# Patient Record
Sex: FEMALE | HISPANIC OR LATINO | Employment: PART TIME | URBAN - METROPOLITAN AREA
[De-identification: names, ages, dates, MRNs, and addresses within clinical notes are randomized per-mention and may not be internally consistent; named-entity substitution may affect disease eponyms.]

---

## 2017-08-08 ENCOUNTER — HOSPITAL ENCOUNTER (EMERGENCY)
Facility: CLINIC | Age: 22
Discharge: HOME | End: 2017-08-08
Attending: EMERGENCY MEDICINE | Admitting: EMERGENCY MEDICINE
Payer: COMMERCIAL

## 2017-08-08 VITALS
RESPIRATION RATE: 16 BRPM | HEART RATE: 78 BPM | SYSTOLIC BLOOD PRESSURE: 94 MMHG | DIASTOLIC BLOOD PRESSURE: 69 MMHG | WEIGHT: 116.13 LBS | TEMPERATURE: 98.1 F | OXYGEN SATURATION: 98 % | BODY MASS INDEX: 21.94 KG/M2

## 2017-08-08 DIAGNOSIS — L50.9 HIVES: ICD-10-CM

## 2017-08-08 PROCEDURE — 99282 EMERGENCY DEPT VISIT SF MDM: CPT | Performed by: EMERGENCY MEDICINE

## 2017-08-08 PROCEDURE — 99283 EMERGENCY DEPT VISIT LOW MDM: CPT | Mod: Z6 | Performed by: EMERGENCY MEDICINE

## 2017-08-08 PROCEDURE — 25000132 ZZH RX MED GY IP 250 OP 250 PS 637: Performed by: EMERGENCY MEDICINE

## 2017-08-08 RX ORDER — DIPHENHYDRAMINE HCL 25 MG
25 CAPSULE ORAL EVERY 6 HOURS PRN
Qty: 40 CAPSULE | Refills: 0 | Status: SHIPPED | OUTPATIENT
Start: 2017-08-08 | End: 2018-02-01

## 2017-08-08 RX ORDER — DIPHENHYDRAMINE HCL 25 MG
25 CAPSULE ORAL ONCE
Status: COMPLETED | OUTPATIENT
Start: 2017-08-08 | End: 2017-08-08

## 2017-08-08 RX ADMIN — DIPHENHYDRAMINE HYDROCHLORIDE 25 MG: 25 CAPSULE ORAL at 10:42

## 2017-08-08 ASSESSMENT — ENCOUNTER SYMPTOMS
ABDOMINAL PAIN: 0
NECK STIFFNESS: 0
TROUBLE SWALLOWING: 0
EYE REDNESS: 0
CONFUSION: 0
DIFFICULTY URINATING: 0
COLOR CHANGE: 0
SHORTNESS OF BREATH: 0
FEVER: 0
HEADACHES: 0
ARTHRALGIAS: 0

## 2017-08-08 NOTE — ED AVS SNAPSHOT
[facility], Emergency Department    [address]    [facility] [address]    Phone:  [phone]    Fax:  [phone]                                       Jaqueline Argueta   MRN: [id number]    Department:  [facility], Emergency Department   Date of Visit:  8/8/2017           After Visit Summary Signature Page     I have received my discharge instructions, and my questions have been answered. I have discussed any challenges I see with this plan with the nurse or doctor.    ..........................................................................................................................................  Patient/Patient Representative Signature      ..........................................................................................................................................  Patient Representative Print Name and Relationship to Patient    ..................................................               ................................................  Date                                            Time    ..........................................................................................................................................  Reviewed by Signature/Title    ...................................................              ..............................................  Date                                                            Time

## 2017-08-08 NOTE — DISCHARGE INSTRUCTIONS
Please make an appointment to follow up with [[facility] (phone: [phone])] [as soon as possible] [ even if entirely better.]  Hives (Adult)  Hives are pink or red bumps on the skin. These bumps are also known as wheals. The bumps can itch, burn, or sting. Hives can occur anywhere on the body. They vary in size and shape and can form in clusters. Individual hives can appear and go away quickly. New hives may develop as old ones fade. Hives are common and usually harmless. Occasionally hives are a sign of a serious allergy.  Hives are often caused by an allergic reaction. It may be an allergic reaction to foods such as fruit, shellfish, chocolate, nuts, or tomatoes. It may be a reaction to pollens, animal fur, or mold spores. Medicines, chemicals, and insect bites can also cause hives. And hives can be caused by hot sun or cold air. The cause of hives can be difficult to find.  You may be given medicines to relieve swelling and itching. Follow all instructions when using these medicines. The hives will usually fade in a few days, but can last up to 2 weeks.  Home care  Follow these tips:    Try to find the cause of the hives and eliminate it. Discuss possible causes with your healthcare provider. Future reactions to the same allergen may be worse.    Don t scratch the hives. Scratching will delay healing. To reduce itching, apply cool, wet compresses to the skin.    Dress in soft, loose cotton clothing.    Don t bathe in hot water. This can make the itching worse.    Apply an ice pack or cool pack wrapped in a thin towel to your skin. This will help reduce redness and itching. But if your hives were caused by exposure to cold, then do not apply more cold to them.    You may use over-the counter antihistamines to reduce itching. Some older antihistamines, such as diphenhydramine and chlorpheniramine, are inexpensive. But they need to be taken often and may make you sleepy. They are best  used at bedtime. Don t use diphenhydramine if you have glaucoma or have trouble urinating because of an enlarged prostate. Newer antihistamines, such as loratadine, cetirizine, and fexofenadine, are generally more expensive. But they tend to have fewer side effects, such as drowsiness. They can be taken less often.    Another type of antihistamine is used to treat heartburn. This type includes ranitidine, nizatidine, famotidine, and cimetidine. These are sometimes used along with the above antihistamines if a single medicine is not working.  Follow-up care  Follow up with your healthcare provider if your symptoms don't get better in 2 days. Ask your provider about allergy testing if you have had a severe reaction, or have had several episodes of hives. He or she can use the allergy testing to find out what you are allergic to.  When to seek medical advice  Call your healthcare provider right away if any of these occur:    Fever of 100.4 F (38.0 C) or higher, or as directed by your healthcare provider    Redness, swelling, or pain    Foul-smelling fluid coming from the rash  Call 911  Call 911 if any of the following occur:    Swelling of the face, throat, or tongue    Trouble breathing or swallowing    Dizziness, weakness, or fainting  Date Last Reviewed: 9/1/2016 2000-2017 [employer]. [address] [place] [address]. All rights reserved. This information is not intended as a substitute for professional medical care. Always follow your healthcare professional's instructions.

## 2017-08-08 NOTE — ED PROVIDER NOTES
History     Chief Complaint   Patient presents with     Rash     All over body     HPI  Jaqueline Argueta is a 21 year old female who presents to the Emergency Department for evaluation of a rash. For the past few months, the patient has been intermittently developing a diffuse rash which first starts with itching then large red blotches form. She is not aware how frequently the rash develops or how long it stays.  She thinks she has had at least 10 or more episodes this summer.  Her most recent episode developed about an hour and a half ago and is now starting to improve. She has not taken anything for her symptoms. She denies shortness of breath, trouble swallowing, eye itching or any other concerns or complaints at this time. No new medications.     Past Medical History:   Diagnosis Date     Anemia due to blood loss, acute 12/27/2011     NO ACTIVE PROBLEMS        Past Surgical History:   Procedure Laterality Date     none         Family History   Problem Relation Age of Onset     Family History Negative No family hx of        Social History   Substance Use Topics     Smoking status: Never Smoker     Smokeless tobacco: Never Used      Comment: smoke marijuana once or twice a wk     Alcohol use Yes      Comment: rare       No current facility-administered medications for this encounter.      Current Outpatient Prescriptions   Medication     diphenhydrAMINE (BENADRYL) 25 MG capsule     imiquimod (ALDARA) 5 % cream     levonorgestrel (MIRENA) 20 MCG/24HR IUD      No Known Allergies     I have reviewed the Medications, Allergies, Past Medical and Surgical History, and Social History in the Epic system.    Review of Systems   Constitutional: Negative for fever.   HENT: Negative for congestion and trouble swallowing.    Eyes: Negative for redness.   Respiratory: Negative for shortness of breath.    Cardiovascular: Negative for chest pain.   Gastrointestinal: Negative for abdominal pain.   Genitourinary: Negative for  difficulty urinating.   Musculoskeletal: Negative for arthralgias and neck stiffness.   Skin: Positive for rash (blotchy, itching, red, diffuse). Negative for color change.   Neurological: Negative for headaches.   Psychiatric/Behavioral: Negative for confusion.   All other systems reviewed and are negative.      Physical Exam   BP: 104/70  Pulse: 93  Temp: 98.4  F (36.9  C)  Resp: 16  Weight: 52.7 kg (116 lb 2 oz)  Physical Exam   Constitutional: No distress.   HENT:   Head: Atraumatic.   Mouth/Throat: Oropharynx is clear and moist. No oropharyngeal exudate.   Eyes: Pupils are equal, round, and reactive to light. No scleral icterus.   Cardiovascular: Normal heart sounds and intact distal pulses.    Pulmonary/Chest: Breath sounds normal. No respiratory distress.   Abdominal: Soft. Bowel sounds are normal. There is no tenderness.   Musculoskeletal: She exhibits no edema or tenderness.   Skin: Skin is warm. Rash noted. Rash is urticarial. She is not diaphoretic.                   ED Course     9:38 AM  The patient was seen and examined by Dr. Erwin in Room 20.     ED Course     Procedures               Labs Ordered and Resulted from Time of ED Arrival Up to the Time of Departure from the ED - No data to display         Assessments & Plan (with Medical Decision Making)   21-year-old female presents with several month history of waning and waxing urticaria.  History did not reveal obvious source or exposure.  Exam did confirm evidence of diffuse patchy urticaria.  Patient was given Benadryl in the emergency room.  She will be discharged with Benadryl and instructions to follow up with primary physician and perhaps dermatology as needed.    I have reviewed the nursing notes.    I have reviewed the findings, diagnosis, plan and need for follow up with the patient.    Discharge Medication List as of 8/8/2017 11:27 AM      START taking these medications    Details   diphenhydrAMINE (BENADRYL) 25 MG capsule Take 1 capsule (25  mg) by mouth every 6 hours as needed for itching or allergies, Disp-40 capsule, R-0, Local Print             Final diagnoses:   Hives     Aury RAHMAN, am serving as a trained medical scribe to document services personally performed by Pawan Erwin MD, based on the provider's statements to me.      Pawan RAHMAN MD, was physically present and have reviewed and verified the accuracy of this note documented by Aury Hernandez.     8/8/2017   [facility], [place], EMERGENCY DEPARTMENT     Pawan Erwin MD  08/08/17 [id number]

## 2017-08-08 NOTE — ED AVS SNAPSHOT
[facility], Emergency Department    [address] [place] [address]    Phone:  [phone]    Fax:  [phone]                                       Jaqueline Argueta   MRN: [id number]    Department:  [facility], Emergency Department   Date of Visit:  8/8/2017           Patient Information     Date Of Birth          1995        Your diagnoses for this visit were:     Hives        You were seen by Pawan Erwin MD.        Discharge Instructions         Please make an appointment to follow up with [[facility] (phone: [phone])] [as soon as possible] [ even if entirely better.]  Hives (Adult)  Hives are pink or red bumps on the skin. These bumps are also known as wheals. The bumps can itch, burn, or sting. Hives can occur anywhere on the body. They vary in size and shape and can form in clusters. Individual hives can appear and go away quickly. New hives may develop as old ones fade. Hives are common and usually harmless. Occasionally hives are a sign of a serious allergy.  Hives are often caused by an allergic reaction. It may be an allergic reaction to foods such as fruit, shellfish, chocolate, nuts, or tomatoes. It may be a reaction to pollens, animal fur, or mold spores. Medicines, chemicals, and insect bites can also cause hives. And hives can be caused by hot sun or cold air. The cause of hives can be difficult to find.  You may be given medicines to relieve swelling and itching. Follow all instructions when using these medicines. The hives will usually fade in a few days, but can last up to 2 weeks.  Home care  Follow these tips:    Try to find the cause of the hives and eliminate it. Discuss possible causes with your healthcare provider. Future reactions to the same allergen may be worse.    Don t scratch the hives. Scratching will delay healing. To reduce itching, apply cool, wet compresses to the skin.    Dress in soft, loose cotton clothing.    Don t bathe  in hot water. This can make the itching worse.    Apply an ice pack or cool pack wrapped in a thin towel to your skin. This will help reduce redness and itching. But if your hives were caused by exposure to cold, then do not apply more cold to them.    You may use over-the counter antihistamines to reduce itching. Some older antihistamines, such as diphenhydramine and chlorpheniramine, are inexpensive. But they need to be taken often and may make you sleepy. They are best used at bedtime. Don t use diphenhydramine if you have glaucoma or have trouble urinating because of an enlarged prostate. Newer antihistamines, such as loratadine, cetirizine, and fexofenadine, are generally more expensive. But they tend to have fewer side effects, such as drowsiness. They can be taken less often.    Another type of antihistamine is used to treat heartburn. This type includes ranitidine, nizatidine, famotidine, and cimetidine. These are sometimes used along with the above antihistamines if a single medicine is not working.  Follow-up care  Follow up with your healthcare provider if your symptoms don't get better in 2 days. Ask your provider about allergy testing if you have had a severe reaction, or have had several episodes of hives. He or she can use the allergy testing to find out what you are allergic to.  When to seek medical advice  Call your healthcare provider right away if any of these occur:    Fever of 100.4 F (38.0 C) or higher, or as directed by your healthcare provider    Redness, swelling, or pain    Foul-smelling fluid coming from the rash  Call 911  Call 911 if any of the following occur:    Swelling of the face, throat, or tongue    Trouble breathing or swallowing    Dizziness, weakness, or fainting  Date Last Reviewed: 9/1/2016 2000-2017 [employer]. [address] [place] [address], [place] [address]. All rights reserved. This information is not intended as a substitute for professional medical care.  Always follow your healthcare professional's instructions.          24 Hour Appointment Hotline       To make an appointment at any [facility], call [phone] ([phone]). If you don't have a family doctor or clinic, we will help you find one. [facility] clinics are conveniently located to serve the needs of you and your family.             Review of your medicines      START taking        Dose / Directions Last dose taken    diphenhydrAMINE 25 MG capsule   Commonly known as:  BENADRYL   Dose:  25 mg   Quantity:  40 capsule        Take 1 capsule (25 mg) by mouth every 6 hours as needed for itching or allergies   Refills:  0          Our records show that you are taking the medicines listed below. If these are incorrect, please call your family doctor or clinic.        Dose / Directions Last dose taken    imiquimod 5 % cream   Commonly known as:  ALDARA   Quantity:  12 packet        Apply a small sized amount to warts or molluscum three times weekly at bedtime.   Wash off after 8 hours.   May use for up to 16 weeks.   Refills:  3        MIRENA (52 MG) 20 MCG/24HR IUD   Dose:  1 each   Generic drug:  levonorgestrel        1 each by Intrauterine route once.   Refills:  0                Prescriptions were sent or printed at these locations (1 Prescription)                   Other Prescriptions                Printed at Department/Unit printer (1 of 1)         diphenhydrAMINE (BENADRYL) 25 MG capsule                Orders Needing Specimen Collection     None      Pending Results     No orders found from 8/6/2017 to 8/9/2017.            Pending Culture Results     No orders found from 8/6/2017 to 8/9/2017.            Pending Results Instructions     If you had any lab results that were not finalized at the time of your Discharge, you can call the ED Lab Result RN at [phone]. You will be contacted by this team for any positive Lab results or changes in treatment. The nurses are available 7 days a week  "from 10A to 6:30P.  You can leave a message 24 hours per day and they will return your call.        Thank you for choosing [facility]       Thank you for choosing [facility] for your care. Our goal is always to provide you with excellent care. Hearing back from our patients is one way we can continue to improve our services. Please take a few minutes to complete the written survey that you may receive in the mail after you visit with us. Thank you!        [employer] Information     [employer] lets you send messages to your doctor, view your test results, renew your prescriptions, schedule appointments and more. To sign up, go to [email or web address] . Click on \"Log in\" on the left side of the screen, which will take you to the Welcome page. Then click on \"Sign up Now\" on the right side of the page.     You will be asked to enter the access code listed below, as well as some personal information. Please follow the directions to create your username and password.     Your access code is: [email or web address]  Expires: 2017 11:27 AM     Your access code will  in 90 days. If you need help or a new code, please call your [facility] clinic or [phone].        Care EveryWhere ID     This is your Care EveryWhere ID. This could be used by other organizations to access your [facility] medical records  [id number]        Equal Access to Services     BARRY GARNICA : Hadii jaelyn clement Sosoila, waaxda luqadaha, qaybta kaalmada alen, joss diaz. So [facility] [phone].    ATENCIÓN: Si habla español, tiene a pan disposición servicios gratuitos de asistencia lingüística. Llame al [phone].    We comply with applicable federal civil rights laws and [place] laws. We do not discriminate on the basis of race, color, national origin, age, disability sex, sexual orientation or gender identity.            After Visit Summary       This is your record. Keep this with you and show to your community " pharmacist(s) and doctor(s) at your next visit.

## 2017-10-31 ENCOUNTER — OFFICE VISIT (OUTPATIENT)
Dept: OBGYN | Facility: CLINIC | Age: 22
End: 2017-10-31
Payer: COMMERCIAL

## 2017-10-31 VITALS
BODY MASS INDEX: 21.73 KG/M2 | DIASTOLIC BLOOD PRESSURE: 59 MMHG | WEIGHT: 115 LBS | TEMPERATURE: 97.1 F | HEART RATE: 80 BPM | SYSTOLIC BLOOD PRESSURE: 103 MMHG

## 2017-10-31 DIAGNOSIS — N76.0 BV (BACTERIAL VAGINOSIS): ICD-10-CM

## 2017-10-31 DIAGNOSIS — N89.8 VAGINAL DISCHARGE: ICD-10-CM

## 2017-10-31 DIAGNOSIS — Z30.09 EMERGENCY CONTRACEPTIVE COUNSELING: ICD-10-CM

## 2017-10-31 DIAGNOSIS — Z30.432 ENCOUNTER FOR IUD REMOVAL: Primary | ICD-10-CM

## 2017-10-31 DIAGNOSIS — Z11.3 SCREEN FOR STD (SEXUALLY TRANSMITTED DISEASE): ICD-10-CM

## 2017-10-31 DIAGNOSIS — B96.89 BV (BACTERIAL VAGINOSIS): ICD-10-CM

## 2017-10-31 LAB
SPECIMEN SOURCE: ABNORMAL
WET PREP SPEC: ABNORMAL

## 2017-10-31 PROCEDURE — 86780 TREPONEMA PALLIDUM: CPT | Performed by: OBSTETRICS & GYNECOLOGY

## 2017-10-31 PROCEDURE — 87210 SMEAR WET MOUNT SALINE/INK: CPT | Performed by: OBSTETRICS & GYNECOLOGY

## 2017-10-31 PROCEDURE — 58301 REMOVE INTRAUTERINE DEVICE: CPT | Performed by: OBSTETRICS & GYNECOLOGY

## 2017-10-31 PROCEDURE — 86803 HEPATITIS C AB TEST: CPT | Performed by: OBSTETRICS & GYNECOLOGY

## 2017-10-31 PROCEDURE — 87491 CHLMYD TRACH DNA AMP PROBE: CPT | Performed by: OBSTETRICS & GYNECOLOGY

## 2017-10-31 PROCEDURE — 87591 N.GONORRHOEAE DNA AMP PROB: CPT | Performed by: OBSTETRICS & GYNECOLOGY

## 2017-10-31 PROCEDURE — 87389 HIV-1 AG W/HIV-1&-2 AB AG IA: CPT | Performed by: OBSTETRICS & GYNECOLOGY

## 2017-10-31 PROCEDURE — 36415 COLL VENOUS BLD VENIPUNCTURE: CPT | Performed by: OBSTETRICS & GYNECOLOGY

## 2017-10-31 PROCEDURE — 99213 OFFICE O/P EST LOW 20 MIN: CPT | Mod: 25 | Performed by: OBSTETRICS & GYNECOLOGY

## 2017-10-31 PROCEDURE — 87340 HEPATITIS B SURFACE AG IA: CPT | Performed by: OBSTETRICS & GYNECOLOGY

## 2017-10-31 NOTE — NURSING NOTE
"Chief Complaint   Patient presents with     IUD     removal  and std check       Initial /59  Pulse 80  Temp 97.1  F (36.2  C) (Oral)  Wt 115 lb (52.2 kg)  BMI 21.73 kg/m2 Estimated body mass index is 21.73 kg/(m^2) as calculated from the following:    Height as of 3/21/16: 5' 1\" (1.549 m).    Weight as of this encounter: 115 lb (52.2 kg).  BP completed using cuff size: regular        The following HM Due: NONE      The following patient reported/Care Every where data was sent to:  P ABSTRACT QUALITY INITIATIVES [04116]  HELEN Navarro          "

## 2017-10-31 NOTE — MR AVS SNAPSHOT
"              After Visit Summary   10/31/2017    Jaqueline Argueta    MRN: [id number]           Patient Information     Date Of Birth          1995        Visit Information        Provider Department      10/31/2017 2:30 PM Fozia Payne MD [facility]        Today's Diagnoses     Encounter for IUD removal    -  1    Screen for STD (sexually transmitted disease)        Vaginal discharge        Emergency contraceptive counseling           Follow-ups after your visit        Who to contact     If you have questions or need follow up information about today's clinic visit or your schedule please contact [facility] directly at [phone].  Normal or non-critical lab and imaging results will be communicated to you by [employer], letter or phone within 4 business days after the clinic has received the results. If you do not hear from us within 7 days, please contact the clinic through [employer] or phone. If you have a critical or abnormal lab result, we will notify you by phone as soon as possible.  Submit refill requests through [employer] or call your pharmacy and they will forward the refill request to us. Please allow 3 business days for your refill to be completed.          Additional Information About Your Visit        MyChart Information     [employer] lets you send messages to your doctor, view your test results, renew your prescriptions, schedule appointments and more. To sign up, go to [email or web address] . Click on \"Log in\" on the left side of the screen, which will take you to the Welcome page. Then click on \"Sign up Now\" on the right side of the page.     You will be asked to enter the access code listed below, as well as some personal information. Please follow the directions to create your username and password.     Your access code is: [email or web address]  Expires: 2017 11:27 AM     Your access code will  in 90 days. If you need help or a new code, please call " your [place] clinic or [phone].        Care EveryWhere ID     This is your Care EveryWhere ID. This could be used by other organizations to access your [facility] medical records  [id number]        Your Vitals Were     Pulse Temperature BMI (Body Mass Index)             80 97.1  F (36.2  C) (Oral) 21.73 kg/m2          Blood Pressure from Last 3 Encounters:   10/31/17 103/59   08/08/17 94/69   12/05/16 108/75    Weight from Last 3 Encounters:   10/31/17 115 lb (52.2 kg)   08/08/17 116 lb 2 oz (52.7 kg) (26 %)*   12/05/16 121 lb 12.8 oz (55.2 kg) (40 %)*     * Growth percentiles are based on [facility] 2-20 Years data.              We Performed the Following     Anti Treponema     CHLAMYDIA TRACHOMATIS PCR     Hepatitis B surface antigen     Hepatitis C antibody     HIV Antigen Antibody Combo     NEISSERIA GONORRHOEA PCR     REMOVE INTRAUTERINE DEVICE     Wet prep        Primary Care Provider    Physician No Ref-Primary       NO REF-PRIMARY PHYSICIAN        Equal Access to Services     BARRY GARNICA : Hadii aad ku hadasho Soomaali, waaxda luqadaha, qaybta kaalmada adeegyada, joss ortega . So [facility] [phone].    ATENCIÓN: Si habla español, tiene a pan disposición servicios gratuitos de asistencia lingüística. Llame al [phone].    We comply with applicable federal civil rights laws and [place] laws. We do not discriminate on the basis of race, color, national origin, age, disability, sex, sexual orientation, or gender identity.            Thank you!     Thank you for choosing [facility]  for your care. Our goal is always to provide you with excellent care. Hearing back from our patients is one way we can continue to improve our services. Please take a few minutes to complete the written survey that you may receive in the mail after your visit with us. Thank you!             Your Updated Medication List - Protect others around you: Learn how to safely use, store and  throw away your medicines at www.disposemymeds.org.          This list is accurate as of: 10/31/17  3:35 PM.  Always use your most recent med list.                   Brand Name Dispense Instructions for use Diagnosis    diphenhydrAMINE 25 MG capsule    BENADRYL    40 capsule    Take 1 capsule (25 mg) by mouth every 6 hours as needed for itching or allergies        imiquimod 5 % cream    ALDARA    12 packet    Apply a small sized amount to warts or molluscum three times weekly at bedtime.   Wash off after 8 hours.   May use for up to 16 weeks.    Condyloma acuminata       MIRENA (52 MG) 20 MCG/24HR IUD   Generic drug:  levonorgestrel      1 each by Intrauterine route once.    Well woman exam with routine gynecological exam

## 2017-10-31 NOTE — PROGRESS NOTES
GYN CLINIC VISIT  10/31/2017     CC: IUD removal    HPI:   Jaqueline Argueta is a 22 year old  who presents to clinic today for an IUD removal.  She had a Mirena inserted on 3/19/12 for contraception. She desires removal of IUD because of cramping and irregular spotting.   Her bleeding pattern with IUD is irregular spotting. She is currently sexually active with female partners and does not need contraception.     Additionally, she requests STI testing today. She reports recent sexual activity at a party with a female partner she does not know. Since then she has had increased white vaginal discharge with a different odor. No irritation or itching. She has a history of chlamydia in . Aside from the cramping, she denies pelvic pain. No nausea, vomiting or fever.     Obstetric History       T1      L1     SAB0   TAB0   Ectopic0   Multiple0   Live Births1       # Outcome Date GA Lbr Paul/2nd Weight Sex Delivery Anes PTL Lv   1 Term 11 39w1d 02:05 / 00:11 6 lb 9 oz (2.977 kg) F Vag-Spont None N SONG      Name: LIZZY ARGUETA      Apgar1:  9                Apgar5: 9        Past Medical History:   Diagnosis Date     Anemia due to blood loss, acute 2011     NO ACTIVE PROBLEMS      Past Surgical History:   Procedure Laterality Date     none       Family History   Problem Relation Age of Onset     Family History Negative No family hx of      Social History   Substance Use Topics     Smoking status: Never Smoker     Smokeless tobacco: Never Used      Comment: smoke marijuana once or twice a wk     Alcohol use Yes      Comment: rare   Lives alone. Drinks alcohol socially and smokes marijuana a few times per week. Has joint custody of her daughter. Unemployed. Feels safe.     Review of Systems  Gen: no change in weight, no fever, no chills, no fatigue  CV: no palpitations, no chest pain, no hypertension, no syncope  Resp: no shortness of breath, no cough, no wheezing, no asthma  GI: no  nausea, no vomiting, no diarrhea, no constipation, no bloating, no GERD  : + vaginal discharge, + pelvic cramping, +spotting, no dysuria  Endo: no thyroid problems, no cold/heat intolerance, no acne, no hirsutism, no diabetes  Heme: no easy bruising or bleeding, no history of DVT/PE/CVA  Neuro: no headaches, no seizures, no strokes, no focal deficits    Reviewed medications and allergies. Changes made in EPIC.     OBJECTIVE:   Vitals:    10/31/17 1452   BP: 103/59   Pulse: 80   Temp: 97.1  F (36.2  C)   TempSrc: Oral   Weight: 115 lb (52.2 kg)     Body mass index is 21.73 kg/(m^2).     Gen: alert, oriented, no distress, cooperative and pleasant  Abd: soft, nontender, nondistended, normoactive bowel sounds, no masses, no scars  : normal external genitalia without lesions or abnormalities. Normal Bartholin's, normal [place]'s, normal urethra. Normal vaginal mucosa. Moderate amount of thin white discharge present. No lesions. Cervix appears normal, no lesions or erythema. IUD strings not visible. Bimanual exam reveals 6 week sized anteverted mobile uterus, no cervical motion tenderness, no uterine tenderness, no adnexal masses.  Extremities: multiple bruises and scrapes on knees    PROCEDURE: IUD REMOVAL  Patient has verbalized understanding of risks and benefits. All questions answered. She has signed the consent form.      A medium rick speculum was placed in the vagina with good visualization of the cervix.  The IUD strings NOT visualized. Endocervical brush used to bring strings into view. IUD strings grasped with sterile ring forceps. Gentle traction applied and IUD removed intact without difficulty. There were no complications. The patient tolerated the procedure well.       ASSESSMENT:   Jaqueline Argueta is a 22 year old  here for IUD removal and STI testing.    PLAN:  1. Encounter for IUD removal  - REMOVE INTRAUTERINE DEVICE    2. Screen for STD (sexually transmitted disease)  - NEISSERIA  GONORRHOEA PCR  - CHLAMYDIA TRACHOMATIS PCR  - Wet prep  - HIV Antigen Antibody Combo  - Anti Treponema  - Hepatitis B surface antigen  - Hepatitis C antibody    3. Vaginal discharge  - Wet prep    4. Emergency contraceptive counseling  Discussed emergency contraception if she becomes sexually active with male partner in the future and fails to use contraception. Currently sexually active with female partners and not needed.      Fozia Payne MD

## 2017-11-01 LAB
C TRACH DNA SPEC QL NAA+PROBE: NEGATIVE
HBV SURFACE AG SERPL QL IA: NONREACTIVE
HCV AB SERPL QL IA: NONREACTIVE
HIV 1+2 AB+HIV1 P24 AG SERPL QL IA: NONREACTIVE
N GONORRHOEA DNA SPEC QL NAA+PROBE: NEGATIVE
SPECIMEN SOURCE: NORMAL
SPECIMEN SOURCE: NORMAL
T PALLIDUM IGG+IGM SER QL: NEGATIVE

## 2017-11-01 RX ORDER — METRONIDAZOLE 500 MG/1
500 TABLET ORAL 2 TIMES DAILY
Qty: 14 TABLET | Refills: 0 | Status: SHIPPED | OUTPATIENT
Start: 2017-11-01 | End: 2018-02-01

## 2018-02-01 ENCOUNTER — OFFICE VISIT (OUTPATIENT)
Dept: OBGYN | Facility: CLINIC | Age: 23
End: 2018-02-01
Payer: COMMERCIAL

## 2018-02-01 VITALS
HEIGHT: 61 IN | BODY MASS INDEX: 22.84 KG/M2 | TEMPERATURE: 98.1 F | SYSTOLIC BLOOD PRESSURE: 114 MMHG | DIASTOLIC BLOOD PRESSURE: 69 MMHG | WEIGHT: 121 LBS

## 2018-02-01 DIAGNOSIS — K59.00 CONSTIPATION, UNSPECIFIED CONSTIPATION TYPE: ICD-10-CM

## 2018-02-01 DIAGNOSIS — Z01.419 ENCOUNTER FOR GYNECOLOGICAL EXAMINATION WITHOUT ABNORMAL FINDING: Primary | ICD-10-CM

## 2018-02-01 LAB — BETA HCG QUAL IFA URINE: NEGATIVE

## 2018-02-01 PROCEDURE — 87491 CHLMYD TRACH DNA AMP PROBE: CPT | Performed by: OBSTETRICS & GYNECOLOGY

## 2018-02-01 PROCEDURE — 87591 N.GONORRHOEAE DNA AMP PROB: CPT | Performed by: OBSTETRICS & GYNECOLOGY

## 2018-02-01 PROCEDURE — 84703 CHORIONIC GONADOTROPIN ASSAY: CPT | Performed by: OBSTETRICS & GYNECOLOGY

## 2018-02-01 PROCEDURE — 99395 PREV VISIT EST AGE 18-39: CPT | Performed by: OBSTETRICS & GYNECOLOGY

## 2018-02-01 RX ORDER — AMOXICILLIN 250 MG
1-2 CAPSULE ORAL 2 TIMES DAILY PRN
Qty: 60 TABLET | Refills: 1 | Status: SHIPPED | OUTPATIENT
Start: 2018-02-01 | End: 2018-04-14

## 2018-02-01 RX ORDER — PRENATAL VIT/IRON FUM/FOLIC AC 27MG-0.8MG
1 TABLET ORAL DAILY
Qty: 100 TABLET | Refills: 3 | Status: SHIPPED | OUTPATIENT
Start: 2018-02-01 | End: 2018-10-02

## 2018-02-01 NOTE — MR AVS SNAPSHOT
"              After Visit Summary   2018    Jaqueline Argueta    MRN: [id number]           Patient Information     Date Of Birth          1995        Visit Information        Provider Department      2018 2:30 PM Fozia Payne [place] [facility] [place]        Today's Diagnoses     Encounter for gynecological examination without abnormal finding    -  1    Constipation, unspecified constipation type           Follow-ups after your visit        Who to contact     If you have questions or need follow up information about today's clinic visit or your schedule please contact [facility] directly at [phone].  Normal or non-critical lab and imaging results will be communicated to you by Ener[employer], letter or phone within 4 business days after the clinic has received the results. If you do not hear from us within 7 days, please contact the clinic through Enerpulse[employer] or phone. If you have a critical or abnormal lab result, we will notify you by phone as soon as possible.  Submit refill requests through [employer] or call your pharmacy and they will forward the refill request to us. Please allow 3 business days for your refill to be completed.          Additional Information About Your Visit        MyChart Information     [employer] lets you send messages to your doctor, view your test results, renew your prescriptions, schedule appointments and more. To sign up, go to [email or web address] . Click on \"Log in\" on the left side of the screen, which will take you to the Welcome page. Then click on \"Sign up Now\" on the right side of the page.     You will be asked to enter the access code listed below, as well as some personal information. Please follow the directions to create your username and password.     Your access code is: [email or web address]  Expires: 2018  6:27 PM     Your access code will  in 90 days. If you need help or a new code, please call your [facility] or " "[phone].        Care EveryWhere ID     This is your Care EveryWhere ID. This could be used by other organizations to access your [place] medical records  [id number]        Your Vitals Were     Temperature Height Last Period BMI (Body Mass Index)          98.1  F (36.7  C) (Oral) 5' 1\" (1.549 m) 01/10/2018 (Approximate) 22.86 kg/m2         Blood Pressure from Last 3 Encounters:   02/01/18 114/69   10/31/17 103/59   08/08/17 94/69    Weight from Last 3 Encounters:   02/01/18 121 lb (54.9 kg)   10/31/17 115 lb (52.2 kg)   08/08/17 116 lb 2 oz (52.7 kg) (26 %)*     * Growth percentiles are based on [facility] 2-20 Years data.              We Performed the Following     Beta HCG qual IFA urine     CHLAMYDIA TRACHOMATIS PCR     NEISSERIA GONORRHOEA PCR          Today's Medication Changes          These changes are accurate as of 2/1/18 11:59 PM.  If you have any questions, ask your nurse or doctor.               Start taking these medicines.        Dose/Directions    prenatal multivitamin plus iron 27-0.8 MG Tabs per tablet   Used for:  Encounter for gynecological examination without abnormal finding   Started by:  Fozia Payne MD        Dose:  1 tablet   Take 1 tablet by mouth daily   Quantity:  100 tablet   Refills:  3       senna-docusate 8.6-50 MG per tablet   Commonly known as:  SENOKOT-S;PERICOLACE   Used for:  Constipation, unspecified constipation type   Started by:  Fozia Payne MD        Dose:  1-2 tablet   Take 1-2 tablets by mouth 2 times daily as needed for constipation   Quantity:  60 tablet   Refills:  1         Stop taking these medicines if you haven't already. Please contact your care team if you have questions.     diphenhydrAMINE 25 MG capsule   Commonly known as:  BENADRYL   Stopped by:  Fozia Payne MD           imiquimod 5 % cream   Commonly known as:  ALDARA   Stopped by:  Fozia Payne MD           metroNIDAZOLE 500 MG tablet   Commonly known as: "  FLAGYL   Stopped by:  Fozia Payne MD           MIRENA (52 MG) 20 MCG/24HR IUD   Generic drug:  levonorgestrel   Stopped by:  Fozia Payne MD                Where to get your medicines      These medications were sent to [facility] [place], [place] - [address]  [address], [facility] [address]     Phone:  [phone]     prenatal multivitamin plus iron 27-0.8 MG Tabs per tablet    senna-docusate 8.6-50 MG per tablet                Primary Care Provider Fax #    Physician No Ref-Primary [phone]       No address on file        Equal Access to Services     REECE GARNICA : Hadii jaelyn Payne, waaxda luqadaha, qaybta kaalmada armenyadusty, joss diaz. So [facility] [phone].    ATENCIÓN: Si habla español, tiene a pan disposición servicios gratuitos de asistencia lingüística. Llame al [phone].    We comply with applicable federal civil rights laws and [place] laws. We do not discriminate on the basis of race, color, national origin, age, disability, sex, sexual orientation, or gender identity.            Thank you!     Thank you for choosing [facility]  for your care. Our goal is always to provide you with excellent care. Hearing back from our patients is one way we can continue to improve our services. Please take a few minutes to complete the written survey that you may receive in the mail after your visit with us. Thank you!             Your Updated Medication List - Protect others around you: Learn how to safely use, store and throw away your medicines at www.disposemymeds.org.          This list is accurate as of 2/1/18 11:59 PM.  Always use your most recent med list.                   Brand Name Dispense Instructions for use Diagnosis    prenatal multivitamin plus iron 27-0.8 MG Tabs per tablet     100 tablet    Take 1 tablet by mouth daily    Encounter for gynecological examination without  abnormal finding       senna-docusate 8.6-50 MG per tablet    SENOKOT-S;PERICOLACE    60 tablet    Take 1-2 tablets by mouth 2 times daily as needed for constipation    Constipation, unspecified constipation type

## 2018-02-01 NOTE — PROGRESS NOTES
GYN CLINIC VISIT  2018  CC: annual exam    HPI:  Jaqueline is a 22 year old  female who presents for annual exam.     Menses are irregular and normal lasting 4 days.  Menses flow: normal.  Patient's last menstrual period was 01/10/2018 (approximate).. Using none for contraception.  She is currently considering pregnancy.    Besides routine health maintenance,  she would like to discuss bloating.  1 week of abdominal pain, bloating and constipation. No n/v.     GYNECOLOGIC HISTORY:  Menarche: 9-10  Jaqueline is sexually active with 1 male partner(s) and is currently in monogamous relationship.    History sexually transmitted infections:Genital warts  STI testing offered?  Declined  BORIS exposure: No  History of abnormal Pap smear: NO - under age 21, PAP not appropriate for age  Family history of breast CA: No  Family history of uterine/ovarian CA: No  Family history of colon CA: No    HEALTH MAINTENANCE:  Cholesterol: (No results found for: CHOL History of abnormal lipids: No  Mammo: no hx  . History of abnormal Mammo: Not applicable.  Regular Self Breast Exams: No  Calcium/Vitamin D intake: source:  dairy Adequate? Yes  TSH: (No results found for: TSH )  Pap; (No results found for: PAP )    HISTORY:  Obstetric History       T1      L1     SAB0   TAB0   Ectopic0   Multiple0   Live Births1       # Outcome Date GA Lbr Paul/2nd Weight Sex Delivery Anes PTL Lv   1 Term 11 39w1d 02:05 / 00:11 6 lb 9 oz (2.977 kg) F Vag-Spont None N SONG      Name: LIZZY VALADEZ      Apgar1:  9                Apgar5: 9        Past Medical History:   Diagnosis Date     Anemia due to blood loss, acute 2011     NO ACTIVE PROBLEMS      Past Surgical History:   Procedure Laterality Date     none       Family History   Problem Relation Age of Onset     Family History Negative No family hx of      Social History   Lives with mom and sister.   Not working.  Social History     Marital status: Single      "Spouse name: N/A     Number of children: N/A     Years of education: N/A     Social History Main Topics     Smoking status: Never Smoker     Smokeless tobacco: Never Used      Comment: smoke marijuana once or twice a wk     Alcohol use Yes      Comment: rare     Drug use: Yes     Special: Marijuana      Comment: marijuana once or twice a wk     Sexual activity: Yes     Partners: Male     Birth control/ protection: IUD     Other Topics Concern     Not on file     Social History Narrative    FAMILY INFORMATION     Date: 2009    Parent #1      Name: Shayy   Gender: female   : 1970        Siblings:      Name: Presley    : 1991    Name: Lisa Baron    : 1991    Name: Monse    : 1992    Name: Jaqueline    : 1995            Relationship Status of Parent(s): single    Who does the child live with? mother                     No current outpatient prescriptions on file.   No Known Allergies    Past medical, surgical, social and family history were reviewed and updated in EPIC.    ROS:   C:     NEGATIVE for fever, chills, change in weight  I:       NEGATIVE for worrisome rashes, moles or lesions  E:     NEGATIVE for vision changes or irritation  E/M: NEGATIVE for ear, mouth and throat problems  R:     NEGATIVE for significant cough or SOB  CV:   NEGATIVE for chest pain, palpitations or peripheral edema  GI:     NEGATIVE for nausea, abdominal pain, heartburn, or change in bowel habits  :   NEGATIVE for frequency, dysuria, hematuria, vaginal discharge, or irregular bleeding  M:     NEGATIVE for significant arthralgias or myalgia  N:      NEGATIVE for weakness, dizziness or paresthesias  E:      NEGATIVE for temperature intolerance, skin/hair changes  P:      NEGATIVE for changes in mood or affect.    EXAM:  /69  Temp 98.1  F (36.7  C) (Oral)  Ht 5' 1\" (1.549 m)  Wt 121 lb (54.9 kg)  LMP 01/10/2018 (Approximate)  BMI 22.86 kg/m2   BMI: Body mass index is " 22.86 kg/(m^2).  Constitutional: healthy, alert and no distress  Head: Normocephalic. No masses, lesions, tenderness or abnormalities  Neck: Neck supple. Trachea midline. No adenopathy. Thyroid symmetric, normal size.   Cardiovascular: RRR. Normal S1 and S2  Respiratory: clear bilaterally.   Breast: No nodularity, asymmetry or nipple discharge bilaterally.  Gastrointestinal: Abdomen soft, non-tender, non-distended. No masses, organomegaly.  :  Vulva:  No external lesions, normal female hair distribution, no inguinal adenopathy.    Urethra:  Midline, non-tender, well supported, no discharge  Vagina:  Moist, pink, no abnormal discharge, no lesions  Uterus:  Normal size, non-tender, freely mobile  Ovaries:  No masses appreciated, non-tender, mobile  Rectal Exam: deferred  Musculoskeletal: extremities normal  Skin: no suspicious lesions or rashes  Psychiatric: Affect appropriate, cooperative,mentation appears normal.     COUNSELING:   Reviewed preventive health counseling, as reflected in patient instructions       Regular exercise       Healthy diet/nutrition       Contraception       Family planning       Safe sex practices/STD prevention   reports that she has never smoked. She has never used smokeless tobacco.    Body mass index is 22.86 kg/(m^2).    FRAX Risk Assessment    ASSESSMENT:  22 year old female with satisfactory annual exam  (Z01.419) Encounter for gynecological examination without abnormal finding  (primary encounter diagnosis)  Plan: NEISSERIA GONORRHOEA PCR, CHLAMYDIA TRACHOMATIS        PCR, Prenatal Vit-Fe Fumarate-FA (PRENATAL         MULTIVITAMIN PLUS IRON) 27-0.8 MG TABS per         tablet, Beta HCG qual IFA urine            (K59.00) Constipation, unspecified constipation type  Plan: senna-docusate (SENOKOT-S;PERICOLACE) 8.6-50 MG        per tablet          Fozia Payne MD

## 2018-02-01 NOTE — LETTER
[facility] [place]  [address] [place] [address] [address]  [facility] [address] [phone]      February 5, 2018      Jaqueline Argueta  [address] [facility] [address]              Dear Jaqueline,    Your recent gonorrhea and chlamydia cultures were negative.  If you have any questions please call the nurse line at [phone].      Sincerely,      Fozia Payne MD  W

## 2018-02-01 NOTE — NURSING NOTE
"Chief Complaint   Patient presents with     Physical     feeling bloated since last weekend. yesterday stomach started hurting bad, by bladder. not as bad today. upper abd today. tried using restroom, sometimes can, sometimes nothing comes out.        Initial /69  Temp 98.1  F (36.7  C) (Oral)  Ht 5' 1\" (1.549 m)  Wt 121 lb (54.9 kg)  LMP 01/10/2018 (Approximate)  BMI 22.86 kg/m2 Estimated body mass index is 22.86 kg/(m^2) as calculated from the following:    Height as of this encounter: 5' 1\" (1.549 m).    Weight as of this encounter: 121 lb (54.9 kg).  BP completed using cuff size: regular        The following HM Due: NONE      The following patient reported/Care Every where data was sent to:  P ABSTRACT QUALITY INITIATIVES [48107]        patient has appointment for today    Wen Asif CMA                "

## 2018-02-04 LAB
C TRACH DNA SPEC QL NAA+PROBE: NEGATIVE
N GONORRHOEA DNA SPEC QL NAA+PROBE: NEGATIVE
SPECIMEN SOURCE: NORMAL
SPECIMEN SOURCE: NORMAL

## 2018-03-27 ENCOUNTER — HOSPITAL ENCOUNTER (EMERGENCY)
Facility: CLINIC | Age: 23
Discharge: HOME | End: 2018-03-27
Attending: EMERGENCY MEDICINE | Admitting: EMERGENCY MEDICINE
Payer: COMMERCIAL

## 2018-03-27 ENCOUNTER — PRENATAL OFFICE VISIT (OUTPATIENT)
Dept: NURSING | Facility: CLINIC | Age: 23
End: 2018-03-27
Payer: COMMERCIAL

## 2018-03-27 VITALS
SYSTOLIC BLOOD PRESSURE: 103 MMHG | TEMPERATURE: 98.5 F | OXYGEN SATURATION: 99 % | RESPIRATION RATE: 16 BRPM | BODY MASS INDEX: 22.82 KG/M2 | HEART RATE: 81 BPM | WEIGHT: 120.8 LBS | DIASTOLIC BLOOD PRESSURE: 50 MMHG

## 2018-03-27 VITALS
SYSTOLIC BLOOD PRESSURE: 104 MMHG | HEIGHT: 61 IN | HEART RATE: 79 BPM | DIASTOLIC BLOOD PRESSURE: 67 MMHG | WEIGHT: 119.2 LBS | BODY MASS INDEX: 22.51 KG/M2

## 2018-03-27 DIAGNOSIS — Z34.90 SUPERVISION OF NORMAL PREGNANCY: Primary | ICD-10-CM

## 2018-03-27 DIAGNOSIS — O21.0 HYPEREMESIS GRAVIDARUM: ICD-10-CM

## 2018-03-27 PROBLEM — Z23 NEED FOR TDAP VACCINATION: Status: ACTIVE | Noted: 2018-03-27

## 2018-03-27 LAB
ABO + RH BLD: NORMAL
ABO + RH BLD: NORMAL
ALBUMIN UR-MCNC: ABNORMAL MG/DL
ANION GAP SERPL CALCULATED.3IONS-SCNC: 9 MMOL/L (ref 3–14)
APPEARANCE UR: CLEAR
BILIRUB UR QL STRIP: ABNORMAL
BLD GP AB SCN SERPL QL: NORMAL
BLOOD BANK CMNT PATIENT-IMP: NORMAL
BUN SERPL-MCNC: 6 MG/DL (ref 7–30)
CALCIUM SERPL-MCNC: 8.7 MG/DL (ref 8.5–10.1)
CHLORIDE SERPL-SCNC: 104 MMOL/L (ref 94–109)
CO2 SERPL-SCNC: 25 MMOL/L (ref 20–32)
COLOR UR AUTO: YELLOW
CREAT SERPL-MCNC: 0.58 MG/DL (ref 0.52–1.04)
ERYTHROCYTE [DISTWIDTH] IN BLOOD BY AUTOMATED COUNT: 12.1 % (ref 10–15)
GFR SERPL CREATININE-BSD FRML MDRD: >90 ML/MIN/1.7M2
GLUCOSE SERPL-MCNC: 86 MG/DL (ref 70–99)
GLUCOSE UR STRIP-MCNC: NEGATIVE MG/DL
HCT VFR BLD AUTO: 37.9 % (ref 35–47)
HGB BLD-MCNC: 13.2 G/DL (ref 11.7–15.7)
HGB BLD-MCNC: 13.3 G/DL (ref 11.7–15.7)
HGB UR QL STRIP: ABNORMAL
KETONES BLD-SCNC: 0.6 MMOL/L (ref 0–0.6)
KETONES UR STRIP-MCNC: >=80 MG/DL
LEUKOCYTE ESTERASE UR QL STRIP: ABNORMAL
MCH RBC QN AUTO: 31.4 PG (ref 26.5–33)
MCHC RBC AUTO-ENTMCNC: 34.8 G/DL (ref 31.5–36.5)
MCV RBC AUTO: 90 FL (ref 78–100)
NITRATE UR QL: NEGATIVE
PH UR STRIP: 6 PH (ref 5–7)
PLATELET # BLD AUTO: 227 10E9/L (ref 150–450)
POTASSIUM SERPL-SCNC: 3.4 MMOL/L (ref 3.4–5.3)
RBC # BLD AUTO: 4.21 10E12/L (ref 3.8–5.2)
SODIUM SERPL-SCNC: 138 MMOL/L (ref 133–144)
SOURCE: ABNORMAL
SP GR UR STRIP: >1.03 (ref 1–1.03)
SPECIMEN EXP DATE BLD: NORMAL
UROBILINOGEN UR STRIP-ACNC: 0.2 EU/DL (ref 0.2–1)
WBC # BLD AUTO: 8.6 10E9/L (ref 4–11)

## 2018-03-27 PROCEDURE — 87086 URINE CULTURE/COLONY COUNT: CPT | Performed by: ADVANCED PRACTICE MIDWIFE

## 2018-03-27 PROCEDURE — 96374 THER/PROPH/DIAG INJ IV PUSH: CPT | Performed by: EMERGENCY MEDICINE

## 2018-03-27 PROCEDURE — 85027 COMPLETE CBC AUTOMATED: CPT | Performed by: ADVANCED PRACTICE MIDWIFE

## 2018-03-27 PROCEDURE — 99283 EMERGENCY DEPT VISIT LOW MDM: CPT | Mod: Z6 | Performed by: EMERGENCY MEDICINE

## 2018-03-27 PROCEDURE — 96361 HYDRATE IV INFUSION ADD-ON: CPT | Performed by: EMERGENCY MEDICINE

## 2018-03-27 PROCEDURE — 99207 ZZC NO CHARGE NURSE ONLY: CPT

## 2018-03-27 PROCEDURE — 25000128 H RX IP 250 OP 636: Performed by: EMERGENCY MEDICINE

## 2018-03-27 PROCEDURE — 99284 EMERGENCY DEPT VISIT MOD MDM: CPT | Mod: 25 | Performed by: EMERGENCY MEDICINE

## 2018-03-27 PROCEDURE — 81003 URINALYSIS AUTO W/O SCOPE: CPT | Performed by: ADVANCED PRACTICE MIDWIFE

## 2018-03-27 PROCEDURE — 85018 HEMOGLOBIN: CPT | Performed by: EMERGENCY MEDICINE

## 2018-03-27 PROCEDURE — 80048 BASIC METABOLIC PNL TOTAL CA: CPT | Performed by: EMERGENCY MEDICINE

## 2018-03-27 PROCEDURE — 86901 BLOOD TYPING SEROLOGIC RH(D): CPT | Performed by: ADVANCED PRACTICE MIDWIFE

## 2018-03-27 PROCEDURE — 36415 COLL VENOUS BLD VENIPUNCTURE: CPT | Performed by: ADVANCED PRACTICE MIDWIFE

## 2018-03-27 PROCEDURE — 99000 SPECIMEN HANDLING OFFICE-LAB: CPT | Performed by: ADVANCED PRACTICE MIDWIFE

## 2018-03-27 PROCEDURE — 82010 KETONE BODYS QUAN: CPT | Performed by: EMERGENCY MEDICINE

## 2018-03-27 PROCEDURE — 87340 HEPATITIS B SURFACE AG IA: CPT | Performed by: ADVANCED PRACTICE MIDWIFE

## 2018-03-27 PROCEDURE — 86780 TREPONEMA PALLIDUM: CPT | Performed by: ADVANCED PRACTICE MIDWIFE

## 2018-03-27 PROCEDURE — 86850 RBC ANTIBODY SCREEN: CPT | Performed by: ADVANCED PRACTICE MIDWIFE

## 2018-03-27 PROCEDURE — 86762 RUBELLA ANTIBODY: CPT | Performed by: ADVANCED PRACTICE MIDWIFE

## 2018-03-27 PROCEDURE — 86900 BLOOD TYPING SEROLOGIC ABO: CPT | Performed by: ADVANCED PRACTICE MIDWIFE

## 2018-03-27 PROCEDURE — 83021 HEMOGLOBIN CHROMOTOGRAPHY: CPT | Mod: 90 | Performed by: ADVANCED PRACTICE MIDWIFE

## 2018-03-27 PROCEDURE — 87389 HIV-1 AG W/HIV-1&-2 AB AG IA: CPT | Performed by: ADVANCED PRACTICE MIDWIFE

## 2018-03-27 RX ORDER — SODIUM CHLORIDE 9 MG/ML
INJECTION, SOLUTION INTRAVENOUS
Status: DISCONTINUED
Start: 2018-03-27 | End: 2018-03-27 | Stop reason: HOSPADM

## 2018-03-27 RX ORDER — SODIUM CHLORIDE 9 MG/ML
INJECTION, SOLUTION INTRAVENOUS CONTINUOUS
Status: DISCONTINUED | OUTPATIENT
Start: 2018-03-27 | End: 2018-03-27 | Stop reason: HOSPADM

## 2018-03-27 RX ORDER — METOCLOPRAMIDE HYDROCHLORIDE 5 MG/ML
5 INJECTION INTRAMUSCULAR; INTRAVENOUS EVERY 30 MIN PRN
Status: DISCONTINUED | OUTPATIENT
Start: 2018-03-27 | End: 2018-03-27 | Stop reason: HOSPADM

## 2018-03-27 RX ORDER — METOCLOPRAMIDE 5 MG/1
5 TABLET ORAL 4 TIMES DAILY PRN
Qty: 30 TABLET | Refills: 0 | Status: ON HOLD | OUTPATIENT
Start: 2018-03-27 | End: 2018-03-29

## 2018-03-27 RX ADMIN — SODIUM CHLORIDE 1000 ML: 9 INJECTION, SOLUTION INTRAVENOUS at 14:43

## 2018-03-27 RX ADMIN — METOCLOPRAMIDE 5 MG: 5 INJECTION, SOLUTION INTRAMUSCULAR; INTRAVENOUS at 14:44

## 2018-03-27 ASSESSMENT — ENCOUNTER SYMPTOMS
FREQUENCY: 0
NAUSEA: 1
SHORTNESS OF BREATH: 0
DIFFICULTY URINATING: 0
COUGH: 0
HEMATURIA: 0
DYSURIA: 0
ABDOMINAL PAIN: 0
FEVER: 0
VOMITING: 1

## 2018-03-27 NOTE — ED AVS SNAPSHOT
[facility], Emergency Department    [address]    [place] [address]    Phone:  [phone]    Fax:  [phone]                                       Jaqueline Argueta   MRN: [id number]    Department:  [facility], Emergency Department   Date of Visit:  3/27/2018           Patient Information     Date Of Birth          1995        Your diagnoses for this visit were:     Hyperemesis gravidarum        You were seen by Toro Elizabeth MD.        Discharge Instructions       Please make an appointment to follow up with Your Primary Care Provider in one week for recheck.    Return to the ER for worsening    Discharge References/Attachments     HYPEREMESIS GRAVIDARUM (ENGLISH)      Your next 10 appointments already scheduled     Apr 24, 2018  2:00 PM CDT   New Prenatal with WILNER Echevarria [facility] [place] [place]    [address] [place] [address] [phone]              24 Hour Appointment Hotline       To make an appointment at any [facility], call [phone] ([phone]). If you don't have a family doctor or clinic, we will help you find one. [facility] are conveniently located to serve the needs of you and your family.             Review of your medicines      START taking        Dose / Directions Last dose taken    doxylamine 25 MG Tabs tablet   Commonly known as:  UNISOM   Dose:  12.5 mg   Quantity:  30 tablet        Take 0.5 tablets (12.5 mg) by mouth 2 times daily   Refills:  0        metoclopramide 5 MG tablet   Commonly known as:  REGLAN   Dose:  5 mg   Quantity:  30 tablet        Take 1 tablet (5 mg) by mouth 4 times daily as needed for nausea   Refills:  0          Our records show that you are taking the medicines listed below. If these are incorrect, please call your family doctor or clinic.        Dose / Directions Last dose taken    prenatal multivitamin plus iron 27-0.8 MG  Tabs per tablet   Dose:  1 tablet   Quantity:  100 tablet        Take 1 tablet by mouth daily   Refills:  3        senna-docusate 8.6-50 MG per tablet   Commonly known as:  SENOKOT-S;PERICOLACE   Dose:  1-2 tablet   Quantity:  60 tablet        Take 1-2 tablets by mouth 2 times daily as needed for constipation   Refills:  1                Prescriptions were sent or printed at these locations (2 Prescriptions)                   Other Prescriptions                Printed at Department/Unit printer (2 of 2)         metoclopramide (REGLAN) 5 MG tablet               doxylamine (UNISOM) 25 MG TABS tablet                Procedures and tests performed during your visit     Basic metabolic panel    Hemoglobin    Ketone Beta-Hydroxybutyrate Quantitative    Peripheral IV catheter      Orders Needing Specimen Collection     None      Pending Results     Date and Time Order Name Status Description    3/27/2018 1255 URINE CULTURE AEROBIC BACTERIAL In process     3/27/2018 1255 ABO/RH TYPE AND SCREEN In process     3/27/2018 1255 ANTI TREPONEMA In process     3/27/2018 1255 HEPATITIS B SURFACE ANTIGEN In process     3/27/2018 1255 RUBELLA ANTIBODY IGG QUANTITATIVE In process     3/27/2018 1255 HIV ANTIGEN ANTIBODY COMBO In process     3/27/2018 1254 HGB EVAL REFLEX TO ELP OR RBC SOLUBILITY In process             Pending Culture Results     Date and Time Order Name Status Description    3/27/2018 1255 URINE CULTURE AEROBIC BACTERIAL In process             Pending Results Instructions     If you had any lab results that were not finalized at the time of your Discharge, you can call the ED Lab Result RN at [phone]. You will be contacted by this team for any positive Lab results or changes in treatment. The nurses are available 7 days a week from 10A to 6:30P.  You can leave a message 24 hours per day and they will return your call.        Thank you for choosing [facility]       Thank you for choosing [facility] for your care. Our  "goal is always to provide you with excellent care. Hearing back from our patients is one way we can continue to improve our services. Please take a few minutes to complete the written survey that you may receive in the mail after you visit with us. Thank you!        Everypoint Information     Everypoint lets you send messages to your doctor, view your test results, renew your prescriptions, schedule appointments and more. To sign up, go to [email or web address] . Click on \"Log in\" on the left side of the screen, which will take you to the Welcome page. Then click on \"Sign up Now\" on the right side of the page.     You will be asked to enter the access code listed below, as well as some personal information. Please follow the directions to create your username and password.     Your access code is: [email or web address]  Expires: 2018  7:27 PM     Your access code will  in 90 days. If you need help or a new code, please call your [place] clinic or [phone].        Care EveryWhere ID     This is your Care EveryWhere ID. This could be used by other organizations to access your [place] medical records  [id number]        Equal Access to Services     BARRY GARNICA : Cleo Payne, wadomenico joseph, regan lowry, joss diaz. So [facility] [phone].    ATENCIÓN: Si habla español, tiene a pan disposición servicios gratuitos de asistencia lingüística. Dewayne al [phone].    We comply with applicable federal civil rights laws and [place] laws. We do not discriminate on the basis of race, color, national origin, age, disability, sex, sexual orientation, or gender identity.            After Visit Summary       This is your record. Keep this with you and show to your community pharmacist(s) and doctor(s) at your next visit.                  "

## 2018-03-27 NOTE — ED AVS SNAPSHOT
[facility], Emergency Department    [address]    [facility] [address]    Phone:  [phone]    Fax:  [phone]                                       Jaqueline Argueta   MRN: [id number]    Department:  [facility], Emergency Department   Date of Visit:  3/27/2018           After Visit Summary Signature Page     I have received my discharge instructions, and my questions have been answered. I have discussed any challenges I see with this plan with the nurse or doctor.    ..........................................................................................................................................  Patient/Patient Representative Signature      ..........................................................................................................................................  Patient Representative Print Name and Relationship to Patient    ..................................................               ................................................  Date                                            Time    ..........................................................................................................................................  Reviewed by Signature/Title    ...................................................              ..............................................  Date                                                            Time

## 2018-03-27 NOTE — PROGRESS NOTES
Patient presents for new ob teaching and labs, second pregnancy. Complains of severe nausea, recommended to try B6 and Unisom, call in a week if not effective. Positive home pregnancy test 3/18/18. Declined first trimester screening. Handouts reviewed and given. NOB appointment with RODOLFO 4/24/18    Caffeine intake/servings daily - 3 pepsi daily  Calcium intake/servings daily - 3  Exercise 5 times weekly - describe ; walks  Sunscreen used - Yes  Seatbelts used - Yes  Guns stored in the home - No  Self Breast Exam - No  Pap test up to date -  No  Eye exam up to date -  Yes  Dental exam up to date -  Yes  Immunizations reviewed and up to date - Yes  Abuse: Current or Past (Physical, Sexual or Emotional) - No  Do you feel safe in your environment - Yes  Do you cope well with stress - Yes  Do you suffer from insomnia - No    Prenatal OB Questionnaire  Past Medical History  Diabetes   No  Hypertension   No  Heart Disease, mitral valve prolapse, or rheumatic fever?   No  An autoimmune disorder such as Lupus or Rheumatoid Arthritis?   No  Kidney Disease or Urinary Tract Infection?   No  Epilepsy, seizures or spells?   No  Migraine headaches?   No  A stroke or loss of function or sensation?   No  Any other neurological problems?   No  Have you ever been treated for depression?  No  Are you having problems with crying spells or loss of self-esteem?   No  Have you ever required psychiatric care?   No  Have you ever hepatitis, liver disease or jaundice?   No  Have you ever been treated for blood clots in your veins, deep venous thrombosis, inflammation in the veins, thrombosis, phlebitis, pulmonary embolism or varicosities?   No  Have you had excessive bleeding after surgery or dental work?   No  Do you bleed more than other women after a cut or scratch?   No  Do you have a history of anemia?   No  Have you ever been treated for thyroid problems or taken thyroid medication?  No  Do you have any other endocrine problems?   No  Have you ever been in a major accident or suffered serious trauma?   No  Within the last year, has anyone hit slapped, kicked or otherwise hurt you?  No  In the last year, has anyone forced you to have sex when you didn't want to?  No  Have you ever had a blood transfusion?   No  Would you refuse a blood transfusion if a doctor judged it to be medically necessary?   No  If you answered yes, would you rather die than have a blood transfusion?   No  If you answered yes, is this for Moravian reasons?   No  Does anyone in your home smoke?   No  Do you use tobacco products?  No  Do you drink beer, wine, hard liquor?  No  Do you use any of the following: marijuana, speed, cocaine, heroine, hallucinogens, or other drugs?  Yes  Is your blood type Rh negative?   No  Have you ever had abnormal antibodies in your blood?   No  Have you ever had asthma?   No  Have you ever had tuberculosis?   No  Do you have any allergies to drugs or over-the-counter medications?   No    Allergies as of 3/27/2018:    Allergies as of 03/27/2018     (No Known Allergies)       Do you have any breast problems?   No  Have you ever ?   No  Have you had any gynecological surgical procedures such as cervical conization, a LEEP procedure, laser treatment, cryosurgery of the cervix, or a dilation and curettage, etc?  No  Have you had any other surgical procedures?  No  Have you been hospitalized for a nonsurgical reason excluding normal delivery?   No  Have you ever had any anesthetic complications?   No  Have you ever had an abnormal pap smear?   No  Do you have a history of abnormalities of the uterus?   No  Did it take you more than one year to become pregnant?   No  Have you ever been evaluated or treated for infertility?   No  Is there a history of medical problems in your family, which you feel might adversely affect your health or pregnancy?   No  Do you have any other problems we have not asked you about which you feel may be  important to this pregnancy?  No    Symptoms since Last Menstrual Period  Do you have any of the following:    *abdominal pain  No  *blood in stool or urine  No  *chest pain  No  *shortness of breath  No  *coughing or vomiting up blood No  *heart racing or skipping beats  No  *nausea and vomiting  Yes  *pain with urination  No  *vaginal discharge or bleeding  No  Current medications are:  Current Outpatient Prescriptions   Medication Sig Dispense Refill     senna-docusate (SENOKOT-S;PERICOLACE) 8.6-50 MG per tablet Take 1-2 tablets by mouth 2 times daily as needed for constipation 60 tablet 1     Prenatal Vit-Fe Fumarate-FA (PRENATAL MULTIVITAMIN PLUS IRON) 27-0.8 MG TABS per tablet Take 1 tablet by mouth daily 100 tablet 3       Genetic Screening  At the time of birth, will you be 35 years old or older?  No  Has the patient, baby s father, or anyone in either family had:  Thalassemia (Italian, Greek, Mediterranean, or  background only) and an MCV result less than 80?  No  Neural tube defect such as meningomyelocele, spina bifida or anencephaly?  No  Congenital heart defect?  No  Down s syndrome?  No  Win-Sach s disease (Restorationism, Cajun, French-[place])?  No  Sickle cell disease or trait (Pamela)?  No  Hemophilia or other inherited problems of blood coagulation? No  Muscular dystrophy?  No  Cystic Fibrosis?  No  [place] s chorea?  No  Mental retardation/autism? No   If yes, was the person tested for fragile X?  No  Any other inherited genetic or chromosomal disorder?  No  Maternal metabolic disorder (e.g. insulin-dependent diabetes, PKU)? No  A child with birth defects not listed above?  No  Recurrent pregnancy loss or a stillbirth?  No  Has the patient had any medications/street drugs/alcohol since her last menstrual period? No  Does the patient or baby s father have any other genetic risks?  FOB is diabetic,taking medication infrequently, not managed well per patient  Infection History  Do you object to  being tested for Hepatitis B? No  Do you object to being tested for HIV? No  Do you feel that you are at high risk for coming in contact with the AIDS virus?  No  Have you ever been treated for tuberculosis?  No  Have you ever received the BCG vaccine for tuberculosis?  No  Have you ever had a positive skin test for tuberculosis? No  Do you live with someone who has tuberculosis?  No  Have you ever been exposed to tuberculosis?  No  Do you have genital herpes?  No  Does your partner have genital herpes?  No  Have you had a rash or viral illness since your last period?  No  Have you ever had Gonorrhea, Chlamydia, Syphilis, venereal warts, trichomoniasis, pelvic inflammatory disease or any other sexually transmitted disease?  Yes  Do you know if you are a genital group B streptococcus carrier? No  You have not had chicken pox/varicella  No  Have you been vaccinated against chicken pox?  No  Have you had any other infectious disease? No        Early ultrasound screening tool:    Does patient have irregular periods?  No  Did patient use hormonal birth control in the three months prior to positive urine pregnancy test? No  Is the patient breastfeeding?  No  Is the patient 10 weeks or greater at time of education visit?  No

## 2018-03-27 NOTE — MR AVS SNAPSHOT
"              After Visit Summary   3/27/2018    Jaqueline Argueta    MRN: [id number]           Patient Information     Date Of Birth          1995        Visit Information        Provider Department      3/27/2018 12:45 PM RD OB NURSE EDUCATION [facility]        Today's Diagnoses     Supervision of normal pregnancy    -  1       Follow-ups after your visit        Your next 10 appointments already scheduled     Apr 24, 2018  2:00 PM CDT   New Prenatal with WILNER Echevarria CNM   [facility] ([facility])    [address] [place] [address] [phone]              Who to contact     If you have questions or need follow up information about today's clinic visit or your schedule please contact [facility] directly at [phone].  Normal or non-critical lab and imaging results will be communicated to you by [employer], letter or phone within 4 business days after the clinic has received the results. If you do not hear from us within 7 days, please contact the clinic through MyChart or phone. If you have a critical or abnormal lab result, we will notify you by phone as soon as possible.  Submit refill requests through [employer] or call your pharmacy and they will forward the refill request to us. Please allow 3 business days for your refill to be completed.          Additional Information About Your Visit        MyChart Information     [employer] lets you send messages to your doctor, view your test results, renew your prescriptions, schedule appointments and more. To sign up, go to [email or web address] . Click on \"Log in\" on the left side of the screen, which will take you to the Welcome page. Then click on \"Sign up Now\" on the right side of the page.     You will be asked to enter the access code listed below, as well as some personal information. Please follow the directions to create your username and password.   " "  Your access code is: [email or web address]  Expires: 2018  7:27 PM     Your access code will  in 90 days. If you need help or a new code, please call your [facility] or [phone].        Care EveryWhere ID     This is your Care EveryWhere ID. This could be used by other organizations to access your [place] medical records  [id number]        Your Vitals Were     Pulse Height Last Period BMI (Body Mass Index)          79 5' 1\" (1.549 m) 2018 22.52 kg/m2         Blood Pressure from Last 3 Encounters:   18 104/67   18 114/69   10/31/17 103/59    Weight from Last 3 Encounters:   18 119 lb 3.2 oz (54.1 kg)   18 121 lb (54.9 kg)   10/31/17 115 lb (52.2 kg)              We Performed the Following     ABO/RH Type and Screen     Anti Treponema     CBC with Platelets     Hepatitis B surface antigen     HGB Eval Reflex to ELP or RBC Solubility     HIV Antigen Antibody Combo     Rubella Antibody IgG Quantitative     UA without Microscopic     Urine Culture Aerobic Bacterial        Primary Care Provider Fax #    Physician No Ref-Primary [phone]       No address on file        Equal Access to Services     BARRY GARNICA : Hadii aad ku hadasho Soomaali, waaxda luqadaha, qaybta kaalmada adeegyada, joss diaz. So [facility] [phone].    ATENCIÓN: Si habla español, tiene a pan disposición servicios gratuitos de asistencia lingüística. Llame al [phone].    We comply with applicable federal civil rights laws and [place] laws. We do not discriminate on the basis of race, color, national origin, age, disability, sex, sexual orientation, or gender identity.            Thank you!     Thank you for choosing [facility]  for your care. Our goal is always to provide you with excellent care. Hearing back from our patients is one way we can continue to improve our services. Please take a few minutes to complete the written survey that you may receive " in the mail after your visit with us. Thank you!             Your Updated Medication List - Protect others around you: Learn how to safely use, store and throw away your medicines at www.disposemymeds.org.          This list is accurate as of 3/27/18  1:07 PM.  Always use your most recent med list.                   Brand Name Dispense Instructions for use Diagnosis    prenatal multivitamin plus iron 27-0.8 MG Tabs per tablet     100 tablet    Take 1 tablet by mouth daily    Encounter for gynecological examination without abnormal finding       senna-docusate 8.6-50 MG per tablet    SENOKOT-S;PERICOLACE    60 tablet    Take 1-2 tablets by mouth 2 times daily as needed for constipation    Constipation, unspecified constipation type

## 2018-03-27 NOTE — DISCHARGE INSTRUCTIONS
Please make an appointment to follow up with Your Primary Care Provider in one week for recheck.    Return to the ER for worsening

## 2018-03-27 NOTE — ED PROVIDER NOTES
History     Chief Complaint   Patient presents with     Hyperemesis     6wks pregnant, not tolerating po for past 4 days. feels dizzy     HPI  Jaqueline Argueta is a 22 year old female who presents to the ER with ongoing nausea and vomiting from the clinic.  Patient was diagnosed as being 6 weeks along in the clinic by date and states that she has been sick and vomiting for the last 3 days.  Patient had urine checked in the clinic which revealed a large amount of ketones and she was sent here for IV fluids and treatment.  Patient states that she has had no UTI symptoms, no vaginal bleeding, and states that this is her second pregnancy.  Patient states her first pregnancy was full-term and that she had hyperemesis with that pregnancy.  Patient denies any cough, fever, shortness of breath.  Patient denies any diarrhea.    Past Medical History:   Diagnosis Date     Anemia due to blood loss, acute 12/27/2011       Past Surgical History:   Procedure Laterality Date     NO HISTORY OF SURGERY         Family History   Problem Relation Age of Onset     Family History Negative No family hx of        Social History   Substance Use Topics     Smoking status: Never Smoker     Smokeless tobacco: Never Used      Comment: smoke marijuana once or twice a wk     Alcohol use Yes      Comment: rare       Previous Medications    PRENATAL VIT-FE FUMARATE-FA (PRENATAL MULTIVITAMIN PLUS IRON) 27-0.8 MG TABS PER TABLET    Take 1 tablet by mouth daily    SENNA-DOCUSATE (SENOKOT-S;PERICOLACE) 8.6-50 MG PER TABLET    Take 1-2 tablets by mouth 2 times daily as needed for constipation      No Known Allergies      I have reviewed the Medications, Allergies, Past Medical and Surgical History, and Social History in the Epic system.    Review of Systems   Constitutional: Negative for fever.   Respiratory: Negative for cough and shortness of breath.    Cardiovascular: Negative for chest pain.   Gastrointestinal: Positive for nausea and vomiting.  Negative for abdominal pain.   Genitourinary: Negative for difficulty urinating, dysuria, frequency, hematuria and vaginal bleeding.   All other systems reviewed and are negative.      Physical Exam   BP: 103/50  Pulse: 81  Temp: 98.5  F (36.9  C)  Resp: 16  Weight: 54.8 kg (120 lb 12.8 oz)  SpO2: 99 %      Physical Exam   Constitutional: She is oriented to person, place, and time.   Alert and conversant   HENT:   Head: Atraumatic.   Eyes: EOM are normal. Pupils are equal, round, and reactive to light.   Neck: Neck supple.   Cardiovascular: Normal heart sounds.    Pulmonary/Chest: Breath sounds normal.   Abdominal: Soft. There is no tenderness.   Musculoskeletal: She exhibits no edema or tenderness.   Neurological: She is alert and oriented to person, place, and time. No cranial nerve deficit.   Grossly intact and symmetric   Skin: Skin is warm.   Psychiatric: She has a normal mood and affect.       ED Course     ED Course     Procedures          Results for SEDA VALADEZ (MRN [id number]) as of 3/27/2018 15:59   Ref. Range 3/27/2018 13:04   Color Urine Unknown Yellow   Appearance Urine Unknown Clear   Glucose Urine Latest Ref Range: NEG^Negative mg/dL Negative   Bilirubin Urine Latest Ref Range: NEG^Negative  Small (A)   Ketones Urine Latest Ref Range: NEG^Negative mg/dL >=80 (A)   Specific Gravity Urine Latest Ref Range: 1.003 - 1.035  >1.030   pH Urine Latest Ref Range: 5.0 - 7.0 pH 6.0   Protein Albumin Urine Latest Ref Range: NEG^Negative mg/dL Trace (A)   Urobilinogen Urine Latest Ref Range: 0.2 - 1.0 EU/dL 0.2   Nitrite Urine Latest Ref Range: NEG^Negative  Negative   Blood Urine Latest Ref Range: NEG^Negative  Trace (A)   Leukocyte Esterase Urine Latest Ref Range: NEG^Negative  Moderate (A)   Source Unknown Midstream Urine       Results for orders placed or performed during the hospital encounter of 03/27/18   Ketone Beta-Hydroxybutyrate Quantitative   Result Value Ref Range    Ketone Quantitative 0.6 0.0  - 0.6 mmol/L   Basic metabolic panel   Result Value Ref Range    Sodium 138 133 - 144 mmol/L    Potassium 3.4 3.4 - 5.3 mmol/L    Chloride 104 94 - 109 mmol/L    Carbon Dioxide 25 20 - 32 mmol/L    Anion Gap 9 3 - 14 mmol/L    Glucose 86 70 - 99 mg/dL    Urea Nitrogen 6 (L) 7 - 30 mg/dL    Creatinine 0.58 0.52 - 1.04 mg/dL    GFR Estimate >90 >60 mL/min/1.7m2    GFR Estimate If Black >90 >60 mL/min/1.7m2    Calcium 8.7 8.5 - 10.1 mg/dL   Hemoglobin   Result Value Ref Range    Hemoglobin 13.3 11.7 - 15.7 g/dL       Labs Ordered and Resulted from Time of ED Arrival Up to the Time of Departure from the ED   BASIC METABOLIC PANEL - Abnormal; Notable for the following:        Result Value    Urea Nitrogen 6 (*)     All other components within normal limits   KETONE BETA-HYDROXYBUTYRATE QUANTITATIVE   HEMOGLOBIN   PERIPHERAL IV CATHETER       Assessments & Plan (with Medical Decision Making)     I have reviewed the nursing notes.    Medications   sodium chloride (PF) 0.9% PF flush 3 mL (not administered)   sodium chloride (PF) 0.9% PF flush 3 mL (not administered)   0.9% sodium chloride BOLUS (0 mLs Intravenous Stopped 3/27/18 1553)     Followed by   0.9% sodium chloride BOLUS ( Intravenous Canceled Entry 3/27/18 1553)     Followed by   sodium chloride 0.9% infusion (not administered)   metoclopramide (REGLAN) injection 5 mg (5 mg Intravenous Given 3/27/18 1444)     Patient did well in the ER and was IV hydrated and medicated.  Afterwards she was able to take orally and not vomit.  At this time the patient will be sent home    I have reviewed the findings, diagnosis, plan and need for follow up with the patient.    New Prescriptions    DOXYLAMINE (UNISOM) 25 MG TABS TABLET    Take 0.5 tablets (12.5 mg) by mouth 2 times daily    METOCLOPRAMIDE (REGLAN) 5 MG TABLET    Take 1 tablet (5 mg) by mouth 4 times daily as needed for nausea       Final diagnoses:   Hyperemesis gravidarum     Please make an appointment to follow up  with Your Primary Care Provider in one week for recheck.    Return to the ER for worsening    Routine discharge instructions were given for this diagnosis.      3/27/2018   [facility] [place], EMERGENCY DEPARTMENT     Toro Elizabeth MD  03/27/18 9240

## 2018-03-28 ENCOUNTER — HOSPITAL ENCOUNTER (OUTPATIENT)
Facility: CLINIC | Age: 23
Setting detail: OBSERVATION
Discharge: HOME | End: 2018-03-29
Attending: EMERGENCY MEDICINE | Admitting: OBSTETRICS & GYNECOLOGY
Payer: COMMERCIAL

## 2018-03-28 DIAGNOSIS — Z3A.01 LESS THAN 8 WEEKS GESTATION OF PREGNANCY: ICD-10-CM

## 2018-03-28 DIAGNOSIS — O21.0 MILD HYPEREMESIS GRAVIDARUM, DELIVERED: ICD-10-CM

## 2018-03-28 DIAGNOSIS — O21.9 NAUSEA/VOMITING IN PREGNANCY: ICD-10-CM

## 2018-03-28 LAB
ANION GAP SERPL CALCULATED.3IONS-SCNC: 9 MMOL/L (ref 3–14)
BACTERIA SPEC CULT: NORMAL
BASOPHILS # BLD AUTO: 0 10E9/L (ref 0–0.2)
BASOPHILS NFR BLD AUTO: 0.2 %
BUN SERPL-MCNC: 6 MG/DL (ref 7–30)
CALCIUM SERPL-MCNC: 8.9 MG/DL (ref 8.5–10.1)
CHLORIDE SERPL-SCNC: 106 MMOL/L (ref 94–109)
CO2 SERPL-SCNC: 25 MMOL/L (ref 20–32)
CREAT SERPL-MCNC: 0.51 MG/DL (ref 0.52–1.04)
DIFFERENTIAL METHOD BLD: ABNORMAL
EOSINOPHIL # BLD AUTO: 0 10E9/L (ref 0–0.7)
EOSINOPHIL NFR BLD AUTO: 0.1 %
ERYTHROCYTE [DISTWIDTH] IN BLOOD BY AUTOMATED COUNT: 11.9 % (ref 10–15)
GFR SERPL CREATININE-BSD FRML MDRD: >90 ML/MIN/1.7M2
GLUCOSE SERPL-MCNC: 85 MG/DL (ref 70–99)
HBV SURFACE AG SERPL QL IA: NONREACTIVE
HCT VFR BLD AUTO: 40.4 % (ref 35–47)
HGB BLD-MCNC: 14.1 G/DL (ref 11.7–15.7)
HIV 1+2 AB+HIV1 P24 AG SERPL QL IA: NONREACTIVE
IMM GRANULOCYTES # BLD: 0 10E9/L (ref 0–0.4)
IMM GRANULOCYTES NFR BLD: 0.4 %
LYMPHOCYTES # BLD AUTO: 1.6 10E9/L (ref 0.8–5.3)
LYMPHOCYTES NFR BLD AUTO: 14.3 %
MCH RBC QN AUTO: 31.3 PG (ref 26.5–33)
MCHC RBC AUTO-ENTMCNC: 34.9 G/DL (ref 31.5–36.5)
MCV RBC AUTO: 90 FL (ref 78–100)
MONOCYTES # BLD AUTO: 0.4 10E9/L (ref 0–1.3)
MONOCYTES NFR BLD AUTO: 3.9 %
NEUTROPHILS # BLD AUTO: 9.2 10E9/L (ref 1.6–8.3)
NEUTROPHILS NFR BLD AUTO: 81.1 %
NRBC # BLD AUTO: 0 10*3/UL
NRBC BLD AUTO-RTO: 0 /100
PLATELET # BLD AUTO: 234 10E9/L (ref 150–450)
POTASSIUM SERPL-SCNC: 3.1 MMOL/L (ref 3.4–5.3)
RBC # BLD AUTO: 4.51 10E12/L (ref 3.8–5.2)
RUBV IGG SERPL IA-ACNC: 7 IU/ML
SODIUM SERPL-SCNC: 140 MMOL/L (ref 133–144)
SPECIMEN SOURCE: NORMAL
T PALLIDUM IGG+IGM SER QL: NEGATIVE
WBC # BLD AUTO: 11.3 10E9/L (ref 4–11)

## 2018-03-28 PROCEDURE — 96361 HYDRATE IV INFUSION ADD-ON: CPT | Mod: 59

## 2018-03-28 PROCEDURE — 99285 EMERGENCY DEPT VISIT HI MDM: CPT | Mod: 25

## 2018-03-28 PROCEDURE — 25000132 ZZH RX MED GY IP 250 OP 250 PS 637: Performed by: EMERGENCY MEDICINE

## 2018-03-28 PROCEDURE — 99285 EMERGENCY DEPT VISIT HI MDM: CPT | Mod: Z6 | Performed by: EMERGENCY MEDICINE

## 2018-03-28 PROCEDURE — 80048 BASIC METABOLIC PNL TOTAL CA: CPT | Performed by: EMERGENCY MEDICINE

## 2018-03-28 PROCEDURE — 25000128 H RX IP 250 OP 636: Performed by: EMERGENCY MEDICINE

## 2018-03-28 PROCEDURE — 25000128 H RX IP 250 OP 636

## 2018-03-28 PROCEDURE — 85025 COMPLETE CBC W/AUTO DIFF WBC: CPT | Performed by: EMERGENCY MEDICINE

## 2018-03-28 PROCEDURE — 81003 URINALYSIS AUTO W/O SCOPE: CPT | Performed by: EMERGENCY MEDICINE

## 2018-03-28 PROCEDURE — 25000125 ZZHC RX 250: Performed by: EMERGENCY MEDICINE

## 2018-03-28 PROCEDURE — 96374 THER/PROPH/DIAG INJ IV PUSH: CPT

## 2018-03-28 RX ORDER — SODIUM CHLORIDE 9 MG/ML
INJECTION, SOLUTION INTRAVENOUS
Status: COMPLETED
Start: 2018-03-28 | End: 2018-03-28

## 2018-03-28 RX ORDER — PROMETHAZINE HYDROCHLORIDE 12.5 MG/1
12.5 SUPPOSITORY RECTAL ONCE
Status: COMPLETED | OUTPATIENT
Start: 2018-03-28 | End: 2018-03-29

## 2018-03-28 RX ORDER — ALUMINA, MAGNESIA, AND SIMETHICONE 2400; 2400; 240 MG/30ML; MG/30ML; MG/30ML
30 SUSPENSION ORAL ONCE
Status: COMPLETED | OUTPATIENT
Start: 2018-03-28 | End: 2018-03-28

## 2018-03-28 RX ORDER — SODIUM CHLORIDE 9 MG/ML
INJECTION, SOLUTION INTRAVENOUS ONCE
Status: COMPLETED | OUTPATIENT
Start: 2018-03-28 | End: 2018-03-29

## 2018-03-28 RX ORDER — ONDANSETRON 8 MG/1
8 TABLET, ORALLY DISINTEGRATING ORAL ONCE
Status: COMPLETED | OUTPATIENT
Start: 2018-03-28 | End: 2018-03-28

## 2018-03-28 RX ORDER — ONDANSETRON 2 MG/ML
8 INJECTION INTRAMUSCULAR; INTRAVENOUS ONCE
Status: COMPLETED | OUTPATIENT
Start: 2018-03-28 | End: 2018-03-28

## 2018-03-28 RX ADMIN — ALUMINUM HYDROXIDE, MAGNESIUM HYDROXIDE, AND DIMETHICONE 30 ML: 400; 400; 40 SUSPENSION ORAL at 21:21

## 2018-03-28 RX ADMIN — SODIUM CHLORIDE 1000 ML: 9 INJECTION, SOLUTION INTRAVENOUS at 22:14

## 2018-03-28 RX ADMIN — Medication 1000 ML: at 22:14

## 2018-03-28 RX ADMIN — ONDANSETRON 8 MG: 8 TABLET, ORALLY DISINTEGRATING ORAL at 22:07

## 2018-03-28 RX ADMIN — SODIUM CHLORIDE: 9 INJECTION, SOLUTION INTRAVENOUS at 23:46

## 2018-03-28 RX ADMIN — SODIUM CHLORIDE 1000 ML: 9 INJECTION, SOLUTION INTRAVENOUS at 20:46

## 2018-03-28 RX ADMIN — ONDANSETRON 8 MG: 2 INJECTION INTRAMUSCULAR; INTRAVENOUS at 20:47

## 2018-03-28 NOTE — IP AVS SNAPSHOT
[facility] [address]    [address]    [place] [address]    Phone:  [phone]                                       After Visit Summary   3/28/2018    Jaqueline Argueta    MRN: [id number]           After Visit Summary Signature Page     I have received my discharge instructions, and my questions have been answered. I have discussed any challenges I see with this plan with the nurse or doctor.    ..........................................................................................................................................  Patient/Patient Representative Signature      ..........................................................................................................................................  Patient Representative Print Name and Relationship to Patient    ..................................................               ................................................  Date                                            Time    ..........................................................................................................................................  Reviewed by Signature/Title    ...................................................              ..............................................  Date                                                            Time

## 2018-03-28 NOTE — IP AVS SNAPSHOT
MRN:[id number]                      After Visit Summary   3/28/2018    Jaqueline Argueta    MRN: [id number]           Thank you!     Thank you for choosing [facility] for your care. Our goal is always to provide you with excellent care. Hearing back from our patients is one way we can continue to improve our services. Please take a few minutes to complete the written survey that you may receive in the mail after you visit with us. Thank you!        Patient Information     Date Of Birth          1995        Designated Caregiver       Most Recent Value    Caregiver    Will someone help with your care after discharge? yes    Name of designated caregiver Shayy/mom    Phone number of caregiver [phone]    Caregiver address [address],[place] [address]      About your hospital stay     You were admitted on:  March 29, 2018 You last received care in the:  [facility]    You were discharged on:  March 29, 2018        Reason for your hospital stay       Nausea in early pregnancy                  Who to Call     For medical emergencies, please call 911.  For non-urgent questions about your medical care, please call your primary care provider or clinic, [phone]          Attending Provider     Provider Specialty    Josué Franco MD Emergency Medicine    Adrianne Galo MD OB/Gyn       Primary Care Provider Office Phone # Fax #    [facility] [phone]       When to contact your care team       Call [phone] if you are experience: vaginal bleeding or inability to tolerate and oral intake of food or liquids                  After Care Instructions     Activity       Your activity upon discharge: activity as tolerated and activity as tolerated and no driving for today            Diet       Follow this diet upon discharge: Regular                  Follow-up Appointments     Follow Up and recommended labs and tests       Follow up with Dr. Payne ,  "at (location with clinic name or city) [facility], within 1 week  for hospital follow- up.                  Your next 10 appointments already scheduled     2018  2:00 PM CDT   New Prenatal with WILNER Echevarria CNM   [facility] ([facility])    [address] [place] [id number] [phone]              Pending Results     No orders found for last 3 day(s).            Statement of Approval     Ordered          18 5532  I have reviewed and agree with all the recommendations and orders detailed in this document.  EFFECTIVE NOW     Approved and electronically signed by:  Sarah Shipley MD             Admission Information     Date & Time Provider Department Dept. Phone    3/28/2018 Adrianne Galo MD [facility] [phone]      Your Vitals Were     Blood Pressure Pulse Temperature Respirations Weight Last Period    116/62 (BP Location: Left arm) 100 97.4  F (36.3  C) (Oral) 16 54.7 kg (120 lb 11.2 oz) 2018    Pulse Oximetry BMI (Body Mass Index)                100% 22.81 kg/m2          MyChart Information     [employer] lets you send messages to your doctor, view your test results, renew your prescriptions, schedule appointments and more. To sign up, go to [email or web address] . Click on \"Log in\" on the left side of the screen, which will take you to the Welcome page. Then click on \"Sign up Now\" on the right side of the page.     You will be asked to enter the access code listed below, as well as some personal information. Please follow the directions to create your username and password.     Your access code is: [email or web address]  Expires: 2018  7:27 PM     Your access code will  in 90 days. If you need help or a new code, please call your [facility] or [phone].        Care EveryWhere ID     This is your Care EveryWhere ID. This could be used by other organizations to access your [facility] medical " records  [id number]        Equal Access to Services     [facility] : Hadii aad ku haddelvisnay Sosoila, waaxda luqadaha, qaybta kaalmadusty lowry, joss diaz. So [facility] [phone].    ATENCIÓN: Si habla español, tiene a pan disposición servicios gratuitos de asistencia lingüística. Llame al [phone].    We comply with applicable federal civil rights laws and [place] laws. We do not discriminate on the basis of race, color, national origin, age, disability, sex, sexual orientation, or gender identity.               Review of your medicines      START taking        Dose / Directions    ondansetron 4 MG ODT tab   Commonly known as:  ZOFRAN-ODT        Dose:  4 mg   Take 1 tablet (4 mg) by mouth every 6 hours   Quantity:  90 tablet   Refills:  1       prochlorperazine 25 MG Suppository   Commonly known as:  COMPAZINE        Dose:  25 mg   Place 1 suppository (25 mg) rectally every 12 hours as needed for nausea or vomiting   Quantity:  12 suppository   Refills:  1       promethazine 25 MG tablet   Commonly known as:  PHENERGAN        Dose:  25 mg   Take 1 tablet (25 mg) by mouth every 6 hours   Quantity:  90 tablet   Refills:  1         CONTINUE these medicines which have NOT CHANGED        Dose / Directions    prenatal multivitamin plus iron 27-0.8 MG Tabs per tablet   Used for:  Encounter for gynecological examination without abnormal finding        Dose:  1 tablet   Take 1 tablet by mouth daily   Quantity:  100 tablet   Refills:  3       senna-docusate 8.6-50 MG per tablet   Commonly known as:  SENOKOT-S;PERICOLACE   Used for:  Constipation, unspecified constipation type        Dose:  1-2 tablet   Take 1-2 tablets by mouth 2 times daily as needed for constipation   Quantity:  60 tablet   Refills:  1         STOP taking     doxylamine 25 MG Tabs tablet   Commonly known as:  UNISOM           metoclopramide 5 MG tablet   Commonly known as:  REGLAN                Where to get your  medicines      These medications were sent to [facility] [place], [place] - [address]  [address] [facility], [facility] [address]     Phone:  [phone]     ondansetron 4 MG ODT tab    prochlorperazine 25 MG Suppository    promethazine 25 MG tablet                Protect others around you: Learn how to safely use, store and throw away your medicines at www.disposemymeds.org.             Medication List: This is a list of all your medications and when to take them. Check marks below indicate your daily home schedule. Keep this list as a reference.      Medications           Morning Afternoon Evening Bedtime As Needed    ondansetron 4 MG ODT tab   Commonly known as:  ZOFRAN-ODT   Take 1 tablet (4 mg) by mouth every 6 hours   Last time this was given:  4 mg on 3/29/2018 11:35 AM         6:00 am    12:00 pm    6:00 pm    12:00 am           prenatal multivitamin plus iron 27-0.8 MG Tabs per tablet   Take 1 tablet by mouth daily         8:00 am                       prochlorperazine 25 MG Suppository   Commonly known as:  COMPAZINE   Place 1 suppository (25 mg) rectally every 12 hours as needed for nausea or vomiting                                promethazine 25 MG tablet   Commonly known as:  PHENERGAN   Take 1 tablet (25 mg) by mouth every 6 hours   Last time this was given:  25 mg on 3/29/2018 10:19 AM                                senna-docusate 8.6-50 MG per tablet   Commonly known as:  SENOKOT-S;PERICOLACE   Take 1-2 tablets by mouth 2 times daily as needed for constipation         8:00 am        8:00 pm

## 2018-03-29 ENCOUNTER — APPOINTMENT (OUTPATIENT)
Dept: ULTRASOUND IMAGING | Facility: CLINIC | Age: 23
End: 2018-03-29
Payer: COMMERCIAL

## 2018-03-29 VITALS
BODY MASS INDEX: 22.81 KG/M2 | HEART RATE: 100 BPM | WEIGHT: 120.7 LBS | SYSTOLIC BLOOD PRESSURE: 116 MMHG | OXYGEN SATURATION: 100 % | TEMPERATURE: 97.4 F | DIASTOLIC BLOOD PRESSURE: 62 MMHG | RESPIRATION RATE: 16 BRPM

## 2018-03-29 PROBLEM — O21.9 NAUSEA/VOMITING IN PREGNANCY: Status: ACTIVE | Noted: 2018-03-29

## 2018-03-29 PROBLEM — O21.9 NAUSEA AND VOMITING OF PREGNANCY, ANTEPARTUM: Status: ACTIVE | Noted: 2018-03-29

## 2018-03-29 LAB
ALBUMIN UR-MCNC: NEGATIVE MG/DL
ANION GAP SERPL CALCULATED.3IONS-SCNC: 7 MMOL/L (ref 3–14)
APPEARANCE UR: CLEAR
B-HCG SERPL-ACNC: ABNORMAL IU/L (ref 0–5)
BILIRUB UR QL STRIP: NEGATIVE
BUN SERPL-MCNC: 4 MG/DL (ref 7–30)
CALCIUM SERPL-MCNC: 7.5 MG/DL (ref 8.5–10.1)
CHLORIDE SERPL-SCNC: 111 MMOL/L (ref 94–109)
CO2 SERPL-SCNC: 21 MMOL/L (ref 20–32)
COLOR UR AUTO: YELLOW
CREAT SERPL-MCNC: 0.45 MG/DL (ref 0.52–1.04)
GFR SERPL CREATININE-BSD FRML MDRD: >90 ML/MIN/1.7M2
GLUCOSE SERPL-MCNC: 137 MG/DL (ref 70–99)
GLUCOSE UR STRIP-MCNC: NEGATIVE MG/DL
HGB A1 MFR BLD: 96.5 % (ref 95–97.9)
HGB A2 MFR BLD: 3 % (ref 2–3.5)
HGB C MFR BLD: 0 % (ref 0–0)
HGB E MFR BLD: 0 % (ref 0–0)
HGB F MFR BLD: 0.5 % (ref 0–2.1)
HGB FRACT BLD ELPH-IMP: NORMAL
HGB OTHER MFR BLD: 0 % (ref 0–0)
HGB S BLD QL SOLY: NORMAL
HGB S MFR BLD: 0 % (ref 0–0)
HGB UR QL STRIP: NEGATIVE
KETONES UR STRIP-MCNC: >150 MG/DL
LEUKOCYTE ESTERASE UR QL STRIP: NEGATIVE
NITRATE UR QL: NEGATIVE
PATH INTERP BLD-IMP: NORMAL
PH UR STRIP: 5.5 PH (ref 5–7)
POTASSIUM SERPL-SCNC: 3.2 MMOL/L (ref 3.4–5.3)
POTASSIUM SERPL-SCNC: 3.7 MMOL/L (ref 3.4–5.3)
SODIUM SERPL-SCNC: 139 MMOL/L (ref 133–144)
SOURCE: ABNORMAL
SP GR UR STRIP: 1.02 (ref 1–1.03)
UROBILINOGEN UR STRIP-MCNC: NORMAL MG/DL (ref 0–2)

## 2018-03-29 PROCEDURE — 84132 ASSAY OF SERUM POTASSIUM: CPT | Mod: 91 | Performed by: OBSTETRICS & GYNECOLOGY

## 2018-03-29 PROCEDURE — G0378 HOSPITAL OBSERVATION PER HR: HCPCS

## 2018-03-29 PROCEDURE — 25000128 H RX IP 250 OP 636: Performed by: STUDENT IN AN ORGANIZED HEALTH CARE EDUCATION/TRAINING PROGRAM

## 2018-03-29 PROCEDURE — 36415 COLL VENOUS BLD VENIPUNCTURE: CPT | Performed by: OBSTETRICS & GYNECOLOGY

## 2018-03-29 PROCEDURE — 25000132 ZZH RX MED GY IP 250 OP 250 PS 637: Performed by: OBSTETRICS & GYNECOLOGY

## 2018-03-29 PROCEDURE — 25000132 ZZH RX MED GY IP 250 OP 250 PS 637: Performed by: EMERGENCY MEDICINE

## 2018-03-29 PROCEDURE — 76801 OB US < 14 WKS SINGLE FETUS: CPT

## 2018-03-29 PROCEDURE — 25000128 H RX IP 250 OP 636: Performed by: OBSTETRICS & GYNECOLOGY

## 2018-03-29 PROCEDURE — 80048 BASIC METABOLIC PNL TOTAL CA: CPT | Performed by: OBSTETRICS & GYNECOLOGY

## 2018-03-29 PROCEDURE — 25000125 ZZHC RX 250: Performed by: STUDENT IN AN ORGANIZED HEALTH CARE EDUCATION/TRAINING PROGRAM

## 2018-03-29 PROCEDURE — 84702 CHORIONIC GONADOTROPIN TEST: CPT | Performed by: OBSTETRICS & GYNECOLOGY

## 2018-03-29 PROCEDURE — 25000125 ZZHC RX 250: Performed by: OBSTETRICS & GYNECOLOGY

## 2018-03-29 PROCEDURE — 99217 ZZC OBSERVATION CARE DISCHARGE: CPT | Mod: GC | Performed by: OBSTETRICS & GYNECOLOGY

## 2018-03-29 RX ORDER — AMOXICILLIN 250 MG
1 CAPSULE ORAL 2 TIMES DAILY PRN
Status: DISCONTINUED | OUTPATIENT
Start: 2018-03-29 | End: 2018-03-29 | Stop reason: HOSPADM

## 2018-03-29 RX ORDER — ONDANSETRON 2 MG/ML
4 INJECTION INTRAMUSCULAR; INTRAVENOUS EVERY 6 HOURS PRN
Status: DISCONTINUED | OUTPATIENT
Start: 2018-03-29 | End: 2018-03-29

## 2018-03-29 RX ORDER — PROMETHAZINE HYDROCHLORIDE 25 MG/1
25 SUPPOSITORY RECTAL EVERY 6 HOURS PRN
Status: DISCONTINUED | OUTPATIENT
Start: 2018-03-29 | End: 2018-03-29 | Stop reason: HOSPADM

## 2018-03-29 RX ORDER — PROCHLORPERAZINE MALEATE 5 MG
10 TABLET ORAL EVERY 6 HOURS PRN
Status: DISCONTINUED | OUTPATIENT
Start: 2018-03-29 | End: 2018-03-29 | Stop reason: HOSPADM

## 2018-03-29 RX ORDER — ONDANSETRON 2 MG/ML
4 INJECTION INTRAMUSCULAR; INTRAVENOUS EVERY 6 HOURS
Status: DISCONTINUED | OUTPATIENT
Start: 2018-03-29 | End: 2018-03-29

## 2018-03-29 RX ORDER — DEXTROSE MONOHYDRATE, SODIUM CHLORIDE, AND POTASSIUM CHLORIDE 50; 1.49; 4.5 G/1000ML; G/1000ML; G/1000ML
INJECTION, SOLUTION INTRAVENOUS CONTINUOUS
Status: DISCONTINUED | OUTPATIENT
Start: 2018-03-29 | End: 2018-03-29 | Stop reason: HOSPADM

## 2018-03-29 RX ORDER — POTASSIUM CHLORIDE 750 MG/1
20-40 TABLET, EXTENDED RELEASE ORAL
Status: DISCONTINUED | OUTPATIENT
Start: 2018-03-29 | End: 2018-03-29 | Stop reason: HOSPADM

## 2018-03-29 RX ORDER — PROMETHAZINE HYDROCHLORIDE 25 MG/1
25 TABLET ORAL EVERY 6 HOURS
Qty: 90 TABLET | Refills: 1 | Status: SHIPPED | OUTPATIENT
Start: 2018-03-29 | End: 2018-10-02

## 2018-03-29 RX ORDER — BISACODYL 10 MG
10 SUPPOSITORY, RECTAL RECTAL DAILY PRN
Status: DISCONTINUED | OUTPATIENT
Start: 2018-03-29 | End: 2018-03-29 | Stop reason: HOSPADM

## 2018-03-29 RX ORDER — ONDANSETRON 4 MG/1
4 TABLET, ORALLY DISINTEGRATING ORAL EVERY 6 HOURS
Qty: 90 TABLET | Refills: 1 | Status: SHIPPED | OUTPATIENT
Start: 2018-03-29 | End: 2018-10-02

## 2018-03-29 RX ORDER — AMOXICILLIN 250 MG
2 CAPSULE ORAL 2 TIMES DAILY PRN
Status: DISCONTINUED | OUTPATIENT
Start: 2018-03-29 | End: 2018-03-29 | Stop reason: HOSPADM

## 2018-03-29 RX ORDER — POTASSIUM CL/LIDO/0.9 % NACL 10MEQ/0.1L
10 INTRAVENOUS SOLUTION, PIGGYBACK (ML) INTRAVENOUS
Status: DISCONTINUED | OUTPATIENT
Start: 2018-03-29 | End: 2018-03-29 | Stop reason: HOSPADM

## 2018-03-29 RX ORDER — SODIUM CHLORIDE, SODIUM LACTATE, POTASSIUM CHLORIDE, CALCIUM CHLORIDE 600; 310; 30; 20 MG/100ML; MG/100ML; MG/100ML; MG/100ML
INJECTION, SOLUTION INTRAVENOUS CONTINUOUS
Status: DISCONTINUED | OUTPATIENT
Start: 2018-03-29 | End: 2018-03-29

## 2018-03-29 RX ORDER — PROCHLORPERAZINE 25 MG
25 SUPPOSITORY, RECTAL RECTAL EVERY 12 HOURS PRN
Qty: 12 SUPPOSITORY | Refills: 1 | Status: SHIPPED | OUTPATIENT
Start: 2018-03-29 | End: 2018-04-13

## 2018-03-29 RX ORDER — CALCIUM CARBONATE 500 MG/1
1000 TABLET, CHEWABLE ORAL 4 TIMES DAILY PRN
Status: DISCONTINUED | OUTPATIENT
Start: 2018-03-29 | End: 2018-03-29 | Stop reason: HOSPADM

## 2018-03-29 RX ORDER — ONDANSETRON 4 MG/1
4 TABLET, ORALLY DISINTEGRATING ORAL EVERY 6 HOURS
Status: DISCONTINUED | OUTPATIENT
Start: 2018-03-29 | End: 2018-03-29 | Stop reason: HOSPADM

## 2018-03-29 RX ORDER — METOCLOPRAMIDE HYDROCHLORIDE 5 MG/ML
10 INJECTION INTRAMUSCULAR; INTRAVENOUS EVERY 6 HOURS PRN
Status: DISCONTINUED | OUTPATIENT
Start: 2018-03-29 | End: 2018-03-29 | Stop reason: HOSPADM

## 2018-03-29 RX ORDER — POTASSIUM CHLORIDE 29.8 MG/ML
20 INJECTION INTRAVENOUS
Status: DISCONTINUED | OUTPATIENT
Start: 2018-03-29 | End: 2018-03-29

## 2018-03-29 RX ORDER — PROMETHAZINE HYDROCHLORIDE 25 MG/1
25 TABLET ORAL EVERY 6 HOURS
Status: DISCONTINUED | OUTPATIENT
Start: 2018-03-29 | End: 2018-03-29 | Stop reason: HOSPADM

## 2018-03-29 RX ORDER — IBUPROFEN 600 MG/1
600 TABLET, FILM COATED ORAL EVERY 6 HOURS PRN
Status: DISCONTINUED | OUTPATIENT
Start: 2018-03-29 | End: 2018-03-29

## 2018-03-29 RX ORDER — ACETAMINOPHEN 650 MG/1
650 SUPPOSITORY RECTAL EVERY 4 HOURS PRN
Status: DISCONTINUED | OUTPATIENT
Start: 2018-03-29 | End: 2018-03-29 | Stop reason: HOSPADM

## 2018-03-29 RX ORDER — ONDANSETRON 4 MG/1
4 TABLET, ORALLY DISINTEGRATING ORAL EVERY 6 HOURS PRN
Status: DISCONTINUED | OUTPATIENT
Start: 2018-03-29 | End: 2018-03-29

## 2018-03-29 RX ORDER — THIAMINE HYDROCHLORIDE 100 MG/ML
100 INJECTION, SOLUTION INTRAMUSCULAR; INTRAVENOUS DAILY
Status: DISCONTINUED | OUTPATIENT
Start: 2018-03-29 | End: 2018-03-29

## 2018-03-29 RX ORDER — PROCHLORPERAZINE 25 MG
25 SUPPOSITORY, RECTAL RECTAL EVERY 12 HOURS PRN
Status: DISCONTINUED | OUTPATIENT
Start: 2018-03-29 | End: 2018-03-29 | Stop reason: HOSPADM

## 2018-03-29 RX ORDER — METOCLOPRAMIDE 10 MG/1
10 TABLET ORAL EVERY 6 HOURS PRN
Status: DISCONTINUED | OUTPATIENT
Start: 2018-03-29 | End: 2018-03-29 | Stop reason: HOSPADM

## 2018-03-29 RX ORDER — NALOXONE HYDROCHLORIDE 0.4 MG/ML
.1-.4 INJECTION, SOLUTION INTRAMUSCULAR; INTRAVENOUS; SUBCUTANEOUS
Status: DISCONTINUED | OUTPATIENT
Start: 2018-03-29 | End: 2018-03-29 | Stop reason: HOSPADM

## 2018-03-29 RX ORDER — LIDOCAINE 40 MG/G
CREAM TOPICAL
Status: DISCONTINUED | OUTPATIENT
Start: 2018-03-29 | End: 2018-03-29 | Stop reason: HOSPADM

## 2018-03-29 RX ORDER — POTASSIUM CHLORIDE 1.5 G/1.58G
20-40 POWDER, FOR SOLUTION ORAL
Status: DISCONTINUED | OUTPATIENT
Start: 2018-03-29 | End: 2018-03-29 | Stop reason: HOSPADM

## 2018-03-29 RX ORDER — POTASSIUM CHLORIDE 7.45 MG/ML
10 INJECTION INTRAVENOUS
Status: DISCONTINUED | OUTPATIENT
Start: 2018-03-29 | End: 2018-03-29 | Stop reason: HOSPADM

## 2018-03-29 RX ADMIN — Medication 10 MEQ: at 10:19

## 2018-03-29 RX ADMIN — Medication 10 MEQ: at 11:36

## 2018-03-29 RX ADMIN — ONDANSETRON 4 MG: 4 TABLET, ORALLY DISINTEGRATING ORAL at 06:47

## 2018-03-29 RX ADMIN — PROMETHAZINE HYDROCHLORIDE 25 MG: 25 TABLET ORAL at 10:19

## 2018-03-29 RX ADMIN — PROMETHAZINE HYDROCHLORIDE 12.5 MG: 12.5 SUPPOSITORY RECTAL at 00:31

## 2018-03-29 RX ADMIN — ONDANSETRON 4 MG: 4 TABLET, ORALLY DISINTEGRATING ORAL at 17:57

## 2018-03-29 RX ADMIN — FAMOTIDINE 20 MG: 20 INJECTION, SOLUTION INTRAVENOUS at 05:11

## 2018-03-29 RX ADMIN — ONDANSETRON 4 MG: 4 TABLET, ORALLY DISINTEGRATING ORAL at 11:35

## 2018-03-29 RX ADMIN — SODIUM CHLORIDE, POTASSIUM CHLORIDE, SODIUM LACTATE AND CALCIUM CHLORIDE: 600; 310; 30; 20 INJECTION, SOLUTION INTRAVENOUS at 05:11

## 2018-03-29 RX ADMIN — FOLIC ACID: 5 INJECTION, SOLUTION INTRAMUSCULAR; INTRAVENOUS; SUBCUTANEOUS at 06:18

## 2018-03-29 ASSESSMENT — ACTIVITIES OF DAILY LIVING (ADL)
DRESS: 0 - INDEPENDENT
COGNITION: 0 - NO COGNITION ISSUES REPORTED
SWALLOWING: 0-->SWALLOWS FOODS/LIQUIDS WITHOUT DIFFICULTY
COMMUNICATION: 0 - UNDERSTANDS/COMMUNICATES WITHOUT DIFFICULTY
DRESS: 0-->INDEPENDENT
BATHING: 0 - INDEPENDENT
AMBULATION: 0 - INDEPENDENT
ADLS_ACUITY_SCORE: 9
RETIRED_COMMUNICATION: 0-->UNDERSTANDS/COMMUNICATES WITHOUT DIFFICULTY
TRANSFERRING: 0 - INDEPENDENT
TOILETING: 0 - INDEPENDENT
RETIRED_EATING: 0-->INDEPENDENT
SWALLOWING: 0 - SWALLOWS FOODS/LIQUIDS WITHOUT DIFFICULTY
BATHING: 0-->INDEPENDENT
FALL_HISTORY_WITHIN_LAST_SIX_MONTHS: NO
AMBULATION: 0-->INDEPENDENT
EATING: 0 - INDEPENDENT
CHANGE_IN_FUNCTIONAL_STATUS_SINCE_ONSET_OF_CURRENT_ILLNESS/INJURY: NO
TOILETING: 0-->INDEPENDENT
TRANSFERRING: 0-->INDEPENDENT

## 2018-03-29 NOTE — DISCHARGE SUMMARY
[facility] Discharge Summary    Jaqueline Argueta MRN# [id number]   Age: 22 year old YOB: 1995     Date of Admission:  3/29/2018  Date of Discharge::  3/29/2018   Admitting Physician:  Adrianne Galo MD  Discharge Physician:  Ellen Galan MD      Home clinic: [facility]           Admission Diagnoses:   1. Early pregnancy, 7w0d by LMP  2. Nausea and vomiting in pregnancy   3. Dehydration  4. Hypokalemia          Discharge Diagnosis:   1. IUP at 7w0d by LMP c/w 6w4d US  2. Nausea and vomiting in pregnancy; resolved   3. Dehydration; resolved  4. Hypokalemia; resolved           Procedures:   Procedure(s): Potassium replacement   IV hydration  IV antiemetics   Dating US              Medications Prior to Admission:     Prescriptions Prior to Admission   Medication Sig Dispense Refill Last Dose     senna-docusate (SENOKOT-S;PERICOLACE) 8.6-50 MG per tablet Take 1-2 tablets by mouth 2 times daily as needed for constipation 60 tablet 1 Past Week at Unknown time     Prenatal Vit-Fe Fumarate-FA (PRENATAL MULTIVITAMIN PLUS IRON) 27-0.8 MG TABS per tablet Take 1 tablet by mouth daily 100 tablet 3 Past Week at Unknown time     [DISCONTINUED] metoclopramide (REGLAN) 5 MG tablet Take 1 tablet (5 mg) by mouth 4 times daily as needed for nausea 30 tablet 0 3/29/2018 at Unknown time     [DISCONTINUED] doxylamine (UNISOM) 25 MG TABS tablet Take 0.5 tablets (12.5 mg) by mouth 2 times daily 30 tablet 0 3/28/2018 at Unknown time           Discharge Medications:        Review of your medicines      START taking       Dose / Directions    ondansetron 4 MG ODT tab   Commonly known as:  ZOFRAN-ODT        Dose:  4 mg   Take 1 tablet (4 mg) by mouth every 6 hours   Quantity:  90 tablet   Refills:  1       prochlorperazine 25 MG Suppository   Commonly known as:  COMPAZINE        Dose:  25 mg   Place 1 suppository (25 mg) rectally every 12 hours as needed for nausea or vomiting   Quantity:  12 suppository    Refills:  1       promethazine 25 MG tablet   Commonly known as:  PHENERGAN        Dose:  25 mg   Take 1 tablet (25 mg) by mouth every 6 hours   Quantity:  90 tablet   Refills:  1         CONTINUE these medicines which have NOT CHANGED       Dose / Directions    prenatal multivitamin plus iron 27-0.8 MG Tabs per tablet   Used for:  Encounter for gynecological examination without abnormal finding        Dose:  1 tablet   Take 1 tablet by mouth daily   Quantity:  100 tablet   Refills:  3       senna-docusate 8.6-50 MG per tablet   Commonly known as:  SENOKOT-S;PERICOLACE   Used for:  Constipation, unspecified constipation type        Dose:  1-2 tablet   Take 1-2 tablets by mouth 2 times daily as needed for constipation   Quantity:  60 tablet   Refills:  1         STOP taking          doxylamine 25 MG Tabs tablet   Commonly known as:  UNISOM           metoclopramide 5 MG tablet   Commonly known as:  REGLAN                Where to get your medicines      These medications were sent to [facility] [address]  [address] [address]     Phone:  [phone]      ondansetron 4 MG ODT tab     prochlorperazine 25 MG Suppository     promethazine 25 MG tablet                    Consultations:   None          Brief History of Illness:     Ms. Jaqueline Argueta is a 22 year old  at 7w0d by LMP who presents with nausea and vomiting of pregnancy.  She reports worsening nausea and vomiting for the last couple days.  Has not been able to keep anything down. Was seen in the ED yesterday and was treated with IVF, unisom and Reglan.  She was discharged home with these medications but reports she has been unable to tolerate PO. She denies any abdominal cramping or vaginal bleeding. In the ED today she received 2L of IVF, zofran x2 and a phenergan suppository with no relief. Patient reports she vomiting 20 times while in the ED.         Hospital Course:   The patient  was admitted and given IV hydration with potassium replacement as well as IV antiemetics with resolution of her symptoms.  On HD#1, her anti-emetic regimen was given PO (Zofram, Phenergan with PRN Compazine suppositories) and she was able to tolerate small amounts of regular diet. Furthermore, on HD#2, she had a formal dating US completed with the below results.  She was discharge home on HD#1 after meeting her discharge goals.     Imaging:   HISTORY: 7 weeks by last menstrual period.     TECHNIQUE: The pelvis was scanned in standard fashion with  transabdominal and transvaginal transducer(s).     FINDINGS:   Single living intrauterine gestation with a heart rate of 120 bpm. The  crown-rump length measures 6.5 mm, corresponding to a gestational age  of 6 week weeks, 4 days. Corresponding PRINCESS by ultrasound is November 18, 2018.      Both ovaries are normal in appearance.  The right and left ovaries  measure 2.8 x 1.3 x 1.8 cm and 2.9 x 3.3 x 2.5 cm, respectively.  Corpus luteum of the left ovary measuring 2.3 x 2.1 x 2.3 cm.         IMPRESSION:      Single living gestation measuring  6 weeks 4 days with an PRINCESS of  November 18, 2018 by ultrasound.          Discharge Instructions and Follow-Up:   Discharge diet: Regular   Discharge activity: Activity as tolerated   Discharge follow-up: Follow up with Dr. Payne in 1 week           Discharge Disposition:   Discharged to home      # Discharge Pain Plan:  - Patient currently has NO PAIN and is not being prescribed pain medications on discharge.     Sarah Maldonado MD   OB/Gyn Resident, PGY-4  March 29, 2018

## 2018-03-29 NOTE — PLAN OF CARE
Problem: Patient Care Overview  Goal: Plan of Care/Patient Progress Review  Outcome: Adequate for Discharge Date Met: 03/29/18  Pt states she has some nausea but thinks it has improved. No emesis. Pt able to tolerate sips of clear liquids. Medicated with Zofran. Pt sleeping between nursing checks.  Pt wants to take  phenergan when she gets home. Discharge instructions reviewed with patient and sig.other. Questions answered. Pt states she understands instructions. Home medications filled at [facility]. Medications reviewed with patient and Sig. Other. Pt discharged home with all belongings via w/c. Accompanied by  and family.

## 2018-03-29 NOTE — H&P
[facility]  OB History and Physical      Jaqueline Argueta MRN# [id number]   Age: 22 year old YOB: 1995     CC:  Nausea and vomiting of pregnancy    HPI:  Ms. Jaqueline Argueta is a 22 year old  at 7w0d by LMP who presents with nausea and vomiting of pregnancy.  She reports worsening nausea and vomiting for the last couple days.  Has not been able to keep anything down. Was seen in the ED yesterday and was treated with IVF, unisom and Reglan.  She was discharged home with these medications but reports she has been unable to tolerate PO. She denies any abdominal cramping or vaginal bleeding. In the ED today she received 2L of IVF, zofran x2 and a phenergan suppository with no relief. Patient reports she vomiting 20 times while in the ED.    OB History  Obstetric History       T1      L1     SAB0   TAB0   Ectopic0   Multiple0   Live Births1       # Outcome Date GA Lbr Paul/2nd Weight Sex Delivery Anes PTL Lv   2 Current            1 Term 11 39w1d 02:05 / 00:11 2.977 kg (6 lb 9 oz) F Vag-Spont None N SONG      Name: LIZZY ARGUETA      Apgar1:  9                Apgar5: 9        PMHx:   Past Medical History:   Diagnosis Date     Anemia due to blood loss, acute 2011     PSHx:   Past Surgical History:   Procedure Laterality Date     NO HISTORY OF SURGERY       Meds:   Prescriptions Prior to Admission   Medication Sig Dispense Refill Last Dose     metoclopramide (REGLAN) 5 MG tablet Take 1 tablet (5 mg) by mouth 4 times daily as needed for nausea 30 tablet 0 3/29/2018 at Unknown time     doxylamine (UNISOM) 25 MG TABS tablet Take 0.5 tablets (12.5 mg) by mouth 2 times daily 30 tablet 0 3/28/2018 at Unknown time     senna-docusate (SENOKOT-S;PERICOLACE) 8.6-50 MG per tablet Take 1-2 tablets by mouth 2 times daily as needed for constipation 60 tablet 1 Past Week at Unknown time     Prenatal Vit-Fe Fumarate-FA (PRENATAL MULTIVITAMIN PLUS IRON) 27-0.8  MG TABS per tablet Take 1 tablet by mouth daily 100 tablet 3 Past Week at Unknown time     Allergies:  No Known Allergies   FmHx:   Family History   Problem Relation Age of Onset     Family History Negative No family hx of      SocHx: She denies any tobacco or alcohol use during this pregnancy.  Does report THC use daily, 1-2 cigarettes worth in the past day.    ROS:   Complete 10-point ROS negative except as noted in HPI  PE:  Vit:   Patient Vitals for the past 4 hrs:   BP Temp Temp src Pulse Resp SpO2 Weight   18 0504 - - - - - - 54.7 kg (120 lb 11.2 oz)   18 0415 101/59 98.6  F (37  C) Oral 97 20 100 % -      Gen: Well-appearing, NAD, comfortable   CV: rrr, no mrg  Pulm: Ctab, no wheezes or crackles  Abd: Soft, gravid, non-tender  BSUS:   Gestational sac present, ?yolk sac w/ fetal pole, Difficulty visualizing cardiac activity    Assessment  Ms. Jaqueline Argueta is a 22 year old , at 7w0d by LMP, who presents with inability to tolerate PO due to nausea and vomiting in pregnancy. No weight loss but significant symptoms and labs notable for ketonuria and hypokalemia.  Plan to admit for IVF and antiemetics    - Scheduled IV zofran and phenergan suppositories  - PRN Reglan and compazine  - 1L banana bag followed by 125 ml/hr LR  - IV Pepcid Q12H  - Hypokalemia - ERP ordered, repeat BMP   - Consider dating ultrasound while inpatient  - Encourage small PO intake   -Transition to orals when able  - Discussed with patient that THC use may be contributing to nausea and vomiting and recommendation to quit    The patient was discussed with Dr. Galo who is in agreement with the treatment plan.    Rhona Brothers MD  OBGYN PGY-3  4:38 AM 3/29/2018      I discussed this patient with Dr Brothers and agree with the plan.    Adrianne Galo MD

## 2018-03-29 NOTE — ED NOTES
"[facility]   ED Nurse to Floor Handoff     Jaqueline Argueta is a 22 year old female who speaks English and lives in a home  They arrived in the ED by car from home    ED Chief Complaint: Hyperemesis (\"I can't keep anything down,\" 6 weeks pregnant with vomiting.)    ED Dx;   Final diagnoses:   Nausea/vomiting in pregnancy         Needed?: No    Allergies: No Known Allergies.  Past Medical Hx:   Past Medical History:   Diagnosis Date     Anemia due to blood loss, acute 12/27/2011      Baseline Mental status: WDL  Current Mental Status changes: none    Infection present or suspected this encounter: no  Sepsis suspected: No  Isolation type: No active isolations     Activity level - Baseline/Home:  Independent  Activity Level - Current:   Independent    Bariatric equipment needed?: No    In the ED these meds were given:   Medications   0.9% sodium chloride BOLUS (0 mLs Intravenous Stopped 3/28/18 2226)   ondansetron (ZOFRAN) injection 8 mg (8 mg Intravenous Given 3/28/18 2047)   alum & mag hydroxide-simethicone (MYLANTA ES/MAALOX  ES) suspension 30 mL (30 mLs Oral Given 3/28/18 2121)   0.9% sodium chloride BOLUS (0 mLs Intravenous Stopped 3/28/18 2334)   ondansetron (ZOFRAN-ODT) ODT tab 8 mg (8 mg Oral Given 3/28/18 2207)   sodium chloride 0.9% infusion ( Intravenous Stopped 3/29/18 0035)   promethazine (PHENERGAN) Suppository 12.5 mg (12.5 mg Rectal Given 3/29/18 0031)       Drips running?  No    Home pump  No    Current LDAs  Peripheral IV 03/28/18 Right (Active)   Number of days:1       Labs results:   Labs Ordered and Resulted from Time of ED Arrival Up to the Time of Departure from the ED   BASIC METABOLIC PANEL - Abnormal; Notable for the following:        Result Value    Potassium 3.1 (*)     Urea Nitrogen 6 (*)     Creatinine 0.51 (*)     All other components within normal limits   CBC WITH PLATELETS DIFFERENTIAL - Abnormal; Notable for the following:     WBC 11.3 " (*)     Absolute Neutrophil 9.2 (*)     All other components within normal limits   UA MACROSCOPIC WITH REFLEX TO MICRO AND CULTURE - Abnormal; Notable for the following:     Ketones Urine >150 (*)     All other components within normal limits       Imaging Studies: No results found for this or any previous visit (from the past 24 hour(s)).    Recent vital signs:   /61  Pulse 70  Temp 96.2  F (35.7  C) (Oral)  Resp 20  Wt 53.5 kg (118 lb)  LMP 02/08/2018  SpO2 90%  BMI 22.3 kg/m2    Cardiac Rhythm: not assessed  Pt needs tele? No  Skin/wound Issues: None    Code Status: Full Code    Pain control: Pt had none    Nausea control: fair    Abnormal labs/tests/findings requiring intervention: none    Family present during ED course? Yes   Family Comments/Social Situation comments:     Tasks needing completion: None    Nathanael Arauz RN  [facility]-- [phone] [place] [facility]  [phone] [facility] ED

## 2018-03-29 NOTE — PLAN OF CARE
Problem: Patient Care Overview  Goal: Plan of Care/Patient Progress Review  Outcome: Improving  Patient alert and oriented x 4. Up and ambulating independently. Still with some nausea but no vomiting reported. Patient able to tolerate oral meds for nausea, was able to tolerate solid foods. K level -3.2- replaced this shift, recheck at 1700. Abdominal Ultrasound done this shift.Patient planning to d/c home this PM. Will continue with POC.

## 2018-03-29 NOTE — PROGRESS NOTES
"CLINICAL NUTRITION SERVICES - ASSESSMENT NOTE     Nutrition Prescription    RECOMMENDATIONS FOR MDs/PROVIDERS TO ORDER:  Start daily prenatal multivitamin     Malnutrition Status:    Patient does not meet two of the above criteria necessary for diagnosing malnutrition    Recommendations already ordered by Registered Dietitian (RD):  Boost Plus qd + PRN    Future/Additional Recommendations:  Adjust supplement pending tolerance.      REASON FOR ASSESSMENT  Jaqueline Argueta is a/an 22 year old female assessed by the dietitian for Admission Nutrition Risk Screen for reduced oral intake over the last month    NUTRITION HISTORY  - Pt reports being 6 weeks pregnant and c/o N/V, \"unable to keep anything down.\"  - She has not eating any foods for the last 4 days per pt report.   - Prior to 1 week ago, pt was tolerating a regular diet and eating well at baseline.    CURRENT NUTRITION ORDERS  Diet: Regular  Intake/Tolerance: ate toast and yogurt per pt report. She wants to take things slow but denies any current nausea.    LABS  Labs reviewed  - K+ 3.2 (L)  - Creatinine 0.45 (L)    MEDICATIONS  Medications reviewed  - Zofran q 6 hrs -- pt reports this is helping    ANTHROPOMETRICS  Height: 0 cm (Data Unavailable)  Ht Readings from Last 2 Encounters:   03/27/18 1.549 m (5' 1\")   02/01/18 1.549 m (5' 1\")   Most Recent Weight: 54.7 kg (120 lb 11.2 oz)    Prepregnancy Weight: 54.9 kg (121 lb)  IBW: 47.7 kg (115% IBW)  BMI: Normal BMI  Weight History: pts wt has been stable over the last 2 months since becoming pregnant. Pt denies any wt changes.   Based on prepregnancy BMI of 19.9-26 kg/m2, it is recommend pt gain 0.9 lbs (0.4 kg) per week per week after 12 weeks, overall gaining at least 25-35 lbs (11.5-16 kg) total in this pregnancy.  Wt Readings from Last 10 Encounters:   03/29/18 54.7 kg (120 lb 11.2 oz)   03/27/18 54.8 kg (120 lb 12.8 oz)   03/27/18 54.1 kg (119 lb 3.2 oz)   02/01/18 54.9 kg (121 lb)   10/31/17 52.2 kg (115 " lb)   08/08/17 52.7 kg (116 lb 2 oz) (26 %)*   12/05/16 55.2 kg (121 lb 12.8 oz) (40 %)*   03/21/16 61.7 kg (136 lb) (68 %)*   03/20/15 60.2 kg (132 lb 11.2 oz) (67 %)*   10/17/14 56.7 kg (125 lb) (56 %)*     * Growth percentiles are based on CDC 2-20 Years data.     Dosing Weight: 55 kg - most recent wt    ASSESSED NUTRITION NEEDS  Estimated Energy Needs: 1650 kcals/day (30 kcals/kg)  Justification: First Trimester Pregnancy  Estimated Protein Needs: 55-66 grams protein/day (1 - 1.2 grams of pro/kg)  Justification: First Trimester Pregnancy  Estimated Fluid Needs: 7138-6756 mL/day (30 - 35 mL/kg)   Justification: Increased needs    PHYSICAL FINDINGS  See malnutrition section below.  Pregnancy 7w0d    MALNUTRITION  % Intake: </= 50% for >/= 5 days (severe)  % Weight Loss: None noted  Subcutaneous Fat Loss: None observed  Muscle Loss: None observed  Fluid Accumulation/Edema: None noted  Malnutrition Diagnosis: Patient does not meet two of the above criteria necessary for diagnosing malnutrition    NUTRITION DIAGNOSIS  Inadequate oral intake related to N/V as evidenced by likely eating <50% nutritional needs over the last several days with frequent vomiting       INTERVENTIONS  Implementation  Nutrition Education: briefly discussed that bland foods are better tolerated during N/V, however pt denies any N/V at this time and feels able to eat and tolerated regular foods. Agreeable to trying Boost Plus d/t poor intakes over the last several days.     Goals  Patient to consume % of nutritionally adequate meal trays TID, or the equivalent with supplements/snacks.     Monitoring/Evaluation  Progress toward goals will be monitored and evaluated per protocol.      Jackie Van RD, LD  Unit Pager: [phone]

## 2018-03-29 NOTE — PROGRESS NOTES
S: Patient feeling better.  No nausea or vomiting for several hours now.  Ate a good amount of her lunch.  Feels ready for discharge.     O:   /62 (BP Location: Left arm)  Pulse 100  Temp 97.4  F (36.3  C) (Oral)  Resp 16  Wt 54.7 kg (120 lb 11.2 oz)  LMP 2018  SpO2 100%  BMI 22.81 kg/m2     Gen: Pleasant, NAD   CV: Regular rate   Resp: Non-labored breathing   Psych: Good eye contact, bright affect   Neuro: A&O x3      A/P: Ms. Jaqueline Argueta is a 22 year old , at 7w0d by LMP c/w 6w4d US, who is HD#1 with nausea and vomiting in pregnancy. Patient with notable improvement in symptoms since schedule emetics have been started.  No eating a good amount of food with PO hydration.  Feels stable for discharge home.   - Plan on discharge home this evening after repeat potassium level.  If low, will replace with PO prior to discharge with plan for recheck in the next 1-5 days.     Discussed with Dr. Adama Maldonado MD   OB/Gyn Resident, PGY-4  2018

## 2018-03-29 NOTE — PLAN OF CARE
Problem: Patient Care Overview  Goal: Plan of Care/Patient Progress Review  Outcome: No Change        VS:   Arrived to unit via wheelchair.Up ad sparkle.Steady gait.VSS.LS clear,satts well at room air.   Output:   Denies issues with bladder.Passing gas,states had BM yesterday and this morning.   Activity:   Up ad spakrle.   Skin: Intact except tattoos   Pain:   Denies   Neuro/CMS:   Denies any numbness/tingling sensation.   Dressing(s):   none   Diet:   On regular diet.Nausea resolved.Tolerating saltines,boxlunch ordered per pt's request,currently eating.   LDA:   PIV line R lower forearm.Banana bag to be infused after famotidine IV then LR at same rate   Equipment:   PCDs.   Plan:   Pt will be monitored for oral intake.On scheduled zofran/phenergan.   Additional Info:   Seen by OB resident and checked  FHT via [facility].MD states FHT ok and will have another check tomorrow morning.

## 2018-03-29 NOTE — ED PROVIDER NOTES
"  History     Chief Complaint   Patient presents with     Hyperemesis     \"I can't keep anything down,\" 6 weeks pregnant with vomiting.     HPI   Jaqueline Argueta is a 22 year old female who is 6 weeks (by LMP) pregnant who presents to the ED with hyperemesis. Per review of her chart, she was seen in the ED yesterday for the same symptoms. She was treated with IV fluids and discharged with Unisom and Reglan. She stated at the previous visit that she also had hyperemesis with her first pregnancy. Patient states she is still unable to tolerate PO intake and hasn't had relief with Unisom or Raglan. She reports she took Zofran with her previous pregnancy and is requesting to get Zofran again.       PAST MEDICAL HISTORY  Past Medical History:   Diagnosis Date     Anemia due to blood loss, acute 2011     PAST SURGICAL HISTORY  Past Surgical History:   Procedure Laterality Date     NO HISTORY OF SURGERY       FAMILY HISTORY  Family History   Problem Relation Age of Onset     Family History Negative No family hx of      SOCIAL HISTORY  Social History   Substance Use Topics     Smoking status: Never Smoker     Smokeless tobacco: Never Used      Comment: smoke marijuana once or twice a wk     Alcohol use Yes      Comment: rare     MEDICATIONS  No current facility-administered medications for this encounter.      Current Outpatient Prescriptions   Medication     metoclopramide (REGLAN) 5 MG tablet     doxylamine (UNISOM) 25 MG TABS tablet     senna-docusate (SENOKOT-S;PERICOLACE) 8.6-50 MG per tablet     Prenatal Vit-Fe Fumarate-FA (PRENATAL MULTIVITAMIN PLUS IRON) 27-0.8 MG TABS per tablet     ALLERGIES  No Known Allergies    I have reviewed the Medications, Allergies, Past Medical and Surgical History, and Social History in the Epic system.    Review of Systems   All other systems reviewed and are negative.      Physical Exam   BP: 128/71  Pulse: 98  Heart Rate: 98  Temp: 96.2  F (35.7  C)  Resp: 18  Weight: " 53.5 kg (118 lb)  SpO2: 97 %      Physical Exam   Constitutional: She is oriented to person, place, and time. She appears well-developed and well-nourished. No distress.   HENT:   Head: Normocephalic and atraumatic.   Eyes: No scleral icterus.   Neck: Normal range of motion. Neck supple.   Cardiovascular: Normal rate.    Pulmonary/Chest: Effort normal.   Abdominal: Soft. There is no tenderness.   Neurological: She is alert and oriented to person, place, and time.   Skin: Skin is warm and dry. No rash noted. She is not diaphoretic. No erythema. No pallor.       ED Course     ED Course     Procedures             Critical Care time:  none         Results for orders placed or performed during the hospital encounter of 03/28/18   Basic metabolic panel   Result Value Ref Range    Sodium 140 133 - 144 mmol/L    Potassium 3.1 (L) 3.4 - 5.3 mmol/L    Chloride 106 94 - 109 mmol/L    Carbon Dioxide 25 20 - 32 mmol/L    Anion Gap 9 3 - 14 mmol/L    Glucose 85 70 - 99 mg/dL    Urea Nitrogen 6 (L) 7 - 30 mg/dL    Creatinine 0.51 (L) 0.52 - 1.04 mg/dL    GFR Estimate >90 >60 mL/min/1.7m2    GFR Estimate If Black >90 >60 mL/min/1.7m2    Calcium 8.9 8.5 - 10.1 mg/dL   CBC with platelets differential   Result Value Ref Range    WBC 11.3 (H) 4.0 - 11.0 10e9/L    RBC Count 4.51 3.8 - 5.2 10e12/L    Hemoglobin 14.1 11.7 - 15.7 g/dL    Hematocrit 40.4 35.0 - 47.0 %    MCV 90 78 - 100 fl    MCH 31.3 26.5 - 33.0 pg    MCHC 34.9 31.5 - 36.5 g/dL    RDW 11.9 10.0 - 15.0 %    Platelet Count 234 150 - 450 10e9/L    Diff Method Automated Method     % Neutrophils 81.1 %    % Lymphocytes 14.3 %    % Monocytes 3.9 %    % Eosinophils 0.1 %    % Basophils 0.2 %    % Immature Granulocytes 0.4 %    Nucleated RBCs 0 0 /100    Absolute Neutrophil 9.2 (H) 1.6 - 8.3 10e9/L    Absolute Lymphocytes 1.6 0.8 - 5.3 10e9/L    Absolute Monocytes 0.4 0.0 - 1.3 10e9/L    Absolute Eosinophils 0.0 0.0 - 0.7 10e9/L    Absolute Basophils 0.0 0.0 - 0.2 10e9/L    Abs  Immature Granulocytes 0.0 0 - 0.4 10e9/L    Absolute Nucleated RBC 0.0    UA reflex to Microscopic and Culture   Result Value Ref Range    Color Urine Yellow     Appearance Urine Clear     Glucose Urine Negative NEG^Negative mg/dL    Bilirubin Urine Negative NEG^Negative    Ketones Urine >150 (A) NEG^Negative mg/dL    Specific Gravity Urine 1.019 1.003 - 1.035    Blood Urine Negative NEG^Negative    pH Urine 5.5 5.0 - 7.0 pH    Protein Albumin Urine Negative NEG^Negative mg/dL    Urobilinogen mg/dL Normal 0.0 - 2.0 mg/dL    Nitrite Urine Negative NEG^Negative    Leukocyte Esterase Urine Negative NEG^Negative    Source Midstream Urine      Medications   0.9% sodium chloride BOLUS (0 mLs Intravenous Stopped 3/28/18 2226)   ondansetron (ZOFRAN) injection 8 mg (8 mg Intravenous Given 3/28/18 2047)   alum & mag hydroxide-simethicone (MYLANTA ES/MAALOX  ES) suspension 30 mL (30 mLs Oral Given 3/28/18 2121)   0.9% sodium chloride BOLUS (0 mLs Intravenous Stopped 3/28/18 2334)   ondansetron (ZOFRAN-ODT) ODT tab 8 mg (8 mg Oral Given 3/28/18 2207)   sodium chloride 0.9% infusion ( Intravenous Stopped 3/29/18 0035)   promethazine (PHENERGAN) Suppository 12.5 mg (12.5 mg Rectal Given 3/29/18 0031)            Labs Ordered and Resulted from Time of ED Arrival Up to the Time of Departure from the ED   BASIC METABOLIC PANEL - Abnormal; Notable for the following:        Result Value    Potassium 3.1 (*)     Urea Nitrogen 6 (*)     Creatinine 0.51 (*)     All other components within normal limits   CBC WITH PLATELETS DIFFERENTIAL - Abnormal; Notable for the following:     WBC 11.3 (*)     Absolute Neutrophil 9.2 (*)     All other components within normal limits   UA MACROSCOPIC WITH REFLEX TO MICRO AND CULTURE - Abnormal; Notable for the following:     Ketones Urine >150 (*)     All other components within normal limits            Assessments & Plan (with Medical Decision Making)   This is a 22-year-old female patient coming in the  emergency room 6 weeks pregnant with vomiting.  Her reviewed her note where she was seen here yesterday with similar symptoms and provided with Reglan and Unisom upon discharge.  She continues to be extremely nauseous and has vomited all day with no food or anything to drink.  Her labs are reviewed which does show significant elevation in the ketones in her urine.  She is provided with 3 L of IV fluid here in the emergency room including Zofran, Phenergan with no significant improvement.  Should be continued to monitor for short period of time.  The case was discussed with OB/GYN and if she does not improve she will be admitted to their service for continued care management.  Should be signed out to the evening emergency physician for disposition planning.    I have reviewed the nursing notes.    I have reviewed the findings, diagnosis, plan and need for follow up with the patient.    New Prescriptions    No medications on file       Final diagnoses:   None       3/28/2018   [facility], [place], EMERGENCY DEPARTMENT     Josué Franco MD  03/29/18 0052

## 2018-03-30 PROBLEM — Z28.39 RUBELLA NON-IMMUNE STATUS, ANTEPARTUM: Status: ACTIVE | Noted: 2018-03-30

## 2018-03-30 PROBLEM — O09.899 RUBELLA NON-IMMUNE STATUS, ANTEPARTUM: Status: ACTIVE | Noted: 2018-03-30

## 2018-04-13 ENCOUNTER — HOSPITAL ENCOUNTER (EMERGENCY)
Facility: CLINIC | Age: 23
Discharge: HOME | End: 2018-04-13
Attending: EMERGENCY MEDICINE | Admitting: EMERGENCY MEDICINE
Payer: COMMERCIAL

## 2018-04-13 VITALS
BODY MASS INDEX: 21.35 KG/M2 | WEIGHT: 113 LBS | TEMPERATURE: 97.9 F | DIASTOLIC BLOOD PRESSURE: 80 MMHG | OXYGEN SATURATION: 100 % | SYSTOLIC BLOOD PRESSURE: 124 MMHG | HEART RATE: 92 BPM

## 2018-04-13 DIAGNOSIS — O21.9 NAUSEA/VOMITING IN PREGNANCY: ICD-10-CM

## 2018-04-13 LAB
ANION GAP SERPL CALCULATED.3IONS-SCNC: 10 MMOL/L (ref 3–14)
BUN SERPL-MCNC: 10 MG/DL (ref 7–30)
CALCIUM SERPL-MCNC: 9.3 MG/DL (ref 8.5–10.1)
CHLORIDE SERPL-SCNC: 101 MMOL/L (ref 94–109)
CO2 SERPL-SCNC: 25 MMOL/L (ref 20–32)
CREAT SERPL-MCNC: 0.45 MG/DL (ref 0.52–1.04)
GFR SERPL CREATININE-BSD FRML MDRD: >90 ML/MIN/1.7M2
GLUCOSE SERPL-MCNC: 86 MG/DL (ref 70–99)
POTASSIUM SERPL-SCNC: 3.4 MMOL/L (ref 3.4–5.3)
SODIUM SERPL-SCNC: 136 MMOL/L (ref 133–144)

## 2018-04-13 PROCEDURE — 80048 BASIC METABOLIC PNL TOTAL CA: CPT | Performed by: EMERGENCY MEDICINE

## 2018-04-13 PROCEDURE — 96376 TX/PRO/DX INJ SAME DRUG ADON: CPT | Performed by: EMERGENCY MEDICINE

## 2018-04-13 PROCEDURE — 99283 EMERGENCY DEPT VISIT LOW MDM: CPT | Mod: Z6 | Performed by: EMERGENCY MEDICINE

## 2018-04-13 PROCEDURE — 25000128 H RX IP 250 OP 636: Performed by: EMERGENCY MEDICINE

## 2018-04-13 PROCEDURE — 99284 EMERGENCY DEPT VISIT MOD MDM: CPT | Mod: 25 | Performed by: EMERGENCY MEDICINE

## 2018-04-13 PROCEDURE — 96374 THER/PROPH/DIAG INJ IV PUSH: CPT | Performed by: EMERGENCY MEDICINE

## 2018-04-13 PROCEDURE — 25000132 ZZH RX MED GY IP 250 OP 250 PS 637: Performed by: FAMILY MEDICINE

## 2018-04-13 PROCEDURE — 96361 HYDRATE IV INFUSION ADD-ON: CPT | Performed by: EMERGENCY MEDICINE

## 2018-04-13 RX ORDER — PROCHLORPERAZINE 25 MG
25 SUPPOSITORY, RECTAL RECTAL EVERY 12 HOURS PRN
Qty: 20 SUPPOSITORY | Refills: 1 | Status: SHIPPED | OUTPATIENT
Start: 2018-04-13 | End: 2018-10-02

## 2018-04-13 RX ORDER — ONDANSETRON 2 MG/ML
8 INJECTION INTRAMUSCULAR; INTRAVENOUS ONCE
Status: COMPLETED | OUTPATIENT
Start: 2018-04-13 | End: 2018-04-13

## 2018-04-13 RX ORDER — ONDANSETRON 2 MG/ML
4 INJECTION INTRAMUSCULAR; INTRAVENOUS EVERY 30 MIN PRN
Status: DISCONTINUED | OUTPATIENT
Start: 2018-04-13 | End: 2018-04-13 | Stop reason: HOSPADM

## 2018-04-13 RX ORDER — SODIUM CHLORIDE 9 MG/ML
INJECTION, SOLUTION INTRAVENOUS CONTINUOUS
Status: DISCONTINUED | OUTPATIENT
Start: 2018-04-13 | End: 2018-04-13 | Stop reason: HOSPADM

## 2018-04-13 RX ORDER — ALUMINA, MAGNESIA, AND SIMETHICONE 2400; 2400; 240 MG/30ML; MG/30ML; MG/30ML
30 SUSPENSION ORAL ONCE
Status: COMPLETED | OUTPATIENT
Start: 2018-04-13 | End: 2018-04-13

## 2018-04-13 RX ADMIN — ONDANSETRON 8 MG: 2 INJECTION INTRAMUSCULAR; INTRAVENOUS at 15:47

## 2018-04-13 RX ADMIN — SODIUM CHLORIDE 1000 ML: 9 INJECTION, SOLUTION INTRAVENOUS at 15:04

## 2018-04-13 RX ADMIN — SODIUM CHLORIDE 1000 ML: 9 INJECTION, SOLUTION INTRAVENOUS at 14:22

## 2018-04-13 RX ADMIN — ALUMINUM HYDROXIDE, MAGNESIUM HYDROXIDE, AND DIMETHICONE 20 ML: 400; 400; 40 SUSPENSION ORAL at 15:03

## 2018-04-13 RX ADMIN — ONDANSETRON 4 MG: 2 INJECTION INTRAMUSCULAR; INTRAVENOUS at 14:24

## 2018-04-13 ASSESSMENT — ENCOUNTER SYMPTOMS
ABDOMINAL PAIN: 0
VOMITING: 1
FEVER: 0
NAUSEA: 1

## 2018-04-13 NOTE — ED AVS SNAPSHOT
[facility], Emergency Department    [address]    [facility] [address]    Phone:  [phone]    Fax:  [phone]                                       Jaqueline Argueta   MRN: [id number]    Department:  [facility], Emergency Department   Date of Visit:  4/13/2018           After Visit Summary Signature Page     I have received my discharge instructions, and my questions have been answered. I have discussed any challenges I see with this plan with the nurse or doctor.    ..........................................................................................................................................  Patient/Patient Representative Signature      ..........................................................................................................................................  Patient Representative Print Name and Relationship to Patient    ..................................................               ................................................  Date                                            Time    ..........................................................................................................................................  Reviewed by Signature/Title    ...................................................              ..............................................  Date                                                            Time

## 2018-04-13 NOTE — ED PROVIDER NOTES
History     Chief Complaint   Patient presents with     Hyperemesis     Pt 7 weeks pregnant with hyperemesis     HPI  Jaqueline Argueta is a 22 year old female who is  at about 7 weeks here with 3 days of nausea and vomiting. No abdominal pain, no vaginal bleeding. She has not started prenatal care yet. She has Phenergan and Zofran at home but says she is throwing up. No fevers. She has no chronic medical problems like diabetes. She is not using drugs or alcohol.  This part of the medical record was transcribed by Cierra Parrish Medical Scribe, from a dictation done by Dhaval Grimaldo MD.       I have reviewed the Medications, Allergies, Past Medical and Surgical History, and Social History in the [employer] system.  PAST MEDICAL HISTORY:   Past Medical History:   Diagnosis Date     Anemia due to blood loss, acute 2011       PAST SURGICAL HISTORY:   Past Surgical History:   Procedure Laterality Date     NO HISTORY OF SURGERY         FAMILY HISTORY:   Family History   Problem Relation Age of Onset     Family History Negative No family hx of        SOCIAL HISTORY:   Social History   Substance Use Topics     Smoking status: Never Smoker     Smokeless tobacco: Never Used      Comment: smoke marijuana once or twice a wk     Alcohol use No      Comment: rare     No current facility-administered medications for this encounter.      Current Outpatient Prescriptions   Medication     ondansetron (ZOFRAN-ODT) 4 MG ODT tab     prochlorperazine (COMPAZINE) 25 MG Suppository     promethazine (PHENERGAN) 25 MG tablet     Prenatal Vit-Fe Fumarate-FA (PRENATAL MULTIVITAMIN PLUS IRON) 27-0.8 MG TABS per tablet      No Known Allergies      Review of Systems   Constitutional: Negative for fever.   Gastrointestinal: Positive for nausea and vomiting. Negative for abdominal pain.   Genitourinary: Negative.  Negative for vaginal bleeding.   All other systems reviewed and are negative.      Physical Exam   BP: 124/80  Pulse:  92  Temp: 97.9  F (36.6  C)  Weight: 51.3 kg (113 lb)  SpO2: 100 %      Physical Exam   Constitutional: She is oriented to person, place, and time. She appears well-developed and well-nourished. No distress.   Thin   HENT:   Head: Normocephalic.   Mouth/Throat: Oropharynx is clear and moist.   Eyes: Conjunctivae and EOM are normal. Pupils are equal, round, and reactive to light.   Neck: Normal range of motion. Neck supple. No JVD present.   Cardiovascular: Normal rate, regular rhythm and normal heart sounds.    Pulmonary/Chest: Effort normal and breath sounds normal. No respiratory distress. She has no wheezes. She has no rales.   Abdominal: Soft. She exhibits no mass. There is no tenderness. There is no guarding.   Musculoskeletal: Normal range of motion.        Right lower leg: Normal.        Left lower leg: Normal.   Neurological: She is alert and oriented to person, place, and time.   Skin: Skin is warm and dry. She is not diaphoretic.   Psychiatric: She has a normal mood and affect. Her behavior is normal.   Nursing note and vitals reviewed.      ED Course     ED Course     Procedures        Medications   0.9% sodium chloride BOLUS (0 mLs Intravenous Stopped 4/13/18 1504)     Followed by   0.9% sodium chloride BOLUS (0 mLs Intravenous Stopped 4/13/18 1626)   alum & mag hydroxide-simethicone (MYLANTA ES/MAALOX  ES) suspension 30 mL (20 mLs Oral Given 4/13/18 1503)   ondansetron (ZOFRAN) injection 8 mg (8 mg Intravenous Given 4/13/18 1547)            Labs Ordered and Resulted from Time of ED Arrival Up to the Time of Departure from the ED   BASIC METABOLIC PANEL - Abnormal; Notable for the following:        Result Value    Creatinine 0.45 (*)     All other components within normal limits            Assessments & Plan (with Medical Decision Making)   22-year-old G2 para 1 at approximately 7 weeks with nausea and vomiting without abdominal pain or vaginal bleeding to suggest miscarriage.  She has not started  prenatal care.  Here she was given IV fluids and antiemetics and Maalox with improvement of her symptoms.  Will sign out to incoming physician, continue hydration and discharge home with antiemetics and recommend primary clinic OB provider follow-up.  No indication for emergent ultrasound this patient does not have pain or bleeding.    I have reviewed the nursing notes.    I have reviewed the findings, diagnosis, plan and need for follow up with the patient.    Discharge Medication List as of 4/13/2018  5:31 PM          Final diagnoses:   Nausea/vomiting in pregnancy       4/13/2018   [facility], [place], EMERGENCY DEPARTMENT     Dhaval Grimaldo MD  04/27/18 0929

## 2018-04-13 NOTE — ED AVS SNAPSHOT
[facility], Emergency Department    [address]    [facility] [place] [place] [address]    Phone:  [phone]    Fax:  [phone]                                       Jaqueline Argueta   MRN: [id number]    Department:  [facility], Emergency Department   Date of Visit:  4/13/2018           Patient Information     Date Of Birth          1995        Your diagnoses for this visit were:     Nausea/vomiting in pregnancy        You were seen by Dhaval Grimaldo MD and Yves Tucrios MD.        Discharge Instructions       Use your home medications and contact your OB provider if still having difficulty  Eating Tips for Morning Sickness   Hyperemesis Gravidarum  During pregnancy, you need to eat enough food to meet your needs and the needs of your baby. Severe nausea and vomiting (hyperemesis) may lead to weight loss and dehydration (too little fluid in the body).   Follow these tips to help control nausea.   1. Eat 6 to 8 small meals in a day, about two hours apart.  2. Before rising in the morning, eat a small amount of dry food. Choose from the list below.    soda crackers (saltines)    dry toast with jelly    breadsticks    dry cereal    rice cakes    pretzels    plain potatoes, rice or noodles    plain low-fat cookies or cake  3. Avoid liquids with meals. Drink liquids 30 to 60 minutes before or after eating. Sip slowly.  4. Foods and drinks should be cool or at room temperature. Try:    flavored gelatin    sherbet, sorbet or Popsicles    carbonated (fizzy) drinks    ice cubes made from juice.  5. Avoid hot drinks and foods.  6. Avoid drinks with caffeine (coffee, tea, cola drinks). They may increase stomach acid.  7. Avoid very sweet, hot or spicy foods.  8. Avoid high-fat foods such as butter, margarine, mayonnaise, wiley, gravies, pie crust, pastries and fried foods. They take longer to leave the stomach.  9. Avoid strong food odors such as fish, cabbage or broccoli. Avoid cooking odors by  eating food you do not have to cook.  10. Do not lie down after eating. Rest sitting up for an hour after meals.  11. Take your prenatal vitamins with food in the evening. Tell your doctor if you cannot take them.  12. Nausea is often gone by midday. You may eat more food in the late afternoon, supper and mid-evening. Find the times best for you.  Keep a food diary to help you find foods that you can eat without problems. Try any food that appeals to you.  Menu Planning Guidelines     [employer]. Reprinted with permission.  Food groups Foods recommended  Foods that may cause distress    Soups Low-fat broth-based and cream soups made with allowed foods. All other soups.    Meats and substitutes  (Six or more ounces daily) All lean, tender meats, poultry or fish. All should be baked, broiled or boiled. Boiled egg. Low-fat or fat-free cheeses. Fried meat, poultry or fish; highly seasoned, cured or smoked meat, poultry or fish (i.e., corned beef, luncheon meat, frankfurters, sausages, sardines, anchovies, wiley and strong flavored cheese). Peanut butter.    Fruits (Two or more servings daily; include a vitamin C source daily) Fruit juices, canned fruits, grapefruit and orange sections (without membrane). Other fresh and dried fruits, if tolerated.     Vegetables  (Three or more servings daily) Vegetable juices, cooked vegetables (i.e., asparagus, green or wax beans, beets, carrots, peas, pumpkin, winter squash, spinach and mushrooms). Raw vegetables if tolerated.  Gas-forming vegetables (i.e., dried peas and beans, corn, broccoli, onions, cauliflower, [place] sprouts, cucumbers, cabbage, turnip, rutabagas, sauerkraut, green peppers).    Bread, cereal, potato, pasta and grains  (Six or more servings daily) Enriched breads and cereals, plain crackers, potatoes, enriched rice, barley, noodles, spaghetti, macaroni and other pastas. Very coarse cereals such as bran; seeds in or on breads, rolls and crackers; breads made with  nuts or dried fruits; fried breads and pastries such as doughnuts; fried potatoes, fried rice, wild rice, seasoned rice and pasta mixes.    Dessert fats Low-fat versions of cakes, cookies, custard, pudding, ice cream, frozen yogurt sherbet; ice pops, gelatin, frozen fruit bars, sorbet. Desserts containing salad dressings, nuts, coconut; high-fat desserts.    Milk and milk products (Four or more cups daily) Fat-free and low-fat milk products. Whole milk, cream.    Beverages   (Four or more cups daily) Water, decaffeinated coffee and tea, fruit drinks, caffeine-free carbonated beverages, weak tea, lemonade, sports drinks. All caffeine-containing beverages (i.e., coffee, strong tea, cocoa, cola); alcoholic beverages.    Condiments and sweets Iodized salt, flavorings, low-fat gravies and sauces, herbs and spices as tolerated; sugar, syrup, honey, jelly, seedless jam, hard candies and marshmallows. Strongly flavored seasonings and condiments (i.e., catsup, pepper, barbecue sauce, chili sauce, chili pepper, horseradish, garlic, mustard and vinegar), olives, pickles, nuts, chocolate candy.    For informational purposes only. Not to replace the advice of your health care provider.   Copyright   2006 [facility]. All rights reserved. [employer] 534684 - REV 09/15.      Your next 10 appointments already scheduled     Apr 24, 2018  2:00 PM CDT   [facility]    [address] [place] [id number] [phone]              24 Hour Appointment Hotline       To make an appointment at any [facility], call [phone] ([phone]). If you don't have a family doctor or clinic, we will help you find one. [facility] are conveniently located to serve the needs of you and your family.             Review of your medicines      CONTINUE these medicines which may have CHANGED, or have new  prescriptions. If we are uncertain of the size of tablets/capsules you have at home, strength may be listed as something that might have changed.        Dose / Directions Last dose taken    prochlorperazine 25 MG Suppository   Commonly known as:  COMPAZINE   Dose:  25 mg   What changed:  reasons to take this   Quantity:  20 suppository        Place 1 suppository (25 mg) rectally every 12 hours as needed for nausea   Refills:  1          Our records show that you are taking the medicines listed below. If these are incorrect, please call your family doctor or clinic.        Dose / Directions Last dose taken    ondansetron 4 MG ODT tab   Commonly known as:  ZOFRAN-ODT   Dose:  4 mg   Quantity:  90 tablet        Take 1 tablet (4 mg) by mouth every 6 hours   Refills:  1        prenatal multivitamin plus iron 27-0.8 MG Tabs per tablet   Dose:  1 tablet   Quantity:  100 tablet        Take 1 tablet by mouth daily   Refills:  3        promethazine 25 MG tablet   Commonly known as:  PHENERGAN   Dose:  25 mg   Quantity:  90 tablet        Take 1 tablet (25 mg) by mouth every 6 hours   Refills:  1        senna-docusate 8.6-50 MG per tablet   Commonly known as:  SENOKOT-S;PERICOLACE   Dose:  1-2 tablet   Quantity:  60 tablet        Take 1-2 tablets by mouth 2 times daily as needed for constipation   Refills:  1                Prescriptions were sent or printed at these locations (1 Prescription)                   Other Prescriptions                Printed at Department/Unit printer (1 of 1)         prochlorperazine (COMPAZINE) 25 MG Suppository                Procedures and tests performed during your visit     Basic metabolic panel      Orders Needing Specimen Collection     None      Pending Results     No orders found from 4/11/2018 to 4/14/2018.            Pending Culture Results     No orders found from 4/11/2018 to 4/14/2018.            Pending Results Instructions     If you had any lab results that were not finalized at  "the time of your Discharge, you can call the ED Lab Result RN at [phone]. You will be contacted by this team for any positive Lab results or changes in treatment. The nurses are available 7 days a week from 10A to 6:30P.  You can leave a message 24 hours per day and they will return your call.        Thank you for choosing [facility]       Thank you for choosing [facility] for your care. Our goal is always to provide you with excellent care. Hearing back from our patients is one way we can continue to improve our services. Please take a few minutes to complete the written survey that you may receive in the mail after you visit with us. Thank you!        [employer] Information     [employer] lets you send messages to your doctor, view your test results, renew your prescriptions, schedule appointments and more. To sign up, go to [email or web address] . Click on \"Log in\" on the left side of the screen, which will take you to the Welcome page. Then click on \"Sign up Now\" on the right side of the page.     You will be asked to enter the access code listed below, as well as some personal information. Please follow the directions to create your username and password.     Your access code is: [email or web address]  Expires: 2018  7:27 PM     Your access code will  in 90 days. If you need help or a new code, please call your [facility] clinic or [phone].        Care EveryWhere ID     This is your Care EveryWhere ID. This could be used by other organizations to access your [facility] medical records  [id number]        Equal Access to Services     [facility] NAHUN : Hadjanene clement Sosoila, waaxda luqadaha, qaybta kaalmada alen, joss diaz. So [facility] [phone].    ATENCIÓN: Si habla español, tiene a pan disposición servicios gratuitos de asistencia lingüística. Llame al [phone].    We comply with applicable federal civil rights laws and [place] laws. We do not discriminate on the " basis of race, color, national origin, age, disability, sex, sexual orientation, or gender identity.            After Visit Summary       This is your record. Keep this with you and show to your community pharmacist(s) and doctor(s) at your next visit.

## 2018-04-14 ENCOUNTER — NURSE TRIAGE (OUTPATIENT)
Dept: NURSING | Facility: CLINIC | Age: 23
End: 2018-04-14

## 2018-04-14 ENCOUNTER — HOSPITAL ENCOUNTER (EMERGENCY)
Facility: CLINIC | Age: 23
Discharge: HOME | End: 2018-04-14
Attending: EMERGENCY MEDICINE | Admitting: EMERGENCY MEDICINE
Payer: COMMERCIAL

## 2018-04-14 VITALS
RESPIRATION RATE: 18 BRPM | TEMPERATURE: 98.3 F | SYSTOLIC BLOOD PRESSURE: 118 MMHG | DIASTOLIC BLOOD PRESSURE: 65 MMHG | BODY MASS INDEX: 21.99 KG/M2 | OXYGEN SATURATION: 100 % | WEIGHT: 116.4 LBS | HEART RATE: 95 BPM

## 2018-04-14 DIAGNOSIS — O21.0 HYPEREMESIS GRAVIDARUM: ICD-10-CM

## 2018-04-14 LAB
ALBUMIN UR-MCNC: 30 MG/DL
ANION GAP SERPL CALCULATED.3IONS-SCNC: 12 MMOL/L (ref 3–14)
APPEARANCE UR: CLEAR
BACTERIA #/AREA URNS HPF: ABNORMAL /HPF
BASOPHILS # BLD AUTO: 0 10E9/L (ref 0–0.2)
BASOPHILS NFR BLD AUTO: 0.3 %
BILIRUB UR QL STRIP: NEGATIVE
BUN SERPL-MCNC: 5 MG/DL (ref 7–30)
CALCIUM SERPL-MCNC: 8.1 MG/DL (ref 8.5–10.1)
CHLORIDE SERPL-SCNC: 107 MMOL/L (ref 94–109)
CO2 SERPL-SCNC: 18 MMOL/L (ref 20–32)
COLOR UR AUTO: YELLOW
CREAT SERPL-MCNC: 0.39 MG/DL (ref 0.52–1.04)
DIFFERENTIAL METHOD BLD: ABNORMAL
EOSINOPHIL # BLD AUTO: 0 10E9/L (ref 0–0.7)
EOSINOPHIL NFR BLD AUTO: 0.1 %
ERYTHROCYTE [DISTWIDTH] IN BLOOD BY AUTOMATED COUNT: 11.7 % (ref 10–15)
GFR SERPL CREATININE-BSD FRML MDRD: >90 ML/MIN/1.7M2
GLUCOSE SERPL-MCNC: 70 MG/DL (ref 70–99)
GLUCOSE UR STRIP-MCNC: NEGATIVE MG/DL
HCT VFR BLD AUTO: 34.7 % (ref 35–47)
HGB BLD-MCNC: 12 G/DL (ref 11.7–15.7)
HGB UR QL STRIP: NEGATIVE
IMM GRANULOCYTES # BLD: 0 10E9/L (ref 0–0.4)
IMM GRANULOCYTES NFR BLD: 0.3 %
KETONES UR STRIP-MCNC: >150 MG/DL
LEUKOCYTE ESTERASE UR QL STRIP: ABNORMAL
LYMPHOCYTES # BLD AUTO: 1.1 10E9/L (ref 0.8–5.3)
LYMPHOCYTES NFR BLD AUTO: 14.2 %
MCH RBC QN AUTO: 31 PG (ref 26.5–33)
MCHC RBC AUTO-ENTMCNC: 34.6 G/DL (ref 31.5–36.5)
MCV RBC AUTO: 90 FL (ref 78–100)
MONOCYTES # BLD AUTO: 0.4 10E9/L (ref 0–1.3)
MONOCYTES NFR BLD AUTO: 4.7 %
MUCOUS THREADS #/AREA URNS LPF: PRESENT /LPF
NEUTROPHILS # BLD AUTO: 6.3 10E9/L (ref 1.6–8.3)
NEUTROPHILS NFR BLD AUTO: 80.4 %
NITRATE UR QL: NEGATIVE
NRBC # BLD AUTO: 0 10*3/UL
NRBC BLD AUTO-RTO: 0 /100
PH UR STRIP: 5.5 PH (ref 5–7)
PLATELET # BLD AUTO: 212 10E9/L (ref 150–450)
POTASSIUM SERPL-SCNC: 3.4 MMOL/L (ref 3.4–5.3)
RBC # BLD AUTO: 3.87 10E12/L (ref 3.8–5.2)
RBC #/AREA URNS AUTO: 1 /HPF (ref 0–2)
SODIUM SERPL-SCNC: 137 MMOL/L (ref 133–144)
SOURCE: ABNORMAL
SP GR UR STRIP: 1.02 (ref 1–1.03)
SQUAMOUS #/AREA URNS AUTO: 5 /HPF (ref 0–1)
TRANS CELLS #/AREA URNS HPF: <1 /HPF (ref 0–1)
UROBILINOGEN UR STRIP-MCNC: NORMAL MG/DL (ref 0–2)
WBC # BLD AUTO: 7.8 10E9/L (ref 4–11)
WBC #/AREA URNS AUTO: 9 /HPF (ref 0–5)

## 2018-04-14 PROCEDURE — 96361 HYDRATE IV INFUSION ADD-ON: CPT | Performed by: EMERGENCY MEDICINE

## 2018-04-14 PROCEDURE — 80048 BASIC METABOLIC PNL TOTAL CA: CPT | Performed by: EMERGENCY MEDICINE

## 2018-04-14 PROCEDURE — 87086 URINE CULTURE/COLONY COUNT: CPT | Performed by: EMERGENCY MEDICINE

## 2018-04-14 PROCEDURE — 25000128 H RX IP 250 OP 636: Performed by: EMERGENCY MEDICINE

## 2018-04-14 PROCEDURE — 85025 COMPLETE CBC W/AUTO DIFF WBC: CPT | Performed by: EMERGENCY MEDICINE

## 2018-04-14 PROCEDURE — 99284 EMERGENCY DEPT VISIT MOD MDM: CPT | Mod: 25 | Performed by: EMERGENCY MEDICINE

## 2018-04-14 PROCEDURE — 81001 URINALYSIS AUTO W/SCOPE: CPT | Performed by: EMERGENCY MEDICINE

## 2018-04-14 PROCEDURE — 96374 THER/PROPH/DIAG INJ IV PUSH: CPT | Performed by: EMERGENCY MEDICINE

## 2018-04-14 PROCEDURE — 99284 EMERGENCY DEPT VISIT MOD MDM: CPT | Mod: Z6 | Performed by: EMERGENCY MEDICINE

## 2018-04-14 RX ORDER — ONDANSETRON 2 MG/ML
4 INJECTION INTRAMUSCULAR; INTRAVENOUS ONCE
Status: COMPLETED | OUTPATIENT
Start: 2018-04-14 | End: 2018-04-14

## 2018-04-14 RX ADMIN — SODIUM CHLORIDE 1000 ML: 9 INJECTION, SOLUTION INTRAVENOUS at 11:07

## 2018-04-14 RX ADMIN — ONDANSETRON 4 MG: 2 INJECTION INTRAMUSCULAR; INTRAVENOUS at 11:36

## 2018-04-14 ASSESSMENT — ENCOUNTER SYMPTOMS
ABDOMINAL PAIN: 0
BLOOD IN STOOL: 0
VOMITING: 1
DIFFICULTY URINATING: 0
DYSURIA: 0
FEVER: 0
SHORTNESS OF BREATH: 0
NAUSEA: 1
COLOR CHANGE: 0
NECK STIFFNESS: 0
HEMATURIA: 0
CONSTIPATION: 1
CONFUSION: 0
ARTHRALGIAS: 0
EYE REDNESS: 0
HEADACHES: 0

## 2018-04-14 NOTE — ED AVS SNAPSHOT
[facility], Emergency Department    [address]    [place] [address]    Phone:  [phone]    Fax:  [phone]                                       Jaqueline Argueta   MRN: [id number]    Department:  [facility], Emergency Department   Date of Visit:  4/14/2018           Patient Information     Date Of Birth          1995        Your diagnoses for this visit were:     Hyperemesis gravidarum        You were seen by Pawan Erwin MD.      Follow-up Information     Follow up with Fozia Payne MD. Call in 2 days.    Specialty:  OB/Gyn    Contact information:    [address]  [facility] [address] [phone]          Discharge Instructions       Eating Tips for Morning Sickness   Hyperemesis Gravidarum  During pregnancy, you need to eat enough food to meet your needs and the needs of your baby. Severe nausea and vomiting (hyperemesis) may lead to weight loss and dehydration (too little fluid in the body).   Follow these tips to help control nausea.   1. Eat 6 to 8 small meals in a day, about two hours apart.  2. Before rising in the morning, eat a small amount of dry food. Choose from the list below.    soda crackers (saltines)    dry toast with jelly    breadsticks    dry cereal    rice cakes    pretzels    plain potatoes, rice or noodles    plain low-fat cookies or cake  3. Avoid liquids with meals. Drink liquids 30 to 60 minutes before or after eating. Sip slowly.  4. Foods and drinks should be cool or at room temperature. Try:    flavored gelatin    sherbet, sorbet or Popsicles    carbonated (fizzy) drinks    ice cubes made from juice.  5. Avoid hot drinks and foods.  6. Avoid drinks with caffeine (coffee, tea, cola drinks). They may increase stomach acid.  7. Avoid very sweet, hot or spicy foods.  8. Avoid high-fat foods such as butter, margarine, mayonnaise, wiley, gravies, pie crust, pastries and fried foods. They take longer to leave the stomach.  9. Avoid  strong food odors such as fish, cabbage or broccoli. Avoid cooking odors by eating food you do not have to cook.  10. Do not lie down after eating. Rest sitting up for an hour after meals.  11. Take your prenatal vitamins with food in the evening. Tell your doctor if you cannot take them.  12. Nausea is often gone by midday. You may eat more food in the late afternoon, supper and mid-evening. Find the times best for you.  Keep a food diary to help you find foods that you can eat without problems. Try any food that appeals to you.  Menu Planning Guidelines     [employer]. Reprinted with permission.  Food groups Foods recommended  Foods that may cause distress    Soups Low-fat broth-based and cream soups made with allowed foods. All other soups.    Meats and substitutes  (Six or more ounces daily) All lean, tender meats, poultry or fish. All should be baked, broiled or boiled. Boiled egg. Low-fat or fat-free cheeses. Fried meat, poultry or fish; highly seasoned, cured or smoked meat, poultry or fish (i.e., corned beef, luncheon meat, frankfurters, sausages, sardines, anchovies, wiley and strong flavored cheese). Peanut butter.    Fruits (Two or more servings daily; include a vitamin C source daily) Fruit juices, canned fruits, grapefruit and orange sections (without membrane). Other fresh and dried fruits, if tolerated.     Vegetables  (Three or more servings daily) Vegetable juices, cooked vegetables (i.e., asparagus, green or wax beans, beets, carrots, peas, pumpkin, winter squash, spinach and mushrooms). Raw vegetables if tolerated.  Gas-forming vegetables (i.e., dried peas and beans, corn, broccoli, onions, cauliflower, [place] sprouts, cucumbers, cabbage, turnip, rutabagas, sauerkraut, green peppers).    Bread, cereal, potato, pasta and grains  (Six or more servings daily) Enriched breads and cereals, plain crackers, potatoes, enriched rice, barley, noodles, spaghetti, macaroni and other pastas. Very coarse cereals  such as bran; seeds in or on breads, rolls and crackers; breads made with nuts or dried fruits; fried breads and pastries such as doughnuts; fried potatoes, fried rice, wild rice, seasoned rice and pasta mixes.    Dessert fats Low-fat versions of cakes, cookies, custard, pudding, ice cream, frozen yogurt sherbet; ice pops, gelatin, frozen fruit bars, sorbet. Desserts containing salad dressings, nuts, coconut; high-fat desserts.    Milk and milk products (Four or more cups daily) Fat-free and low-fat milk products. Whole milk, cream.    Beverages   (Four or more cups daily) Water, decaffeinated coffee and tea, fruit drinks, caffeine-free carbonated beverages, weak tea, lemonade, sports drinks. All caffeine-containing beverages (i.e., coffee, strong tea, cocoa, cola); alcoholic beverages.    Condiments and sweets Iodized salt, flavorings, low-fat gravies and sauces, herbs and spices as tolerated; sugar, syrup, honey, jelly, seedless jam, hard candies and marshmallows. Strongly flavored seasonings and condiments (i.e., catsup, pepper, barbecue sauce, chili sauce, chili pepper, horseradish, garlic, mustard and vinegar), olives, pickles, nuts, chocolate candy.    For informational purposes only. Not to replace the advice of your health care provider.   Copyright   2006 [facility]. All rights reserved. [employer] 127643 - REV 09/15.      Hyperemesis Gravidarum  Hyperemesis gravidarum is a severe form of morning sickness that can affect some women during pregnancy. It may develop around the 5th week and last until the 16th week of pregnancy. In some women, it may last longer. Symptoms include severe nausea and vomiting. This can lead to problems such as as weight loss and dehydration.  It is not clear what causes hyperemesis gravidarum. It may be due to rising hormone levels early in the pregnancy. It can be a serious threat to mother and fetus, if symptoms are severe. Therefore, follow the advice below  carefully. If symptoms are not controlled by home measures, a hospital stay may be needed. IV (intravenous) fluids and medicines may be given.  Home care    Diet    Keep a log of the foods you eat and how they affect your symptoms. Avoid foods that trigger your symptoms.    Eat frequent small meals throughout the day rather than three large meals. This can help keep the stomach from being empty, which can make nausea worse.    Choose foods that are high in carbohydrates. Eating foods high in protein may also help. Limit greasy or spicy foods.    Before getting out of bed in the morning, try eating crackers or dry toast. This may help settle your stomach.    Drink cold, clear liquids. Drinking small amounts of liquids with electrolytes, such as sports drinks may help as well.    Medicine    If needed, your healthcare provider may prescribe certain medicines to help relieve nausea and vomiting. Vitamin B6 and brandon may also be advised. Don t try any over-the-counter medicines or home remedies without talking to your provider first.  Follow-up care  Follow up with your healthcare provider, or as advised.  When to seek medical advice  Call your healthcare provider right away if any of these occur:    Signs of dehydration (dry mouth, extreme thirst, dark urine or little urine output, dizziness, weakness, or fainting)    Vomiting that won t stop    Inability to keep down liquids    Frequent diarrhea    Weight loss or no weight gain over a 2-week period    Severe constant pain in the lower right abdomen    Fever of 100.4 F (38 C) or higher, or as directed by your provider  Date Last Reviewed: 8/20/2015 2000-2017 [employer]. [address] [place] [address], [place] [address]. All rights reserved. This information is not intended as a substitute for professional medical care. Always follow your healthcare professional's instructions.          Your next 10 appointments already scheduled     Apr 24, 2018  2:00 PM CDT    New Prenatal with WILNER Echevarria CNM   [facility] ([facility])    [address] [place] [address] [phone]              24 Hour Appointment Hotline       To make an appointment at any [facility], call [phone] ([phone]). If you don't have a family doctor or clinic, we will help you find one. [facility] are conveniently located to serve the needs of you and your family.             Review of your medicines      Our records show that you are taking the medicines listed below. If these are incorrect, please call your family doctor or clinic.        Dose / Directions Last dose taken    ondansetron 4 MG ODT tab   Commonly known as:  ZOFRAN-ODT   Dose:  4 mg   Quantity:  90 tablet        Take 1 tablet (4 mg) by mouth every 6 hours   Refills:  1        prenatal multivitamin plus iron 27-0.8 MG Tabs per tablet   Dose:  1 tablet   Quantity:  100 tablet        Take 1 tablet by mouth daily   Refills:  3        prochlorperazine 25 MG Suppository   Commonly known as:  COMPAZINE   Dose:  25 mg   Quantity:  20 suppository        Place 1 suppository (25 mg) rectally every 12 hours as needed for nausea   Refills:  1        promethazine 25 MG tablet   Commonly known as:  PHENERGAN   Dose:  25 mg   Quantity:  90 tablet        Take 1 tablet (25 mg) by mouth every 6 hours   Refills:  1                Procedures and tests performed during your visit     Basic metabolic panel    CBC with platelets differential    Orthostatic blood pressure and pulse    UA with Microscopic reflex to Culture    Urine Culture Aerobic Bacterial    Weigh patient      Orders Needing Specimen Collection     None      Pending Results     Date and Time Order Name Status Description    4/14/2018 1058 Urine Culture Aerobic Bacterial In process             Pending Culture Results     Date and Time Order Name Status Description    4/14/2018 1058 Urine Culture Aerobic  "Bacterial In process             Pending Results Instructions     If you had any lab results that were not finalized at the time of your Discharge, you can call the ED Lab Result RN at [phone]. You will be contacted by this team for any positive Lab results or changes in treatment. The nurses are available 7 days a week from 10A to 6:30P.  You can leave a message 24 hours per day and they will return your call.        Thank you for choosing [facility]       Thank you for choosing [facility] for your care. Our goal is always to provide you with excellent care. Hearing back from our patients is one way we can continue to improve our services. Please take a few minutes to complete the written survey that you may receive in the mail after you visit with us. Thank you!        [email or web address] Information     [employer] lets you send messages to your doctor, view your test results, renew your prescriptions, schedule appointments and more. To sign up, go to [email or web address] . Click on \"Log in\" on the left side of the screen, which will take you to the Welcome page. Then click on \"Sign up Now\" on the right side of the page.     You will be asked to enter the access code listed below, as well as some personal information. Please follow the directions to create your username and password.     Your access code is: [email or web address]  Expires: 2018  7:27 PM     Your access code will  in 90 days. If you need help or a new code, please call your [facility] clinic or [phone].        Care EveryWhere ID     This is your Care EveryWhere ID. This could be used by other organizations to access your [facility] medical records  [id number]        Equal Access to Services     [facility]: Cleo Payne, ismael joseph, joss khan. So [facility] [phone].    ATENCIÓN: Si habla español, tiene a pan disposición servicios gratuitos de asistencia lingüística. Llame al " [phone].    We comply with applicable federal civil rights laws and [place] laws. We do not discriminate on the basis of race, color, national origin, age, disability, sex, sexual orientation, or gender identity.            After Visit Summary       This is your record. Keep this with you and show to your community pharmacist(s) and doctor(s) at your next visit.

## 2018-04-14 NOTE — DISCHARGE INSTRUCTIONS
Eating Tips for Morning Sickness   Hyperemesis Gravidarum  During pregnancy, you need to eat enough food to meet your needs and the needs of your baby. Severe nausea and vomiting (hyperemesis) may lead to weight loss and dehydration (too little fluid in the body).   Follow these tips to help control nausea.   1. Eat 6 to 8 small meals in a day, about two hours apart.  2. Before rising in the morning, eat a small amount of dry food. Choose from the list below.    soda crackers (saltines)    dry toast with jelly    breadsticks    dry cereal    rice cakes    pretzels    plain potatoes, rice or noodles    plain low-fat cookies or cake  3. Avoid liquids with meals. Drink liquids 30 to 60 minutes before or after eating. Sip slowly.  4. Foods and drinks should be cool or at room temperature. Try:    flavored gelatin    sherbet, sorbet or Popsicles    carbonated (fizzy) drinks    ice cubes made from juice.  5. Avoid hot drinks and foods.  6. Avoid drinks with caffeine (coffee, tea, cola drinks). They may increase stomach acid.  7. Avoid very sweet, hot or spicy foods.  8. Avoid high-fat foods such as butter, margarine, mayonnaise, wiley, gravies, pie crust, pastries and fried foods. They take longer to leave the stomach.  9. Avoid strong food odors such as fish, cabbage or broccoli. Avoid cooking odors by eating food you do not have to cook.  10. Do not lie down after eating. Rest sitting up for an hour after meals.  11. Take your prenatal vitamins with food in the evening. Tell your doctor if you cannot take them.  12. Nausea is often gone by midday. You may eat more food in the late afternoon, supper and mid-evening. Find the times best for you.  Keep a food diary to help you find foods that you can eat without problems. Try any food that appeals to you.  Menu Planning Guidelines     [employer]. Reprinted with permission.  Food groups Foods recommended  Foods that may cause distress    Soups Low-fat broth-based and cream  soups made with allowed foods. All other soups.    Meats and substitutes  (Six or more ounces daily) All lean, tender meats, poultry or fish. All should be baked, broiled or boiled. Boiled egg. Low-fat or fat-free cheeses. Fried meat, poultry or fish; highly seasoned, cured or smoked meat, poultry or fish (i.e., corned beef, luncheon meat, frankfurters, sausages, sardines, anchovies, wiley and strong flavored cheese). Peanut butter.    Fruits (Two or more servings daily; include a vitamin C source daily) Fruit juices, canned fruits, grapefruit and orange sections (without membrane). Other fresh and dried fruits, if tolerated.     Vegetables  (Three or more servings daily) Vegetable juices, cooked vegetables (i.e., asparagus, green or wax beans, beets, carrots, peas, pumpkin, winter squash, spinach and mushrooms). Raw vegetables if tolerated.  Gas-forming vegetables (i.e., dried peas and beans, corn, broccoli, onions, cauliflower, [place] sprouts, cucumbers, cabbage, turnip, rutabagas, sauerkraut, green peppers).    Bread, cereal, potato, pasta and grains  (Six or more servings daily) Enriched breads and cereals, plain crackers, potatoes, enriched rice, barley, noodles, spaghetti, macaroni and other pastas. Very coarse cereals such as bran; seeds in or on breads, rolls and crackers; breads made with nuts or dried fruits; fried breads and pastries such as doughnuts; fried potatoes, fried rice, wild rice, seasoned rice and pasta mixes.    Dessert fats Low-fat versions of cakes, cookies, custard, pudding, ice cream, frozen yogurt sherbet; ice pops, gelatin, frozen fruit bars, sorbet. Desserts containing salad dressings, nuts, coconut; high-fat desserts.    Milk and milk products (Four or more cups daily) Fat-free and low-fat milk products. Whole milk, cream.    Beverages   (Four or more cups daily) Water, decaffeinated coffee and tea, fruit drinks, caffeine-free carbonated beverages, weak tea, lemonade, sports drinks.  All caffeine-containing beverages (i.e., coffee, strong tea, cocoa, cola); alcoholic beverages.    Condiments and sweets Iodized salt, flavorings, low-fat gravies and sauces, herbs and spices as tolerated; sugar, syrup, honey, jelly, seedless jam, hard candies and marshmallows. Strongly flavored seasonings and condiments (i.e., catsup, pepper, barbecue sauce, chili sauce, chili pepper, horseradish, garlic, mustard and vinegar), olives, pickles, nuts, chocolate candy.    For informational purposes only. Not to replace the advice of your health care provider.   Copyright   2006 [facility]. All rights reserved. hiyalife [id number] - REV 09/15.      Hyperemesis Gravidarum  Hyperemesis gravidarum is a severe form of morning sickness that can affect some women during pregnancy. It may develop around the 5th week and last until the 16th week of pregnancy. In some women, it may last longer. Symptoms include severe nausea and vomiting. This can lead to problems such as as weight loss and dehydration.  It is not clear what causes hyperemesis gravidarum. It may be due to rising hormone levels early in the pregnancy. It can be a serious threat to mother and fetus, if symptoms are severe. Therefore, follow the advice below carefully. If symptoms are not controlled by home measures, a hospital stay may be needed. IV (intravenous) fluids and medicines may be given.  Home care    Diet    Keep a log of the foods you eat and how they affect your symptoms. Avoid foods that trigger your symptoms.    Eat frequent small meals throughout the day rather than three large meals. This can help keep the stomach from being empty, which can make nausea worse.    Choose foods that are high in carbohydrates. Eating foods high in protein may also help. Limit greasy or spicy foods.    Before getting out of bed in the morning, try eating crackers or dry toast. This may help settle your stomach.    Drink cold, clear liquids. Drinking small  amounts of liquids with electrolytes, such as sports drinks may help as well.    Medicine    If needed, your healthcare provider may prescribe certain medicines to help relieve nausea and vomiting. Vitamin B6 and brandon may also be advised. Don t try any over-the-counter medicines or home remedies without talking to your provider first.  Follow-up care  Follow up with your healthcare provider, or as advised.  When to seek medical advice  Call your healthcare provider right away if any of these occur:    Signs of dehydration (dry mouth, extreme thirst, dark urine or little urine output, dizziness, weakness, or fainting)    Vomiting that won t stop    Inability to keep down liquids    Frequent diarrhea    Weight loss or no weight gain over a 2-week period    Severe constant pain in the lower right abdomen    Fever of 100.4 F (38 C) or higher, or as directed by your provider  Date Last Reviewed: 8/20/2015 2000-2017 [employer]. [address] [place] [address], [place] [address]. All rights reserved. This information is not intended as a substitute for professional medical care. Always follow your healthcare professional's instructions.

## 2018-04-14 NOTE — ED AVS SNAPSHOT
[facility], Emergency Department    [address]    [facility] [address]    Phone:  [phone]    Fax:  [phone]                                       Jaqueline Argueta   MRN: [id number]    Department:  [facility], Emergency Department   Date of Visit:  4/14/2018           After Visit Summary Signature Page     I have received my discharge instructions, and my questions have been answered. I have discussed any challenges I see with this plan with the nurse or doctor.    ..........................................................................................................................................  Patient/Patient Representative Signature      ..........................................................................................................................................  Patient Representative Print Name and Relationship to Patient    ..................................................               ................................................  Date                                            Time    ..........................................................................................................................................  Reviewed by Signature/Title    ...................................................              ..............................................  Date                                                            Time

## 2018-04-14 NOTE — ED PROVIDER NOTES
History     Chief Complaint   Patient presents with     Nausea & Vomiting     Patient is 9 weeks pregnant; nausea and vomiting for a little over a week. Was prescribed zofran and phenergan although it makes her stomach upset; unable to take them.      JESSICA Argueta is a 22 year old  female who is 9 weeks pregnant by LMP who presents to the ED with nausea and vomiting.  The patient has now been seen in the emergency department with similar complaints 3 different times since 3/27/2018 with one visit resulting in an admission for management of dehydration and hypokalemia (admitted on 3/28/2018, discharged on 3/29) and her most recent ED visit being yesterday. Following her admission, the patient was instructed to follow up with her OB/GYN physician, Dr. Payne, but the patient never called to make the appointment. The patient has now tried PO Reglan, PO Zofran, PO Phenergan, and Compazine suppositories without adequate control of her nausea/vomiting.      She reports that her symptoms have been ongoing now for the past 3 weeks, and she has been vomiting about once every hour consistently during that timeframe. Due to this, the patient has not been able to keep any food down for what she reports is a week, and she is having significant difficulty keeping liquids down as well.  She denies any hematemesis, but she reports that she does occasionally note brownish emesis. Since her initial ED visit about 2 weeks ago, she has now last about 10 pounds, but she denies any persistent abdominal pain other than that which she experiences following an episode of emesis. She reports feeling somewhat constipated as well, as she typically stools two times per day, but she has been having only one per day now with her last being yesterday morning. Last use of compazine at 2200 last night, about 13 hours PTA, which once again did not help her symptoms.     The patient reports that her symptoms are similar to her  previous pregnancy, and she doesn't have an appointment with an OB/GYN provider until 4/24/18, 10 days from now. She does not have a PCP. The patient offers no other concerns or complaints at this time. She called into the Nursing Triage line today to speak about her pregnancy as she is considering termination secondary to the severity of her symptoms, and she states that they referred her here for further advising.       PAST MEDICAL HISTORY:   Past Medical History:   Diagnosis Date     Anemia due to blood loss, acute 12/27/2011       PAST SURGICAL HISTORY:   Past Surgical History:   Procedure Laterality Date     NO HISTORY OF SURGERY         FAMILY HISTORY:   Family History   Problem Relation Age of Onset     Family History Negative No family hx of        SOCIAL HISTORY:   Social History   Substance Use Topics     Smoking status: Never Smoker     Smokeless tobacco: Never Used      Comment: smoke marijuana once or twice a wk     Alcohol use No      Comment: rare       Discharge Medication List as of 4/14/2018 12:16 PM      CONTINUE these medications which have NOT CHANGED    Details   prochlorperazine (COMPAZINE) 25 MG Suppository Place 1 suppository (25 mg) rectally every 12 hours as needed for nausea, Disp-20 suppository, R-1, Local Print      promethazine (PHENERGAN) 25 MG tablet Take 1 tablet (25 mg) by mouth every 6 hours, Disp-90 tablet, R-1, E-Prescribe      ondansetron (ZOFRAN-ODT) 4 MG ODT tab Take 1 tablet (4 mg) by mouth every 6 hours, Disp-90 tablet, R-1, E-Prescribe      Prenatal Vit-Fe Fumarate-FA (PRENATAL MULTIVITAMIN PLUS IRON) 27-0.8 MG TABS per tablet Take 1 tablet by mouth daily, Disp-100 tablet, R-3, E-Prescribe              No Known Allergies          I have reviewed the Medications, Allergies, Past Medical and Surgical History, and Social History in the Epic system.    Review of Systems   Constitutional: Negative for fever.   HENT: Negative for congestion.    Eyes: Negative for redness.    Respiratory: Negative for shortness of breath.    Cardiovascular: Negative for chest pain.   Gastrointestinal: Positive for constipation, nausea and vomiting. Negative for abdominal pain and blood in stool.   Genitourinary: Negative for difficulty urinating, dysuria, hematuria and vaginal bleeding.   Musculoskeletal: Negative for arthralgias and neck stiffness.   Skin: Negative for color change.   Neurological: Negative for headaches.   Psychiatric/Behavioral: Negative for confusion.   All other systems reviewed and are negative.      Physical Exam   BP: 109/76  Pulse: 76  Temp: 97.9  F (36.6  C)  Resp: 16  Weight: 52.8 kg (116 lb 6.4 oz)  SpO2: 100 %      Physical Exam   Constitutional: No distress.   HENT:   Head: Atraumatic.   Mouth/Throat: Oropharynx is clear and moist. No oropharyngeal exudate.   Eyes: Pupils are equal, round, and reactive to light. No scleral icterus.   Cardiovascular: Normal heart sounds and intact distal pulses.    Pulmonary/Chest: Breath sounds normal. No respiratory distress.   Abdominal: Soft. Bowel sounds are normal. There is no tenderness.   Musculoskeletal: She exhibits no edema or tenderness.   Skin: Skin is warm. No rash noted. She is not diaphoretic.       ED Course     ED Course     Procedures             Labs Ordered and Resulted from Time of ED Arrival Up to the Time of Departure from the ED   BASIC METABOLIC PANEL - Abnormal; Notable for the following:        Result Value    Carbon Dioxide 18 (*)     Urea Nitrogen 5 (*)     Creatinine 0.39 (*)     Calcium 8.1 (*)     All other components within normal limits   CBC WITH PLATELETS DIFFERENTIAL - Abnormal; Notable for the following:     Hematocrit 34.7 (*)     All other components within normal limits   ROUTINE UA WITH MICROSCOPIC REFLEX TO CULTURE - Abnormal; Notable for the following:     Ketones Urine >150 (*)     Protein Albumin Urine 30 (*)     Leukocyte Esterase Urine Large (*)     WBC Urine 9 (*)     Bacteria Urine Few (*)      Squamous Epithelial /HPF Urine 5 (*)     Mucous Urine Present (*)     All other components within normal limits   MEASURE WEIGHT   ORTHOSTATIC BLOOD PRESSURE AND PULSE   URINE CULTURE AEROBIC BACTERIAL     Results for orders placed or performed during the hospital encounter of 04/14/18 (from the past 24 hour(s))   Basic metabolic panel   Result Value Ref Range    Sodium 137 133 - 144 mmol/L    Potassium 3.4 3.4 - 5.3 mmol/L    Chloride 107 94 - 109 mmol/L    Carbon Dioxide 18 (L) 20 - 32 mmol/L    Anion Gap 12 3 - 14 mmol/L    Glucose 70 70 - 99 mg/dL    Urea Nitrogen 5 (L) 7 - 30 mg/dL    Creatinine 0.39 (L) 0.52 - 1.04 mg/dL    GFR Estimate >90 >60 mL/min/1.7m2    GFR Estimate If Black >90 >60 mL/min/1.7m2    Calcium 8.1 (L) 8.5 - 10.1 mg/dL   CBC with platelets differential   Result Value Ref Range    WBC 7.8 4.0 - 11.0 10e9/L    RBC Count 3.87 3.8 - 5.2 10e12/L    Hemoglobin 12.0 11.7 - 15.7 g/dL    Hematocrit 34.7 (L) 35.0 - 47.0 %    MCV 90 78 - 100 fl    MCH 31.0 26.5 - 33.0 pg    MCHC 34.6 31.5 - 36.5 g/dL    RDW 11.7 10.0 - 15.0 %    Platelet Count 212 150 - 450 10e9/L    Diff Method Automated Method     % Neutrophils 80.4 %    % Lymphocytes 14.2 %    % Monocytes 4.7 %    % Eosinophils 0.1 %    % Basophils 0.3 %    % Immature Granulocytes 0.3 %    Nucleated RBCs 0 0 /100    Absolute Neutrophil 6.3 1.6 - 8.3 10e9/L    Absolute Lymphocytes 1.1 0.8 - 5.3 10e9/L    Absolute Monocytes 0.4 0.0 - 1.3 10e9/L    Absolute Eosinophils 0.0 0.0 - 0.7 10e9/L    Absolute Basophils 0.0 0.0 - 0.2 10e9/L    Abs Immature Granulocytes 0.0 0 - 0.4 10e9/L    Absolute Nucleated RBC 0.0    UA with Microscopic reflex to Culture   Result Value Ref Range    Color Urine Yellow     Appearance Urine Clear     Glucose Urine Negative NEG^Negative mg/dL    Bilirubin Urine Negative NEG^Negative    Ketones Urine >150 (A) NEG^Negative mg/dL    Specific Gravity Urine 1.024 1.003 - 1.035    Blood Urine Negative NEG^Negative    pH Urine 5.5  5.0 - 7.0 pH    Protein Albumin Urine 30 (A) NEG^Negative mg/dL    Urobilinogen mg/dL Normal 0.0 - 2.0 mg/dL    Nitrite Urine Negative NEG^Negative    Leukocyte Esterase Urine Large (A) NEG^Negative    Source Midstream Urine     WBC Urine 9 (H) 0 - 5 /HPF    RBC Urine 1 0 - 2 /HPF    Bacteria Urine Few (A) NEG^Negative /HPF    Squamous Epithelial /HPF Urine 5 (H) 0 - 1 /HPF    Transitional Epi <1 0 - 1 /HPF    Mucous Urine Present (A) NEG^Negative /LPF          Medications   0.9% sodium chloride BOLUS (0 mLs Intravenous Stopped 18 1216)   ondansetron (ZOFRAN) injection 4 mg (4 mg Intravenous Given 18 1136)       Assessments & Plan (with Medical Decision Making)   22-year-old female who is 9 weeks pregnant presents with nausea and vomiting.  Differential includes gastroenteritis, hyperemesis gravidarum, medication side effect, other.  Vital signs were entirely normal including blood pressure and a heart rate of 76.  In the emergency room, patient was given Zofran and Zofran as well as liter of normal saline.  Laboratories were obtained including basic metabolic panel and CBC which were grossly unremarkable with exception of slightly low carbon dioxide and low creatinine, BUN and minimally low hematocrit.  Urinalysis revealed ketones but was otherwise contaminated in the setting of no urinary tract symptoms.  Patient requested termination of her pregnancy.  It was explained to her that this procedure is not performed in the emergency room or in the hospital.  She was given resources to pursue including [facility].  It appears that she contacted a nurse on call immediately prior to coming in requesting an .  At that time, she did not mention nausea or vomiting.  She had been given resources at that time but could not wait.  Throughout her observation.  In the emergency room, she had no further vomiting.  She was able to drink without difficulty.  She apparently has missed a follow-up  appointment a week ago with OB/GYN.  She was encouraged to contact the clinic to reestablish an appointment if so desired.  Patient has medications at home including Zofran, promethazine and Compazine which she was encouraged to use.    I have reviewed the nursing notes.    I have reviewed the findings, diagnosis, plan and need for follow up with the patient.    Discharge Medication List as of 4/14/2018 12:16 PM          Final diagnoses:   Hyperemesis gravidarum   I, Raz Negrete, am serving as a trained medical scribe to document services personally performed by Isaias Erwin MD, based on the provider's statements to me.      IIsaias MD, was physically present and have reviewed and verified the accuracy of this note documented by Raz Negrete.       4/14/2018   [facility], [place], EMERGENCY DEPARTMENT     Pawan Erwin MD  04/14/18 142

## 2018-04-15 LAB
BACTERIA SPEC CULT: NORMAL
Lab: NORMAL
SPECIMEN SOURCE: NORMAL

## 2018-04-17 ENCOUNTER — HOSPITAL ENCOUNTER (EMERGENCY)
Facility: CLINIC | Age: 23
Discharge: HOME | End: 2018-04-17
Attending: EMERGENCY MEDICINE | Admitting: EMERGENCY MEDICINE
Payer: COMMERCIAL

## 2018-04-17 VITALS
HEART RATE: 98 BPM | RESPIRATION RATE: 16 BRPM | OXYGEN SATURATION: 98 % | DIASTOLIC BLOOD PRESSURE: 70 MMHG | SYSTOLIC BLOOD PRESSURE: 120 MMHG | TEMPERATURE: 98 F

## 2018-04-17 DIAGNOSIS — O21.0 HYPEREMESIS GRAVIDARUM: ICD-10-CM

## 2018-04-17 PROCEDURE — 96361 HYDRATE IV INFUSION ADD-ON: CPT | Performed by: EMERGENCY MEDICINE

## 2018-04-17 PROCEDURE — 96374 THER/PROPH/DIAG INJ IV PUSH: CPT | Performed by: EMERGENCY MEDICINE

## 2018-04-17 PROCEDURE — 96375 TX/PRO/DX INJ NEW DRUG ADDON: CPT | Performed by: EMERGENCY MEDICINE

## 2018-04-17 PROCEDURE — 99284 EMERGENCY DEPT VISIT MOD MDM: CPT | Mod: Z6 | Performed by: EMERGENCY MEDICINE

## 2018-04-17 PROCEDURE — 25000128 H RX IP 250 OP 636: Performed by: EMERGENCY MEDICINE

## 2018-04-17 PROCEDURE — 99284 EMERGENCY DEPT VISIT MOD MDM: CPT | Mod: 25 | Performed by: EMERGENCY MEDICINE

## 2018-04-17 RX ORDER — ONDANSETRON 2 MG/ML
4 INJECTION INTRAMUSCULAR; INTRAVENOUS ONCE
Status: COMPLETED | OUTPATIENT
Start: 2018-04-17 | End: 2018-04-17

## 2018-04-17 RX ADMIN — PROCHLORPERAZINE EDISYLATE 5 MG: 5 INJECTION INTRAMUSCULAR; INTRAVENOUS at 14:01

## 2018-04-17 RX ADMIN — SODIUM CHLORIDE 1000 ML: 9 INJECTION, SOLUTION INTRAVENOUS at 14:01

## 2018-04-17 RX ADMIN — ONDANSETRON 4 MG: 2 INJECTION INTRAMUSCULAR; INTRAVENOUS at 14:01

## 2018-04-17 ASSESSMENT — ENCOUNTER SYMPTOMS
VOMITING: 1
DIFFICULTY URINATING: 0
ABDOMINAL PAIN: 0
CONFUSION: 0
ARTHRALGIAS: 0
HEADACHES: 0
COLOR CHANGE: 0
DIZZINESS: 1
SORE THROAT: 1
SHORTNESS OF BREATH: 0
FEVER: 0
NECK STIFFNESS: 0
EYE REDNESS: 0

## 2018-04-17 NOTE — ED AVS SNAPSHOT
[facility], Emergency Department    [address]    [facility] [address]    Phone:  [phone]    Fax:  [phone]                                       Jaqueline Argueta   MRN: [id number]    Department:  [facility], Emergency Department   Date of Visit:  4/17/2018           After Visit Summary Signature Page     I have received my discharge instructions, and my questions have been answered. I have discussed any challenges I see with this plan with the nurse or doctor.    ..........................................................................................................................................  Patient/Patient Representative Signature      ..........................................................................................................................................  Patient Representative Print Name and Relationship to Patient    ..................................................               ................................................  Date                                            Time    ..........................................................................................................................................  Reviewed by Signature/Title    ...................................................              ..............................................  Date                                                            Time

## 2018-04-17 NOTE — ED PROVIDER NOTES
History     Chief Complaint   Patient presents with     Morning Sickness     continuous n/v for past few weeks; not able to keep any po down. Pt says she is 9 weeks pregnant.     JESSICA Argueta is a 22 year old female who presents for evaluation of vomiting. Patient complains of several episodes of vomiting per day to the point where she has lost count of how many. She reports today she has had at least 10 episodes of vomiting prior to arrival with associated sore throat. She also complains of dizziness when she goes from laying down to sitting or standing. She denies hematemesis, but notes sometimes the emesis is dark-brown in color. Of note, patient was seen and evaluated here in the Emergency Department for the same symptoms on 18, three days ago, at which time she was prescribed Compazine suppositories and ODT Zofran which she states she has been taking without relief. Her last suppository was at 5 AM and her last dose of Zofran was at 10 AM. She does have an appointment at [facility] scheduled for tomorrow, and the patient is planning on having an .     I have reviewed the Medications, Allergies, Past Medical and Surgical History, and Social History in the [employer] system.  Past Medical History:   Diagnosis Date     Anemia due to blood loss, acute 2011       Past Surgical History:   Procedure Laterality Date     NO HISTORY OF SURGERY         Family History   Problem Relation Age of Onset     Family History Negative No family hx of        Social History   Substance Use Topics     Smoking status: Never Smoker     Smokeless tobacco: Never Used      Comment: smoke marijuana once or twice a wk     Alcohol use No      Comment: rare       No current facility-administered medications for this encounter.      Current Outpatient Prescriptions   Medication     prochlorperazine (COMPAZINE) 25 MG Suppository     ondansetron (ZOFRAN-ODT) 4 MG ODT tab     promethazine (PHENERGAN) 25 MG tablet      Prenatal Vit-Fe Fumarate-FA (PRENATAL MULTIVITAMIN PLUS IRON) 27-0.8 MG TABS per tablet      No Known Allergies    Review of Systems   Constitutional: Negative for fever.   HENT: Positive for sore throat. Negative for congestion.    Eyes: Negative for redness.   Respiratory: Negative for shortness of breath.    Cardiovascular: Negative for chest pain.   Gastrointestinal: Positive for vomiting. Negative for abdominal pain.   Genitourinary: Negative for difficulty urinating.   Musculoskeletal: Negative for arthralgias and neck stiffness.   Skin: Negative for color change.   Neurological: Positive for dizziness. Negative for headaches.   Psychiatric/Behavioral: Negative for confusion.   All other systems reviewed and are negative.      Physical Exam   BP: 126/79  Pulse: 110  Temp: 98  F (36.7  C)  Resp: 20  SpO2: 99 %      Physical Exam   Constitutional: No distress.   HENT:   Head: Atraumatic.   Mouth/Throat: Oropharynx is clear and moist. No oropharyngeal exudate.   Eyes: Pupils are equal, round, and reactive to light. No scleral icterus.   Cardiovascular: Normal heart sounds and intact distal pulses.    Pulmonary/Chest: Breath sounds normal. No respiratory distress.   Abdominal: Soft. Bowel sounds are normal. There is no tenderness.   Musculoskeletal: She exhibits no edema or tenderness.   Skin: Skin is warm. No rash noted. She is not diaphoretic.       ED Course     ED Course     Procedures       1:37 PM  The patient was seen and examined by Dr. Erwin in Room 18.          Labs Ordered and Resulted from Time of ED Arrival Up to the Time of Departure from the ED - No data to display       Medications   0.9% sodium chloride BOLUS (0 mLs Intravenous Stopped 4/17/18 1508)   ondansetron (ZOFRAN) injection 4 mg (4 mg Intravenous Given 4/17/18 1401)   prochlorperazine (COMPAZINE) injection 5 mg (5 mg Intravenous Given 4/17/18 1401)       Assessments & Plan (with Medical Decision Making)   22-year-old female who is well  known to me from previous visit 3 days ago presents for evaluation of continued nausea and vomiting in the setting of pregnancy.  Laboratories from 3 and 4 days ago were unremarkable.  In the emergency room, she was noted to be somewhat tachycardic but otherwise had normal exam.  She was treated with normal saline, Zofran and Compazine and felt improved.  She will be discharged to follow-up with the primary physician or as she has noted in previous visit with [facility] tomorrow that she is scheduled for consideration of termination of her pregnancy.  She was encouraged to continue use of Zofran and Compazine suppositories.    I have reviewed the nursing notes.    I have reviewed the findings, diagnosis, plan and need for follow up with the patient.    New Prescriptions    No medications on file       Final diagnoses:   Hyperemesis gravidarum   IAyana, am serving as a trained medical scribe to document services personally performed by Isaias Erwin MD, based on the provider's statements to me.   Isaias RAHMAN MD, was physically present and have reviewed and verified the accuracy of this note documented by Ayana Oglesby.      4/17/2018   [facility], [place], EMERGENCY DEPARTMENT     Pawan Erwin MD  04/17/18 2360

## 2018-04-17 NOTE — ED AVS SNAPSHOT
[facility], Emergency Department    [address]    [place] [address]    Phone:  [phone]    Fax:  [phone]                                       Jaqueline Argueta   MRN: [id number]    Department:  [facility], Emergency Department   Date of Visit:  4/17/2018           Patient Information     Date Of Birth          1995        Your diagnoses for this visit were:     Hyperemesis gravidarum        You were seen by Pawan Erwin MD.      Follow-up Information     Follow up with Clinic, [facility].    Specialty:  Clinic    Contact information:    [address] [facility] [address]  [facility] [id number] [phone]          Discharge Instructions         Severe Morning Sickness (Hyperemesis Gravidarum)    Nausea and vomiting are common during pregnancy. It is often called  morning sickness,  but it can happen at any time of day. But severe nausea and vomiting that doesn t let up is not normal. Dehydration and weight loss can result. This can be dangerous for the mother and baby. Hyperemesis gravidarum (HEG) is the medical term for severe nausea and vomiting during pregnancy, and it results in weight loss. If you have it, your healthcare provider can take steps to keep you and your baby safe. He or she can also help you find relief.   What are the symptoms of severe morning sickness?  Call your healthcare provider right away if you suspect that you have severe morning symptoms. The symptoms include:    Inability to keep down liquids    Nausea that is severe and lasts beyond the first few months    Inability to empty the bladder     Urine that is dark and concentrated     Fainting spells  What causes severe morning symptoms?  Nausea and vomiting during pregnancy are thought to be due to an increase in certain hormone levels. It is not clear what causes severe morning sickness, but it may be more likely in women carrying twins or more. Your healthcare provider will do some tests to rule out  certain health conditions that may lead to severe nausea and vomiting.  Getting relief from morning sickness  To help combat nausea, eat small amounts often. This helps prevent the stomach from being empty, which can make nausea worse. Choose dry foods such as crackers. Try sipping cold, clear drinks. And ask your healthcare provider about taking vitamin B6 or brandon to help ease nausea. Your provider may recommend that you try vitamin B6 and a medicine called doxylamine to relieve the nausea. In some cases, alternative treatments such as acupuncture are effective in helping managing nausea during pregnancy.  Treating severe morning sickness  The focus of treatment for severe morning sickness is to relieve symptoms and prevent weight loss and dehydration. If you are dehydrated or losing weight, steps are needed to protect you and your baby. You will most likely be admitted to the hospital for at least a short time. There, you can be given IV fluids to rehydrate you. You may also be prescribed medicines that relieve nausea. In very severe cases, a longer hospitalization may be needed. IV nutrition or tube feeding will then be used. If this becomes necessary, your healthcare provider can tell you more.  Recovery and follow-up  With treatment, severe morning sickness can be managed. Follow up with your healthcare provider to be sure you are keeping down fluids and gaining a healthy amount of weight.  When to seek medical care  Contact your healthcare provider right away if you have any of the following:    Signs of dehydration, including extreme thirst, headache, little urine, very dark urine, or a dry, sticky mouth.    Weight loss    Dizziness or fainting    Racing or pounding heart    Blood in your vomit   Date Last Reviewed: 5/1/2016 2000-2017 [employer]. [address] [place] [address], [place] [address]. All rights reserved. This information is not intended as a substitute for professional medical care.  Always follow your healthcare professional's instructions.        Eating Tips for Morning Sickness   Hyperemesis Gravidarum  During pregnancy, you need to eat enough food to meet your needs and the needs of your baby. Severe nausea and vomiting (hyperemesis) may lead to weight loss and dehydration (too little fluid in the body).   Follow these tips to help control nausea.   1. Eat 6 to 8 small meals in a day, about two hours apart.  2. Before rising in the morning, eat a small amount of dry food. Choose from the list below.    soda crackers (saltines)    dry toast with jelly    breadsticks    dry cereal    rice cakes    pretzels    plain potatoes, rice or noodles    plain low-fat cookies or cake  3. Avoid liquids with meals. Drink liquids 30 to 60 minutes before or after eating. Sip slowly.  4. Foods and drinks should be cool or at room temperature. Try:    flavored gelatin    sherbet, sorbet or Popsicles    carbonated (fizzy) drinks    ice cubes made from juice.  5. Avoid hot drinks and foods.  6. Avoid drinks with caffeine (coffee, tea, cola drinks). They may increase stomach acid.  7. Avoid very sweet, hot or spicy foods.  8. Avoid high-fat foods such as butter, margarine, mayonnaise, wiley, gravies, pie crust, pastries and fried foods. They take longer to leave the stomach.  9. Avoid strong food odors such as fish, cabbage or broccoli. Avoid cooking odors by eating food you do not have to cook.  10. Do not lie down after eating. Rest sitting up for an hour after meals.  11. Take your prenatal vitamins with food in the evening. Tell your doctor if you cannot take them.  12. Nausea is often gone by midday. You may eat more food in the late afternoon, supper and mid-evening. Find the times best for you.  Keep a food diary to help you find foods that you can eat without problems. Try any food that appeals to you.  Menu Planning Guidelines     [employer]. Reprinted with permission.  Food groups Foods recommended  Foods  that may cause distress    Soups Low-fat broth-based and cream soups made with allowed foods. All other soups.    Meats and substitutes  (Six or more ounces daily) All lean, tender meats, poultry or fish. All should be baked, broiled or boiled. Boiled egg. Low-fat or fat-free cheeses. Fried meat, poultry or fish; highly seasoned, cured or smoked meat, poultry or fish (i.e., corned beef, luncheon meat, frankfurters, sausages, sardines, anchovies, wiley and strong flavored cheese). Peanut butter.    Fruits (Two or more servings daily; include a vitamin C source daily) Fruit juices, canned fruits, grapefruit and orange sections (without membrane). Other fresh and dried fruits, if tolerated.     Vegetables  (Three or more servings daily) Vegetable juices, cooked vegetables (i.e., asparagus, green or wax beans, beets, carrots, peas, pumpkin, winter squash, spinach and mushrooms). Raw vegetables if tolerated.  Gas-forming vegetables (i.e., dried peas and beans, corn, broccoli, onions, cauliflower, [place] sprouts, cucumbers, cabbage, turnip, rutabagas, sauerkraut, green peppers).    Bread, cereal, potato, pasta and grains  (Six or more servings daily) Enriched breads and cereals, plain crackers, potatoes, enriched rice, barley, noodles, spaghetti, macaroni and other pastas. Very coarse cereals such as bran; seeds in or on breads, rolls and crackers; breads made with nuts or dried fruits; fried breads and pastries such as doughnuts; fried potatoes, fried rice, wild rice, seasoned rice and pasta mixes.    Dessert fats Low-fat versions of cakes, cookies, custard, pudding, ice cream, frozen yogurt sherbet; ice pops, gelatin, frozen fruit bars, sorbet. Desserts containing salad dressings, nuts, coconut; high-fat desserts.    Milk and milk products (Four or more cups daily) Fat-free and low-fat milk products. Whole milk, cream.    Beverages   (Four or more cups daily) Water, decaffeinated coffee and tea, fruit drinks,  caffeine-free carbonated beverages, weak tea, lemonade, sports drinks. All caffeine-containing beverages (i.e., coffee, strong tea, cocoa, cola); alcoholic beverages.    Condiments and sweets Iodized salt, flavorings, low-fat gravies and sauces, herbs and spices as tolerated; sugar, syrup, honey, jelly, seedless jam, hard candies and marshmallows. Strongly flavored seasonings and condiments (i.e., catsup, pepper, barbecue sauce, chili sauce, chili pepper, horseradish, garlic, mustard and vinegar), olives, pickles, nuts, chocolate candy.    For informational purposes only. Not to replace the advice of your health care provider.   Copyright   2006 [facility]. All rights reserved. [employer] [id number] - REV 09/15.      Hyperemesis Gravidarum  Hyperemesis gravidarum is a severe form of morning sickness that can affect some women during pregnancy. It may develop around the 5th week and last until the 16th week of pregnancy. In some women, it may last longer. Symptoms include severe nausea and vomiting. This can lead to problems such as as weight loss and dehydration.  It is not clear what causes hyperemesis gravidarum. It may be due to rising hormone levels early in the pregnancy. It can be a serious threat to mother and fetus, if symptoms are severe. Therefore, follow the advice below carefully. If symptoms are not controlled by home measures, a hospital stay may be needed. IV (intravenous) fluids and medicines may be given.  Home care    Diet    Keep a log of the foods you eat and how they affect your symptoms. Avoid foods that trigger your symptoms.    Eat frequent small meals throughout the day rather than three large meals. This can help keep the stomach from being empty, which can make nausea worse.    Choose foods that are high in carbohydrates. Eating foods high in protein may also help. Limit greasy or spicy foods.    Before getting out of bed in the morning, try eating crackers or dry toast. This may  help settle your stomach.    Drink cold, clear liquids. Drinking small amounts of liquids with electrolytes, such as sports drinks may help as well.    Medicine    If needed, your healthcare provider may prescribe certain medicines to help relieve nausea and vomiting. Vitamin B6 and brandon may also be advised. Don t try any over-the-counter medicines or home remedies without talking to your provider first.  Follow-up care  Follow up with your healthcare provider, or as advised.  When to seek medical advice  Call your healthcare provider right away if any of these occur:    Signs of dehydration (dry mouth, extreme thirst, dark urine or little urine output, dizziness, weakness, or fainting)    Vomiting that won t stop    Inability to keep down liquids    Frequent diarrhea    Weight loss or no weight gain over a 2-week period    Severe constant pain in the lower right abdomen    Fever of 100.4 F (38 C) or higher, or as directed by your provider  Date Last Reviewed: 8/20/2015 2000-2017 [employer]. [address] [place] [address]. All rights reserved. This information is not intended as a substitute for professional medical care. Always follow your healthcare professional's instructions.          Your next 10 appointments already scheduled     Apr 24, 2018  2:00 PM CDT   New Prenatal with WILNER Echevarria CNM   [facility] ([facility])    [address] [place] [id number] [phone]              24 Hour Appointment Hotline       To make an appointment at any [facility], call [phone] ([phone]). If you don't have a family doctor or clinic, we will help you find one. [facility] are conveniently located to serve the needs of you and your family.             Review of your medicines      Our records show that you are taking the medicines listed below. If these are incorrect, please call your family doctor  or clinic.        Dose / Directions Last dose taken    ondansetron 4 MG ODT tab   Commonly known as:  ZOFRAN-ODT   Dose:  4 mg   Quantity:  90 tablet        Take 1 tablet (4 mg) by mouth every 6 hours   Refills:  1        prenatal multivitamin plus iron 27-0.8 MG Tabs per tablet   Dose:  1 tablet   Quantity:  100 tablet        Take 1 tablet by mouth daily   Refills:  3        prochlorperazine 25 MG Suppository   Commonly known as:  COMPAZINE   Dose:  25 mg   Quantity:  20 suppository        Place 1 suppository (25 mg) rectally every 12 hours as needed for nausea   Refills:  1        promethazine 25 MG tablet   Commonly known as:  PHENERGAN   Dose:  25 mg   Quantity:  90 tablet        Take 1 tablet (25 mg) by mouth every 6 hours   Refills:  1                Procedures and tests performed during your visit     Orthostatic blood pressure and pulse    Weigh patient      Orders Needing Specimen Collection     None      Pending Results     No orders found from 4/15/2018 to 4/18/2018.            Pending Culture Results     No orders found from 4/15/2018 to 4/18/2018.            Pending Results Instructions     If you had any lab results that were not finalized at the time of your Discharge, you can call the ED Lab Result RN at [phone]. You will be contacted by this team for any positive Lab results or changes in treatment. The nurses are available 7 days a week from 10A to 6:30P.  You can leave a message 24 hours per day and they will return your call.        Thank you for choosing [facility]       Thank you for choosing [facility] for your care. Our goal is always to provide you with excellent care. Hearing back from our patients is one way we can continue to improve our services. Please take a few minutes to complete the written survey that you may receive in the mail after you visit with us. Thank you!        [employer] Information     [employer] lets you send messages to your doctor, view your test results, renew your  "prescriptions, schedule appointments and more. To sign up, go to [email or web address] . Click on \"Log in\" on the left side of the screen, which will take you to the Welcome page. Then click on \"Sign up Now\" on the right side of the page.     You will be asked to enter the access code listed below, as well as some personal information. Please follow the directions to create your username and password.     Your access code is: [email or web address]  Expires: 2018  7:27 PM     Your access code will  in 90 days. If you need help or a new code, please call your [facility] clinic or [phone].        Care EveryWhere ID     This is your Care EveryWhere ID. This could be used by other organizations to access your [facility] medical records  [id number]        Equal Access to Services     BARRY GARNICA : Cleo Payne, ismael joseph, regan lowry, joss ortega . So [facility] [phone].    ATENCIÓN: Si habla español, tiene a pan disposición servicios gratuitos de asistencia lingüística. Llame al [phone].    We comply with applicable federal civil rights laws and [place] laws. We do not discriminate on the basis of race, color, national origin, age, disability, sex, sexual orientation, or gender identity.            After Visit Summary       This is your record. Keep this with you and show to your community pharmacist(s) and doctor(s) at your next visit.                  "

## 2018-04-17 NOTE — DISCHARGE INSTRUCTIONS
Severe Morning Sickness (Hyperemesis Gravidarum)    Nausea and vomiting are common during pregnancy. It is often called  morning sickness,  but it can happen at any time of day. But severe nausea and vomiting that doesn t let up is not normal. Dehydration and weight loss can result. This can be dangerous for the mother and baby. Hyperemesis gravidarum (HEG) is the medical term for severe nausea and vomiting during pregnancy, and it results in weight loss. If you have it, your healthcare provider can take steps to keep you and your baby safe. He or she can also help you find relief.   What are the symptoms of severe morning sickness?  Call your healthcare provider right away if you suspect that you have severe morning symptoms. The symptoms include:    Inability to keep down liquids    Nausea that is severe and lasts beyond the first few months    Inability to empty the bladder     Urine that is dark and concentrated     Fainting spells  What causes severe morning symptoms?  Nausea and vomiting during pregnancy are thought to be due to an increase in certain hormone levels. It is not clear what causes severe morning sickness, but it may be more likely in women carrying twins or more. Your healthcare provider will do some tests to rule out certain health conditions that may lead to severe nausea and vomiting.  Getting relief from morning sickness  To help combat nausea, eat small amounts often. This helps prevent the stomach from being empty, which can make nausea worse. Choose dry foods such as crackers. Try sipping cold, clear drinks. And ask your healthcare provider about taking vitamin B6 or brandon to help ease nausea. Your provider may recommend that you try vitamin B6 and a medicine called doxylamine to relieve the nausea. In some cases, alternative treatments such as acupuncture are effective in helping managing nausea during pregnancy.  Treating severe morning sickness  The focus of treatment for severe  morning sickness is to relieve symptoms and prevent weight loss and dehydration. If you are dehydrated or losing weight, steps are needed to protect you and your baby. You will most likely be admitted to the hospital for at least a short time. There, you can be given IV fluids to rehydrate you. You may also be prescribed medicines that relieve nausea. In very severe cases, a longer hospitalization may be needed. IV nutrition or tube feeding will then be used. If this becomes necessary, your healthcare provider can tell you more.  Recovery and follow-up  With treatment, severe morning sickness can be managed. Follow up with your healthcare provider to be sure you are keeping down fluids and gaining a healthy amount of weight.  When to seek medical care  Contact your healthcare provider right away if you have any of the following:    Signs of dehydration, including extreme thirst, headache, little urine, very dark urine, or a dry, sticky mouth.    Weight loss    Dizziness or fainting    Racing or pounding heart    Blood in your vomit   Date Last Reviewed: 5/1/2016 2000-2017 [employer]. [address] [place] [address]. All rights reserved. This information is not intended as a substitute for professional medical care. Always follow your healthcare professional's instructions.        Eating Tips for Morning Sickness   Hyperemesis Gravidarum  During pregnancy, you need to eat enough food to meet your needs and the needs of your baby. Severe nausea and vomiting (hyperemesis) may lead to weight loss and dehydration (too little fluid in the body).   Follow these tips to help control nausea.   1. Eat 6 to 8 small meals in a day, about two hours apart.  2. Before rising in the morning, eat a small amount of dry food. Choose from the list below.    soda crackers (saltines)    dry toast with jelly    breadsticks    dry cereal    rice cakes    pretzels    plain potatoes, rice or noodles    plain  low-fat cookies or cake  3. Avoid liquids with meals. Drink liquids 30 to 60 minutes before or after eating. Sip slowly.  4. Foods and drinks should be cool or at room temperature. Try:    flavored gelatin    sherbet, sorbet or Popsicles    carbonated (fizzy) drinks    ice cubes made from juice.  5. Avoid hot drinks and foods.  6. Avoid drinks with caffeine (coffee, tea, cola drinks). They may increase stomach acid.  7. Avoid very sweet, hot or spicy foods.  8. Avoid high-fat foods such as butter, margarine, mayonnaise, wiley, gravies, pie crust, pastries and fried foods. They take longer to leave the stomach.  9. Avoid strong food odors such as fish, cabbage or broccoli. Avoid cooking odors by eating food you do not have to cook.  10. Do not lie down after eating. Rest sitting up for an hour after meals.  11. Take your prenatal vitamins with food in the evening. Tell your doctor if you cannot take them.  12. Nausea is often gone by midday. You may eat more food in the late afternoon, supper and mid-evening. Find the times best for you.  Keep a food diary to help you find foods that you can eat without problems. Try any food that appeals to you.  Menu Planning Guidelines     [employer]. Reprinted with permission.  Food groups Foods recommended  Foods that may cause distress    Soups Low-fat broth-based and cream soups made with allowed foods. All other soups.    Meats and substitutes  (Six or more ounces daily) All lean, tender meats, poultry or fish. All should be baked, broiled or boiled. Boiled egg. Low-fat or fat-free cheeses. Fried meat, poultry or fish; highly seasoned, cured or smoked meat, poultry or fish (i.e., corned beef, luncheon meat, frankfurters, sausages, sardines, anchovies, wiley and strong flavored cheese). Peanut butter.    Fruits (Two or more servings daily; include a vitamin C source daily) Fruit juices, canned fruits, grapefruit and orange sections (without membrane). Other fresh and dried  fruits, if tolerated.     Vegetables  (Three or more servings daily) Vegetable juices, cooked vegetables (i.e., asparagus, green or wax beans, beets, carrots, peas, pumpkin, winter squash, spinach and mushrooms). Raw vegetables if tolerated.  Gas-forming vegetables (i.e., dried peas and beans, corn, broccoli, onions, cauliflower, [place] sprouts, cucumbers, cabbage, turnip, rutabagas, sauerkraut, green peppers).    Bread, cereal, potato, pasta and grains  (Six or more servings daily) Enriched breads and cereals, plain crackers, potatoes, enriched rice, barley, noodles, spaghetti, macaroni and other pastas. Very coarse cereals such as bran; seeds in or on breads, rolls and crackers; breads made with nuts or dried fruits; fried breads and pastries such as doughnuts; fried potatoes, fried rice, wild rice, seasoned rice and pasta mixes.    Dessert fats Low-fat versions of cakes, cookies, custard, pudding, ice cream, frozen yogurt sherbet; ice pops, gelatin, frozen fruit bars, sorbet. Desserts containing salad dressings, nuts, coconut; high-fat desserts.    Milk and milk products (Four or more cups daily) Fat-free and low-fat milk products. Whole milk, cream.    Beverages   (Four or more cups daily) Water, decaffeinated coffee and tea, fruit drinks, caffeine-free carbonated beverages, weak tea, lemonade, sports drinks. All caffeine-containing beverages (i.e., coffee, strong tea, cocoa, cola); alcoholic beverages.    Condiments and sweets Iodized salt, flavorings, low-fat gravies and sauces, herbs and spices as tolerated; sugar, syrup, honey, jelly, seedless jam, hard candies and marshmallows. Strongly flavored seasonings and condiments (i.e., catsup, pepper, barbecue sauce, chili sauce, chili pepper, horseradish, garlic, mustard and vinegar), olives, pickles, nuts, chocolate candy.    For informational purposes only. Not to replace the advice of your health care provider.   Copyright   2006 [facility]. All  rights reserved. [employer] [id number] - REV 09/15.      Hyperemesis Gravidarum  Hyperemesis gravidarum is a severe form of morning sickness that can affect some women during pregnancy. It may develop around the 5th week and last until the 16th week of pregnancy. In some women, it may last longer. Symptoms include severe nausea and vomiting. This can lead to problems such as as weight loss and dehydration.  It is not clear what causes hyperemesis gravidarum. It may be due to rising hormone levels early in the pregnancy. It can be a serious threat to mother and fetus, if symptoms are severe. Therefore, follow the advice below carefully. If symptoms are not controlled by home measures, a hospital stay may be needed. IV (intravenous) fluids and medicines may be given.  Home care    Diet    Keep a log of the foods you eat and how they affect your symptoms. Avoid foods that trigger your symptoms.    Eat frequent small meals throughout the day rather than three large meals. This can help keep the stomach from being empty, which can make nausea worse.    Choose foods that are high in carbohydrates. Eating foods high in protein may also help. Limit greasy or spicy foods.    Before getting out of bed in the morning, try eating crackers or dry toast. This may help settle your stomach.    Drink cold, clear liquids. Drinking small amounts of liquids with electrolytes, such as sports drinks may help as well.    Medicine    If needed, your healthcare provider may prescribe certain medicines to help relieve nausea and vomiting. Vitamin B6 and brandon may also be advised. Don t try any over-the-counter medicines or home remedies without talking to your provider first.  Follow-up care  Follow up with your healthcare provider, or as advised.  When to seek medical advice  Call your healthcare provider right away if any of these occur:    Signs of dehydration (dry mouth, extreme thirst, dark urine or little urine output, dizziness,  weakness, or fainting)    Vomiting that won t stop    Inability to keep down liquids    Frequent diarrhea    Weight loss or no weight gain over a 2-week period    Severe constant pain in the lower right abdomen    Fever of 100.4 F (38 C) or higher, or as directed by your provider  Date Last Reviewed: 8/20/2015 2000-2017 [employer]. [address] [place] [address]. All rights reserved. This information is not intended as a substitute for professional medical care. Always follow your healthcare professional's instructions.

## 2018-10-01 NOTE — PROGRESS NOTES
SUBJECTIVE:   CC: Jaqueline Argueta is an 23 year old woman who presents for preventive health visit.     Healthy Habits:    Do you get at least three servings of calcium containing foods daily (dairy, green leafy vegetables, etc.)? yes    Amount of exercise or daily activities, outside of work: No     Problems taking medications regularly No    Medication side effects: No    Have you had an eye exam in the past two years? no    Do you see a dentist twice per year? no    Do you have sleep apnea, excessive snoring or daytime drowsiness?no      -------------------------------------    Today's PHQ-2 Score:   PHQ-2 ( 1999 Pfizer) 3/21/2016 3/29/2013   Q1: Little interest or pleasure in doing things 0 0   Q2: Feeling down, depressed or hopeless 0 0   PHQ-2 Score 0 0       Abuse: Current or Past(Physical, Sexual or Emotional)- No  Do you feel safe in your environment - Yes    Social History   Substance Use Topics     Smoking status: Never Smoker     Smokeless tobacco: Never Used      Comment: smoke marijuana once or twice a wk     Alcohol use No      Comment: rare     If you drink alcohol do you typically have >3 drinks per day or >7 drinks per week? No                     Reviewed orders with patient.  Reviewed health maintenance and updated orders accordingly - Yes  Labs reviewed in EPIC    Mammogram not appropriate for this patient based on age.    Pertinent mammograms are reviewed under the imaging tab.  History of abnormal Pap smear: NO - age 21-29 PAP every 3 years recommended     Reviewed and updated as needed this visit by clinical staff         Reviewed and updated as needed this visit by Provider            ROS:  CONSTITUTIONAL: NEGATIVE for fever, chills, change in weight  INTEGUMENTARU/SKIN: NEGATIVE for worrisome rashes, moles or lesions  EYES: NEGATIVE for vision changes or irritation  ENT: NEGATIVE for ear, mouth and throat problems  RESP: NEGATIVE for significant cough or SOB  BREAST: NEGATIVE for  masses, tenderness or discharge  CV: NEGATIVE for chest pain, palpitations or peripheral edema  GI: NEGATIVE for nausea, abdominal pain, heartburn, or change in bowel habits  : NEGATIVE for unusual urinary or vaginal symptoms. Periods are regular.  MUSCULOSKELETAL: NEGATIVE for significant arthralgias or myalgia  NEURO: NEGATIVE for weakness, dizziness or paresthesias  PSYCHIATRIC: NEGATIVE for changes in mood or affect    OBJECTIVE:   LMP 02/08/2018  EXAM:  GENERAL: healthy, alert and no distress  NECK: no adenopathy, no asymmetry, masses, or scars and thyroid normal to palpation  RESP: lungs clear to auscultation - no rales, rhonchi or wheezes  CV: regular rate and rhythm, normal S1 S2, no S3 or S4, no murmur, click or rub, no peripheral edema and peripheral pulses strong  ABDOMEN: soft, nontender, no hepatosplenomegaly, no masses and bowel sounds normal   (female): normal female external genitalia, normal urethral meatus, vaginal mucosa, normal cervix/adnexa/uterus without masses or discharge  MS: no gross musculoskeletal defects noted, no edema  SKIN: no suspicious lesions or rashes    Diagnostic Test Results:  No results found for this or any previous visit (from the past 24 hour(s)).    ASSESSMENT/PLAN:       ICD-10-CM    1. Routine general medical examination at a health care facility Z00.00    2. Encounter for initial prescription of injectable contraceptive Z30.013 Medroxyprogesterone inj  1mg   (Depo Provera J-Code)     INJECTION INTRAMUSCULAR OR SUB-Q     Beta HCG Qual, Urine - [facility] and [facility] ([id number])     medroxyPROGESTERone (DEPO-PROVERA) 150 MG/ML injection   3. Routine screening for STI (sexually transmitted infection) Z11.3 HIV Antigen Antibody Combo     Treponema Abs w Reflex to RPR and Titer     NEISSERIA GONORRHOEA PCR     CHLAMYDIA TRACHOMATIS PCR     CANCELED: NEISSERIA GONORRHOEA PCR     CANCELED: CHLAMYDIA TRACHOMATIS PCR   4. Cervical cancer screening Z12.4 PAP imaged thin layer  "screen only - recommended age 21 - 24 years       COUNSELING:   Reviewed preventive health counseling, as reflected in patient instructions       Regular exercise       Healthy diet/nutrition    BP Readings from Last 1 Encounters:   04/17/18 120/70     Estimated body mass index is 21.99 kg/(m^2) as calculated from the following:    Height as of 3/27/18: 5' 1\" (1.549 m).    Weight as of 4/14/18: 116 lb 6.4 oz (52.8 kg).           reports that she has never smoked. She has never used smokeless tobacco.      Counseling Resources:  ATP IV Guidelines  Pooled Cohorts Equation Calculator  Breast Cancer Risk Calculator  FRAX Risk Assessment  ICSI Preventive Guidelines  Dietary Guidelines for Americans, 2010  USDA's MyPlate  ASA Prophylaxis  Lung CA Screening    [facility]  "

## 2018-10-02 ENCOUNTER — OFFICE VISIT (OUTPATIENT)
Dept: FAMILY MEDICINE | Facility: CLINIC | Age: 23
End: 2018-10-02
Payer: COMMERCIAL

## 2018-10-02 VITALS
BODY MASS INDEX: 23.5 KG/M2 | DIASTOLIC BLOOD PRESSURE: 78 MMHG | SYSTOLIC BLOOD PRESSURE: 107 MMHG | HEIGHT: 61 IN | HEART RATE: 99 BPM | WEIGHT: 124.5 LBS | TEMPERATURE: 98.4 F | RESPIRATION RATE: 12 BRPM | OXYGEN SATURATION: 99 %

## 2018-10-02 DIAGNOSIS — Z30.013 ENCOUNTER FOR INITIAL PRESCRIPTION OF INJECTABLE CONTRACEPTIVE: ICD-10-CM

## 2018-10-02 DIAGNOSIS — Z00.00 ROUTINE GENERAL MEDICAL EXAMINATION AT A HEALTH CARE FACILITY: Primary | ICD-10-CM

## 2018-10-02 DIAGNOSIS — Z11.3 ROUTINE SCREENING FOR STI (SEXUALLY TRANSMITTED INFECTION): ICD-10-CM

## 2018-10-02 DIAGNOSIS — Z12.4 CERVICAL CANCER SCREENING: ICD-10-CM

## 2018-10-02 LAB — BETA HCG QUAL IFA URINE: NEGATIVE

## 2018-10-02 PROCEDURE — 99395 PREV VISIT EST AGE 18-39: CPT | Mod: 25 | Performed by: NURSE PRACTITIONER

## 2018-10-02 PROCEDURE — 87491 CHLMYD TRACH DNA AMP PROBE: CPT | Performed by: NURSE PRACTITIONER

## 2018-10-02 PROCEDURE — 86780 TREPONEMA PALLIDUM: CPT | Performed by: NURSE PRACTITIONER

## 2018-10-02 PROCEDURE — 87389 HIV-1 AG W/HIV-1&-2 AB AG IA: CPT | Performed by: NURSE PRACTITIONER

## 2018-10-02 PROCEDURE — 36415 COLL VENOUS BLD VENIPUNCTURE: CPT | Performed by: NURSE PRACTITIONER

## 2018-10-02 PROCEDURE — 87591 N.GONORRHOEAE DNA AMP PROB: CPT | Performed by: NURSE PRACTITIONER

## 2018-10-02 PROCEDURE — 84703 CHORIONIC GONADOTROPIN ASSAY: CPT | Performed by: NURSE PRACTITIONER

## 2018-10-02 PROCEDURE — G0145 SCR C/V CYTO,THINLAYER,RESCR: HCPCS | Performed by: NURSE PRACTITIONER

## 2018-10-02 PROCEDURE — 96372 THER/PROPH/DIAG INJ SC/IM: CPT | Performed by: NURSE PRACTITIONER

## 2018-10-02 NOTE — MR AVS SNAPSHOT
After Visit Summary   10/2/2018    Jaqueline Argueta    MRN: [id number]           Patient Information     Date Of Birth          1995        Visit Information        Provider Department      10/2/2018 3:30 PM Georgia Appiah APRN [facility]        Today's Diagnoses     Routine general medical examination at a health care facility    -  1    Encounter for initial prescription of injectable contraceptive        Routine screening for STI (sexually transmitted infection)        Cervical cancer screening          Care Instructions      Preventive Health Recommendations  Female Ages 21 to 25     Yearly exam:     See your health care provider every year in order to  o Review health changes.   o Discuss preventive care.    o Review your medicines if your doctor has prescribed any.      You should be tested each year for STDs (sexually transmitted diseases).       Talk to your provider about how often you should have cholesterol testing.      Get a Pap test every three years. If you have an abnormal result, your doctor may have you test more often.      If you are at risk for diabetes, you should have a diabetes test (fasting glucose).     Shots:     Get a flu shot each year.     Get a tetanus shot every 10 years.     Consider getting the shot (vaccine) that prevents cervical cancer (Gardasil).    Nutrition:     Eat at least 5 servings of fruits and vegetables each day.    Eat whole-grain bread, whole-wheat pasta and brown rice instead of white grains and rice.    Get adequate Calcium and Vitamin D.     Lifestyle    Exercise at least 150 minutes a week each week (30 minutes a day, 5 days a week). This will help you control your weight and prevent disease.    Limit alcohol to one drink per day.    No smoking.     Wear sunscreen to prevent skin cancer.    See your dentist every six months for an exam and cleaning.          Follow-ups after your visit        Who to contact     If you  "have questions or need follow up information about today's clinic visit or your schedule please contact [facility] directly at [phone].  Normal or non-critical lab and imaging results will be communicated to you by MyChart, letter or phone within 4 business days after the clinic has received the results. If you do not hear from us within 7 days, please contact the clinic through [employer] or phone. If you have a critical or abnormal lab result, we will notify you by phone as soon as possible.  Submit refill requests through [employer] or call your pharmacy and they will forward the refill request to us. Please allow 3 business days for your refill to be completed.          Additional Information About Your Visit        [employer] [employer] Information     [facility] lets you send messages to your doctor, view your test results, renew your prescriptions, schedule appointments and more. To sign up, go to [email or web address] . Click on \"Log in\" on the left side of the screen, which will take you to the Welcome page. Then click on \"Sign up Now\" on the right side of the page.     You will be asked to enter the access code listed below, as well as some personal information. Please follow the directions to create your username and password.     Your access code is: [email or web address]  Expires: 2018  3:31 PM     Your access code will  in 90 days. If you need help or a new code, please call your [facility] clinic or [phone].        Care EveryWhere ID     This is your Care EveryWhere ID. This could be used by other organizations to access your [facility] medical records  [id number]        Your Vitals Were     Pulse Temperature Respirations Height Last Period Pulse Oximetry    99 98.4  F (36.9  C) (Oral) 12 5' 1\" (1.549 m) 2018 99%    Breastfeeding? BMI (Body Mass Index)                No 23.52 kg/m2           Blood Pressure from Last 3 Encounters:   10/02/18 107/78   18 120/70   18 118/65    " Weight from Last 3 Encounters:   10/02/18 124 lb 8 oz (56.5 kg)   04/14/18 116 lb 6.4 oz (52.8 kg)   04/13/18 113 lb (51.3 kg)              We Performed the Following     Beta HCG Qual, Urine - [facility] ([id number])     CHLAMYDIA TRACHOMATIS PCR     HIV Antigen Antibody Combo     INJECTION INTRAMUSCULAR OR SUB-Q     Medroxyprogesterone inj  1mg   (Depo Provera J-Code)     NEISSERIA GONORRHOEA PCR     PAP imaged thin layer screen only - recommended age 21 - 24 years     Treponema Abs w Reflex to RPR and Titer        Primary Care Provider Office Phone # Fax #    [facility] [phone]       [address] [address]        Equal Access to Services     BARRY GARNICA : Hadii jaelyn bondo Sosoila, waaxda luqadaha, qaybta kaalmada adeegyada, joss ortega . So [facility] [phone].    ATENCIÓN: Si habla español, tiene a pan disposición servicios gratuitos de asistencia lingüística. Llame al [phone].    We comply with applicable federal civil rights laws and [place] laws. We do not discriminate on the basis of race, color, national origin, age, disability, sex, sexual orientation, or gender identity.            Thank you!     Thank you for choosing [facility]  for your care. Our goal is always to provide you with excellent care. Hearing back from our patients is one way we can continue to improve our services. Please take a few minutes to complete the written survey that you may receive in the mail after your visit with us. Thank you!             Your Updated Medication List - Protect others around you: Learn how to safely use, store and throw away your medicines at www.disposemymeds.org.      Notice  As of 10/2/2018  4:15 PM    You have not been prescribed any medications.

## 2018-10-04 LAB
C TRACH DNA SPEC QL NAA+PROBE: NEGATIVE
HIV 1+2 AB+HIV1 P24 AG SERPL QL IA: NONREACTIVE
N GONORRHOEA DNA SPEC QL NAA+PROBE: NEGATIVE
SPECIMEN SOURCE: NORMAL
SPECIMEN SOURCE: NORMAL
T PALLIDUM AB SER QL: NONREACTIVE

## 2018-10-05 LAB
COPATH REPORT: NORMAL
PAP: NORMAL

## 2018-10-09 ENCOUNTER — TELEPHONE (OUTPATIENT)
Dept: FAMILY MEDICINE | Facility: CLINIC | Age: 23
End: 2018-10-09

## 2018-10-09 NOTE — TELEPHONE ENCOUNTER
Spoke with patient. Notified of provider result message from results 10/02/18. Pt verbalized understanding.    Gloria Wall RN  [facility]

## 2018-10-11 RX ORDER — MEDROXYPROGESTERONE ACETATE 150 MG/ML
150 INJECTION, SUSPENSION INTRAMUSCULAR
Qty: 1 ML | Refills: 1 | OUTPATIENT
Start: 2018-10-11 | End: 2021-03-10

## 2019-04-18 NOTE — PROGRESS NOTES
SUBJECTIVE:   Jaqueline Argueta is a 23 year old female who presents to clinic today for the following   health issues:    Concern - Face breaking out  Onset: 1 week ago    Description:   When she gets out of the shower, face gets red, itchy, and bumpy    Intensity: moderate    Progression of Symptoms:  same    Accompanying Signs & Symptoms:  None    Previous history of similar problem:   No    Precipitating factors:   Worsened by: Getting out of the shower or washing face in the morning, nothing new    Alleviating factors:  Improved by: Nothing    Therapies Tried and outcome: nothing    Requesting STD testing, reports she does not have any current symptoms    HPI: Patient reports that she noticed pimples appearing on her face, which started 2 weeks ago. She also has a  Piercing below her right eye and she noticed that the area is a little raised, she removed the ring two days ago and the area is looking improved. Uses Cetaphil soap with little improvement. Requesting STD screening, denies symptoms, no new partners.       Additional history: as documented    Reviewed  and updated as needed this visit by clinical staff         Reviewed and updated as needed this visit by Provider         Patient Active Problem List   Diagnosis     Condyloma acuminata     Need for Tdap vaccination     Nausea/vomiting in pregnancy     Nausea and vomiting of pregnancy, antepartum     Rubella non-immune status, antepartum     Past Surgical History:   Procedure Laterality Date     NO HISTORY OF SURGERY         Social History     Tobacco Use     Smoking status: Never Smoker     Smokeless tobacco: Never Used     Tobacco comment: smoke marijuana once or twice a wk   Substance Use Topics     Alcohol use: No     Comment: rare     Family History   Problem Relation Age of Onset     Family History Negative No family hx of            ROS:  CONSTITUTIONAL:NEGATIVE for fever, chills, change in weight  INTEGUMENTARY/SKIN: see HPI    OBJECTIVE:  "    /80   Pulse 92   Temp 98.5  F (36.9  C) (Oral)   Resp 14   Ht 1.549 m (5' 1\")   Wt 68.2 kg (150 lb 4.8 oz)   SpO2 100%   BMI 28.40 kg/m    Body mass index is 28.4 kg/m .  GENERAL: healthy, alert and no distress  RESP: lungs clear to auscultation - no rales, rhonchi or wheezes  CV: regular rate and rhythm, normal S1 S2, no S3 or S4, no murmur, click or rub, no peripheral edema and peripheral pulses strong  SKIN: superficial, clear 1 to 2 mm maculopapular rash to neck and right side of face., Old piercing site right cheekbone with mild erythema, no tenderness or pain. No drng, Tattoo above upper eye lid and temporal area.    MS: no tenderness over cheekbone and erythema localized only to old piercing    ASSESSMENT/PLAN:       1. Screen for STD (sexually transmitted disease)  - HIV Antigen Antibody Combo  - Treponema Abs w Reflex to RPR and Titer  - Neisseria gonorrhoeae PCR  - Chlamydia trachomatis PCR    2. Acne vulgaris  Comment: Mild papulopustular  Plan:benzoyl peroxide face wash   Differin gel to affected areas     3. Heat rash  Comment: Based on presentation of superficial clear vesicles without associated inflammation, this is more consistent with heat rash.  Plan:   1.apply Hydrocortisone 0.1% to affected area for itching PRN.   2.stay in  cooler environment, if possible  3. Wearing of breathable clothing (such as cotton) that does not occlude the skin    scar -below right eye  Plan: For your face, you can warm pack the old piercing and watch for signs of infection (increasing redness or warmth, pus drainage) and notify us if that is the case      Patient Instructions   For your face, you can warm pack the old piercing and watch for signs of infection (increasing redness or warmth, pus drainage) and notify us if that is the case    Use the differin and Hydrocortisone cream over the counter for the rash and acne    Manny Zelaya DNP Student    I was present with the NP Student during the " history and exam.  I discussed the case with the NP Student and agree with the findings as documented in the assessment and plan.     IWLNER Balderrama CNP  [facility]

## 2019-04-19 ENCOUNTER — OFFICE VISIT (OUTPATIENT)
Dept: FAMILY MEDICINE | Facility: CLINIC | Age: 24
End: 2019-04-19
Payer: COMMERCIAL

## 2019-04-19 VITALS
RESPIRATION RATE: 14 BRPM | WEIGHT: 150.3 LBS | SYSTOLIC BLOOD PRESSURE: 118 MMHG | HEART RATE: 92 BPM | BODY MASS INDEX: 28.37 KG/M2 | OXYGEN SATURATION: 100 % | TEMPERATURE: 98.5 F | DIASTOLIC BLOOD PRESSURE: 80 MMHG | HEIGHT: 61 IN

## 2019-04-19 DIAGNOSIS — L74.0 HEAT RASH: ICD-10-CM

## 2019-04-19 DIAGNOSIS — L70.0 ACNE VULGARIS: ICD-10-CM

## 2019-04-19 DIAGNOSIS — Z11.3 SCREEN FOR STD (SEXUALLY TRANSMITTED DISEASE): Primary | ICD-10-CM

## 2019-04-19 PROCEDURE — 0064U ANTB TP TOTAL&RPR IA QUAL: CPT | Performed by: NURSE PRACTITIONER

## 2019-04-19 PROCEDURE — 36415 COLL VENOUS BLD VENIPUNCTURE: CPT | Performed by: NURSE PRACTITIONER

## 2019-04-19 PROCEDURE — 87491 CHLMYD TRACH DNA AMP PROBE: CPT | Performed by: NURSE PRACTITIONER

## 2019-04-19 PROCEDURE — 87591 N.GONORRHOEAE DNA AMP PROB: CPT | Performed by: NURSE PRACTITIONER

## 2019-04-19 PROCEDURE — 99213 OFFICE O/P EST LOW 20 MIN: CPT | Performed by: NURSE PRACTITIONER

## 2019-04-19 PROCEDURE — 87389 HIV-1 AG W/HIV-1&-2 AB AG IA: CPT | Performed by: NURSE PRACTITIONER

## 2019-04-19 ASSESSMENT — MIFFLIN-ST. JEOR: SCORE: 1384.14

## 2019-04-19 NOTE — PATIENT INSTRUCTIONS
For your face, you can warm pack the old piercing and watch for signs of infection (increasing redness or warmth, pus drainage) and notify us if that is the case    Use the differin and Hydrocortisone cream over the counter for the rash and acne

## 2019-04-20 LAB — T PALLIDUM AB SER QL: NONREACTIVE

## 2019-04-22 LAB — HIV 1+2 AB+HIV1 P24 AG SERPL QL IA: NONREACTIVE

## 2019-09-10 ENCOUNTER — OFFICE VISIT (OUTPATIENT)
Dept: FAMILY MEDICINE | Facility: CLINIC | Age: 24
End: 2019-09-10
Payer: COMMERCIAL

## 2019-09-10 VITALS
BODY MASS INDEX: 27.41 KG/M2 | TEMPERATURE: 97.6 F | DIASTOLIC BLOOD PRESSURE: 82 MMHG | HEIGHT: 61 IN | RESPIRATION RATE: 18 BRPM | WEIGHT: 145.2 LBS | SYSTOLIC BLOOD PRESSURE: 118 MMHG | HEART RATE: 76 BPM | OXYGEN SATURATION: 99 %

## 2019-09-10 DIAGNOSIS — A59.9 TRICHIMONIASIS: Primary | ICD-10-CM

## 2019-09-10 DIAGNOSIS — B96.89 BV (BACTERIAL VAGINOSIS): ICD-10-CM

## 2019-09-10 DIAGNOSIS — Z11.3 SCREEN FOR STD (SEXUALLY TRANSMITTED DISEASE): ICD-10-CM

## 2019-09-10 DIAGNOSIS — N76.0 BV (BACTERIAL VAGINOSIS): ICD-10-CM

## 2019-09-10 DIAGNOSIS — N89.8 VAGINAL ITCHING: ICD-10-CM

## 2019-09-10 LAB
SPECIMEN SOURCE: ABNORMAL
WET PREP SPEC: ABNORMAL

## 2019-09-10 PROCEDURE — 87591 N.GONORRHOEAE DNA AMP PROB: CPT | Performed by: PHYSICIAN ASSISTANT

## 2019-09-10 PROCEDURE — 99214 OFFICE O/P EST MOD 30 MIN: CPT | Performed by: PHYSICIAN ASSISTANT

## 2019-09-10 PROCEDURE — 36415 COLL VENOUS BLD VENIPUNCTURE: CPT | Performed by: PHYSICIAN ASSISTANT

## 2019-09-10 PROCEDURE — 86803 HEPATITIS C AB TEST: CPT | Performed by: PHYSICIAN ASSISTANT

## 2019-09-10 PROCEDURE — 86780 TREPONEMA PALLIDUM: CPT | Performed by: PHYSICIAN ASSISTANT

## 2019-09-10 PROCEDURE — 87389 HIV-1 AG W/HIV-1&-2 AB AG IA: CPT | Performed by: PHYSICIAN ASSISTANT

## 2019-09-10 PROCEDURE — 87210 SMEAR WET MOUNT SALINE/INK: CPT | Performed by: PHYSICIAN ASSISTANT

## 2019-09-10 PROCEDURE — 87491 CHLMYD TRACH DNA AMP PROBE: CPT | Performed by: PHYSICIAN ASSISTANT

## 2019-09-10 RX ORDER — METRONIDAZOLE 500 MG/1
500 TABLET ORAL 2 TIMES DAILY
Qty: 14 TABLET | Refills: 0 | Status: SHIPPED | OUTPATIENT
Start: 2019-09-10 | End: 2019-10-16

## 2019-09-10 ASSESSMENT — MIFFLIN-ST. JEOR: SCORE: 1361

## 2019-09-10 NOTE — PATIENT INSTRUCTIONS
Always wear condoms  Return to clinic for any new or worsening symptoms or go to ER Urgent care in off hours     Patient Education     If You Think You Have an STD  Treating a sexually transmitted disease (STD) early limits the problems they can cause. If you have an STD, get treated right away. Ask your partner to be tested, too. Then avoid sex until you ve finished treatment and your healthcare provider says it s OK to have sex again.    Follow your treatment plan  Treatment depends on the type of STD you have. Common treatments include injections and oral pills or liquids. Creams and gels can be applied to sores caused by certain STDs. Follow the tips below:    Get new treatment for each new STD.    Don t use old medicine, even for the same STD. Use medicines as directed.    Don t share medicine unless instructed to do so by your healthcare provider or clinic.  Talk to your partner  If you have an STD, it s your duty to tell all your recent partners so they can be tested and treated. This is one important way to prevent the disease from being spread. Telling a partner that you have an STD can be hard. You may be embarrassed, angry, or afraid. It s often unclear who had the STD first. So try not to place blame. Your healthcare provider may offer some advice on how to begin.  Prevent future problems  Even after you ve been treated, you can still be infected again. This is a common problem. It happens when a partner passes the STD back to you. To avoid this, your partner must be tested. He or she may also need treatment. After treatment, go to any scheduled follow-up visits. Then prevent future problems with safer sex. Limit your number of partners and always use a latex condom.  Diagnosing STDS  Your healthcare provider will take a health history and examine you. During your health history, you will be asked about your sex habits and health history. You may also be asked about drug use. Give honest answers. Your  healthcare provider will then check your body for signs of STDs. He or she also may perform one or more of the following tests:    Fluid is swabbed from any sores. Samples also may be taken from the vagina, penis, mouth, or rectum. The samples are then tested for STDs.    Blood or urine samples may be taken. They are checked for viruses or bacteria that cause STDs.    For women, cells from the cervix (where the vagina and uterus meet) are checked for signs of cancer. This is called a Pap smear. If cell changes are found, a magnifying scope may be used to take a closer look (colposcopy).   Date Last Reviewed: 12/1/2016 2000-2018 [employer]. [address] [place] [address], [place] [address]. All rights reserved. This information is not intended as a substitute for professional medical care. Always follow your healthcare professional's instructions.

## 2019-09-10 NOTE — LETTER
September 13, 2019      Jaqueline Argueta  [address]  [facility] [address]        Dear ,    We are writing to inform you of your test results.    Your test results fall within the expected range(s) or remain unchanged from previous results.  Please continue with current treatment plan.    Resulted Orders   HIV Antigen Antibody Combo   Result Value Ref Range    HIV Antigen Antibody Combo Nonreactive NR^Nonreactive          Comment:      HIV-1 p24 Ag & HIV-1/HIV-2 Ab Not Detected   Hepatitis C antibody   Result Value Ref Range    Hepatitis C Antibody Nonreactive NR^Nonreactive      Comment:      Assay performance characteristics have not been established for newborns,   infants, and children     Treponema Abs w Reflex to RPR and Titer   Result Value Ref Range    Treponema Antibodies Nonreactive NR^Nonreactive   NEISSERIA GONORRHOEA PCR   Result Value Ref Range    Specimen Descrip Urine     N Gonorrhea PCR Negative NEG^Negative      Comment:      Negative for N. gonorrhoeae rRNA by transcription mediated amplification.  A negative result by transcription mediated amplification does not preclude   the presence of N. gonorrhoeae infection because results are dependent on   proper and adequate collection, absence of inhibitors, and sufficient rRNA to   be detected.     CHLAMYDIA TRACHOMATIS PCR   Result Value Ref Range    Specimen Description Urine     Chlamydia Trachomatis PCR Negative NEG^Negative      Comment:      Negative for C. trachomatis rRNA by transcription mediated amplification.  A negative result by transcription mediated amplification does not preclude   the presence of C. trachomatis infection because results are dependent on   proper and adequate collection, absence of inhibitors, and sufficient rRNA to   be detected.     Wet prep   Result Value Ref Range    Specimen Description Vagina     Wet Prep Rare  Trichomonas seen   (A)     Wet Prep No yeast seen     Wet Prep Moderate  Clue cells  seen   (A)     Wet Prep Few  WBC'S seen          If you have any questions or concerns, please call the clinic at the number listed above.       Sincerely,        Laureen Jerry PA-C

## 2019-09-10 NOTE — LETTER
September 12, 2019      Jaqueline Argueta  [address]  [facility] [address]        Dear ,    We are writing to inform you of your test results.    Your STD test results fall within the expected range(s) or is within normal.  Please continue with current treatment plan.    Resulted Orders   HIV Antigen Antibody Combo   Result Value Ref Range    HIV Antigen Antibody Combo Nonreactive NR^Nonreactive          Comment:      HIV-1 p24 Ag & HIV-1/HIV-2 Ab Not Detected   Hepatitis C antibody   Result Value Ref Range    Hepatitis C Antibody Nonreactive NR^Nonreactive      Comment:      Assay performance characteristics have not been established for newborns,   infants, and children     Treponema Abs w Reflex to RPR and Titer   Result Value Ref Range    Treponema Antibodies Nonreactive NR^Nonreactive   NEISSERIA GONORRHOEA PCR   Result Value Ref Range    Specimen Descrip Urine     N Gonorrhea PCR Negative NEG^Negative      Comment:      Negative for N. gonorrhoeae rRNA by transcription mediated amplification.  A negative result by transcription mediated amplification does not preclude   the presence of N. gonorrhoeae infection because results are dependent on   proper and adequate collection, absence of inhibitors, and sufficient rRNA to   be detected.     CHLAMYDIA TRACHOMATIS PCR   Result Value Ref Range    Specimen Description Urine     Chlamydia Trachomatis PCR Negative NEG^Negative      Comment:      Negative for C. trachomatis rRNA by transcription mediated amplification.  A negative result by transcription mediated amplification does not preclude   the presence of C. trachomatis infection because results are dependent on   proper and adequate collection, absence of inhibitors, and sufficient rRNA to   be detected.     Wet prep   Result Value Ref Range    Specimen Description Vagina     Wet Prep Rare  Trichomonas seen   (A)     Wet Prep No yeast seen     Wet Prep Moderate  Clue cells seen   (A)      Wet Prep Few  WBC'S seen          If you have any questions or concerns, please call the clinic at the number listed above.       Sincerely,        Laureen Jerry PA-C

## 2019-09-10 NOTE — PROGRESS NOTES
"Jaqueline Argueta is a 23 year old female who presents to clinic today for the following health issues:    STD Check  -Patient is here STD check, reports vaginal itching, white discharge in the last week  -She has not had itching in the past  -Reports being with a new partner  -States that her stress level is \"somewhat\" higher  -Has not been using any new soaps or detergents  -Patient states that she will have mild urinary incontinence when exercising    Social History  -Patient has an 8 year old daughter      Problem list and histories reviewed & adjusted, as indicated.  Additional history: as documented    ROS:  CONSTITUTIONAL: NEGATIVE for fever, chills, change in weight  INTEGUMENTARY/SKIN: NEGATIVE for worrisome rashes, moles or lesions  EYES: NEGATIVE for vision changes or irritation  ENT/MOUTH: NEGATIVE for ear, mouth and throat problems  RESP: NEGATIVE for significant cough or SOB  BREAST: NEGATIVE for masses, tenderness or discharge  CV: NEGATIVE for chest pain, palpitations or peripheral edema  GI: NEGATIVE for nausea, abdominal pain, heartburn, or change in bowel habits  : NEGATIVE for frequency, dysuria, or hematuria, POSITIVE incontinence, itching, discharge  MUSCULOSKELETAL: NEGATIVE for significant arthralgias or myalgia  NEURO: NEGATIVE for weakness, dizziness or paresthesias  ENDOCRINE: NEGATIVE for temperature intolerance, skin/hair changes  HEME: NEGATIVE for bleeding problems  PSYCHIATRIC: NEGATIVE for changes in mood or affect    This document serves as a record of the services and decisions personally performed and made by Laureen Jerry PA-C. It was created on her behalf by Javier Hall, trained medical scribe. The creation of this document is based on the provider's statements to the medical scribe.  Javier Hall 11:06 AM September 10, 2019    Patient Active Problem List   Diagnosis     Condyloma acuminata     Need for Tdap vaccination     Nausea/vomiting in pregnancy     Nausea and " "vomiting of pregnancy, antepartum     Rubella non-immune status, antepartum     Past Surgical History:   Procedure Laterality Date     NO HISTORY OF SURGERY         Social History     Tobacco Use     Smoking status: Never Smoker     Smokeless tobacco: Never Used     Tobacco comment: smoke marijuana once or twice a wk   Substance Use Topics     Alcohol use: No     Comment: rare     Family History   Problem Relation Age of Onset     Family History Negative No family hx of            Labs reviewed in EPIC  Patient Active Problem List   Diagnosis     Condyloma acuminata     Need for Tdap vaccination     Nausea/vomiting in pregnancy     Nausea and vomiting of pregnancy, antepartum     Rubella non-immune status, antepartum     Past Surgical History:   Procedure Laterality Date     NO HISTORY OF SURGERY         Social History     Tobacco Use     Smoking status: Never Smoker     Smokeless tobacco: Never Used     Tobacco comment: smoke marijuana once or twice a wk   Substance Use Topics     Alcohol use: No     Comment: rare     Family History   Problem Relation Age of Onset     Family History Negative No family hx of          Current Outpatient Medications   Medication Sig Dispense Refill     metroNIDAZOLE (FLAGYL) 500 MG tablet Take 1 tablet (500 mg) by mouth 2 times daily for 7 days 14 tablet 0     medroxyPROGESTERone (DEPO-PROVERA) 150 MG/ML injection Inject 1 mL (150 mg) into the muscle every 3 months (Patient not taking: Reported on 9/10/2019) 1 mL 1       OBJECTIVE:                                                    /82   Pulse 76   Temp 97.6  F (36.4  C) (Oral)   Resp 18   Ht 1.549 m (5' 1\")   Wt 65.9 kg (145 lb 3.2 oz)   SpO2 99%   BMI 27.44 kg/m   Body mass index is 27.44 kg/m .   GENERAL:: healthy, alert and no distress  RESP: lungs clear to auscultation - no rales, no rhonchi, no wheezes  CV: regular rates and rhythm, normal S1 S2, no S3 or S4 and no murmur, no click or rub -  MS: extremities- no " gross deformities noted, no edema  ABDOMEN: soft, no hepatosplenomegaly, no masses and bowel sounds normal, mild suprapubic tenderness  SKIN: no suspicious lesions, no rashes  NEURO: strength and tone- normal, sensory exam- grossly normal, mentation- intact, speech- normal, reflexes- symmetric  PSYCH: Alert and oriented times 3; speech- coherent , normal rate and volume; able to articulate logical thoughts, able to abstract reason, no tangential thoughts, no hallucinations or delusions, affect- normal    Results for orders placed or performed in visit on 09/10/19 (from the past 24 hour(s))   Wet prep   Result Value Ref Range    Specimen Description Vagina     Wet Prep Rare  Trichomonas seen   (A)     Wet Prep No yeast seen     Wet Prep Moderate  Clue cells seen   (A)     Wet Prep Few  WBC'S seen             ASSESSMENT/PLAN:                                                        ICD-10-CM    1. Trichimoniasis A59.9 metroNIDAZOLE (FLAGYL) 500 MG tablet   2. BV (bacterial vaginosis) N76.0 metroNIDAZOLE (FLAGYL) 500 MG tablet    B96.89    3. Screen for STD (sexually transmitted disease) Z11.3 HIV Antigen Antibody Combo     Hepatitis C antibody     Treponema Abs w Reflex to RPR and Titer     NEISSERIA GONORRHOEA PCR     CHLAMYDIA TRACHOMATIS PCR   4. Vaginal itching N89.8 Wet prep       Patient Instructions     Always wear condoms  Return to clinic for any new or worsening symptoms or go to ER Urgent care in off hours     Patient Education     If You Think You Have an STD  Treating a sexually transmitted disease (STD) early limits the problems they can cause. If you have an STD, get treated right away. Ask your partner to be tested, too. Then avoid sex until you ve finished treatment and your healthcare provider says it s OK to have sex again.    Follow your treatment plan  Treatment depends on the type of STD you have. Common treatments include injections and oral pills or liquids. Creams and gels can be applied to  sores caused by certain STDs. Follow the tips below:    Get new treatment for each new STD.    Don t use old medicine, even for the same STD. Use medicines as directed.    Don t share medicine unless instructed to do so by your healthcare provider or clinic.  Talk to your partner  If you have an STD, it s your duty to tell all your recent partners so they can be tested and treated. This is one important way to prevent the disease from being spread. Telling a partner that you have an STD can be hard. You may be embarrassed, angry, or afraid. It s often unclear who had the STD first. So try not to place blame. Your healthcare provider may offer some advice on how to begin.  Prevent future problems  Even after you ve been treated, you can still be infected again. This is a common problem. It happens when a partner passes the STD back to you. To avoid this, your partner must be tested. He or she may also need treatment. After treatment, go to any scheduled follow-up visits. Then prevent future problems with safer sex. Limit your number of partners and always use a latex condom.  Diagnosing STDS  Your healthcare provider will take a health history and examine you. During your health history, you will be asked about your sex habits and health history. You may also be asked about drug use. Give honest answers. Your healthcare provider will then check your body for signs of STDs. He or she also may perform one or more of the following tests:    Fluid is swabbed from any sores. Samples also may be taken from the vagina, penis, mouth, or rectum. The samples are then tested for STDs.    Blood or urine samples may be taken. They are checked for viruses or bacteria that cause STDs.    For women, cells from the cervix (where the vagina and uterus meet) are checked for signs of cancer. This is called a Pap smear. If cell changes are found, a magnifying scope may be used to take a closer look (colposcopy).   Date Last Reviewed:  "12/1/2016 2000-2018 [employer]. [address] [place] [address], [place] [address]. All rights reserved. This information is not intended as a substitute for professional medical care. Always follow your healthcare professional's instructions.                 Estimated body mass index is 27.44 kg/m  as calculated from the following:    Height as of this encounter: 1.549 m (5' 1\").    Weight as of this encounter: 65.9 kg (145 lb 3.2 oz).     The information in this document, created by the medical scribe for me, accurately reflects the services I personally performed and the decisions made by me. I have reviewed and approved this document for accuracy prior to leaving the patient care area.  September 10, 2019 11:07 AM    Laureen Jerry  [facility] [place]      "

## 2019-09-11 LAB
C TRACH DNA SPEC QL NAA+PROBE: NEGATIVE
HCV AB SERPL QL IA: NONREACTIVE
HIV 1+2 AB+HIV1 P24 AG SERPL QL IA: NONREACTIVE
N GONORRHOEA DNA SPEC QL NAA+PROBE: NEGATIVE
SPECIMEN SOURCE: NORMAL
SPECIMEN SOURCE: NORMAL
T PALLIDUM AB SER QL: NONREACTIVE

## 2019-10-16 ENCOUNTER — OFFICE VISIT (OUTPATIENT)
Dept: FAMILY MEDICINE | Facility: CLINIC | Age: 24
End: 2019-10-16
Payer: COMMERCIAL

## 2019-10-16 VITALS
DIASTOLIC BLOOD PRESSURE: 64 MMHG | HEIGHT: 61 IN | HEART RATE: 96 BPM | BODY MASS INDEX: 26.45 KG/M2 | WEIGHT: 140.1 LBS | TEMPERATURE: 98.3 F | SYSTOLIC BLOOD PRESSURE: 112 MMHG | OXYGEN SATURATION: 100 %

## 2019-10-16 DIAGNOSIS — B37.31 YEAST INFECTION OF THE VAGINA: ICD-10-CM

## 2019-10-16 DIAGNOSIS — N94.9 VAGINAL SYMPTOM: Primary | ICD-10-CM

## 2019-10-16 DIAGNOSIS — N76.0 BV (BACTERIAL VAGINOSIS): ICD-10-CM

## 2019-10-16 DIAGNOSIS — Z72.51 UNPROTECTED SEX: ICD-10-CM

## 2019-10-16 DIAGNOSIS — B96.89 BV (BACTERIAL VAGINOSIS): ICD-10-CM

## 2019-10-16 LAB
HCG UR QL: NEGATIVE
SPECIMEN SOURCE: ABNORMAL
WET PREP SPEC: ABNORMAL

## 2019-10-16 PROCEDURE — 87491 CHLMYD TRACH DNA AMP PROBE: CPT | Performed by: NURSE PRACTITIONER

## 2019-10-16 PROCEDURE — 87591 N.GONORRHOEAE DNA AMP PROB: CPT | Performed by: NURSE PRACTITIONER

## 2019-10-16 PROCEDURE — 81025 URINE PREGNANCY TEST: CPT | Performed by: NURSE PRACTITIONER

## 2019-10-16 PROCEDURE — 87210 SMEAR WET MOUNT SALINE/INK: CPT | Performed by: NURSE PRACTITIONER

## 2019-10-16 PROCEDURE — 99213 OFFICE O/P EST LOW 20 MIN: CPT | Performed by: NURSE PRACTITIONER

## 2019-10-16 RX ORDER — METRONIDAZOLE 500 MG/1
500 TABLET ORAL 3 TIMES DAILY
Qty: 21 TABLET | Refills: 0 | Status: SHIPPED | OUTPATIENT
Start: 2019-10-16 | End: 2020-01-08

## 2019-10-16 RX ORDER — FLUCONAZOLE 150 MG/1
150 TABLET ORAL ONCE
Qty: 1 TABLET | Refills: 0 | Status: SHIPPED | OUTPATIENT
Start: 2019-10-16 | End: 2020-01-08

## 2019-10-16 RX ORDER — METRONIDAZOLE 7.5 MG/G
1 GEL VAGINAL DAILY
Qty: 25 G | Refills: 0 | Status: SHIPPED | OUTPATIENT
Start: 2019-10-16 | End: 2019-10-16

## 2019-10-16 ASSESSMENT — MIFFLIN-ST. JEOR: SCORE: 1332.87

## 2019-10-16 NOTE — PATIENT INSTRUCTIONS
Patient Education     Bacterial Vaginosis    You have a vaginal infection called bacterial vaginosis (BV). Both good and bad bacteria are present in a healthy vagina. BV occurs when these bacteria get out of balance. The number of bad bacteria increase. And the number of good bacteria decrease. Although BV is associated with sexual activity, it is not a sexually transmitted disease.  BV may or may not cause symptoms. If symptoms do occur, they can include:    Thin, gray, milky-white, or sometimes green discharge    Unpleasant odor or  fishy  smell    Itching, burning, or pain in or around the vagina  It is not known what causes BV, but certain factors can make the problem more likely. This can include:    Douching    Having sex with a new partner    Having sex with more than one partner  BV will sometimes go away on its own. But treatment is usually recommended. This is because untreated BV can increase the risk of more serious health problems such as:    Pelvic inflammatory disease (PID)     delivery (giving birth to a baby early if you re pregnant)    HIV and certain other sexually transmitted diseases (STDs)    Infection after surgery on the reproductive organs  Home care  General care    BV is most often treated with medicines called antibiotics. These may be given as pills or as a vaginal cream. If antibiotics are prescribed, be sure to use them exactly as directed. Also, be sure to complete all of the medicine, even if your symptoms go away.    Don't douche or having sex during treatment.    If you have sex with a female partner, ask your healthcare provider if she should also be treated.  Prevention    Don't douche.    Don't have sex. If you do have sex, then take steps to lower your risk:  ? Use condoms when having sex.  ? Limit the number of sexual partners you have.  Follow-up care  Follow up with your healthcare provider, or as advised.  When to seek medical advice  Call your healthcare provider  right away if:    You have a fever of 100.4 F (38 C) or higher, or as directed by your provider.    Your symptoms worsen, or they don t go away within a few days of starting treatment.    You have new pain in the lower belly or pelvic region.    You have side effects that bother you or a reaction to the pills or cream you re prescribed.    You or any partners you have sex with have new symptoms, such as a rash, joint pain, or sores.  Date Last Reviewed: 10/1/2017    3983-7534 [employer]. [address] [place] [address]. All rights reserved. This information is not intended as a substitute for professional medical care. Always follow your healthcare professional's instructions.           Patient Education     Vaginal Infection: Yeast (Candidiasis)  Yeast infection occurs when yeast in the vagina increase and attacks the vaginal tissues. Yeast is a type of fungus. These infections are often caused by a type of yeast called Candida albicans. Other species of yeast can also cause infections. Factors that may make infection more likely include recent antibiotic use, douching, or increased sex. Yeast infections are more common in women who have diabetes, or are obese or pregnant, or have a weak immune system.  Symptoms of yeast infection    Clumpy or thin, white discharge, which may look like cottage cheese    No odor or minimal odor    Severe vaginal itching or burning    Burning with urination    Swelling, redness of vulva    Pain during sex  Treating yeast infection  Yeast infection is treated with a vaginal antifungal cream. In some cases, antifungal pills are prescribed instead. During treatment:    Finish all of your medicine, even if your symptoms go away.    Apply the cream before going to bed. Lie flat after applying so that it doesn't drip out.    Do not douche or use tampons.    Don't rely on a diaphragm or condoms, since the cream may weaken them.    Avoid intercourse if advised by  your healthcare provider.     Should I treat a yeast infection myself?  Discuss with your healthcare provider whether you should use over-the-counter medicines to treat a yeast infection. Self-treatment may depend on whether:    You've had a yeast infection in the past.    You're at risk for STDs.  Call your healthcare provider if symptoms do not go away or come back after treatment.   Date Last Reviewed: 3/1/2017    1662-3187 [employer]. [address] [place] [address], [place] [id number]. All rights reserved. This information is not intended as a substitute for professional medical care. Always follow your healthcare professional's instructions.

## 2019-10-16 NOTE — Clinical Note
I forgot to ask pt to schedule a health maintenance exam and come fasting to the appointment for blood work, can do the rest of her std testing at that appointment and a pelvic if her symptoms do not clear up--ideally in a couple weeks or so. Thanks!

## 2019-10-16 NOTE — PROGRESS NOTES
Subjective     Jaqueline Argueta is a 24 year old female who presents to clinic today for the following health issues:    HPI     Would also like to do a pregnancy test and pap as well today --clarified pap is not due and was normal last year, wants everything else checked   Unprotected intercourse with partner she does not know the history of   Adds this is her current partner she is still with     The patient is here today for STI screening.  Last STI screening:  In the beginning of September but never got results would like to retest anyways  History of STI:  no  Sexually active: yes   Known exposures:  Unsure  Symptoms of concern:  Itching, discharge that is white, thicker, no burning with urination           Patient Active Problem List   Diagnosis     Condyloma acuminata     Need for Tdap vaccination     Nausea/vomiting in pregnancy     Nausea and vomiting of pregnancy, antepartum     Rubella non-immune status, antepartum     Past Surgical History:   Procedure Laterality Date     NO HISTORY OF SURGERY         Social History     Tobacco Use     Smoking status: Never Smoker     Smokeless tobacco: Never Used     Tobacco comment: smoke marijuana once or twice a wk   Substance Use Topics     Alcohol use: No     Comment: rare     Family History   Problem Relation Age of Onset     Family History Negative No family hx of          No Known Allergies  Recent Labs   Lab Test 04/14/18  1057 04/13/18  1410  10/08/14  0147 12/31/11  1335   ALT  --   --   --  28 22   CR 0.39* 0.45*   < > 0.54 0.40   GFRESTIMATED >90 >90   < > >90  Non  GFR Calc   GFR not calculated, patient <16 years old.   GFRESTBLACK >90 >90   < > >90   GFR Calc   GFR not calculated, patient <16 years old.   POTASSIUM 3.4 3.4   < > 3.5 3.9    < > = values in this interval not displayed.          Reviewed and updated as needed this visit by Provider         Review of Systems   ROS COMP: Constitutional, HEENT, cardiovascular,  "pulmonary, GI, , musculoskeletal, neuro, skin, endocrine and psych systems are negative, except as otherwise noted.      Objective    /64   Pulse 96   Temp 98.3  F (36.8  C) (Oral)   Ht 1.549 m (5' 1\")   Wt 63.5 kg (140 lb 1.6 oz)   LMP 09/13/2019 (Exact Date)   SpO2 100%   BMI 26.47 kg/m    Body mass index is 26.47 kg/m .  Physical Exam   GENERAL: healthy, alert and no distress  EYES: Eyes grossly normal to inspection, PERRL and conjunctivae and sclerae normal  RESP: Respiratory rate regular and breathing easily   CV: No abnormal color and extremities warm    (female): self collected wet prep  NEURO: Normal strength and tone, mentation intact and speech normal  PSYCH: mentation appears normal, affect normal/bright    Diagnostic Test Results:  Labs reviewed in Epic  Results for orders placed or performed in visit on 10/16/19 (from the past 24 hour(s))   HCG Qual, Urine ([id number])   Result Value Ref Range    HCG Qual Urine Negative NEG^Negative   Wet prep   Result Value Ref Range    Specimen Description Vagina     Wet Prep Moderate  Clue cells seen   (A)     Wet Prep Moderate  WBC'S seen       Wet Prep Few  Yeast seen   (A)     Wet Prep No Trichomonas seen            Assessment & Plan       ICD-10-CM    1. Vaginal symptom N94.9 HCG Qual, Urine ([id number])     Wet prep   2. BV (bacterial vaginosis) N76.0 metroNIDAZOLE (FLAGYL) 500 MG tablet    B96.89 DISCONTINUED: metroNIDAZOLE (METROGEL) 0.75 % vaginal gel   3. Yeast infection of the vagina B37.3 fluconazole (DIFLUCAN) 150 MG tablet   4. Unprotected sex Z72.51 NEISSERIA GONORRHOEA PCR     CHLAMYDIA TRACHOMATIS PCR     HIV Antigen Antibody Combo     Treponema Abs w Reflex to RPR and Titer   pt did not offer up details regarding partner, recommended she have open communication with partner(s) and condoms if no info known.   Discussed treatment and prevention, noted after this visit pt also had recent trich. Will likely need reinforcement     Due for " "health maintenance exam can do pelvic at that time and rest of std testing, will have scheduling ask that she come fasting for blood work      BMI:   Estimated body mass index is 26.47 kg/m  as calculated from the following:    Height as of this encounter: 1.549 m (5' 1\").    Weight as of this encounter: 63.5 kg (140 lb 1.6 oz).         No follow-ups on file.    WILNER Balderrama [facility]      "

## 2019-10-17 ENCOUNTER — TELEPHONE (OUTPATIENT)
Dept: FAMILY MEDICINE | Facility: CLINIC | Age: 24
End: 2019-10-17

## 2019-10-17 NOTE — TELEPHONE ENCOUNTER
Called and left voice mail to call back.   Needs to schedule physical with fasting labs.    Per provider: I forgot to ask pt to schedule a health maintenance exam and come fasting to the appointment for blood work, can do the rest of her std testing at that appointment and a pelvic if her symptoms do not clear up--ideally in a couple weeks or so. Thanks!

## 2019-10-19 ENCOUNTER — APPOINTMENT (OUTPATIENT)
Dept: GENERAL RADIOLOGY | Facility: CLINIC | Age: 24
End: 2019-10-19
Attending: EMERGENCY MEDICINE
Payer: COMMERCIAL

## 2019-10-19 ENCOUNTER — HOSPITAL ENCOUNTER (EMERGENCY)
Facility: CLINIC | Age: 24
Discharge: HOME | End: 2019-10-19
Attending: EMERGENCY MEDICINE | Admitting: EMERGENCY MEDICINE
Payer: COMMERCIAL

## 2019-10-19 VITALS
WEIGHT: 140 LBS | BODY MASS INDEX: 26.43 KG/M2 | OXYGEN SATURATION: 100 % | SYSTOLIC BLOOD PRESSURE: 117 MMHG | DIASTOLIC BLOOD PRESSURE: 76 MMHG | HEIGHT: 61 IN | RESPIRATION RATE: 18 BRPM | TEMPERATURE: 97.4 F | HEART RATE: 86 BPM

## 2019-10-19 DIAGNOSIS — S61.210A LACERATION OF RIGHT INDEX FINGER WITHOUT FOREIGN BODY WITHOUT DAMAGE TO NAIL, INITIAL ENCOUNTER: ICD-10-CM

## 2019-10-19 PROCEDURE — 90471 IMMUNIZATION ADMIN: CPT | Performed by: EMERGENCY MEDICINE

## 2019-10-19 PROCEDURE — 99283 EMERGENCY DEPT VISIT LOW MDM: CPT | Mod: 25 | Performed by: EMERGENCY MEDICINE

## 2019-10-19 PROCEDURE — 73140 X-RAY EXAM OF FINGER(S): CPT | Mod: RT

## 2019-10-19 PROCEDURE — 90715 TDAP VACCINE 7 YRS/> IM: CPT | Performed by: EMERGENCY MEDICINE

## 2019-10-19 PROCEDURE — 25000128 H RX IP 250 OP 636: Performed by: EMERGENCY MEDICINE

## 2019-10-19 PROCEDURE — 12001 RPR S/N/AX/GEN/TRNK 2.5CM/<: CPT | Mod: Z6 | Performed by: EMERGENCY MEDICINE

## 2019-10-19 PROCEDURE — 25000128 H RX IP 250 OP 636

## 2019-10-19 PROCEDURE — 12001 RPR S/N/AX/GEN/TRNK 2.5CM/<: CPT | Performed by: EMERGENCY MEDICINE

## 2019-10-19 RX ORDER — BUPIVACAINE HYDROCHLORIDE 5 MG/ML
INJECTION, SOLUTION EPIDURAL; INTRACAUDAL
Status: COMPLETED
Start: 2019-10-19 | End: 2019-10-19

## 2019-10-19 RX ADMIN — CLOSTRIDIUM TETANI TOXOID ANTIGEN (FORMALDEHYDE INACTIVATED), CORYNEBACTERIUM DIPHTHERIAE TOXOID ANTIGEN (FORMALDEHYDE INACTIVATED), BORDETELLA PERTUSSIS TOXOID ANTIGEN (GLUTARALDEHYDE INACTIVATED), BORDETELLA PERTUSSIS FILAMENTOUS HEMAGGLUTININ ANTIGEN (FORMALDEHYDE INACTIVATED), BORDETELLA PERTUSSIS PERTACTIN ANTIGEN, AND BORDETELLA PERTUSSIS FIMBRIAE 2/3 ANTIGEN 0.5 ML: 5; 2; 2.5; 5; 3; 5 INJECTION, SUSPENSION INTRAMUSCULAR at 21:23

## 2019-10-19 RX ADMIN — BUPIVACAINE HYDROCHLORIDE: 5 INJECTION, SOLUTION EPIDURAL; INTRACAUDAL; PERINEURAL at 21:33

## 2019-10-19 ASSESSMENT — ENCOUNTER SYMPTOMS: WOUND: 1

## 2019-10-19 ASSESSMENT — MIFFLIN-ST. JEOR: SCORE: 1332.42

## 2019-10-19 NOTE — ED AVS SNAPSHOT
[facility], Emergency Department  [address]  [facility] [address]  Phone:  [phone]  Fax:  [phone]                                    Jaqueline Argueta   MRN: [id number]    Department:  [facility], Emergency Department   Date of Visit:  10/19/2019           After Visit Summary Signature Page    I have received my discharge instructions, and my questions have been answered. I have discussed any challenges I see with this plan with the nurse or doctor.    ..........................................................................................................................................  Patient/Patient Representative Signature      ..........................................................................................................................................  Patient Representative Print Name and Relationship to Patient    ..................................................               ................................................  Date                                   Time    ..........................................................................................................................................  Reviewed by Signature/Title    ...................................................              ..............................................  Date                                               Time          22EPIC Rev 08/18

## 2019-10-20 NOTE — ED PROVIDER NOTES
[facility] EMERGENCY DEPARTMENT ([facility])    10/19/19        History     Chief Complaint   Patient presents with     Laceration     Right fourth digit caught in car door causing approximately 2 cm, controlled bleeding     The history is provided by the patient and medical records.     Jaqueline Argueta is a 24 year old female with no significant past medical history who presents here to the Emergency Department due to a laceration to her right index finger. Patient reports that she was rushing and accidentally slammed her right index finger in her right index finger. This subsequently caused a laceration to the anterior aspect of the right pointer finger. Patient wrapped the finger, applied Vasolin and stopped the bleeding within 5 minutes. Patient drove herself here. Patient is right hand dominant. According to [facility] records patients last tetanus shot was in 2009.    I have reviewed the Medications, Allergies, Past Medical and Surgical History, and Social History in the [employer] system.    Past Medical History:   Diagnosis Date     Anemia due to blood loss, acute 12/27/2011       Past Surgical History:   Procedure Laterality Date     NO HISTORY OF SURGERY         Family History   Problem Relation Age of Onset     Family History Negative No family hx of        Social History     Tobacco Use     Smoking status: Never Smoker     Smokeless tobacco: Never Used     Tobacco comment: smoke marijuana once or twice a wk   Substance Use Topics     Alcohol use: No     Comment: rare       No current facility-administered medications for this encounter.      Current Outpatient Medications   Medication     metroNIDAZOLE (FLAGYL) 500 MG tablet     medroxyPROGESTERone (DEPO-PROVERA) 150 MG/ML injection      No Known Allergies      Review of Systems   Skin: Positive for wound (Laceration; right anterior index finger).   All other systems reviewed and are negative.      Physical Exam   BP: 117/76  Pulse: 86  Temp: 97.4  F (36.3  " C)  Resp: 18  Height: 154.9 cm (5' 1\")  Weight: 63.5 kg (140 lb)  SpO2: 100 %      Physical Exam  Vitals signs and nursing note reviewed.   Constitutional:       General: She is not in acute distress.     Appearance: She is well-developed. She is not ill-appearing or toxic-appearing.   HENT:      Head: Normocephalic and atraumatic.   Eyes:      General: No scleral icterus.     Pupils: Pupils are equal, round, and reactive to light.   Neck:      Musculoskeletal: Normal range of motion and neck supple.   Cardiovascular:      Rate and Rhythm: Normal rate.   Pulmonary:      Effort: Pulmonary effort is normal. No respiratory distress.   Musculoskeletal:      Right hand: She exhibits tenderness, laceration and swelling. She exhibits normal range of motion, normal two-point discrimination, normal capillary refill and no deformity. Normal sensation noted. Normal strength noted.        Hands:    Skin:     General: Skin is warm and dry.      Coloration: Skin is not pale.      Findings: No rash.   Neurological:      Mental Status: She is alert and oriented to person, place, and time.      Sensory: No sensory deficit.   Psychiatric:         Behavior: Behavior normal.         ED Course         Procedures     [facility] Procedure Note        Laceration Repair:    Performed by: Med student under direct supervision by Armando Vu MD  Authorized by: Armando Vu MD      Preparation: Patient was prepped and draped in usual sterile fashion.  Irrigation solution: saline    Body area: Right index finger  Laceration length: 2cm  Contamination: The wound is not contaminated.  Foreign bodies:none  Tendon involvement: none  Anesthesia: Digital block  Local anesthetic: Bupivacaine 0.5%  Anesthetic total: 2ml    Debridement: none  Skin closure: Closed with 3 x 4.0 Ethilon  Technique: interrupted  Approximation: close  Approximation difficulty: simple    Patient tolerance: Patient tolerated the procedure well " with no immediate complications.        Results for orders placed or performed during the hospital encounter of 10/19/19   XR Finger Right G/E 2 Views    Narrative    XR FINGER RT G/E 2 VW   10/19/2019 7:59 PM     HISTORY:  trauma      Impression    IMPRESSION:  Negative exam.    JANNIE BERG MD            Assessments & Plan (with Medical Decision Making)     This patient presented to the emergency department after slamming her right index finger in a car door.  She was neurovascularly intact distally and x-ray demonstrated no evidence of underlying fracture.  No clinical evidence to suggest tendon injury.  Wound was repaired under my direct supervision by the medical student as per the above procedure note.  Tetanus was updated and patient was discharged in good condition with instructions for care and follow-up.    I have reviewed the nursing notes.    I have reviewed the findings, diagnosis, plan and need for follow up with the patient.    Discharge Medication List as of 10/19/2019  9:28 PM          Final diagnoses:   Laceration of right index finger without foreign body without damage to nail, initial encounter     IJack, am serving as a trained medical scribe to document services personally performed by Armando Vu MD, based on the provider's statements to me.   IArmando MD, was physically present and have reviewed and verified the accuracy of this note documented by Jack Llamas.    10/19/2019   [facility], [facility], EMERGENCY DEPARTMENT     Armando Vu MD  10/20/19 0044

## 2019-10-20 NOTE — DISCHARGE INSTRUCTIONS
Please make an appointment to follow up with Your Primary Care Provider for suture removal in 7-10 days.   Remove dressing in 36 hours and then cleanse twice per day with peroxide on a Q-tip and apply antibiotic ointment and dressing.   Return to the emergency department for fevers, redness, swelling, red streaks, pus from wound, or any other problems.

## 2019-10-29 ENCOUNTER — OFFICE VISIT (OUTPATIENT)
Dept: FAMILY MEDICINE | Facility: CLINIC | Age: 24
End: 2019-10-29
Payer: COMMERCIAL

## 2019-10-29 VITALS
TEMPERATURE: 98.3 F | DIASTOLIC BLOOD PRESSURE: 73 MMHG | SYSTOLIC BLOOD PRESSURE: 117 MMHG | BODY MASS INDEX: 25.75 KG/M2 | WEIGHT: 136.3 LBS | HEART RATE: 104 BPM

## 2019-10-29 DIAGNOSIS — B96.89 BV (BACTERIAL VAGINOSIS): ICD-10-CM

## 2019-10-29 DIAGNOSIS — Z48.02 VISIT FOR SUTURE REMOVAL: ICD-10-CM

## 2019-10-29 DIAGNOSIS — B37.31 YEAST INFECTION OF THE VAGINA: ICD-10-CM

## 2019-10-29 DIAGNOSIS — N89.8 VAGINAL DISCHARGE: Primary | ICD-10-CM

## 2019-10-29 DIAGNOSIS — N76.0 BV (BACTERIAL VAGINOSIS): ICD-10-CM

## 2019-10-29 DIAGNOSIS — Z72.51 UNPROTECTED SEX: ICD-10-CM

## 2019-10-29 LAB
SPECIMEN SOURCE: ABNORMAL
WET PREP SPEC: ABNORMAL

## 2019-10-29 PROCEDURE — 99214 OFFICE O/P EST MOD 30 MIN: CPT | Performed by: NURSE PRACTITIONER

## 2019-10-29 PROCEDURE — 86780 TREPONEMA PALLIDUM: CPT | Performed by: NURSE PRACTITIONER

## 2019-10-29 PROCEDURE — 87210 SMEAR WET MOUNT SALINE/INK: CPT | Performed by: NURSE PRACTITIONER

## 2019-10-29 PROCEDURE — 36415 COLL VENOUS BLD VENIPUNCTURE: CPT | Performed by: NURSE PRACTITIONER

## 2019-10-29 PROCEDURE — 87389 HIV-1 AG W/HIV-1&-2 AB AG IA: CPT | Performed by: NURSE PRACTITIONER

## 2019-10-29 RX ORDER — CLINDAMYCIN HCL 300 MG
300 CAPSULE ORAL 3 TIMES DAILY
Qty: 14 CAPSULE | Refills: 0 | Status: SHIPPED | OUTPATIENT
Start: 2019-10-29 | End: 2020-01-08

## 2019-10-29 RX ORDER — FLUCONAZOLE 150 MG/1
150 TABLET ORAL ONCE
Qty: 1 TABLET | Refills: 1 | Status: SHIPPED | OUTPATIENT
Start: 2019-10-29 | End: 2020-01-08

## 2019-10-29 NOTE — PROGRESS NOTES
Subjective     Jaqueline Argueta is a 24 year old female who presents to clinic today for the following health issues:    HPI   ED/UC Followup:    Facility:  [facility]  Date of visit: 10/19/19  Reason for visit: laceration of right index finger   Current Status: needs removal of sutures    Fingertip continues to be swollen and a little numb, hasn't tried Ibuprofen or ice     Vaginal Symptoms      Duration: few weeks     Description  vaginal discharge - white clear odorous    Intensity:  moderate    Accompanying signs and symptoms (fever/dysuria/abdominal or back pain): None    History  Sexually active: yes, had new partner unprotected, no new since she saw me 2 weeks ago   Possibility of pregnancy: No  Recent antibiotic use: YES was treated with flagyl and diflucan     Precipitating or alleviating factors: above     Therapies tried and outcome: Diflucan and Flagyl (metronidazole)   Outcome: unchanged       Patient Active Problem List   Diagnosis     Condyloma acuminata     Need for Tdap vaccination     Nausea/vomiting in pregnancy     Nausea and vomiting of pregnancy, antepartum     Rubella non-immune status, antepartum     Past Surgical History:   Procedure Laterality Date     NO HISTORY OF SURGERY         Social History     Tobacco Use     Smoking status: Never Smoker     Smokeless tobacco: Never Used     Tobacco comment: smoke marijuana once or twice a wk   Substance Use Topics     Alcohol use: No     Comment: rare     Family History   Problem Relation Age of Onset     Family History Negative No family hx of          Current Outpatient Medications   Medication Sig Dispense Refill     medroxyPROGESTERone (DEPO-PROVERA) 150 MG/ML injection Inject 1 mL (150 mg) into the muscle every 3 months (Patient not taking: Reported on 10/29/2019) 1 mL 1     No Known Allergies    Reviewed and updated as needed this visit by Provider         Review of Systems   ROS COMP: Constitutional, HEENT, cardiovascular, pulmonary, GI,  , musculoskeletal, neuro, skin, endocrine and psych systems are negative, except as otherwise noted.      Objective    /73 (BP Location: Left arm, Patient Position: Sitting, Cuff Size: Adult Regular)   Pulse 104   Temp 98.3  F (36.8  C) (Oral)   Wt 61.8 kg (136 lb 4.8 oz)   BMI 25.75 kg/m    Body mass index is 25.75 kg/m .  Physical Exam   GENERAL: healthy, alert and no distress  EYES: Eyes grossly normal to inspection, PERRL and conjunctivae and sclerae normal  HENT: ear canals and TM's normal, nose and mouth without ulcers or lesions  NECK: no adenopathy, no asymmetry, masses, or scars and thyroid normal to palpation  RESP: lungs clear to auscultation - no rales, rhonchi or wheezes  CV: regular rate and rhythm, normal S1 S2, no S3 or S4, no murmur, click or rub, no peripheral edema and peripheral pulses strong  ABDOMEN: soft, nontender, no hepatosplenomegaly, no masses and bowel sounds normal   (female): self collected wet prep   MS: no gross musculoskeletal defects noted, no edema  SKIN: right index finger with 3 sutures clean and removed compltely, tolerated well slight swelling and covered with bacitracin and bandaid   NEURO: normal movement, pt reports decreased sensation at fingertip with light touch    Diagnostic Test Results:  Labs reviewed in Epic      Results for orders placed or performed during the hospital encounter of 10/19/19   XR Finger Right G/E 2 Views    Narrative    XR FINGER RT G/E 2 VW   10/19/2019 7:59 PM     HISTORY:  trauma      Impression    IMPRESSION:  Negative exam.    JANNIE BERG MD        Assessment & Plan       ICD-10-CM    1. Vaginal discharge N89.8 Wet prep   2. Unprotected sex Z72.51 HIV Antigen Antibody Combo     Treponema Abs w Reflex to RPR and Titer   3. Visit for suture removal Z48.02    4. BV (bacterial vaginosis) N76.0 clindamycin (CLEOCIN) 300 MG capsule    B96.89    5. Yeast infection of the vagina B37.3 fluconazole (DIFLUCAN) 150 MG tablet   BV and  "yeast again on wet prep, will treat with other oral agent, can do 7 day otc yeast treatment and repeat diflucan at end of clinda course  Daily yogurt and good self care, recommended she quit smoking that can also contribute to infections/delayed healing   Went over results and answered pt's many questions    Monitor finger and see patient instructions we reviewed      BMI:   Estimated body mass index is 25.75 kg/m  as calculated from the following:    Height as of 10/19/19: 1.549 m (5' 1\").    Weight as of this encounter: 61.8 kg (136 lb 4.8 oz).     Patient Instructions       Patient Education     Stitches or Staple Removal  You were seen today for removal of your stitches or staples. Your wound is healing as expected. The wound has healed well enough that the stitches or staples can be removed. The wound will continue to heal for the next few months.  At this time there is no sign of infection.   Home care    If you have pain, take pain medicine as advised by your healthcare provider.     Keep your wound clean and protected by covering it with a bandage for the next week or so.     Wash your hands with soap and warm water before and after caring for your wound. This helps prevent infection.    Clean the wound gently with soap and warm water daily or as directed by your healthcare provider. Don't use iodine, alcohol, or other cleansers on the wound.  Gently pat it dry. Put on a new bandage, if needed. Don't reuse bandages.    If the area gets wet, gently pat it dry with a clean cloth. Replace the wet bandage with a dry one.    Check the wound daily for signs of infection. (These are listed under \"When to seek medical advice\" below.)    You may shower and bathe as usual. Swimming may be allowed, but check with your healthcare provider first.    Keep the wound out of prolonged direct sunlight. The new skin is very sensitive and can easily sunburn causing worse scarring.    Ask your provider about using " over-the-counter scar removing creams if your wound is highly visible.  Follow-up care  Follow up with your healthcare provider as advised.  When to seek medical advice   Call your healthcare provider if any of the following occur:    Wound reopens or bleeds    Signs of an infection, such as:  ? Increasing redness or swelling around the wound  ? Increased warmth from the wound  ? Worsening pain  ? Red streaking lines away from the wound  ? Fluid draining from the wound    Fever of 100.4 F (38 C) or higher, or as directed by your healthcare provider  Date Last Reviewed: 4/1/2018 2000-2018 [employer]. [address] [place] [address], [place] [id number]. All rights reserved. This information is not intended as a substitute for professional medical care. Always follow your healthcare professional's instructions.           Patient Education     Preventing Vaginitis     Use mild, unscented soap when you bathe or shower to avoid irritating your vagina.    Vaginitis is irritation or infection of the vagina or vulva (the outside opening of the vagina). Vaginitis can be caused by bacteria, viruses, parasites, or yeast. Chemicals (such as in perfumes or soaps or in spermicides) can sometimes be a cause. Vaginitis can be caused by hormone changes in pregnancy or with menopause. You can help prevent vaginitis. Follow the tips below. And see your healthcare provider if you have any symptoms.  Hygiene    Avoid chemicals. Do not use vaginal sprays. Do not use scented toilet paper or tampons that are scented. Sprays and scents have chemicals that can irritate your vagina.    Do not douche unless you are told to by your healthcare provider. Douching is rarely needed. And it upsets the normal balance in the vagina.    Wash yourself well. Wash the outer vaginal area (vulva) every day with mild, unscented soap. Keep it as dry as possible.    Wipe correctly. Make sure to wipe from front to back after a bowel movement. This helps  keep from spreading bacteria from your anus to your vagina.    Change your tampon often. During your period, make sure to change your tampon as often as directed on the package. This allows the normal flow of vaginal discharge and blood.  Lifestyle    Limit your number of sexual partners. The more partners you have, the greater your risk of infection. Using condoms helps reduce your risk.    Get enough sleep. Sleep helps keep your body s immune system healthy. This helps you fight infection.    Lose weight, if needed. Excess weight can reduce air circulation around your vagina. This can increase your risk of infection.    Exercise regularly. Regular activity helps keep your body healthy.    Take antibiotics only as directed. Antibiotics can change the normal chemical balance in the vagina.    Clothing    Don t sit in wet clothes. Yeast thrives when it s warm and damp.    Don t wear tight pants. And don t wear tights, leggings, or hose without a cotton crotch. These types of clothing trap warmth and moisture.    Wear cotton underwear. Cotton lets air circulate around the vagina.  Symptoms of vaginitis    Irritation, swelling, or itching of the genital area    Vaginal discharge    Bad vaginal odor    Pain or burning during urination   Date Last Reviewed: 12/1/2016 2000-2018 [employer]. [address] [place] [address], [place] [address]. All rights reserved. This information is not intended as a substitute for professional medical care. Always follow your healthcare professional's instructions.            Return for next annual exam or as needed.     I spent 30 min in direct face to face time with this pt, greater than 50% in counseling and coordination of care of above diagnoses      WILNER Balderrama [facility]

## 2019-10-29 NOTE — LETTER
October 31, 2019      Jaqueline Argueta  [address]  [facility] [address]        Dear ,    We are writing to inform you of your test results.    Your test results fall within the expected range(s) and are normal.  Please continue with current treatment plan.    Resulted Orders   HIV Antigen Antibody Combo   Result Value Ref Range    HIV Antigen Antibody Combo Nonreactive NR^Nonreactive          Comment:      HIV-1 p24 Ag & HIV-1/HIV-2 Ab Not Detected   Treponema Abs w Reflex to RPR and Titer   Result Value Ref Range    Treponema Antibodies Nonreactive NR^Nonreactive   Wet prep   Result Value Ref Range    Specimen Description Vagina     Wet Prep No Trichomonas seen     Wet Prep Few  Clue cells seen   (A)     Wet Prep Many  Yeast seen   (A)     Wet Prep Moderate  WBC'S seen          If you have any questions or concerns, please call the clinic at the number listed above.       Sincerely,        WILNER Balderrama CNP

## 2019-10-29 NOTE — PATIENT INSTRUCTIONS
"  Patient Education     Stitches or Staple Removal  You were seen today for removal of your stitches or staples. Your wound is healing as expected. The wound has healed well enough that the stitches or staples can be removed. The wound will continue to heal for the next few months.  At this time there is no sign of infection.   Home care    If you have pain, take pain medicine as advised by your healthcare provider.     Keep your wound clean and protected by covering it with a bandage for the next week or so.     Wash your hands with soap and warm water before and after caring for your wound. This helps prevent infection.    Clean the wound gently with soap and warm water daily or as directed by your healthcare provider. Don't use iodine, alcohol, or other cleansers on the wound.  Gently pat it dry. Put on a new bandage, if needed. Don't reuse bandages.    If the area gets wet, gently pat it dry with a clean cloth. Replace the wet bandage with a dry one.    Check the wound daily for signs of infection. (These are listed under \"When to seek medical advice\" below.)    You may shower and bathe as usual. Swimming may be allowed, but check with your healthcare provider first.    Keep the wound out of prolonged direct sunlight. The new skin is very sensitive and can easily sunburn causing worse scarring.    Ask your provider about using over-the-counter scar removing creams if your wound is highly visible.  Follow-up care  Follow up with your healthcare provider as advised.  When to seek medical advice   Call your healthcare provider if any of the following occur:    Wound reopens or bleeds    Signs of an infection, such as:  ? Increasing redness or swelling around the wound  ? Increased warmth from the wound  ? Worsening pain  ? Red streaking lines away from the wound  ? Fluid draining from the wound    Fever of 100.4 F (38 C) or higher, or as directed by your healthcare provider  Date Last Reviewed: 4/1/2018    " [id number] [employer]. [address] [place] [address], [place] [address]. All rights reserved. This information is not intended as a substitute for professional medical care. Always follow your healthcare professional's instructions.           Patient Education     Preventing Vaginitis     Use mild, unscented soap when you bathe or shower to avoid irritating your vagina.    Vaginitis is irritation or infection of the vagina or vulva (the outside opening of the vagina). Vaginitis can be caused by bacteria, viruses, parasites, or yeast. Chemicals (such as in perfumes or soaps or in spermicides) can sometimes be a cause. Vaginitis can be caused by hormone changes in pregnancy or with menopause. You can help prevent vaginitis. Follow the tips below. And see your healthcare provider if you have any symptoms.  Hygiene    Avoid chemicals. Do not use vaginal sprays. Do not use scented toilet paper or tampons that are scented. Sprays and scents have chemicals that can irritate your vagina.    Do not douche unless you are told to by your healthcare provider. Douching is rarely needed. And it upsets the normal balance in the vagina.    Wash yourself well. Wash the outer vaginal area (vulva) every day with mild, unscented soap. Keep it as dry as possible.    Wipe correctly. Make sure to wipe from front to back after a bowel movement. This helps keep from spreading bacteria from your anus to your vagina.    Change your tampon often. During your period, make sure to change your tampon as often as directed on the package. This allows the normal flow of vaginal discharge and blood.  Lifestyle    Limit your number of sexual partners. The more partners you have, the greater your risk of infection. Using condoms helps reduce your risk.    Get enough sleep. Sleep helps keep your body s immune system healthy. This helps you fight infection.    Lose weight, if needed. Excess weight can reduce air circulation around your vagina.  This can increase your risk of infection.    Exercise regularly. Regular activity helps keep your body healthy.    Take antibiotics only as directed. Antibiotics can change the normal chemical balance in the vagina.    Clothing    Don t sit in wet clothes. Yeast thrives when it s warm and damp.    Don t wear tight pants. And don t wear tights, leggings, or hose without a cotton crotch. These types of clothing trap warmth and moisture.    Wear cotton underwear. Cotton lets air circulate around the vagina.  Symptoms of vaginitis    Irritation, swelling, or itching of the genital area    Vaginal discharge    Bad vaginal odor    Pain or burning during urination   Date Last Reviewed: 12/1/2016 2000-2018 [employer]. [address] [place] [address], [place] [address]. All rights reserved. This information is not intended as a substitute for professional medical care. Always follow your healthcare professional's instructions.

## 2019-10-30 LAB
HIV 1+2 AB+HIV1 P24 AG SERPL QL IA: NONREACTIVE
T PALLIDUM AB SER QL: NONREACTIVE

## 2020-01-08 ENCOUNTER — OFFICE VISIT (OUTPATIENT)
Dept: MIDWIFE SERVICES | Facility: CLINIC | Age: 25
End: 2020-01-08
Payer: COMMERCIAL

## 2020-01-08 VITALS
WEIGHT: 129.7 LBS | TEMPERATURE: 98.4 F | BODY MASS INDEX: 24.49 KG/M2 | HEART RATE: 95 BPM | OXYGEN SATURATION: 100 % | DIASTOLIC BLOOD PRESSURE: 71 MMHG | HEIGHT: 61 IN | SYSTOLIC BLOOD PRESSURE: 111 MMHG

## 2020-01-08 DIAGNOSIS — N89.8 VAGINAL DISCHARGE: ICD-10-CM

## 2020-01-08 DIAGNOSIS — Z30.013 ENCOUNTER FOR INITIAL PRESCRIPTION OF INJECTABLE CONTRACEPTIVE: ICD-10-CM

## 2020-01-08 DIAGNOSIS — Z11.3 SCREEN FOR STD (SEXUALLY TRANSMITTED DISEASE): Primary | ICD-10-CM

## 2020-01-08 LAB
HCG UR QL: NEGATIVE
SPECIMEN SOURCE: NORMAL
WET PREP SPEC: NORMAL

## 2020-01-08 PROCEDURE — 87591 N.GONORRHOEAE DNA AMP PROB: CPT | Performed by: ADVANCED PRACTICE MIDWIFE

## 2020-01-08 PROCEDURE — 87491 CHLMYD TRACH DNA AMP PROBE: CPT | Performed by: ADVANCED PRACTICE MIDWIFE

## 2020-01-08 PROCEDURE — 87210 SMEAR WET MOUNT SALINE/INK: CPT | Performed by: ADVANCED PRACTICE MIDWIFE

## 2020-01-08 PROCEDURE — 99214 OFFICE O/P EST MOD 30 MIN: CPT | Mod: 25 | Performed by: ADVANCED PRACTICE MIDWIFE

## 2020-01-08 PROCEDURE — 96372 THER/PROPH/DIAG INJ SC/IM: CPT | Performed by: ADVANCED PRACTICE MIDWIFE

## 2020-01-08 PROCEDURE — 81025 URINE PREGNANCY TEST: CPT | Performed by: ADVANCED PRACTICE MIDWIFE

## 2020-01-08 RX ORDER — MEDROXYPROGESTERONE ACETATE 150 MG/ML
150 INJECTION, SUSPENSION INTRAMUSCULAR
Status: DISCONTINUED | OUTPATIENT
Start: 2020-01-08 | End: 2020-03-11

## 2020-01-08 RX ADMIN — MEDROXYPROGESTERONE ACETATE 150 MG: 150 INJECTION, SUSPENSION INTRAMUSCULAR at 15:36

## 2020-01-08 ASSESSMENT — MIFFLIN-ST. JEOR: SCORE: 1285.7

## 2020-01-08 NOTE — PROGRESS NOTES
"SUBJECTIVE:  Jaqueline Argueta is an 24 year old woman who presents with vaginitis. Symptoms   include local irritation. Onset of symptoms 1   week ago, unchanged since.  She would like STI testing and would like to restart Depo.  She has taken it in the past without any problems.      Predisposing factors: None  Hx of previous vaginitis: rare  Sexually active: yes, single partner, contraception - condoms    Current Outpatient Medications   Medication     medroxyPROGESTERone (DEPO-PROVERA) 150 MG/ML injection     Current Facility-Administered Medications   Medication     medroxyPROGESTERone (DEPO-PROVERA) injection 150 mg     No Known Allergies    Social History     Tobacco Use     Smoking status: Never Smoker     Smokeless tobacco: Never Used     Tobacco comment: smoke marijuana once or twice a wk   Substance Use Topics     Alcohol use: No     Comment: rare       OBJECTIVE:  /71 (BP Location: Left arm, Patient Position: Sitting, Cuff Size: Adult Regular)   Pulse 95   Temp 98.4  F (36.9  C) (Oral)   Ht 1.549 m (5' 1\")   Wt 58.8 kg (129 lb 11.2 oz)   LMP  (LMP Unknown)   SpO2 100%   Breastfeeding No   BMI 24.51 kg/m    Pelvic: External genitalia and vagina normal. Bimanual normal.  GC/CT and wet prep collected   Wet prep negative  UPT negative     ASSESSMENT:  Vaginal discharge - normal   STI screen  Initial depo     PLAN:  1)  Reviewed results   2) reviewed STI prevention.  Test pending   3) Depo reviewed and given - use back up birth control for 1 week   4)  Recheck if symptoms persist, worsen, or new symptoms develop.    PE: Discussed vaginitis, modes of transmission, and rationale for   Treatment.      25  minutes was spent face to face with the patient today discussing her history, diagnosis, and follow-up plan as noted above. Over 50% of the visit was spent in counseling and coordination of care.    Total Visit Time: 25 minutes.       "

## 2020-01-08 NOTE — NURSING NOTE
"Chief Complaint   Patient presents with     STD       Initial /71 (BP Location: Left arm, Patient Position: Sitting, Cuff Size: Adult Regular)   Pulse 95   Temp 98.4  F (36.9  C) (Oral)   Ht 1.549 m (5' 1\")   Wt 58.8 kg (129 lb 11.2 oz)   LMP  (LMP Unknown)   SpO2 100%   Breastfeeding No   BMI 24.51 kg/m   Estimated body mass index is 24.51 kg/m  as calculated from the following:    Height as of this encounter: 1.549 m (5' 1\").    Weight as of this encounter: 58.8 kg (129 lb 11.2 oz).  BP completed using cuff size: regular    Questioned patient about current smoking habits.  Pt. has never smoked.          The following HM Due: NONE      The following patient reported/Care Every where data was sent to:  P ABSTRACT QUALITY INITIATIVES [[id number]]        N/a      Kathy Delgado MA     Clinic Administered Medication Documentation    MEDICATION LIST:   Depo Provera Documentation    Prior to injection, verified patient identity using patient's name and date of birth. Medication was administered. Please see MAR and medication order for additional information. Patient instructed to report any adverse reaction to staff immediately .    BP: 111/71    LAST PAP/EXAM:   Lab Results   Component Value Date    PAP NIL 10/02/2018     URINE HCG:negative    NEXT INJECTION DUE: 3/25/20 - 20    Was entire vial of medication used? Yes  Vial/Syringe: Single dose vial  Expiration Date:  2020           "

## 2020-02-19 ENCOUNTER — OFFICE VISIT (OUTPATIENT)
Dept: MIDWIFE SERVICES | Facility: CLINIC | Age: 25
End: 2020-02-19
Payer: COMMERCIAL

## 2020-02-19 VITALS
TEMPERATURE: 97.3 F | HEART RATE: 85 BPM | BODY MASS INDEX: 25.32 KG/M2 | WEIGHT: 134 LBS | SYSTOLIC BLOOD PRESSURE: 112 MMHG | DIASTOLIC BLOOD PRESSURE: 71 MMHG

## 2020-02-19 DIAGNOSIS — R21 RASH OF GENITAL AREA: Primary | ICD-10-CM

## 2020-02-19 DIAGNOSIS — N89.8 VAGINAL ITCHING: ICD-10-CM

## 2020-02-19 DIAGNOSIS — N76.0 BV (BACTERIAL VAGINOSIS): ICD-10-CM

## 2020-02-19 DIAGNOSIS — A59.01 TRICHOMONAL VAGINITIS: ICD-10-CM

## 2020-02-19 DIAGNOSIS — Z11.3 SCREEN FOR STD (SEXUALLY TRANSMITTED DISEASE): ICD-10-CM

## 2020-02-19 DIAGNOSIS — B96.89 BV (BACTERIAL VAGINOSIS): ICD-10-CM

## 2020-02-19 LAB
SPECIMEN SOURCE: ABNORMAL
WET PREP SPEC: ABNORMAL

## 2020-02-19 PROCEDURE — 36415 COLL VENOUS BLD VENIPUNCTURE: CPT | Performed by: ADVANCED PRACTICE MIDWIFE

## 2020-02-19 PROCEDURE — 86780 TREPONEMA PALLIDUM: CPT | Performed by: ADVANCED PRACTICE MIDWIFE

## 2020-02-19 PROCEDURE — 86803 HEPATITIS C AB TEST: CPT | Performed by: ADVANCED PRACTICE MIDWIFE

## 2020-02-19 PROCEDURE — 87389 HIV-1 AG W/HIV-1&-2 AB AG IA: CPT | Performed by: ADVANCED PRACTICE MIDWIFE

## 2020-02-19 PROCEDURE — 87491 CHLMYD TRACH DNA AMP PROBE: CPT | Performed by: ADVANCED PRACTICE MIDWIFE

## 2020-02-19 PROCEDURE — 87210 SMEAR WET MOUNT SALINE/INK: CPT | Performed by: ADVANCED PRACTICE MIDWIFE

## 2020-02-19 PROCEDURE — 87591 N.GONORRHOEAE DNA AMP PROB: CPT | Performed by: ADVANCED PRACTICE MIDWIFE

## 2020-02-19 PROCEDURE — 99213 OFFICE O/P EST LOW 20 MIN: CPT | Performed by: ADVANCED PRACTICE MIDWIFE

## 2020-02-19 RX ORDER — METRONIDAZOLE 500 MG/1
500 TABLET ORAL 2 TIMES DAILY
Qty: 14 TABLET | Refills: 0 | Status: SHIPPED | OUTPATIENT
Start: 2020-02-19 | End: 2020-03-11

## 2020-02-19 RX ORDER — TRIAMCINOLONE ACETONIDE 1 MG/G
CREAM TOPICAL 2 TIMES DAILY
Qty: 80 G | Refills: 0 | Status: SHIPPED | OUTPATIENT
Start: 2020-02-19 | End: 2020-07-07

## 2020-02-19 NOTE — PROGRESS NOTES
SUBJECTIVE: 24 year old female presents with complaints of vaginal itching and discharge. She reports this has been going on for a few weeks now. She has increased discharge and itching on both the inside and the outside of the vagina. She was seen 1/8/2020 with similar symptoms but all tests were negative. Feels like it has only been getting worse since. She denies odor or rash. She started on the depo shot 1/8 which has been going well. Pregnancy test was negative then and reports there is no chance of pregnancy. She denies any STI exposures. She denies urinary symptoms. She has no other questions or concerns today.     Current Outpatient Medications   Medication     medroxyPROGESTERone (DEPO-PROVERA) 150 MG/ML injection     Current Facility-Administered Medications   Medication     medroxyPROGESTERone (DEPO-PROVERA) injection 150 mg     No Known Allergies  Social History     Tobacco Use     Smoking status: Never Smoker     Smokeless tobacco: Never Used     Tobacco comment: smoke marijuana once or twice a wk   Substance Use Topics     Alcohol use: No     Comment: rare         REVIEW OF SYSTEMS: Constitutional, HEENT, cardiovascular, pulmonary, gi and gu systems are negative, except as otherwise noted.  General medical, surgical, OB/Gyn and social histories   reviewed and updated in Histories section of [facility].     OBJECTIVE: /71   Pulse 85   Temp 97.3  F (36.3  C) (Oral)   Wt 60.8 kg (134 lb)   BMI 25.32 kg/m    Patient appears well.  Abdomen normal, soft without tenderness, guarding, mass or organomegaly.   No inguinal adenopathy or CVA tenderness.  Pelvic exam: normal vagina and vulva with red rash around labial majora and down to the rectum, located on her bottom, one excoriation luann, vaginal discharge described as white. No odor.   WET PREP: trichomonas and clue cells   Gonorrhea/Chlamydia: pending     ASSESSMENT:   trichomonas vaginalis, bacterial vaginosis.  Genital Rash    PLAN:  Treatment plan  per orders in [facility].   STD prevention discussed. Birth control needs reviewed.  Abstain from intercourse for duration of treatment.   Return if symptoms do not resolve as anticipated.    (R21) Rash of genital area  (primary encounter diagnosis)  Plan: triamcinolone 0.1 % EX external cream    (Z11.3) Screen for STD (sexually transmitted disease)  Plan: Chlamydia trachomatis PCR, Hepatitis C         antibody, HIV Antigen Antibody Combo, Neisseria        gonorrhoeae PCR, Treponema Abs w Reflex to RPR         and Titer, Wet prep    (N89.8) Vaginal itching    (A59.01) Trichomonal vaginitis  Plan: metroNIDAZOLE 500 MG PO tablet. Discuss with partner. He needs treatment. No sexual intercourse until he is treated. Can come in for JENNIFER if desires.     (N76.0,  B96.89) BV (bacterial vaginosis)  Plan: metroNIDAZOLE 500 MG PO tablet. No alcohol use.     WILNER Rondon CNM

## 2020-02-21 NOTE — LETTER
Caity Horan called today to discuss possible birth control options.  She is contemplating getting IUD placed. Because Caity in on mycophenolate, this would be an acceptable and preferred type of contraception.  Caity montemayor/rizwan, she will discuss with gynecologist at next appt.     Caity did express concern about increased infection risk. RNCC stated that there would be increased infection risk, however, this is usually a safe option for transplant patients and we recommend IUD's if the gynecologist feels this is a safe option.  RNCC also recommended she discuss with GI team as well. She is currently receiving vedolizumab infusions every 6 weeks.     We also discussed need to stop mycophenolate when she is ready to become pregnant in the future, Caity Horan v/rizwan.    October 11, 2018      Jaqueline Argueta  [address]  [facility] [address]    Dear ,      I am happy to inform you that your recent cervical cancer screening test (PAP smear) was normal.      Preventative screenings such as this help to ensure your health for years to come. You should repeat a pap smear in 3 years, unless otherwise directed.      You will still need to return to the clinic every year for your annual exam and other preventive tests.     If you have additional questions regarding this result, please call our registered nurse, Yelena at [phone].      Sincerely,      WILNER Keys CNP./  Yelena Garcia RN-Pap Tracking          NADYA TOM

## 2020-03-11 ENCOUNTER — OFFICE VISIT (OUTPATIENT)
Dept: MIDWIFE SERVICES | Facility: CLINIC | Age: 25
End: 2020-03-11
Payer: COMMERCIAL

## 2020-03-11 VITALS
SYSTOLIC BLOOD PRESSURE: 124 MMHG | DIASTOLIC BLOOD PRESSURE: 79 MMHG | HEART RATE: 120 BPM | HEIGHT: 61 IN | WEIGHT: 131 LBS | OXYGEN SATURATION: 97 % | BODY MASS INDEX: 24.73 KG/M2

## 2020-03-11 DIAGNOSIS — B96.89 BV (BACTERIAL VAGINOSIS): ICD-10-CM

## 2020-03-11 DIAGNOSIS — R21 RASH AND NONSPECIFIC SKIN ERUPTION: ICD-10-CM

## 2020-03-11 DIAGNOSIS — N89.8 VAGINAL DISCHARGE: Primary | ICD-10-CM

## 2020-03-11 DIAGNOSIS — N76.0 BV (BACTERIAL VAGINOSIS): ICD-10-CM

## 2020-03-11 LAB
SPECIMEN SOURCE: ABNORMAL
WET PREP SPEC: ABNORMAL

## 2020-03-11 PROCEDURE — 87210 SMEAR WET MOUNT SALINE/INK: CPT | Performed by: ADVANCED PRACTICE MIDWIFE

## 2020-03-11 PROCEDURE — 99213 OFFICE O/P EST LOW 20 MIN: CPT | Performed by: ADVANCED PRACTICE MIDWIFE

## 2020-03-11 RX ORDER — METRONIDAZOLE 500 MG/1
500 TABLET ORAL 2 TIMES DAILY
Qty: 14 TABLET | Refills: 0 | Status: SHIPPED | OUTPATIENT
Start: 2020-03-11 | End: 2020-07-07

## 2020-03-11 ASSESSMENT — PATIENT HEALTH QUESTIONNAIRE - PHQ9: SUM OF ALL RESPONSES TO PHQ QUESTIONS 1-9: 2

## 2020-03-11 ASSESSMENT — MIFFLIN-ST. JEOR: SCORE: 1291.59

## 2020-03-11 NOTE — PROGRESS NOTES
"SUBJECTIVE:  Jaqueline Argueta is a 24 year old female presents with discharge described as brown for 7 days.    She also has a rash on her legs comprised of small red bumps that are spaced from each other.     Contraception abstinence    REVIEW OF SYSTEMS  C: NEGATIVE for fever, chills, change in weight  R: NEGATIVE for significant cough or SOB  CV: NEGATIVE for chest pain, palpitations or peripheral edema  GI: NEGATIVE for nausea, abdominal pain, heartburn, or change in bowel habits  : NEGATIVE for frequency, dysuria, or hematuria      General medical, surgical, OB/Gyn and social histories   reviewed and updated in Histories section of [facility].     OBJECTIVE:   EXAM  /79   Pulse 120   Ht 1.549 m (5' 1\")   Wt 59.4 kg (131 lb)   SpO2 97%   BMI 24.75 kg/m    Patient appears well.    Abdomen normal, soft without tenderness, guarding, mass or organomegaly.   No inguinal adenopathy or CVA tenderness.    PELVIC EXAM:  PELVIC EXAM:  Vulva: BUS WNL, no lesions noted,   Vagina: Discharge scant and brown, no lesions, well rugated, good tone,   Cervix: Smooth, pink, no visible lesions, neg CMT,   Uterus: Normal size and position, non-tender, mobile,   Ovaries: No masses palpable, non-tender, mobile,   Rectal exam: deferred    WET PREP: clue cells   GC/CHLAMYDIA CULTURE OBTAINED:PATIENT DECLINED    ASSESSMENT:   bacterial vaginosis.  (N89.8) Vaginal discharge  (primary encounter diagnosis)  Comment:   Plan: Wet prep            (R21) Rash and nonspecific skin eruption  Comment:   Plan: DERMATOLOGY REFERRAL            (N76.0,  B96.89) BV (bacterial vaginosis)  Comment:   Plan: metroNIDAZOLE (FLAGYL) 500 MG tablet              PLAN:  Treatment plan per orders in [facility].   STD prevention discussed. Birth control needs reviewed.  Abstain from intercourse for duration of treatment.   Return if symptoms do not resolve as anticipated.  Given letter/ handout on healthy vaginal environment.      Marla Rios, " CNM, APRN CNM

## 2020-03-22 ENCOUNTER — HEALTH MAINTENANCE LETTER (OUTPATIENT)
Age: 25
End: 2020-03-22

## 2020-04-15 ENCOUNTER — E-VISIT (OUTPATIENT)
Dept: MIDWIFE SERVICES | Facility: CLINIC | Age: 25
End: 2020-04-15
Payer: MEDICAID

## 2020-04-15 DIAGNOSIS — R52 PAIN: Primary | ICD-10-CM
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
## 2020-07-07 ENCOUNTER — OFFICE VISIT (OUTPATIENT)
Dept: MIDWIFE SERVICES | Facility: CLINIC | Age: 25
End: 2020-07-07
Payer: COMMERCIAL

## 2020-07-07 VITALS
DIASTOLIC BLOOD PRESSURE: 68 MMHG | HEIGHT: 61 IN | TEMPERATURE: 98.6 F | BODY MASS INDEX: 25.53 KG/M2 | SYSTOLIC BLOOD PRESSURE: 108 MMHG | WEIGHT: 135.2 LBS | HEART RATE: 83 BPM

## 2020-07-07 DIAGNOSIS — Z11.3 SCREEN FOR STD (SEXUALLY TRANSMITTED DISEASE): Primary | ICD-10-CM

## 2020-07-07 LAB
SPECIMEN SOURCE: NORMAL
WET PREP SPEC: NORMAL

## 2020-07-07 PROCEDURE — 87591 N.GONORRHOEAE DNA AMP PROB: CPT | Performed by: ADVANCED PRACTICE MIDWIFE

## 2020-07-07 PROCEDURE — 87491 CHLMYD TRACH DNA AMP PROBE: CPT | Performed by: ADVANCED PRACTICE MIDWIFE

## 2020-07-07 PROCEDURE — 87210 SMEAR WET MOUNT SALINE/INK: CPT | Performed by: ADVANCED PRACTICE MIDWIFE

## 2020-07-07 PROCEDURE — 99212 OFFICE O/P EST SF 10 MIN: CPT | Performed by: ADVANCED PRACTICE MIDWIFE

## 2020-07-07 RX ORDER — METRONIDAZOLE 500 MG/1
500 TABLET ORAL 2 TIMES DAILY
Qty: 14 TABLET | Refills: 0 | Status: CANCELLED | OUTPATIENT
Start: 2020-07-07

## 2020-07-07 ASSESSMENT — MIFFLIN-ST. JEOR: SCORE: 1310.64

## 2020-07-07 NOTE — PROGRESS NOTES
"Chief Complaint   Patient presents with     STD       Initial /68 (BP Location: Left arm, Patient Position: Sitting, Cuff Size: Adult Regular)   Pulse 83   Temp 98.6  F (37  C) (Oral)   Ht 1.549 m (5' 1\")   Wt 61.3 kg (135 lb 3.2 oz)   LMP 2020 (Approximate)   Breastfeeding No   BMI 25.55 kg/m   Estimated body mass index is 25.55 kg/m  as calculated from the following:    Height as of this encounter: 1.549 m (5' 1\").    Weight as of this encounter: 61.3 kg (135 lb 3.2 oz).  BP completed using cuff size: regular    Questioned patient about current smoking habits.  Pt. has never smoked.          The following HM Due: NONE      The following patient reported/Care Every where data was sent to:  P ABSTRACT QUALITY INITIATIVES [[id number]]  n/a      n/a and patient has appointment for today              "

## 2020-07-07 NOTE — PROGRESS NOTES
"  SUBJECTIVE:                                                    Jaqueline Argueta is a 24 year old female who presents to clinic today for the following health issues:  Concern - Hx of BV and yeast    Onset: 3 day(s) ago     Description:   Location: Vag  Radiation:   Character: Dull ache and Gnawing    Duration: (seconds, minutes, hours, days?):     Intensity: mild     Frequency (if intermittent):     Accompanying Signs & Symptoms and Therapies tried:     Precipitating and/or Alleviating factors:  Hx of previous vaginitis: frequent  Sexually active: yes, multiple partners, contraception - none and depoprovera  Contraception:  none and depoprovera.      Progression of Symptoms: (better, worse, same): worse    Patient's last menstrual period was 06/14/2020 (approximate).    Additional History: None Noted    Current Outpatient Medications:      permethrin (ELIMITE) 5 % external cream, Apply cream from head to toe (except the face); leave on for 8-14 hours then wash off with water; reapply in 1 week if live mites appear., Disp: 60 g, Rfl: 1     medroxyPROGESTERone (DEPO-PROVERA) 150 MG/ML injection, Inject 1 mL (150 mg) into the muscle every 3 months (Patient not taking: Reported on 10/29/2019), Disp: 1 mL, Rfl: 1    ROS:  5-Point Review of Systems Negative -- Except as noted above.    OBJECTIVE:                                                    /68 (BP Location: Left arm, Patient Position: Sitting, Cuff Size: Adult Regular)   Pulse 83   Temp 98.6  F (37  C) (Oral)   Ht 1.549 m (5' 1\")   Wt 61.3 kg (135 lb 3.2 oz)   LMP 06/14/2020 (Approximate)   Breastfeeding No   BMI 25.55 kg/m   Body mass index is 25.55 kg/m .   Appears in no acute distress.  ABDOMEN: soft, nontender, no hepatosplenomegaly, no masses and bowel sounds normal   (female): normal female external genitalia, vaginal mucosa pink, moist, well rugated and normal cervix  Bimanual exam is deferred  Urine dipstick:deferred.    LAB:  Wet prep:  " Negative                       Ph is 4.6  UA:unknown   GC/Chlamydia Screening:  YES         ASSESSMENT/PLAN:                                                        ICD-10-CM    1. Screen for STD (sexually transmitted disease)  Z11.3 NEISSERIA GONORRHOEA PCR     CHLAMYDIA TRACHOMATIS PCR     Wet prep       Discussed vaginitis, modes of transmission, and rationale for treatment.  Risks, benefits and alternatives of treatments discussed. Plan agreed on.    Teja Weaver CNM

## 2020-09-30 NOTE — PROGRESS NOTES
"Chief Complaint   Patient presents with     STD       Initial /71 (BP Location: Left arm, Patient Position: Sitting, Cuff Size: Adult Regular)   Pulse 95   Temp 98.4  F (36.9  C) (Oral)   Ht 1.549 m (5' 1\")   Wt 58.8 kg (129 lb 11.2 oz)   LMP  (LMP Unknown)   SpO2 100%   Breastfeeding No   BMI 24.51 kg/m   Estimated body mass index is 24.51 kg/m  as calculated from the following:    Height as of this encounter: 1.549 m (5' 1\").    Weight as of this encounter: 58.8 kg (129 lb 11.2 oz).  BP completed using cuff size: regular    Questioned patient about current smoking habits.  Pt. has never smoked.          The following HM Due: NONE      The following patient reported/Care Every where data was sent to:  P ABSTRACT QUALITY INITIATIVES [[id number]]  n/a      n/a, patient has appointment for today and orders have been placed              " Detail Level: Simple Additional Notes: Patient consent was obtained to proceed with the visit and recommended plan of care after discussion of all risks and benefits, including the risks of COVID-19 exposure.

## 2021-01-15 ENCOUNTER — HEALTH MAINTENANCE LETTER (OUTPATIENT)
Age: 26
End: 2021-01-15

## 2021-03-10 ENCOUNTER — OFFICE VISIT (OUTPATIENT)
Dept: FAMILY MEDICINE | Facility: CLINIC | Age: 26
End: 2021-03-10
Payer: COMMERCIAL

## 2021-03-10 VITALS
WEIGHT: 140.5 LBS | DIASTOLIC BLOOD PRESSURE: 72 MMHG | TEMPERATURE: 98.2 F | SYSTOLIC BLOOD PRESSURE: 114 MMHG | OXYGEN SATURATION: 97 % | HEART RATE: 90 BPM | BODY MASS INDEX: 26.55 KG/M2

## 2021-03-10 DIAGNOSIS — Z11.3 ROUTINE SCREENING FOR STI (SEXUALLY TRANSMITTED INFECTION): Primary | ICD-10-CM

## 2021-03-10 DIAGNOSIS — A59.9 TRICHOMONIASIS: ICD-10-CM

## 2021-03-10 DIAGNOSIS — Z23 ENCOUNTER FOR IMMUNIZATION: ICD-10-CM

## 2021-03-10 DIAGNOSIS — Z30.8 ENCOUNTER FOR OTHER CONTRACEPTIVE MANAGEMENT: ICD-10-CM

## 2021-03-10 DIAGNOSIS — N76.0 BACTERIAL VAGINOSIS: ICD-10-CM

## 2021-03-10 DIAGNOSIS — B96.89 BACTERIAL VAGINOSIS: ICD-10-CM

## 2021-03-10 DIAGNOSIS — Z11.3 ROUTINE SCREENING FOR STI (SEXUALLY TRANSMITTED INFECTION): ICD-10-CM

## 2021-03-10 LAB
HCG UR QL: NEGATIVE
SPECIMEN SOURCE: ABNORMAL
WET PREP SPEC: ABNORMAL

## 2021-03-10 PROCEDURE — 87210 SMEAR WET MOUNT SALINE/INK: CPT | Performed by: NURSE PRACTITIONER

## 2021-03-10 PROCEDURE — 87491 CHLMYD TRACH DNA AMP PROBE: CPT | Performed by: NURSE PRACTITIONER

## 2021-03-10 PROCEDURE — 87389 HIV-1 AG W/HIV-1&-2 AB AG IA: CPT | Performed by: NURSE PRACTITIONER

## 2021-03-10 PROCEDURE — 36415 COLL VENOUS BLD VENIPUNCTURE: CPT | Performed by: NURSE PRACTITIONER

## 2021-03-10 PROCEDURE — 81025 URINE PREGNANCY TEST: CPT | Performed by: NURSE PRACTITIONER

## 2021-03-10 PROCEDURE — 86780 TREPONEMA PALLIDUM: CPT | Mod: 90 | Performed by: NURSE PRACTITIONER

## 2021-03-10 PROCEDURE — 99214 OFFICE O/P EST MOD 30 MIN: CPT | Mod: 25 | Performed by: NURSE PRACTITIONER

## 2021-03-10 PROCEDURE — 96372 THER/PROPH/DIAG INJ SC/IM: CPT | Performed by: NURSE PRACTITIONER

## 2021-03-10 PROCEDURE — 90471 IMMUNIZATION ADMIN: CPT | Performed by: NURSE PRACTITIONER

## 2021-03-10 PROCEDURE — 87591 N.GONORRHOEAE DNA AMP PROB: CPT | Performed by: NURSE PRACTITIONER

## 2021-03-10 PROCEDURE — 90651 9VHPV VACCINE 2/3 DOSE IM: CPT | Performed by: NURSE PRACTITIONER

## 2021-03-10 PROCEDURE — 99000 SPECIMEN HANDLING OFFICE-LAB: CPT | Performed by: NURSE PRACTITIONER

## 2021-03-10 RX ORDER — MEDROXYPROGESTERONE ACETATE 150 MG/ML
150 INJECTION, SUSPENSION INTRAMUSCULAR
Status: DISCONTINUED | OUTPATIENT
Start: 2021-03-10 | End: 2021-11-24

## 2021-03-10 RX ORDER — METRONIDAZOLE 500 MG/1
500 TABLET ORAL 2 TIMES DAILY
Qty: 14 TABLET | Refills: 0 | Status: SHIPPED | OUTPATIENT
Start: 2021-03-10 | End: 2021-03-17

## 2021-03-10 RX ADMIN — MEDROXYPROGESTERONE ACETATE 150 MG: 150 INJECTION, SUSPENSION INTRAMUSCULAR at 16:23

## 2021-03-10 NOTE — PROGRESS NOTES
"    Assessment & Plan   Problem List Items Addressed This Visit     None      Visit Diagnoses     Routine screening for STI (sexually transmitted infection)    -  Primary    Relevant Orders    **HIV Antigen Antibody Combo FUTURE anytime (Completed)    Wet prep (Completed)    Treponema Abs w Reflex to RPR and Titer (Completed)    NEISSERIA GONORRHOEA PCR (Completed)    CHLAMYDIA TRACHOMATIS PCR (Completed)    Encounter for other contraceptive management        Relevant Medications    medroxyPROGESTERone (DEPO-PROVERA) injection 150 mg    Other Relevant Orders    HCG Qual, Urine ([id number]) (Completed)    Encounter for immunization        Relevant Orders    HPV, IM (9 - 26 YRS) - Gardasil 9 (Completed)    Bacterial vaginosis        Relevant Medications    metroNIDAZOLE (FLAGYL) 500 MG tablet    Trichomoniasis        Relevant Medications    metroNIDAZOLE (FLAGYL) 500 MG tablet             No LOS data to display         BMI:   Estimated body mass index is 26.55 kg/m  as calculated from the following:    Height as of 7/7/20: 1.549 m (5' 1\").    Weight as of this encounter: 63.7 kg (140 lb 8 oz).       See Patient Instructions    No follow-ups on file.    WILNER Keys CNP  [facility]    Magalys Parsons is a 25 year old who presents for the following health issues  HPI     The patient is here today for STI screening.  Last STI screening:  October 2020  History of STI:  BV  Sexually active: Yes  Known exposures:  None  Symptoms of concern:  Vaginal itchy, discharge,           Review of Systems   Constitutional, HEENT, cardiovascular, pulmonary, gi and gu systems are negative, except as otherwise noted.      Objective    /72   Pulse 90   Temp 98.2  F (36.8  C) (Temporal)   Wt 63.7 kg (140 lb 8 oz)   SpO2 97%   BMI 26.55 kg/m    Body mass index is 26.55 kg/m .  Physical Exam   GENERAL: healthy, alert and no distress  NECK: no adenopathy, no asymmetry, masses, or scars and " thyroid normal to palpation  RESP: lungs clear to auscultation - no rales, rhonchi or wheezes  CV: regular rate and rhythm, normal S1 S2, no S3 or S4, no murmur, click or rub, no peripheral edema and peripheral pulses strong  ABDOMEN: soft, nontender, no hepatosplenomegaly, no masses and bowel sounds normal    Orders Only on 03/10/2021   Component Date Value Ref Range Status     HCG Qual Urine 03/10/2021 Negative  NEG^Negative Final    Comment: This test is for screening purposes.  Results should be interpreted along with   the clinical picture.  Confirmation testing is available if warranted by   ordering [id number], HCG Quantitative Pregnancy.       Specimen Description 03/10/2021 Vagina   Final     Wet Prep 03/10/2021 *  Final                    Value:Moderate  Trichomonas seen       Wet Prep 03/10/2021 *  Final                    Value:Moderate  Clue cells seen       Wet Prep 03/10/2021 No yeast seen   Final     Wet Prep 03/10/2021    Final                    Value:Moderate  WBC'S seen

## 2021-03-18 ENCOUNTER — MYC MEDICAL ADVICE (OUTPATIENT)
Dept: FAMILY MEDICINE | Facility: CLINIC | Age: 26
End: 2021-03-18

## 2021-03-18 DIAGNOSIS — B37.31 YEAST INFECTION OF THE VAGINA: Primary | ICD-10-CM

## 2021-03-19 RX ORDER — FLUCONAZOLE 150 MG/1
150 TABLET ORAL ONCE
Qty: 1 TABLET | Refills: 0 | Status: SHIPPED | OUTPATIENT
Start: 2021-03-19 | End: 2021-03-19

## 2021-03-19 NOTE — TELEPHONE ENCOUNTER
Route to provider pool    See pt Mychart messages  She was on metroNIDAZOLE (FLAGYL) 500 MG tablet for BV    Pharm cued    Tanesha Jain, CARLO   [facility]

## 2021-03-19 NOTE — TELEPHONE ENCOUNTER
If she completed the 7 day course of flagyl there are 2 possibilities  1) often the vagina takes a few days after finishing  The medication to total heal so just wait a few more days   2) could be getting a yeast  Infection after BV  Treatment , for that use diflucan times 1

## 2021-05-15 ENCOUNTER — HEALTH MAINTENANCE LETTER (OUTPATIENT)
Age: 26
End: 2021-05-15

## 2021-06-11 ENCOUNTER — OFFICE VISIT (OUTPATIENT)
Dept: FAMILY MEDICINE | Facility: CLINIC | Age: 26
End: 2021-06-11
Payer: COMMERCIAL

## 2021-06-11 VITALS
OXYGEN SATURATION: 98 % | WEIGHT: 142.5 LBS | TEMPERATURE: 97.6 F | HEART RATE: 85 BPM | DIASTOLIC BLOOD PRESSURE: 70 MMHG | BODY MASS INDEX: 26.93 KG/M2 | SYSTOLIC BLOOD PRESSURE: 106 MMHG

## 2021-06-11 DIAGNOSIS — N92.0 SPOTTING: ICD-10-CM

## 2021-06-11 DIAGNOSIS — Z11.3 ROUTINE SCREENING FOR STI (SEXUALLY TRANSMITTED INFECTION): Primary | ICD-10-CM

## 2021-06-11 DIAGNOSIS — N89.8 VAGINAL ITCHING: ICD-10-CM

## 2021-06-11 DIAGNOSIS — Z11.3 ROUTINE SCREENING FOR STI (SEXUALLY TRANSMITTED INFECTION): ICD-10-CM

## 2021-06-11 LAB
HCG UR QL: NEGATIVE
HIV 1+2 AB+HIV1 P24 AG SERPL QL IA: NONREACTIVE
SPECIMEN SOURCE: NORMAL
T PALLIDUM AB SER QL: NONREACTIVE
WET PREP SPEC: NORMAL

## 2021-06-11 PROCEDURE — 87591 N.GONORRHOEAE DNA AMP PROB: CPT | Performed by: FAMILY MEDICINE

## 2021-06-11 PROCEDURE — 87491 CHLMYD TRACH DNA AMP PROBE: CPT | Performed by: FAMILY MEDICINE

## 2021-06-11 PROCEDURE — 99000 SPECIMEN HANDLING OFFICE-LAB: CPT | Performed by: FAMILY MEDICINE

## 2021-06-11 PROCEDURE — 86780 TREPONEMA PALLIDUM: CPT | Mod: 90 | Performed by: FAMILY MEDICINE

## 2021-06-11 PROCEDURE — 99213 OFFICE O/P EST LOW 20 MIN: CPT | Performed by: FAMILY MEDICINE

## 2021-06-11 PROCEDURE — 81025 URINE PREGNANCY TEST: CPT | Performed by: FAMILY MEDICINE

## 2021-06-11 PROCEDURE — 87389 HIV-1 AG W/HIV-1&-2 AB AG IA: CPT | Performed by: FAMILY MEDICINE

## 2021-06-11 PROCEDURE — 36415 COLL VENOUS BLD VENIPUNCTURE: CPT | Performed by: FAMILY MEDICINE

## 2021-06-11 PROCEDURE — 87210 SMEAR WET MOUNT SALINE/INK: CPT | Performed by: FAMILY MEDICINE

## 2021-06-11 NOTE — PROGRESS NOTES
Assessment & Plan     Routine screening for STI (sexually transmitted infection)  Vaginal itching  Discussed plan for labs as ordered.  Plan for treatment pending results.  She is well appearing and without concerning symptoms which is reassuring.    - NEISSERIA GONORRHOEA PCR; Future  - CHLAMYDIA TRACHOMATIS PCR; Future  - HIV Antigen Antibody Combo; Future  - Treponema Abs w Reflex to RPR and Titer; Future  - Wet prep; Future    Spotting  Discussed that it is normal to have irregular bleeding and spotting coming off Depo.  She will follow-up if not resolving in next few months.  Additionally, she declined any other prescription birth control and was encouraged to use condoms to prevent pregnancy.    - HCG Qual, Urine ([id number]); Future     She was advised to follow-up for new, persistent, or worsening symptoms.      Charleen Pierce DO  [facility]    Magalys Parsons is a 25 year old who presents for the following health issues:     HPI     Vaginal Symptoms  Onset/Duration: x2 days ago  Description:  Vaginal Discharge: none   Itching (Pruritis): YES- mild  Burning sensation:  no  Odor: no  Accompanying Signs & Symptoms:  Urinary symptoms: no  Abdominal pain: no  Fever: no  History:   Sexually active: no  New Partner: no  Possibility of Pregnancy:  no  Recent antibiotic use: no  STI history: Trichomonas   Previous vaginitis issues: YES- has hx of BV  Precipitating or alleviating factors: None  Therapies tried and outcome: none    Was on depo previously (last dose was about 3 months).  She does not want to continue depo.      Review of Systems         Objective    /70   Pulse 85   Temp 97.6  F (36.4  C) (Temporal)   Wt 64.6 kg (142 lb 8 oz)   LMP 06/08/2021   SpO2 98%   BMI 26.93 kg/m    Body mass index is 26.93 kg/m .  Physical Exam   GENERAL: healthy, alert and no distress  EYES: Eyes grossly normal to inspection, conjunctivae and sclerae normal  RESP: lungs clear  to auscultation - no rales, rhonchi or wheezes  CV: regular rate and rhythm, normal S1 S2, no S3 or S4, no murmur, click or rub, no peripheral edema and peripheral pulses strong  ABDOMEN: soft, nontender, no hepatosplenomegaly, no masses and bowel sounds normal  SKIN: warm and dry, no pallor or jaundice  PSYCH: mentation appears normal, affect normal/bright

## 2021-06-11 NOTE — PATIENT INSTRUCTIONS
Wet prep results will be done today, other results by Monday.  I will send you a note via [employer].      Nice to meet you!

## 2021-07-13 ENCOUNTER — OFFICE VISIT (OUTPATIENT)
Dept: MIDWIFE SERVICES | Facility: CLINIC | Age: 26
End: 2021-07-13
Payer: COMMERCIAL

## 2021-07-13 VITALS
HEART RATE: 85 BPM | SYSTOLIC BLOOD PRESSURE: 117 MMHG | DIASTOLIC BLOOD PRESSURE: 80 MMHG | WEIGHT: 146.5 LBS | BODY MASS INDEX: 27.68 KG/M2

## 2021-07-13 DIAGNOSIS — Z11.3 ROUTINE SCREENING FOR STI (SEXUALLY TRANSMITTED INFECTION): Primary | ICD-10-CM

## 2021-07-13 DIAGNOSIS — R22.31 MASS OF AXILLA, RIGHT: ICD-10-CM

## 2021-07-13 LAB
CLUE CELLS: ABNORMAL
TRICHOMONAS, WET PREP: ABNORMAL
WBC'S/HIGH POWER FIELD, WET PREP: ABNORMAL
YEAST, WET PREP: ABNORMAL

## 2021-07-13 PROCEDURE — 99213 OFFICE O/P EST LOW 20 MIN: CPT | Performed by: ADVANCED PRACTICE MIDWIFE

## 2021-07-13 PROCEDURE — 87491 CHLMYD TRACH DNA AMP PROBE: CPT | Performed by: ADVANCED PRACTICE MIDWIFE

## 2021-07-13 PROCEDURE — 87210 SMEAR WET MOUNT SALINE/INK: CPT | Performed by: ADVANCED PRACTICE MIDWIFE

## 2021-07-13 PROCEDURE — 87591 N.GONORRHOEAE DNA AMP PROB: CPT | Performed by: ADVANCED PRACTICE MIDWIFE

## 2021-07-13 NOTE — PROGRESS NOTES
S:  Jaqueline is here for routine STI testing due to recent unprotected sex. She would also like a wet prep to test for yeast and BV though she doesn't have any symptoms. She is not currently using birth control and is not planning pregnancy. She does not desire pregnancy test today.    In addition, Jaqueline has noticed two small lumps in her armpit. She noticed the first lump a few months ago, about the size of a marble. She noticed it because it got inflamed and tender. At first she suspected that it might be an ingrown hair. Since then, the lump has become nontender but is still present, and a second lump was noticed earlier this month. She does not do regular breast exams.    O:  /80   Pulse 85   Wt 66.5 kg (146 lb 8 oz)   LMP 06/08/2021 (Approximate)   BMI 27.68 kg/m    Breasts: symmetric fibrous changes in both upper outer quadrants, self exam is taught and encouraged, two cystic mass size 1 cm and 0.5cm noted in R axilla.  Wet prep self collect  GC/CT self collect    A/P:  (Z11.3) Routine screening for STI (sexually transmitted infection)  (primary encounter diagnosis)  Plan: Neisseria gonorrhoeae PCR, Chlamydia         trachomatis PCR, Wet prep - Clinic Collect    (R22.31) Mass of axilla, right  Plan: Breast Provider Referral      WILNER Bergman CNM

## 2021-07-13 NOTE — NURSING NOTE
"Chief Complaint   Patient presents with     STD     testing, wet prep       Initial /80   Pulse 85   Wt 66.5 kg (146 lb 8 oz)   LMP 2021 (Approximate)   BMI 27.68 kg/m   Estimated body mass index is 27.68 kg/m  as calculated from the following:    Height as of 20: 1.549 m (5' 1\").    Weight as of this encounter: 66.5 kg (146 lb 8 oz).  BP completed using cuff size: regular    Questioned patient about current smoking habits.  Pt. has never smoked.          The following HM Due: NONE      The following patient reported/Care Every where data was sent to:  P ABSTRACT QUALITY INITIATIVES [[id number]]        patient has appointment for today  Anne Pierre                "

## 2021-07-14 LAB
C TRACH DNA SPEC QL NAA+PROBE: NEGATIVE
N GONORRHOEA DNA SPEC QL NAA+PROBE: NEGATIVE

## 2021-08-02 NOTE — PROGRESS NOTES
Assessment & Plan   Problem List Items Addressed This Visit     None      Visit Diagnoses     Pain in finger of left hand    -  Primary    Relevant Orders    XR Hand Left G/E 3 Views    Orthopedic  Referral    Vaginal discharge        Relevant Orders    Wet prep - lab collect    NEISSERIA GONORRHOEA PCR    CHLAMYDIA TRACHOMATIS PCR             25 minutes spent on the date of the encounter doing chart review, history and exam, documentation and further activities per the note       See Patient Instructions    No follow-ups on file.    WILNER Keys CNP  [facility] is a 25 year old who presents for the following health issues   HPI     The patient is here today for STI screening.  Last STI screening:  Few weeks ago   History of STI:  Has had BV and tric  Sexually active: Yes  Known exposures:  No  Symptoms of concern:  Discharge, itching,     She fell on her hand last week; has noticed a large bum,p on her ring finger since then and trouble bending her ring finger on her left hand. No history of fractures/injuries to hand.   Review of Systems   Constitutional, HEENT, cardiovascular, pulmonary, GI, , musculoskeletal, neuro, skin, endocrine and psych systems are negative, except as otherwise noted.      Objective    /80   Pulse (!) 127   Temp 98.7  F (37.1  C) (Temporal)   Wt 68.5 kg (151 lb)   LMP 08/02/2021 (Exact Date)   SpO2 98%   Breastfeeding No   BMI 28.53 kg/m    Body mass index is 28.53 kg/m .  Physical Exam   GENERAL: healthy, alert and no distress  RESP: lungs clear to auscultation - no rales, rhonchi or wheezes  CV: regular rate and rhythm, normal S1 S2, no S3 or S4, no murmur, click or rub, no peripheral edema and peripheral pulses strong  ABDOMEN: soft, nontender, no hepatosplenomegaly, no masses and bowel sounds normal  MS: tender to left ring finger third joint    Office Visit on 07/13/2021   Component Date Value Ref  Range Status     Neisseria gonorrhoeae 07/13/2021 Negative  Negative Final    Negative for N. gonorrhoeae rRNA by transcription mediated amplification. A negative result by transcription mediated amplification does not preclude the presence of C. trachomatis infection because results are dependent on proper and adequate collection, absence of inhibitors and sufficient rRNA to be detected.     Chlamydia trachomatis 07/13/2021 Negative  Negative Final     Trichomonas 07/13/2021 Absent  Absent Final     Yeast 07/13/2021 Absent  Absent Final     Clue Cells 07/13/2021 Absent  Absent Final     WBCs/high power field 07/13/2021 1+* None Final

## 2021-08-03 ENCOUNTER — HOSPITAL ENCOUNTER (OUTPATIENT)
Dept: GENERAL RADIOLOGY | Facility: CLINIC | Age: 26
Discharge: HOME | End: 2021-08-03
Attending: NURSE PRACTITIONER | Admitting: NURSE PRACTITIONER
Payer: COMMERCIAL

## 2021-08-03 ENCOUNTER — OFFICE VISIT (OUTPATIENT)
Dept: FAMILY MEDICINE | Facility: CLINIC | Age: 26
End: 2021-08-03
Payer: COMMERCIAL

## 2021-08-03 VITALS
HEART RATE: 127 BPM | DIASTOLIC BLOOD PRESSURE: 80 MMHG | SYSTOLIC BLOOD PRESSURE: 102 MMHG | TEMPERATURE: 98.7 F | OXYGEN SATURATION: 98 % | BODY MASS INDEX: 28.53 KG/M2 | WEIGHT: 151 LBS

## 2021-08-03 DIAGNOSIS — M79.645 PAIN IN FINGER OF LEFT HAND: Primary | ICD-10-CM

## 2021-08-03 DIAGNOSIS — N89.8 VAGINAL DISCHARGE: ICD-10-CM

## 2021-08-03 DIAGNOSIS — M79.645 PAIN IN FINGER OF LEFT HAND: ICD-10-CM

## 2021-08-03 PROCEDURE — 73130 X-RAY EXAM OF HAND: CPT | Mod: 26 | Performed by: RADIOLOGY

## 2021-08-03 PROCEDURE — 73130 X-RAY EXAM OF HAND: CPT | Mod: LT

## 2021-08-03 PROCEDURE — 99213 OFFICE O/P EST LOW 20 MIN: CPT | Performed by: NURSE PRACTITIONER

## 2021-08-03 PROCEDURE — 87491 CHLMYD TRACH DNA AMP PROBE: CPT | Performed by: NURSE PRACTITIONER

## 2021-08-03 PROCEDURE — 87210 SMEAR WET MOUNT SALINE/INK: CPT | Performed by: NURSE PRACTITIONER

## 2021-08-03 PROCEDURE — 87591 N.GONORRHOEAE DNA AMP PROB: CPT | Performed by: NURSE PRACTITIONER

## 2021-08-04 ENCOUNTER — MYC MEDICAL ADVICE (OUTPATIENT)
Dept: FAMILY MEDICINE | Facility: CLINIC | Age: 26
End: 2021-08-04

## 2021-08-04 LAB
C TRACH DNA SPEC QL NAA+PROBE: NEGATIVE
N GONORRHOEA DNA SPEC QL NAA+PROBE: NEGATIVE

## 2021-08-04 NOTE — TELEPHONE ENCOUNTER
[place],    Please see [employer] message.    Fozia Morris RN  [facility]

## 2021-08-06 NOTE — TELEPHONE ENCOUNTER
DIAGNOSIS: Pain in Finger of left hand, referred by [place] Appiah NP   APPOINTMENT DATE: 8/10/2021   NOTES STATUS DETAILS   OFFICE NOTE from referring provider Internal [facility]:  8/3/21 - [facility] OV with [place] Appiah NP   OFFICE NOTE from other specialist N/A    DISCHARGE SUMMARY from hospital N/A    DISCHARGE REPORT from the ER [facility]:  10/19/19 - ED OV with Dr. Vu   OPERATIVE REPORT N/A    EMG report N/A    MEDICATION LIST Internal    MRI N/A    DEXA (osteoporosis/bone health) N/A    CT SCAN N/A    XRAYS (IMAGES & REPORTS) Internal [facility]:  8/3/21 - XR Left Hand  10/19/19 - XR Right Finger

## 2021-08-09 NOTE — PROGRESS NOTES
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 Fall onto hand, unsure how she fell   Swelling? Yes, 4th finger DIP   Numbness/ Tingling? None   Weakness? Yes, due to pain   Imaging? XR 8/3/21   Treatment?  None      Findings:     PA, oblique and lateral view(s) of the left hand were obtained.      Minimally displaced fracture through the ulnar base of the fourth  distal phalanx. Approximately 2 mm of dorsal displacement of the  fracture fragment on the lateral view.     No significant degenerative change.   Soft tissue is unremarkable.                                                                    Impression:  Mildly displaced fracture through the dorsal ulnar base of the fourth  distal phalanx.    Past Medical History:   Diagnosis Date     Anemia due to blood loss, acute 12/27/2011      Past Surgical History:   Procedure Laterality Date     NO HISTORY OF SURGERY        No current outpatient medications on file.      No Known Allergies   ROS:   Gen- no fevers/chills   Rheum - no morning stiffness   Derm - no rash/ redness   Neuro - no numbness, no tingling   Remainder of ROS negative.     Exam:   Ht 1.549 m (5' 1\")   Wt 63.5 kg (140 lb)   LMP 08/02/2021 (Exact Date)   BMI 26.45 kg/m         L WRIST/HAND:   Inspection: Swelling - Yes - at dorsal DIP; Atrophy - NO   Sensation: intact   ROM:   Hand: Full flexion at PIP/DIP; " unable to extend at DIP   Strength: 5/5 in all motions   Bony tenderness:   Hand: Metacarpals: No; Phalanges: YES - index finger DIP   Tendons: FDS/FDP intact/Extensor mechanism NOT intact   Maneuvers:deferred

## 2021-08-09 NOTE — PROGRESS NOTES
NEW CONSULTATION   Aug 11, 2021     Jaqueline Argueta is a 25 year old woman who presents with right axillary mass. She was referred by Dr. Suazo.    HPI:    She developed a right axillary mass 2-3 months ago. Initially it was painful but the pain has resolved. She denies any erythema or fever. The mass is now smaller. She denies any breast mass, skin change, nipple inversion or nipple discharge.     BREAST-SPECIFIC HISTORY:    Previous breast imaging: No    Prior breast biopsies/surgeries: No    Prior history of breast cancer or DCIS: No  Prior radiation history: No   Self breast exams: Yes  Breast density:     GYN HISTORY:  . Age at 1st pregnancy: 16. Breastfeeding history: Yes.   Age at menarche: 11  Menopausal: premenopausal   Menopausal hormone replacement therapy: no    RISK ASSESSMENT:< 20% lifetime risk     FAMILY HISTORY:  Breast ca: No  Ovarian ca: No   Pancreatic ca: No  Prostate: No  Gastric ca: No  Melanoma: No  Colon ca: No  Other cancer: No  Other genetic, testing, syndromes, or clotting disorders: no     PAST MEDICAL HISTORY  Past Medical History:   Diagnosis Date     Anemia due to blood loss, acute 2011     PAST SURGICAL HISTORY   Past Surgical History:   Procedure Laterality Date     NO HISTORY OF SURGERY       MEDICATIONS  No current outpatient medications on file.     ALLERGIES   No Known Allergies     SOCIAL HISTORY:  Smokes: No  EtOH: No  Works at [employer]    ROS:  Change in vision No  Headaches: no  Respiratory: No shortness of breath, dyspnea on exertion, cough, or hemoptysis and No shortnesnos of breath, dyspnea on exertion, cough, or hemoptysis   Cardiovascular: negative   Gastrointestinal: negative Abdominal pain: no  Breast: right axillary mass  Musculoskeletal: negative Joint pain No Back pain: no  Psychiatric: negative  Hematologic/Lymphatic/Immunologic: negative  Endocrine: negative    EXAM  /67 (BP Location: Right arm, Patient Position: Sitting, Cuff Size: Adult  "Regular)   Pulse 82   Temp 97.9  F (36.6  C) (Oral)   Resp 16   Ht 1.555 m (5' 1.22\")   Wt 67.8 kg (149 lb 6.4 oz)   LMP 08/02/2021 (Exact Date)   SpO2 100%   BMI 28.03 kg/m     PHYSICAL EXAM  Respiratory: breathing non labored.   Breasts: Examination was done in both the upright and supine positions.  Breasts are symmetrical . No dominant fixed or suspicious masses noted. No skin or nipple changes. No nipple discharge.   4 mm mass within the skin. No erythema.   No clavicular, cervical, or axillary lymphadenopathy.     INVESTIGATIONS:    8/11/21 right axillary ultrasound: demonstrated a epidermoid inclusion cyst.     ASSESSMENT/PLAN:    Jaqueline Argueta is a 25 year old woman with left axillary mass. Imaging demonstrated a epidermoid inclusion cyst.     1) Right axillary epidermoid inclusion cyst  - Conservative management. Can be removed if needed.     2)Screening  Clinic encounter every 1-3 years. Be familiar with your breast and how they normally feel and appear. Promptly report any changes to your healthcare provider. Beginning screening mammograms at age 40.       Caitlin Egan PA-C    40 minutes spent on the date of the encounter doing chart review, review of test results, interpretation of tests, patient visit and documentation.     "

## 2021-08-10 ENCOUNTER — PRE VISIT (OUTPATIENT)
Dept: ORTHOPEDICS | Facility: CLINIC | Age: 26
End: 2021-08-10

## 2021-08-10 ENCOUNTER — THERAPY VISIT (OUTPATIENT)
Dept: OCCUPATIONAL THERAPY | Facility: CLINIC | Age: 26
End: 2021-08-10
Attending: FAMILY MEDICINE
Payer: COMMERCIAL

## 2021-08-10 ENCOUNTER — OFFICE VISIT (OUTPATIENT)
Dept: ORTHOPEDICS | Facility: CLINIC | Age: 26
End: 2021-08-10
Attending: NURSE PRACTITIONER
Payer: COMMERCIAL

## 2021-08-10 VITALS — WEIGHT: 140 LBS | HEIGHT: 61 IN | BODY MASS INDEX: 26.43 KG/M2

## 2021-08-10 DIAGNOSIS — M20.012 MALLET DEFORMITY OF LEFT RING FINGER: ICD-10-CM

## 2021-08-10 DIAGNOSIS — M79.645 PAIN IN FINGER OF LEFT HAND: ICD-10-CM

## 2021-08-10 DIAGNOSIS — M20.012 MALLET DEFORMITY OF LEFT RING FINGER: Primary | ICD-10-CM

## 2021-08-10 DIAGNOSIS — M79.645 PAIN OF FINGER OF LEFT HAND: ICD-10-CM

## 2021-08-10 PROCEDURE — 99203 OFFICE O/P NEW LOW 30 MIN: CPT | Performed by: FAMILY MEDICINE

## 2021-08-10 PROCEDURE — 97110 THERAPEUTIC EXERCISES: CPT | Mod: GO | Performed by: OCCUPATIONAL THERAPIST

## 2021-08-10 PROCEDURE — 97535 SELF CARE MNGMENT TRAINING: CPT | Mod: GO | Performed by: OCCUPATIONAL THERAPIST

## 2021-08-10 PROCEDURE — 97165 OT EVAL LOW COMPLEX 30 MIN: CPT | Mod: GO | Performed by: OCCUPATIONAL THERAPIST

## 2021-08-10 PROCEDURE — 97760 ORTHOTIC MGMT&TRAING 1ST ENC: CPT | Mod: GO | Performed by: OCCUPATIONAL THERAPIST

## 2021-08-10 ASSESSMENT — MIFFLIN-ST. JEOR: SCORE: 1327.42

## 2021-08-10 NOTE — LETTER
"  8/10/2021      RE: Jaqueline Argueta  [address]  [facility] [address]       ASSESSMENT/PLAN:    (M20.120) Mallet deformity of left ring finger  (primary encounter diagnosis)  Comment: I am concerned that she has a displaced avulsion and will need surgery; will see hand therapy later today for a splint but also needs a surgical consult asap; anticipatory guidance given  Plan: HAND THERAPY Occupational Therapy or Physical Therapy, Orthopedic  Referral          (F05.871) Pain in finger of left hand  Comment:   Plan: see above    Rick Green MD  August 10, 2021  11:50 AM        Pt is a 25 year old female here today for:     Left Hand pain:   Location: Left 4th finger DIP   Duration: 7/16/21   Trauma/ Fall? Fall onto hand, unsure how she fell   Swelling? Yes, 4th finger DIP   Numbness/ Tingling? None   Weakness? Yes, due to pain   Imaging? XR 8/3/21   Treatment?  None      Findings:     PA, oblique and lateral view(s) of the left hand were obtained.      Minimally displaced fracture through the ulnar base of the fourth  distal phalanx. Approximately 2 mm of dorsal displacement of the  fracture fragment on the lateral view.     No significant degenerative change.   Soft tissue is unremarkable.                                                                    Impression:  Mildly displaced fracture through the dorsal ulnar base of the fourth  distal phalanx.    Past Medical History:   Diagnosis Date     Anemia due to blood loss, acute 12/27/2011      Past Surgical History:   Procedure Laterality Date     NO HISTORY OF SURGERY        No current outpatient medications on file.      No Known Allergies   ROS:   Gen- no fevers/chills   Rheum - no morning stiffness   Derm - no rash/ redness   Neuro - no numbness, no tingling   Remainder of ROS negative.     Exam:   Ht 1.549 m (5' 1\")   Wt 63.5 kg (140 lb)   LMP 08/02/2021 (Exact Date)   BMI 26.45 kg/m         L WRIST/HAND:   Inspection: Swelling - Yes - " at dorsal DIP; Atrophy - NO   Sensation: intact   ROM:   Hand: Full flexion at PIP/DIP; unable to extend at DIP   Strength: 5/5 in all motions   Bony tenderness:   Hand: Metacarpals: No; Phalanges: YES - index finger DIP   Tendons: FDS/FDP intact/Extensor mechanism NOT intact   Maneuvers:deferred          Rick Grene MD

## 2021-08-10 NOTE — PROGRESS NOTES
Hand Therapy Initial Evaluation    Current Date:  8/10/2021    Diagnosis: Mallet deformity of left ring finger  DOI: 7/16/21     Subjective:  Jaqueline Argueta is a 25 year old female.    Patient reports symptoms of the left ring finger which occurred due to a fall, and she jammed it on the ground. Since onset symptoms are Unchanged  General health as reported by patient is good.      Patient to see [facility] for surgical consult in November. Dr Green suspecting avulsion.     Pertinent medical history includes:   Past Medical History:   Diagnosis Date     Anemia due to blood loss, acute 12/27/2011     Medical allergies:  No Known Allergies    Surgical history:   Past Surgical History:   Procedure Laterality Date     NO HISTORY OF SURGERY       Medication history:   No current outpatient medications on file.     Current Facility-Administered Medications   Medication     medroxyPROGESTERone (DEPO-PROVERA) injection 150 mg     Current occupation is [employer]   Job Tasks: Computer Work, Driving, Lifting, Carrying    Occupational Profile Information:  Right hand dominant  Prior functional level:  no limitations  Patient reports symptoms of pain, stiffness/loss of motion, weakness/loss of strength, edema and tingling   Special tests:  x-ray.    Previous treatment: none   Barriers include: live alone  Mobility: No difficulty  Transportation: drives  Currently working in normal job without restrictions  Leisure activities/hobbies: hiking, outdoors,     Objective:  Pain Level (Scale 0-10):   8/10/2021   At Rest 0-2 (numb)   With Use 10     Pain Description:  Date 8/10/2021   Location ring finger   Pain Quality Shooting and Throbbing   Frequency intermittent     Pain is worst  daytime or nighttime, waking from sleep    Exacerbated by  impacts, gripping    Relieved by rest and massage    Progression Unchanged      Edema  Moderate    Edema (Circumference measured in cm)   8/10/2021 8/10/2021   Ring finger R L   P1 5.8 5.5   P2 4.8  5.1     Sensation  WNL throughout all nerve distributions; per patient report    2 point discrimination of Right ring finger normal (3.0 mm)      ROM  Ring Finger 8/10/2021 8/10/2021   AROM (PROM) R L   MCP 0/85 0/72   PIP 0/95 0/75   DIP 0/51 -25/35   JUAREZ       Strength  NT    Assessment:  Patient presents with symptoms consistent with diagnosis of left ring finger mallet,  with conservative intervention.     Patient's limitations or Problem List includes:  Pain, Decreased ROM/motion, Increased edema, Weakness, Decreased stability and Hypomobility of the right hand and ring finger which interferes with the patient's ability to perform Self Care Tasks (dressing, eating, bathing, hygiene/toileting), Work Tasks, Sleep Patterns, Recreational Activities, Household Chores and Driving  as compared to previous level of function.    Rehab Potential:  Excellent - Return to full activity, no limitations    Patient will benefit from skilled Occupational Therapy to increase ROM and  strength and decrease pain and edema to return to previous activity level and resume normal daily tasks and to reach their rehab potential.    Barriers to Learning:  No barrier    Communication Issues:  Patient appears to be able to clearly communicate and understand verbal and written communication and follow directions correctly.    Chart Review: Chart Review and Brief history including review of medical and/or therapy records relating to the presenting problem    Identified Performance Deficits: bathing/showering, functional mobility, home establishment and management, meal preparation and cleanup, work and leisure activities    Assessment of Occupational Performance:  5 or more Performance Deficits    Clinical Decision Making (Complexity): Low complexity    Treatment Explanation:  The following has been discussed with the patient:  RX ordered/plan of care  Anticipated outcomes  Possible risks and side effects    Plan:  Frequency:  2 X a  month, once daily  Duration:  for 3 months    Treatment Plan:   Therapeutic Exercise:  AROM, AAROM, Tendon Gliding, Blocking, Reverse Blocking, Place and Hold, Contract Relax, Extensor Tracking, Isotonics, Isometrics and Stabilization  Neuromuscular re-education:  Nerve Gliding, Kinesthetic Training, Proprioceptive Training and Kinesiotaping  Manual Techniques:  Myofascial release and Manual edema mobilization  Orthotic Fabrication:  Static, Static progressive, Finger based and Hand based  Self Care:  Self Care Tasks, Ergonomic Considerations, Community Transportation and Work Tasks  Discharge Plan:  Achieve all LTG.  Independent in home treatment program.  Reach maximal therapeutic benefit.    Home Exercise Program:  Mallet Finger orthosis, full time, only removed for hygiene and tip kept straight during that time  PIP motion within confines of splint      Next Visit:  Adjust splint as needed

## 2021-08-11 ENCOUNTER — ANCILLARY PROCEDURE (OUTPATIENT)
Dept: MAMMOGRAPHY | Facility: CLINIC | Age: 26
End: 2021-08-11
Payer: COMMERCIAL

## 2021-08-11 ENCOUNTER — OFFICE VISIT (OUTPATIENT)
Dept: SURGERY | Facility: CLINIC | Age: 26
End: 2021-08-11
Attending: ADVANCED PRACTICE MIDWIFE
Payer: COMMERCIAL

## 2021-08-11 VITALS
HEIGHT: 61 IN | OXYGEN SATURATION: 100 % | TEMPERATURE: 97.9 F | DIASTOLIC BLOOD PRESSURE: 67 MMHG | BODY MASS INDEX: 28.21 KG/M2 | WEIGHT: 149.4 LBS | HEART RATE: 82 BPM | RESPIRATION RATE: 16 BRPM | SYSTOLIC BLOOD PRESSURE: 119 MMHG

## 2021-08-11 DIAGNOSIS — R22.31 MASS OF RIGHT AXILLA: ICD-10-CM

## 2021-08-11 DIAGNOSIS — R22.31 MASS OF AXILLA, RIGHT: ICD-10-CM

## 2021-08-11 PROCEDURE — 99215 OFFICE O/P EST HI 40 MIN: CPT | Performed by: PHYSICIAN ASSISTANT

## 2021-08-11 PROCEDURE — 76882 US LMTD JT/FCL EVL NVASC XTR: CPT | Mod: RT | Performed by: RADIOLOGY

## 2021-08-11 PROCEDURE — G0463 HOSPITAL OUTPT CLINIC VISIT: HCPCS

## 2021-08-11 ASSESSMENT — PAIN SCALES - GENERAL: PAINLEVEL: NO PAIN (0)

## 2021-08-11 ASSESSMENT — MIFFLIN-ST. JEOR: SCORE: 1373.54

## 2021-08-11 NOTE — NURSING NOTE
"Oncology Rooming Note    August 11, 2021 9:04 AM   Jaqueline Argueta is a 25 year old female who presents for:    Chief Complaint   Patient presents with     Oncology Clinic Visit     MASS OF RIGHT AXILLA     Initial Vitals: /67 (BP Location: Right arm, Patient Position: Sitting, Cuff Size: Adult Regular)   Pulse 82   Temp 97.9  F (36.6  C) (Oral)   Resp 16   Ht 1.555 m (5' 1.22\")   Wt 67.8 kg (149 lb 6.4 oz)   LMP 08/02/2021 (Exact Date)   SpO2 100%   BMI 28.03 kg/m   Estimated body mass index is 28.03 kg/m  as calculated from the following:    Height as of this encounter: 1.555 m (5' 1.22\").    Weight as of this encounter: 67.8 kg (149 lb 6.4 oz). Body surface area is 1.71 meters squared.  No Pain (0) Comment: Data Unavailable   Patient's last menstrual period was 08/02/2021 (exact date).  Allergies reviewed: Yes  Medications reviewed: Yes    Medications: Medication refills not needed today.  Pharmacy name entered into [facility]: [facility] [place], [place] - [address]    Clinical concerns: No new concerns.        Ros Grimes CMA              "

## 2021-08-11 NOTE — PATIENT INSTRUCTIONS
Jaqueline Argueta is a 25 year old woman with left axillary mass. Imaging demonstrated a epidermoid inclusion cyst.     1) Right axillary epidermoid inclusion cyst  - Conservative management. Can be removed if needed.     2)Screening  Clinic encounter every 1-3 years. Be familiar with your breast and how they normally feel and appear. Promptly report any changes to your healthcare provider. Beginning screening mammograms at age 40.

## 2021-08-11 NOTE — LETTER
2021         RE: Jaqueline Argueta  [address]  [facility] [address]        Dear Colleague,    Thank you for referring your patient, Jaqueline Argueta, to the [facility]. Please see a copy of my visit note below.    NEW CONSULTATION   Aug 11, 2021     Jaqueline Argueta is a 25 year old woman who presents with right axillary mass. She was referred by Dr. Suazo.    HPI:    She developed a right axillary mass 2-3 months ago. Initially it was painful but the pain has resolved. She denies any erythema or fever. The mass is now smaller. She denies any breast mass, skin change, nipple inversion or nipple discharge.     BREAST-SPECIFIC HISTORY:    Previous breast imaging: No    Prior breast biopsies/surgeries: No    Prior history of breast cancer or DCIS: No  Prior radiation history: No   Self breast exams: Yes  Breast density:     GYN HISTORY:  . Age at 1st pregnancy: 16. Breastfeeding history: Yes.   Age at menarche: 11  Menopausal: premenopausal   Menopausal hormone replacement therapy: no    RISK ASSESSMENT:< 20% lifetime risk     FAMILY HISTORY:  Breast ca: No  Ovarian ca: No   Pancreatic ca: No  Prostate: No  Gastric ca: No  Melanoma: No  Colon ca: No  Other cancer: No  Other genetic, testing, syndromes, or clotting disorders: no     PAST MEDICAL HISTORY  Past Medical History:   Diagnosis Date     Anemia due to blood loss, acute 2011     PAST SURGICAL HISTORY   Past Surgical History:   Procedure Laterality Date     NO HISTORY OF SURGERY       MEDICATIONS  No current outpatient medications on file.     ALLERGIES   No Known Allergies     SOCIAL HISTORY:  Smokes: No  EtOH: No  Works at [employer]    ROS:  Change in vision No  Headaches: no  Respiratory: No shortness of breath, dyspnea on exertion, cough, or hemoptysis and No shortnesnos of breath, dyspnea on exertion, cough, or hemoptysis   Cardiovascular: negative   Gastrointestinal: negative Abdominal pain:  "no  Breast: right axillary mass  Musculoskeletal: negative Joint pain No Back pain: no  Psychiatric: negative  Hematologic/Lymphatic/Immunologic: negative  Endocrine: negative    EXAM  /67 (BP Location: Right arm, Patient Position: Sitting, Cuff Size: Adult Regular)   Pulse 82   Temp 97.9  F (36.6  C) (Oral)   Resp 16   Ht 1.555 m (5' 1.22\")   Wt 67.8 kg (149 lb 6.4 oz)   LMP 08/02/2021 (Exact Date)   SpO2 100%   BMI 28.03 kg/m     PHYSICAL EXAM  Respiratory: breathing non labored.   Breasts: Examination was done in both the upright and supine positions.  Breasts are symmetrical . No dominant fixed or suspicious masses noted. No skin or nipple changes. No nipple discharge.   4 mm mass within the skin. No erythema.   No clavicular, cervical, or axillary lymphadenopathy.     INVESTIGATIONS:    8/11/21 right axillary ultrasound: demonstrated a epidermoid inclusion cyst.     ASSESSMENT/PLAN:    Jaqueline Argueta is a 25 year old woman with left axillary mass. Imaging demonstrated a epidermoid inclusion cyst.     1) Right axillary epidermoid inclusion cyst  - Conservative management. Can be removed if needed.     2)Screening  Clinic encounter every 1-3 years. Be familiar with your breast and how they normally feel and appear. Promptly report any changes to your healthcare provider. Beginning screening mammograms at age 40.       Caitlin Egan PA-C    40 minutes spent on the date of the encounter doing chart review, review of test results, interpretation of tests, patient visit and documentation.         Again, thank you for allowing me to participate in the care of your patient.        Sincerely,        Caitlin Egan PA-C    "

## 2021-09-01 ENCOUNTER — OFFICE VISIT (OUTPATIENT)
Dept: FAMILY MEDICINE | Facility: CLINIC | Age: 26
End: 2021-09-01
Payer: COMMERCIAL

## 2021-09-01 ENCOUNTER — NURSE TRIAGE (OUTPATIENT)
Dept: FAMILY MEDICINE | Facility: CLINIC | Age: 26
End: 2021-09-01

## 2021-09-01 VITALS
SYSTOLIC BLOOD PRESSURE: 131 MMHG | OXYGEN SATURATION: 99 % | WEIGHT: 146 LBS | BODY MASS INDEX: 27.39 KG/M2 | DIASTOLIC BLOOD PRESSURE: 78 MMHG | HEART RATE: 83 BPM

## 2021-09-01 DIAGNOSIS — J02.0 STREP THROAT: ICD-10-CM

## 2021-09-01 DIAGNOSIS — K59.00 CONSTIPATION, UNSPECIFIED CONSTIPATION TYPE: ICD-10-CM

## 2021-09-01 DIAGNOSIS — R07.0 THROAT PAIN: Primary | ICD-10-CM

## 2021-09-01 LAB — DEPRECATED S PYO AG THROAT QL EIA: POSITIVE

## 2021-09-01 PROCEDURE — 99214 OFFICE O/P EST MOD 30 MIN: CPT | Performed by: PHYSICIAN ASSISTANT

## 2021-09-01 PROCEDURE — 87880 STREP A ASSAY W/OPTIC: CPT | Performed by: PHYSICIAN ASSISTANT

## 2021-09-01 RX ORDER — AMOXICILLIN 500 MG/1
500 CAPSULE ORAL 2 TIMES DAILY
Qty: 20 CAPSULE | Refills: 0 | Status: SHIPPED | OUTPATIENT
Start: 2021-09-01 | End: 2021-09-11

## 2021-09-01 RX ORDER — POLYETHYLENE GLYCOL 3350 17 G/17G
1 POWDER, FOR SOLUTION ORAL DAILY
Qty: 238 G | Refills: 0 | Status: SHIPPED | OUTPATIENT
Start: 2021-09-01 | End: 2021-09-08

## 2021-09-01 ASSESSMENT — ENCOUNTER SYMPTOMS
NAUSEA: 0
COUGH: 0
ABDOMINAL PAIN: 1
VOMITING: 0
SORE THROAT: 1
FEVER: 0
CONSTIPATION: 1

## 2021-09-01 NOTE — PROGRESS NOTES
Patient presents with:  Abdominal Pain: Pain in lower abdomen and left upper abdomen x 1 week   Constipation: no bowel movent x 1 week   Pharyngitis: pain and in swollen in throat       Clinical Decision Making: Patient experiencing throat pain.  RST is positive.  No physical exam findings concerning for peritonsillar abscess.  Patient started on amoxicillin for treatment.    Patient also experiencing constipation abdominal pain.  Abdominal exam is not consistent with appendicitis.  Recommend jumpstart bowel movements with magnesium citrate solution followed by MiraLAX for the next 7 days.  Patient will follow up with she is still not having normal bowel movements.      ICD-10-CM    1. Throat pain  R07.0 Streptococcus A Rapid Screen w/Reflex to PCR   2. Strep throat  J02.0 amoxicillin (AMOXIL) 500 MG capsule   3. Constipation, unspecified constipation type  K59.00 polyethylene glycol (MIRALAX) 17 GM/Dose powder     magnesium citrate solution       Patient Instructions   1) Increase rest and fluid intake.  2) Take Tylenol as needed for pain.   3) Strep infection is considered contagious until treated for 24 hours, avoid attending school, , or work during contagious period.  4) Complete full course of antibiotics.   5) Replace toothbrush after being on the antibiotic for 48 hours to avoid reinfection   6) Return if not resolved in one week or sooner if worsening.      Abdominal symptoms and exam are consistent with constipation.     Recommend taking 1/2 bottle of magnesium citrate solution with 8 oz of water. Wait 3-4 hours. If no bowel movement has happened go ahead and take second half of bottle. Starting the next take take 1 capful of Miralax dissolved in 4-8 oz fluid once daily. After a week you can decrease down to 1/2 capful of miralax.       HPI:  Jaqueline Argueta is a 25 year old female who presents today complaining of throat pain over the past 2 days.  She denies any fevers, cough, or  chills.    Patient is also here for low abdominal pain and intermittent cramping on the left side of the abdomen for the past week.  Pain is intermittent and sometimes sharp.  She also is feeling constipated.  She has not had normal bowel movement in 1 week.  She has had some passage of stools, they have been small and pellet-like.  She denies any previous abdominal surgeries.  She has not had any nausea or vomiting.  She has not taken any medications such as laxatives.    History obtained from the patient.    Problem List:  2021: Mallet deformity of left ring finger  2021: Pain of finger of left hand  2018: Rubella non-immune status, antepartum  2018: Nausea/vomiting in pregnancy  2018: Nausea and vomiting of pregnancy, antepartum  2018: Need for Tdap vaccination  2016: Condyloma acuminata  2012: IUD  2011: Anemia due to blood loss, acute  2011:  (normal spontaneous vaginal delivery)  2011: Teen pregnancy  2011: Pregnancy, high-risk, maternal age < 16 primigravida      Past Medical History:   Diagnosis Date     Anemia due to blood loss, acute 2011       Social History     Tobacco Use     Smoking status: Current Every Day Smoker     Smokeless tobacco: Never Used     Tobacco comment: smoke marijuana once or twice a wk   Substance Use Topics     Alcohol use: No     Comment: rare       Review of Systems   Constitutional: Negative for fever.   HENT: Positive for sore throat.    Respiratory: Negative for cough.    Gastrointestinal: Positive for abdominal pain and constipation. Negative for nausea and vomiting.       Vitals:    21 1336   BP: 131/78   BP Location: Right arm   Patient Position: Chair   Cuff Size: Adult Regular   Pulse: 83   SpO2: 99%   Weight: 66.2 kg (146 lb)       Physical Exam  Vitals and nursing note reviewed.   Constitutional:       General: She is not in acute distress.     Appearance: She is not toxic-appearing or diaphoretic.   HENT:      Head:  Normocephalic and atraumatic.      Right Ear: External ear normal.      Left Ear: External ear normal.      Mouth/Throat:      Mouth: Mucous membranes are moist.      Pharynx: Oropharyngeal exudate and posterior oropharyngeal erythema present.   Eyes:      Conjunctiva/sclera: Conjunctivae normal.   Pulmonary:      Effort: Pulmonary effort is normal. No respiratory distress.   Abdominal:      General: Abdomen is flat. Bowel sounds are normal.      Palpations: Abdomen is soft.      Tenderness: There is abdominal tenderness in the right lower quadrant, suprapubic area, left upper quadrant and left lower quadrant. There is no guarding or rebound. Negative signs include Cotter's sign, Rovsing's sign, McBurney's sign and psoas sign.   Neurological:      Mental Status: She is alert.   Psychiatric:         Mood and Affect: Mood normal.         Behavior: Behavior normal.         Thought Content: Thought content normal.         Judgment: Judgment normal.         Labs:  Results for orders placed or performed in visit on 09/01/21   Streptococcus A Rapid Screen w/Reflex to PCR     Status: Abnormal    Specimen: Throat; Swab   Result Value Ref Range    Group A Strep antigen Positive (A) Negative       At the end of the encounter, I discussed results, diagnosis, medications. Discussed red flags for immediate return to clinic/ER, as well as indications for follow up if no improvement. Patient understood and agreed to plan. Patient was stable for discharge.

## 2021-09-01 NOTE — TELEPHONE ENCOUNTER
"  Reason for Disposition    MODERATE OR MILD pain that comes and goes (cramps) lasts > 24 hours    Answer Assessment - Initial Assessment Questions  1. LOCATION: \"Where does it hurt?\"       Left side of abdomen in both upper and lower quadrant  2. RADIATION: \"Does the pain shoot anywhere else?\" (e.g., chest, back)      no  3. ONSET: \"When did the pain begin?\" (e.g., minutes, hours or days ago)       Week ago  4. SUDDEN: \"Gradual or sudden onset?\"      Sudden sharp pain woke her up at night   5. PATTERN \"Does the pain come and go, or is it constant?\"     - If constant: \"Is it getting better, staying the same, or worsening?\"       (Note: Constant means the pain never goes away completely; most serious pain is constant and it progresses)      - If intermittent: \"How long does it last?\" \"Do you have pain now?\"      (Note: Intermittent means the pain goes away completely between bouts)      Intermittent lasting about 5-10 minutes  6. SEVERITY: \"How bad is the pain?\"  (e.g., Scale 1-10; mild, moderate, or severe)    - MILD (1-3): doesn't interfere with normal activities, abdomen soft and not tender to touch     - MODERATE (4-7): interferes with normal activities or awakens from sleep, tender to touch     - SEVERE (8-10): excruciating pain, doubled over, unable to do any normal activities       8-9/10 when the pain comes  7. RECURRENT SYMPTOM: \"Have you ever had this type of abdominal pain before?\" If so, ask: \"When was the last time?\" and \"What happened that time?\"       no  8. CAUSE: \"What do you think is causing the abdominal pain?\"      Constipation, unable to have a regular bowel movements.  Last regular BM was about 7-10 days ago  9. RELIEVING/AGGRAVATING FACTORS: \"What makes it better or worse?\" (e.g., movement, antacids, bowel movement)      Hasn't tried anything and nothing noted to make it worse  10. OTHER SYMPTOMS: \"Has there been any vomiting, diarrhea, constipation, or urine problems?\"        Sore throat, " "constipation   11. PREGNANCY: \"Is there any chance you are pregnant?\" \"When was your last menstrual period?\"        No, currently on menstrual cycle    Protocols used: ABDOMINAL PAIN - FEMALE-A-OH    "

## 2021-09-01 NOTE — PATIENT INSTRUCTIONS
1) Increase rest and fluid intake.  2) Take Tylenol as needed for pain.   3) Strep infection is considered contagious until treated for 24 hours, avoid attending school, , or work during contagious period.  4) Complete full course of antibiotics.   5) Replace toothbrush after being on the antibiotic for 48 hours to avoid reinfection   6) Return if not resolved in one week or sooner if worsening.      Abdominal symptoms and exam are consistent with constipation.     Recommend taking 1/2 bottle of magnesium citrate solution with 8 oz of water. Wait 3-4 hours. If no bowel movement has happened go ahead and take second half of bottle. Starting the next take take 1 capful of Miralax dissolved in 4-8 oz fluid once daily. After a week you can decrease down to 1/2 capful of miralax.

## 2021-09-04 ENCOUNTER — HEALTH MAINTENANCE LETTER (OUTPATIENT)
Age: 26
End: 2021-09-04

## 2021-09-23 PROBLEM — M79.645 PAIN OF FINGER OF LEFT HAND: Status: RESOLVED | Noted: 2021-08-10 | Resolved: 2021-09-23

## 2021-09-23 PROBLEM — M20.012 MALLET DEFORMITY OF LEFT RING FINGER: Status: RESOLVED | Noted: 2021-08-10 | Resolved: 2021-09-23

## 2021-10-04 ENCOUNTER — TELEPHONE (OUTPATIENT)
Dept: ORTHOPEDICS | Facility: CLINIC | Age: 26
End: 2021-10-04

## 2021-10-04 NOTE — TELEPHONE ENCOUNTER
LVM offering to move appointment with Dr. Mcnair up (patient may see Dr. Garcia or Dr. Beltran if they would like to be seen sooner).

## 2021-10-18 ENCOUNTER — OFFICE VISIT (OUTPATIENT)
Dept: ORTHOPEDICS | Facility: CLINIC | Age: 26
End: 2021-10-18
Attending: FAMILY MEDICINE
Payer: COMMERCIAL

## 2021-10-18 ENCOUNTER — ANCILLARY PROCEDURE (OUTPATIENT)
Dept: GENERAL RADIOLOGY | Facility: CLINIC | Age: 26
End: 2021-10-18
Attending: STUDENT IN AN ORGANIZED HEALTH CARE EDUCATION/TRAINING PROGRAM
Payer: COMMERCIAL

## 2021-10-18 ENCOUNTER — ANCILLARY PROCEDURE (OUTPATIENT)
Dept: CT IMAGING | Facility: CLINIC | Age: 26
End: 2021-10-18
Attending: STUDENT IN AN ORGANIZED HEALTH CARE EDUCATION/TRAINING PROGRAM
Payer: COMMERCIAL

## 2021-10-18 DIAGNOSIS — M20.012 MALLET DEFORMITY OF LEFT RING FINGER: ICD-10-CM

## 2021-10-18 PROCEDURE — 99204 OFFICE O/P NEW MOD 45 MIN: CPT | Performed by: STUDENT IN AN ORGANIZED HEALTH CARE EDUCATION/TRAINING PROGRAM

## 2021-10-18 PROCEDURE — 73140 X-RAY EXAM OF FINGER(S): CPT | Mod: LT | Performed by: RADIOLOGY

## 2021-10-18 PROCEDURE — 73200 CT UPPER EXTREMITY W/O DYE: CPT | Mod: LT | Performed by: RADIOLOGY

## 2021-10-18 NOTE — LETTER
10/18/2021         RE: Jaqueline Argueta  [address]  [facility] [address]        Dear Colleague,    Thank you for referring your patient, Jaqueline Argueta, to the [facility] [place]. Please see a copy of my visit note below.    Orthopaedic Surgery Hand and Upper Extremity Clinic H&P NOTE:  10/18/2021     Patient Name: Jaqueline Argueta  MRN: [id number]    CHIEF COMPLAINT:  Left ring finger chronic bony mallet injury    HPI:  Ms. Jaqueline Argueta is a 26 year old female right hand dominant who presents with left ring finger chronic bony mallet injury. She sustained the injury when she jammed the finger in July after a ground level fall. Did not think too much of it, but had it examined in August due to persistent pain and extensor lag. XRs at that time revealed the bony mallet injury. She was given an extension splint but admits she was not fully compliant with it as it did not fit well. I see notes from hand therapy at that time but she does not remember seeing them.    She continues to have pain and mild extensor lag. Main concern is the tender bump over the DIP joint and pain with flexion. Has difficulty even carrying light objects such as a grocery bag. Cannot fully flex at DIP joint. No numbness/tingling.    The patient denies pain elsewhere in the symptomatic upper extremity.      PAST MEDICAL HISTORY:  Past Medical History:   Diagnosis Date     Anemia due to blood loss, acute 12/27/2011       PAST SURGICAL HISTORY:  Past Surgical History:   Procedure Laterality Date     NO HISTORY OF SURGERY         MEDICATIONS:  No current outpatient medications on file.     Current Facility-Administered Medications   Medication     medroxyPROGESTERone (DEPO-PROVERA) injection 150 mg       ALLERGIES:   No Known Allergies    FAMILY HISTORY:  No pertinent family history    SOCIAL HISTORY:  Social History     Tobacco Use     Smoking status: Current Every Day Smoker     Smokeless  tobacco: Never Used     Tobacco comment: smoke marijuana once or twice a wk   Substance Use Topics     Alcohol use: No     Comment: rare     Drug use: Yes     Types: Marijuana     Comment: marijuana once or twice a wk       Denies tobacco    The patient's past medical, family, and social history was reviewed and confirmed.    REVIEW OF SYMPTOMS:      General: Negative   Eyes: Negative   Ear, Nose and Throat: Negative   Respiratory: Negative   Cardiovascular: Negative   Gastrointestinal: Negative   Genito-urinary: Negative   Musculoskeletal: Negative  Neurological: Negative   Psychological: Negative  HEME: Negative   ENDO: Negative   SKIN: Negative    VITALS:  There were no vitals filed for this visit.    EXAM:  General: NAD, A&Ox3  HEENT: NC/AT  CV: RRR by peripheral pulse  Pulmonary: Non-labored breathing on RA  LUE:  Palpable enlargement of the dorsal DIP joint  Pain dorsally with passive flexion of the DIP joint  Unable to make fully closed fist, tip to palm distance approximately 1 cm  Mild extensor lag  Intact EDC, FDP, FDS  SILT m/r/u  WWP CR< 2s    IMAGING:    XRs of left ring finger from August and new ones obtained today and reviewed by me demonstrates bony mallet injury of the dorsal distal phalangeal base. There has been interval healing and calcification around the fracture. Mild volar subluxation of the distal phalanx.    I have personally reviewed the above images and labs.         IMPRESSION AND RECOMMENDATIONS:  Ms. Jaqueline Argueta is a 26 year old year old female right hand dominant with chronic left ring finger bony mallet injury.    I suspect that she has a nonunion or fibrous union given her dorsal pain with passive flexion. She has a minimal extensor lag, and has some active extension suggesting partial healing.    We discussed the patient's diagnosis and treatment options in detail today. This included a description of the associated pathology and non-operative/operative treatment options with  the use of illustrations and diagrams.    I will order a CT to evaluate the degree of union of the fracture. If there is bony healing, I discussed that surgery would not significantly improve her result. If there is a nonunion or fibrous union, we could attempt percutaneous vs. Open treatment with dorsal extension blocking pinning to try to get the injury to unite. I discussed that this would be a prolonged immobilization course with pins in for 4-6 weeks. I discussed that with any surgery, stiffness is a risk and her ROM may not improve. Goal would be to achieve union and eliminate pain. She likely would have a persistent deformity.    Patient is uncertain if she wants to pursue surgery at this point. I recommended hand therapy for ROM and modalities as necessary to improve function. She also voices difficulty with making multiple hand therapy appointments as she has a young daughter who goes to school.    She will f/u with me in 4 weeks, or sooner if she would like to pursue surgery. All questions answered to her satisfaction. She voiced understanding and agreement.      Kenji Beltran MD    Hand, Upper Extremity & Microvascular Surgery  Department of Orthopaedic Surgery  [facility]

## 2021-10-18 NOTE — PROGRESS NOTES
Orthopaedic Surgery Hand and Upper Extremity Clinic H&P NOTE:  10/18/2021     Patient Name: Jaqueline Argueta  MRN: [id number]    CHIEF COMPLAINT:  Left ring finger chronic bony mallet injury    HPI:  Ms. Jaqueline Argueta is a 26 year old female right hand dominant who presents with left ring finger chronic bony mallet injury. She sustained the injury when she jammed the finger in July after a ground level fall. Did not think too much of it, but had it examined in August due to persistent pain and extensor lag. XRs at that time revealed the bony mallet injury. She was given an extension splint but admits she was not fully compliant with it as it did not fit well. I see notes from hand therapy at that time but she does not remember seeing them.    She continues to have pain and mild extensor lag. Main concern is the tender bump over the DIP joint and pain with flexion. Has difficulty even carrying light objects such as a grocery bag. Cannot fully flex at DIP joint. No numbness/tingling.    The patient denies pain elsewhere in the symptomatic upper extremity.      PAST MEDICAL HISTORY:  Past Medical History:   Diagnosis Date     Anemia due to blood loss, acute 12/27/2011       PAST SURGICAL HISTORY:  Past Surgical History:   Procedure Laterality Date     NO HISTORY OF SURGERY         MEDICATIONS:  No current outpatient medications on file.     Current Facility-Administered Medications   Medication     medroxyPROGESTERone (DEPO-PROVERA) injection 150 mg       ALLERGIES:   No Known Allergies    FAMILY HISTORY:  No pertinent family history    SOCIAL HISTORY:  Social History     Tobacco Use     Smoking status: Current Every Day Smoker     Smokeless tobacco: Never Used     Tobacco comment: smoke marijuana once or twice a wk   Substance Use Topics     Alcohol use: No     Comment: rare     Drug use: Yes     Types: Marijuana     Comment: marijuana once or twice a wk       Denies tobacco    The patient's past medical, family,  and social history was reviewed and confirmed.    REVIEW OF SYMPTOMS:      General: Negative   Eyes: Negative   Ear, Nose and Throat: Negative   Respiratory: Negative   Cardiovascular: Negative   Gastrointestinal: Negative   Genito-urinary: Negative   Musculoskeletal: Negative  Neurological: Negative   Psychological: Negative  HEME: Negative   ENDO: Negative   SKIN: Negative    VITALS:  There were no vitals filed for this visit.    EXAM:  General: NAD, A&Ox3  HEENT: NC/AT  CV: RRR by peripheral pulse  Pulmonary: Non-labored breathing on RA  LUE:  Palpable enlargement of the dorsal DIP joint  Pain dorsally with passive flexion of the DIP joint  Unable to make fully closed fist, tip to palm distance approximately 1 cm  Mild extensor lag  Intact EDC, FDP, FDS  SILT m/r/u  WWP CR< 2s    IMAGING:    XRs of left ring finger from August and new ones obtained today and reviewed by me demonstrates bony mallet injury of the dorsal distal phalangeal base. There has been interval healing and calcification around the fracture. Mild volar subluxation of the distal phalanx.    I have personally reviewed the above images and labs.         IMPRESSION AND RECOMMENDATIONS:  Ms. Jaqueline Argueta is a 26 year old year old female right hand dominant with chronic left ring finger bony mallet injury.    I suspect that she has a nonunion or fibrous union given her dorsal pain with passive flexion. She has a minimal extensor lag, and has some active extension suggesting partial healing.    We discussed the patient's diagnosis and treatment options in detail today. This included a description of the associated pathology and non-operative/operative treatment options with the use of illustrations and diagrams.    I will order a CT to evaluate the degree of union of the fracture. If there is bony healing, I discussed that surgery would not significantly improve her result. If there is a nonunion or fibrous union, we could attempt percutaneous  vs. Open treatment with dorsal extension blocking pinning to try to get the injury to unite. I discussed that this would be a prolonged immobilization course with pins in for 4-6 weeks. I discussed that with any surgery, stiffness is a risk and her ROM may not improve. Goal would be to achieve union and eliminate pain. She likely would have a persistent deformity.    Patient is uncertain if she wants to pursue surgery at this point. I recommended hand therapy for ROM and modalities as necessary to improve function. She also voices difficulty with making multiple hand therapy appointments as she has a young daughter who goes to school.    She will f/u with me in 4 weeks, or sooner if she would like to pursue surgery. All questions answered to her satisfaction. She voiced understanding and agreement.      Kenji Beltran MD    Hand, Upper Extremity & Microvascular Surgery  Department of Orthopaedic Surgery  [facility]

## 2021-10-18 NOTE — NURSING NOTE
Reason For Visit:   Chief Complaint   Patient presents with     Consult For     Mallet deformity of left ring finger       Primary MD: Lorenzo, [facility]    ?  No    Age: 26 year old    Date of injury: 7/16/21  Type of injury: Mallet deformity of left ring finger - took a fall.  Date of surgery: NA  Type of surgery: NA.  Smoker: Yes  Request smoking cessation information: No      There were no vitals taken for this visit.      Pain Assessment  Patient Currently in Pain: Yes  0-10 Pain Scale: 7  Primary Pain Location: Finger (Comment which one) (Left ring finger)       QuickDASH Assessment  No flowsheet data found.       No Known Allergies    Dominique Bradley, ATC

## 2021-10-20 ENCOUNTER — TELEPHONE (OUTPATIENT)
Dept: ORTHOPEDICS | Facility: CLINIC | Age: 26
End: 2021-10-20

## 2021-10-20 NOTE — TELEPHONE ENCOUNTER
Called patient back. She wants to know what Dr. Beltran thinks after the CT was done about if she should go to hand therapy or have surgery.     Sending to team for further follow up after Dr. Beltran reviews CT     Allison PORTILLO

## 2021-10-20 NOTE — TELEPHONE ENCOUNTER
"Spoke with patient.  Will give message to Dr Beltran for review and recommendations.  Informed patient that per CT results the fracture appears to have healed.    IMPRESSION: Healed fracture deformity of the dorsal base of the fourth  distal phalanx with incongruity of the articular surface.    Informed patient that MD will review images and determine if he agrees with this and to see if he recommends anything further.  Informed patient that MD had stated in office visit notes from 10-18-21: \"I will order a CT to evaluate the degree of union of the fracture. If there is bony healing, I discussed that surgery would not significantly improve her result.\"      Patient had no further questions at this time and will await Dr Beltran's recommendations after reviewing the CT scan results. Marsha Saucedo RN         "

## 2021-10-20 NOTE — TELEPHONE ENCOUNTER
[facility] Call Center    Phone Message    May a detailed message be left on voicemail: yes     Reason for Call: Other: pt needs a callback did not disclose why     Action Taken: Other: [facility]    Travel Screening: Not Applicable    Please call back at earliest convenience.

## 2021-10-27 NOTE — TELEPHONE ENCOUNTER
Dr Beltran informed RN on 10-25-21 that he had attempted a few times to try and reach patient by telephone on Friday 10-22-21 with no response.  He will attempt again. Marsha Saucedo RN

## 2021-11-22 ENCOUNTER — TELEPHONE (OUTPATIENT)
Dept: BEHAVIORAL HEALTH | Facility: CLINIC | Age: 26
End: 2021-11-22
Payer: COMMERCIAL

## 2021-11-24 ENCOUNTER — HOSPITAL ENCOUNTER (OUTPATIENT)
Dept: BEHAVIORAL HEALTH | Facility: CLINIC | Age: 26
Discharge: HOME | End: 2021-11-24
Attending: FAMILY MEDICINE | Admitting: FAMILY MEDICINE
Payer: COMMERCIAL

## 2021-11-24 DIAGNOSIS — F43.10 PTSD (POST-TRAUMATIC STRESS DISORDER): Primary | ICD-10-CM

## 2021-11-24 PROCEDURE — 90791 PSYCH DIAGNOSTIC EVALUATION: CPT | Mod: TEL | Performed by: COUNSELOR

## 2021-11-24 ASSESSMENT — COLUMBIA-SUICIDE SEVERITY RATING SCALE - C-SSRS
4. HAVE YOU HAD THESE THOUGHTS AND HAD SOME INTENTION OF ACTING ON THEM?: NO
2. HAVE YOU ACTUALLY HAD ANY THOUGHTS OF KILLING YOURSELF LIFETIME?: NO
1. IN THE PAST MONTH, HAVE YOU WISHED YOU WERE DEAD OR WISHED YOU COULD GO TO SLEEP AND NOT WAKE UP?: NO
1. IN THE PAST MONTH, HAVE YOU WISHED YOU WERE DEAD OR WISHED YOU COULD GO TO SLEEP AND NOT WAKE UP?: NO
2. HAVE YOU ACTUALLY HAD ANY THOUGHTS OF KILLING YOURSELF?: NO
3. HAVE YOU BEEN THINKING ABOUT HOW YOU MIGHT KILL YOURSELF?: NO
4. HAVE YOU HAD THESE THOUGHTS AND HAD SOME INTENTION OF ACTING ON THEM?: NO
5. HAVE YOU STARTED TO WORK OUT OR WORKED OUT THE DETAILS OF HOW TO KILL YOURSELF? DO YOU INTEND TO CARRY OUT THIS PLAN?: NO
5. HAVE YOU STARTED TO WORK OUT OR WORKED OUT THE DETAILS OF HOW TO KILL YOURSELF? DO YOU INTEND TO CARRY OUT THIS PLAN?: NO
ATTEMPT LIFETIME: NO

## 2021-11-24 ASSESSMENT — ANXIETY QUESTIONNAIRES
2. NOT BEING ABLE TO STOP OR CONTROL WORRYING: MORE THAN HALF THE DAYS
1. FEELING NERVOUS, ANXIOUS, OR ON EDGE: MORE THAN HALF THE DAYS
5. BEING SO RESTLESS THAT IT IS HARD TO SIT STILL: NOT AT ALL
4. TROUBLE RELAXING: NEARLY EVERY DAY
3. WORRYING TOO MUCH ABOUT DIFFERENT THINGS: NEARLY EVERY DAY
7. FEELING AFRAID AS IF SOMETHING AWFUL MIGHT HAPPEN: MORE THAN HALF THE DAYS
GAD7 TOTAL SCORE: 15
6. BECOMING EASILY ANNOYED OR IRRITABLE: NEARLY EVERY DAY
IF YOU CHECKED OFF ANY PROBLEMS ON THIS QUESTIONNAIRE, HOW DIFFICULT HAVE THESE PROBLEMS MADE IT FOR YOU TO DO YOUR WORK, TAKE CARE OF THINGS AT HOME, OR GET ALONG WITH OTHER PEOPLE: VERY DIFFICULT
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
## 2021-11-25 ASSESSMENT — ANXIETY QUESTIONNAIRES: GAD7 TOTAL SCORE: 15

## 2021-11-25 ASSESSMENT — PATIENT HEALTH QUESTIONNAIRE - PHQ9: SUM OF ALL RESPONSES TO PHQ QUESTIONS 1-9: 21

## 2021-12-07 ENCOUNTER — OFFICE VISIT (OUTPATIENT)
Dept: FAMILY MEDICINE | Facility: CLINIC | Age: 26
End: 2021-12-07
Payer: COMMERCIAL

## 2021-12-07 VITALS
DIASTOLIC BLOOD PRESSURE: 77 MMHG | OXYGEN SATURATION: 96 % | WEIGHT: 146.5 LBS | HEIGHT: 61 IN | SYSTOLIC BLOOD PRESSURE: 113 MMHG | HEART RATE: 87 BPM | TEMPERATURE: 98.4 F | BODY MASS INDEX: 27.66 KG/M2

## 2021-12-07 DIAGNOSIS — N89.8 VAGINAL DISCHARGE: ICD-10-CM

## 2021-12-07 DIAGNOSIS — N91.2 AMENORRHEA: ICD-10-CM

## 2021-12-07 DIAGNOSIS — Z11.3 SCREEN FOR STD (SEXUALLY TRANSMITTED DISEASE): Primary | ICD-10-CM

## 2021-12-07 LAB
CLUE CELLS: ABNORMAL
HCG UR QL: NEGATIVE
TRICHOMONAS, WET PREP: ABNORMAL
WBC'S/HIGH POWER FIELD, WET PREP: ABNORMAL
YEAST, WET PREP: ABNORMAL

## 2021-12-07 PROCEDURE — G0145 SCR C/V CYTO,THINLAYER,RESCR: HCPCS | Performed by: FAMILY MEDICINE

## 2021-12-07 PROCEDURE — 86803 HEPATITIS C AB TEST: CPT | Performed by: FAMILY MEDICINE

## 2021-12-07 PROCEDURE — 81025 URINE PREGNANCY TEST: CPT | Performed by: FAMILY MEDICINE

## 2021-12-07 PROCEDURE — 87210 SMEAR WET MOUNT SALINE/INK: CPT | Performed by: FAMILY MEDICINE

## 2021-12-07 PROCEDURE — 87389 HIV-1 AG W/HIV-1&-2 AB AG IA: CPT | Performed by: FAMILY MEDICINE

## 2021-12-07 PROCEDURE — 36415 COLL VENOUS BLD VENIPUNCTURE: CPT | Performed by: FAMILY MEDICINE

## 2021-12-07 PROCEDURE — 87591 N.GONORRHOEAE DNA AMP PROB: CPT | Performed by: FAMILY MEDICINE

## 2021-12-07 PROCEDURE — 99214 OFFICE O/P EST MOD 30 MIN: CPT | Performed by: FAMILY MEDICINE

## 2021-12-07 PROCEDURE — 87491 CHLMYD TRACH DNA AMP PROBE: CPT | Performed by: FAMILY MEDICINE

## 2021-12-07 PROCEDURE — 86780 TREPONEMA PALLIDUM: CPT | Performed by: FAMILY MEDICINE

## 2021-12-07 ASSESSMENT — PATIENT HEALTH QUESTIONNAIRE - PHQ9
10. IF YOU CHECKED OFF ANY PROBLEMS, HOW DIFFICULT HAVE THESE PROBLEMS MADE IT FOR YOU TO DO YOUR WORK, TAKE CARE OF THINGS AT HOME, OR GET ALONG WITH OTHER PEOPLE: EXTREMELY DIFFICULT
SUM OF ALL RESPONSES TO PHQ QUESTIONS 1-9: 16
SUM OF ALL RESPONSES TO PHQ QUESTIONS 1-9: 16

## 2021-12-07 ASSESSMENT — MIFFLIN-ST. JEOR: SCORE: 1355.07

## 2021-12-07 NOTE — PROGRESS NOTES
"  Assessment & Plan     Vaginal discharge  No signs of yeast or Bv on the wet prep, will wait on the other results   - Wet prep - Clinic Collect  - Pap screen only - recommended age 21 - 24 years  She was recently treated for BV   Amenorrhea  Negative HCg test   - HCG Qual, Urine ([id number]); Future  - HCG Qual, Urine ([id number])    Screen for STD (sexually transmitted disease)  Will do full STD screen   - HIV Antigen Antibody Combo; Future  - Treponema Abs w Reflex to RPR and Titer; Future  - Hepatitis C antibody; Future  - NEISSERIA GONORRHOEA PCR  - CHLAMYDIA TRACHOMATIS PCR  - HIV Antigen Antibody Combo  - Treponema Abs w Reflex to RPR and Titer  - Hepatitis C antibody             BMI:   Estimated body mass index is 27.5 kg/m  as calculated from the following:    Height as of this encounter: 1.554 m (5' 1.2\").    Weight as of this encounter: 66.5 kg (146 lb 8 oz).       Depression Screening Follow Up    PHQ 12/7/2021   PHQ-9 Total Score 16   Q9: Thoughts of better off dead/self-harm past 2 weeks Not at all   Some encounter information is confidential and restricted. Go to Review Flowsheets activity to see all data.         Follow Up Actions Taken           No follow-ups on file.    Akanksha Andino MD  [facility]   Jaqueline is a 26 year old who presents for the following health issues     History of Present Illness       She eats 0-1 servings of fruits and vegetables daily.She consumes 2 sweetened beverage(s) daily.She exercises with enough effort to increase her heart rate 9 or less minutes per day.  She exercises with enough effort to increase her heart rate 3 or less days per week.   She is taking medications regularly.     Answers for HPI/ROS submitted by the patient on 12/7/2021  If you checked off any problems, how difficult have these problems made it for you to do your work, take care of things at home, or get along with other people?: Extremely difficult  PHQ9 TOTAL SCORE: " "16        Pt would like STD testing. Has had some white vag discharge for a few weeks , itching is mild , odor , a new partner   No hx of chlamidia   Recently had BV and she took Abx - metronidazole , before that odor and more discharge and itchiness   Thin discharge   finished a week ago , similar symptoms as before but not as strong   LMP was October 7th     Vaginal Symptoms  Onset/Duration: for a week , new sexual partner , she was recently treated for BV and done with the course of Metronidazole oral , no odor in the discharge , very mild itching but does not seem to be like previous yeast infections , no cramps   Description:  Vaginal Discharge: white   Itching (Pruritis): YES  Burning sensation:  no  Odor: no  Accompanying Signs & Symptoms:  Urinary symptoms: no  Abdominal pain: no  Fever: no  History:   Sexually active: YES  New Partner: YES  Possibility of Pregnancy:  YES  Recent antibiotic use: YES  Previous vaginitis issues: YES  Precipitating or alleviating factors: None  Therapies tried and outcome: none          Review of Systems   Constitutional, HEENT, cardiovascular, pulmonary, GI, , musculoskeletal, neuro, skin, endocrine and psych systems are negative, except as otherwise noted.      Objective    Ht 1.554 m (5' 1.2\")   BMI 27.41 kg/m    Body mass index is 27.41 kg/m .  Physical Exam   GENERAL: healthy, alert and no distress  NECK: no adenopathy, no asymmetry, masses, or scars and thyroid normal to palpation  RESP: lungs clear to auscultation - no rales, rhonchi or wheezes  ABDOMEN: soft, nontender, no hepatosplenomegaly, no masses and bowel sounds normal   (female): normal female external genitalia, normal urethral meatus, vaginal mucosa, normal cervix/adnexa/uterus without masses or discharge  MS: no gross musculoskeletal defects noted, no edema    Results for orders placed or performed in visit on 12/07/21 (from the past 24 hour(s))   Wet prep - Clinic Collect    Specimen: Vagina; Swab "   Result Value Ref Range    Trichomonas Absent Absent    Yeast Absent Absent    Clue Cells Absent Absent    WBCs/high power field 2+ (A) None   HCG Qual, Urine ([id number])   Result Value Ref Range    hCG Urine Qualitative Negative Negative

## 2021-12-08 LAB
C TRACH DNA SPEC QL NAA+PROBE: NEGATIVE
HCV AB SERPL QL IA: NONREACTIVE
HIV 1+2 AB+HIV1 P24 AG SERPL QL IA: NONREACTIVE
N GONORRHOEA DNA SPEC QL NAA+PROBE: NEGATIVE
T PALLIDUM AB SER QL: NONREACTIVE

## 2021-12-08 ASSESSMENT — PATIENT HEALTH QUESTIONNAIRE - PHQ9: SUM OF ALL RESPONSES TO PHQ QUESTIONS 1-9: 16

## 2021-12-09 LAB
BKR LAB AP GYN ADEQUACY: NORMAL
BKR LAB AP GYN INTERPRETATION: NORMAL
BKR LAB AP HPV REFLEX: NO
BKR LAB AP LMP: NORMAL
BKR LAB AP PREVIOUS ABNORMAL: NORMAL
PATH REPORT.COMMENTS IMP SPEC: NORMAL
PATH REPORT.COMMENTS IMP SPEC: NORMAL
PATH REPORT.RELEVANT HX SPEC: NORMAL

## 2022-02-09 ENCOUNTER — OFFICE VISIT (OUTPATIENT)
Dept: MIDWIFE SERVICES | Facility: CLINIC | Age: 27
End: 2022-02-09
Payer: COMMERCIAL

## 2022-02-09 VITALS
TEMPERATURE: 97.5 F | BODY MASS INDEX: 28.16 KG/M2 | WEIGHT: 150 LBS | HEART RATE: 85 BPM | SYSTOLIC BLOOD PRESSURE: 120 MMHG | DIASTOLIC BLOOD PRESSURE: 70 MMHG

## 2022-02-09 DIAGNOSIS — R30.0 DYSURIA: ICD-10-CM

## 2022-02-09 DIAGNOSIS — N89.8 VAGINAL ITCHING: ICD-10-CM

## 2022-02-09 DIAGNOSIS — Z11.3 SCREEN FOR STD (SEXUALLY TRANSMITTED DISEASE): ICD-10-CM

## 2022-02-09 DIAGNOSIS — N89.8 VAGINAL DISCHARGE: Primary | ICD-10-CM

## 2022-02-09 DIAGNOSIS — K64.4 EXTERNAL HEMORRHOIDS: ICD-10-CM

## 2022-02-09 LAB
ALBUMIN UR-MCNC: NEGATIVE MG/DL
APPEARANCE UR: CLEAR
BACTERIA #/AREA URNS HPF: ABNORMAL /HPF
BILIRUB UR QL STRIP: NEGATIVE
CLUE CELLS: ABNORMAL
COLOR UR AUTO: YELLOW
GLUCOSE UR STRIP-MCNC: NEGATIVE MG/DL
HGB UR QL STRIP: NEGATIVE
KETONES UR STRIP-MCNC: NEGATIVE MG/DL
LEUKOCYTE ESTERASE UR QL STRIP: NEGATIVE
NITRATE UR QL: NEGATIVE
PH UR STRIP: 5.5 [PH] (ref 5–7)
RBC #/AREA URNS AUTO: ABNORMAL /HPF
SP GR UR STRIP: >=1.03 (ref 1–1.03)
SQUAMOUS #/AREA URNS AUTO: ABNORMAL /LPF
TRICHOMONAS, WET PREP: ABNORMAL
UROBILINOGEN UR STRIP-ACNC: 0.2 E.U./DL
WBC #/AREA URNS AUTO: ABNORMAL /HPF
WBC'S/HIGH POWER FIELD, WET PREP: ABNORMAL
YEAST, WET PREP: ABNORMAL

## 2022-02-09 PROCEDURE — 86803 HEPATITIS C AB TEST: CPT | Performed by: ADVANCED PRACTICE MIDWIFE

## 2022-02-09 PROCEDURE — 87491 CHLMYD TRACH DNA AMP PROBE: CPT | Performed by: ADVANCED PRACTICE MIDWIFE

## 2022-02-09 PROCEDURE — 87210 SMEAR WET MOUNT SALINE/INK: CPT | Performed by: ADVANCED PRACTICE MIDWIFE

## 2022-02-09 PROCEDURE — 87389 HIV-1 AG W/HIV-1&-2 AB AG IA: CPT | Performed by: ADVANCED PRACTICE MIDWIFE

## 2022-02-09 PROCEDURE — 86780 TREPONEMA PALLIDUM: CPT | Performed by: ADVANCED PRACTICE MIDWIFE

## 2022-02-09 PROCEDURE — 87591 N.GONORRHOEAE DNA AMP PROB: CPT | Performed by: ADVANCED PRACTICE MIDWIFE

## 2022-02-09 PROCEDURE — 81001 URINALYSIS AUTO W/SCOPE: CPT | Performed by: ADVANCED PRACTICE MIDWIFE

## 2022-02-09 PROCEDURE — 87340 HEPATITIS B SURFACE AG IA: CPT | Performed by: ADVANCED PRACTICE MIDWIFE

## 2022-02-09 PROCEDURE — 36415 COLL VENOUS BLD VENIPUNCTURE: CPT | Performed by: ADVANCED PRACTICE MIDWIFE

## 2022-02-09 PROCEDURE — 99213 OFFICE O/P EST LOW 20 MIN: CPT | Performed by: ADVANCED PRACTICE MIDWIFE

## 2022-02-09 RX ORDER — HYDROCORTISONE 25 MG/G
CREAM TOPICAL 2 TIMES DAILY PRN
Qty: 30 G | Refills: 0 | Status: SHIPPED | OUTPATIENT
Start: 2022-02-09 | End: 2023-05-24

## 2022-02-09 RX ORDER — CLOBETASOL PROPIONATE 0.5 MG/G
OINTMENT TOPICAL 2 TIMES DAILY
Qty: 30 G | Refills: 0 | Status: SHIPPED | OUTPATIENT
Start: 2022-02-09 | End: 2023-05-24

## 2022-02-09 NOTE — PROGRESS NOTES
SUBJECTIVE:   26 year old female complains of vulvar and anal itching. Describes vulvar itching as located on the clitoral fontaine and bilaterally extends down labia majora. Additionally, has noticed pain with BM and an instance of blood on the toilet paper after a BM. Also states that anus is itchy. Reports deals with BV chronically, seems like any time she has vaginal intercourse she gets BV. Last +BV in our system was in March of 2021. Pt states she is also seen at [facility]. Denies any symptoms of BV or vaginitis today.   Denies abnormal vaginal bleeding or significant pelvic pain or  fever. No UTI symptoms. Denies history of known exposure to STD. Would like full panel STD testing.     No LMP recorded.    OBJECTIVE:   /70   Pulse 85   Temp 97.5  F (36.4  C)   Wt 68 kg (150 lb)   Breastfeeding No   BMI 28.16 kg/m     General: well appearing, in NAD  Cardiac: well perfused  Respiratory: non-labored breathing on room air   Psych: alert and oriented     Pelvic Exam: normal vagina and vulva. No areas of redness, irritation, tissue intact, pink and moist.   There is a skin tag on anus that appears as if it could be residual hemorrhoid tissue but is not engorged at this time.     A/P:   (N89.8) Vaginal discharge  (primary encounter diagnosis)  Comment: Reviewed vaginal culture as next steps in evaluation for chronic BV. Also discussed boric acid to use at onset of symptoms.   Plan: Wet prep - lab collect          (R30.0) Dysuria  Comment:   Plan: UA with Microscopic reflex to Culture - lab         collect, Urine Microscopic         (Z11.3) Screen for STD (sexually transmitted disease)  Comment:   Plan: NEISSERIA GONORRHOEA PCR, CHLAMYDIA TRACHOMATIS        PCR, HIV Antigen Antibody Combo, Hepatitis B         surface antigen, Hepatitis C antibody,         Treponema Abs w Reflex to RPR and Titer         (K64.4) External hemorrhoids  Comment: Discussed use and comfort measures. If hemorrhoids worsen or  persist can put in referral to GI clinic for further evaluation.  Plan: hydrocortisone, Perianal, (HYDROCORTISONE) 2.5         % cream    (N89.8) Vaginal itching  Comment: Discussed use of clobetasol for comfort of vulvar itching.   Plan: clobetasol (TEMOVATE) 0.05 % external ointment         Will notify patient of lab results via MyChart or phone call for f/u if treatment necessary  RTC yearly for annual exam or sooner prn  Malena Lambert CNM

## 2022-02-10 LAB
C TRACH DNA SPEC QL NAA+PROBE: NEGATIVE
HBV SURFACE AG SERPL QL IA: NONREACTIVE
HCV AB SERPL QL IA: NONREACTIVE
HIV 1+2 AB+HIV1 P24 AG SERPL QL IA: NONREACTIVE
N GONORRHOEA DNA SPEC QL NAA+PROBE: NEGATIVE
T PALLIDUM AB SER QL: NONREACTIVE

## 2022-05-24 ENCOUNTER — MYC MEDICAL ADVICE (OUTPATIENT)
Dept: SURGERY | Facility: CLINIC | Age: 27
End: 2022-05-24
Payer: COMMERCIAL

## 2022-05-24 DIAGNOSIS — R22.31 MASS OF AXILLA, RIGHT: Primary | ICD-10-CM

## 2022-06-11 ENCOUNTER — HEALTH MAINTENANCE LETTER (OUTPATIENT)
Age: 27
End: 2022-06-11

## 2022-10-22 ENCOUNTER — HEALTH MAINTENANCE LETTER (OUTPATIENT)
Age: 27
End: 2022-10-22

## 2023-04-12 ENCOUNTER — OFFICE VISIT (OUTPATIENT)
Dept: FAMILY MEDICINE | Facility: CLINIC | Age: 28
End: 2023-04-12
Payer: COMMERCIAL

## 2023-04-12 VITALS
OXYGEN SATURATION: 98 % | RESPIRATION RATE: 16 BRPM | TEMPERATURE: 98 F | DIASTOLIC BLOOD PRESSURE: 80 MMHG | WEIGHT: 144.4 LBS | BODY MASS INDEX: 26.57 KG/M2 | HEART RATE: 107 BPM | HEIGHT: 62 IN | SYSTOLIC BLOOD PRESSURE: 120 MMHG

## 2023-04-12 DIAGNOSIS — Z32.01 PREGNANCY TEST POSITIVE: ICD-10-CM

## 2023-04-12 DIAGNOSIS — R10.84 ABDOMINAL PAIN, GENERALIZED: ICD-10-CM

## 2023-04-12 DIAGNOSIS — Z87.59 HISTORY OF HYPEREMESIS GRAVIDARUM: ICD-10-CM

## 2023-04-12 DIAGNOSIS — Z32.00 ENCOUNTER FOR PREGNANCY TEST, RESULT UNKNOWN: Primary | ICD-10-CM

## 2023-04-12 LAB
ALBUMIN SERPL BCG-MCNC: 4.3 G/DL (ref 3.5–5.2)
ALP SERPL-CCNC: 79 U/L (ref 35–104)
ALT SERPL W P-5'-P-CCNC: 103 U/L (ref 10–35)
ANION GAP SERPL CALCULATED.3IONS-SCNC: 13 MMOL/L (ref 7–15)
AST SERPL W P-5'-P-CCNC: 66 U/L (ref 10–35)
BILIRUB SERPL-MCNC: 0.8 MG/DL
BUN SERPL-MCNC: 6.7 MG/DL (ref 6–20)
CALCIUM SERPL-MCNC: 9.7 MG/DL (ref 8.6–10)
CHLORIDE SERPL-SCNC: 99 MMOL/L (ref 98–107)
CREAT SERPL-MCNC: 0.64 MG/DL (ref 0.51–0.95)
DEPRECATED HCO3 PLAS-SCNC: 24 MMOL/L (ref 22–29)
ERYTHROCYTE [DISTWIDTH] IN BLOOD BY AUTOMATED COUNT: 11.6 % (ref 10–15)
GFR SERPL CREATININE-BSD FRML MDRD: >90 ML/MIN/1.73M2
GLUCOSE SERPL-MCNC: 94 MG/DL (ref 70–99)
HCG UR QL: POSITIVE
HCT VFR BLD AUTO: 45.9 % (ref 35–47)
HGB BLD-MCNC: 15.4 G/DL (ref 11.7–15.7)
HOLD SPECIMEN: NORMAL
MCH RBC QN AUTO: 32 PG (ref 26.5–33)
MCHC RBC AUTO-ENTMCNC: 33.6 G/DL (ref 31.5–36.5)
MCV RBC AUTO: 95 FL (ref 78–100)
PLATELET # BLD AUTO: 276 10E3/UL (ref 150–450)
POTASSIUM SERPL-SCNC: 4.3 MMOL/L (ref 3.4–5.3)
PROT SERPL-MCNC: 8.1 G/DL (ref 6.4–8.3)
RBC # BLD AUTO: 4.81 10E6/UL (ref 3.8–5.2)
SODIUM SERPL-SCNC: 136 MMOL/L (ref 136–145)
WBC # BLD AUTO: 7.9 10E3/UL (ref 4–11)

## 2023-04-12 PROCEDURE — 76815 OB US LIMITED FETUS(S): CPT | Mod: 59 | Performed by: STUDENT IN AN ORGANIZED HEALTH CARE EDUCATION/TRAINING PROGRAM

## 2023-04-12 PROCEDURE — 85027 COMPLETE CBC AUTOMATED: CPT | Performed by: STUDENT IN AN ORGANIZED HEALTH CARE EDUCATION/TRAINING PROGRAM

## 2023-04-12 PROCEDURE — 36415 COLL VENOUS BLD VENIPUNCTURE: CPT | Performed by: STUDENT IN AN ORGANIZED HEALTH CARE EDUCATION/TRAINING PROGRAM

## 2023-04-12 PROCEDURE — 99204 OFFICE O/P NEW MOD 45 MIN: CPT | Mod: 25 | Performed by: STUDENT IN AN ORGANIZED HEALTH CARE EDUCATION/TRAINING PROGRAM

## 2023-04-12 PROCEDURE — 81025 URINE PREGNANCY TEST: CPT | Performed by: STUDENT IN AN ORGANIZED HEALTH CARE EDUCATION/TRAINING PROGRAM

## 2023-04-12 PROCEDURE — 80053 COMPREHEN METABOLIC PANEL: CPT | Performed by: STUDENT IN AN ORGANIZED HEALTH CARE EDUCATION/TRAINING PROGRAM

## 2023-04-12 RX ORDER — ONDANSETRON 4 MG/1
4 TABLET, ORALLY DISINTEGRATING ORAL EVERY 8 HOURS PRN
Qty: 30 TABLET | Refills: 3 | Status: SHIPPED | OUTPATIENT
Start: 2023-04-12 | End: 2023-05-24

## 2023-04-12 RX ORDER — PYRIDOXINE HCL (VITAMIN B6) 25 MG
25 TABLET ORAL 3 TIMES DAILY
Qty: 90 TABLET | Refills: 3 | Status: SHIPPED | OUTPATIENT
Start: 2023-04-12 | End: 2023-05-24 | Stop reason: ALTCHOICE

## 2023-04-12 NOTE — Clinical Note
Discussed this patient with Dr. Rice on  4/12. please call patient next week 4/17 if we have not heard from her by then to see if there is anything we can support her in.  Should be getting an ultrasound 4/13 and should have nausea meds picked up already.  See AVS and my note for full context

## 2023-04-12 NOTE — PROGRESS NOTES
Assessment & Plan     Encounter for pregnancy test, result unknown  History of hyperemesis gravidarum  Abdominal pain, generalized  Pregnancy test positive  Patient with positive pregnancy test at home in context of generalized abdominal pain, history of hyperemesis gravidarum and active vomiting for the last week.  Bedside ultrasound showed intrauterine fetal cardiac activity, due to waxing waning nature of symptoms, spotting, some ambivalence have scheduled patient for ultrasound tomorrow to confirm bedside findings as well as to get accurate dating (LMP 3/10/23 which puts us at 4 weeks 5 days).  Patient having some spotting but no vaginal discharge, no suprapubic pain other than waves of crampy pain accompanied by some spotting, no fevers or chills, generalized abdominal pain she describes as crampy and the upper cramping feels like she is hungry because she has not been able to keep anything down.  Did order CMP, CBC to be sure that electrolytes and kidney function look well in the context of multiple days of vomiting, as well as to make sure that there is no significant drop in hemoglobin or rising white count.  Return precautions and hospital precautions given.  As with her previous pregnancy send with doxylamine, paroxetine, and Zofran.  Other diet adjustments discussed through, she does not think she is lost weight.  Pregnancy test here positive, patient will let us know if she wants to establish OB care here, return to her previous location, or would like other pregnancy option support.  I will send her chart to OB coordinator to check in next week.  Warm handoff with Dr. Yang.  - US OB <14 WKS SINGLE OR FIRST GESTATION (IN CLINIC); Future  - CBC with Platelets and Reflex to Iron Studies; Future  - Comprehensive metabolic panel; Future  - US Pelvic Complete with Transvaginal; Future  - CBC with Platelets and Reflex to Iron Studies  - Comprehensive metabolic panel  - doxylamine (UNISOM) 25 MG TABS  "tablet; Take 1 tablet (25 mg) by mouth At Bedtime  - pyridOXINE (VITAMIN B6) 25 MG tablet; Take 1 tablet (25 mg) by mouth 3 times daily  - ondansetron (ZOFRAN ODT) 4 MG ODT tab; Take 1 tablet (4 mg) by mouth every 8 hours as needed for nausea       BMI:   Estimated body mass index is 26.07 kg/m  as calculated from the following:    Height as of this encounter: 1.585 m (5' 2.4\").    Weight as of this encounter: 65.5 kg (144 lb 6.4 oz).   This is a healthy and appropriate BMI for someone of this activity level and age.  Counseling should not be directed towards weight loss.    No follow-ups on file.    Deepali Moya MD  [facility] ServicesTIERRA Parsons is a 27 year old, presenting for the following health issues:  Establish Care (Pt is here for establish care with provider. No specific questions or concerns.)         View : No data to display.              HPI     LMP 3/10- had some spotting intermittently this month (did not have with previous pregnancy)  Has taken several doses of plan B between now and then  Vomiting for last week, increasing in severity, waking up to vomit at night (similar to previous pregnancy)  Moderate abdominal pain 4/10 at rest with intermittent worsening, pain in LUQ, RLQ, suprapubic  No fevers/chills, no blood in vomit  No other vaginal discharge    Review of Systems   See HPI      Objective    /80 (BP Location: Left arm, Patient Position: Sitting, Cuff Size: Adult Small)   Pulse 107   Temp 98  F (36.7  C) (Oral)   Resp 16   Ht 1.585 m (5' 2.4\")   Wt 65.5 kg (144 lb 6.4 oz)   LMP 03/10/2023 (Approximate)   SpO2 98%   BMI 26.07 kg/m    Body mass index is 26.07 kg/m .  Physical Exam   Vital signs reviewed  Constitutional: Age appropriate human well kempt, appears uncomfortable  HENT: Sclera non-icteric and not injected, pink conjunctivae, mucous membranes light pink pink slightly tacky  Cardiac: Tachycardic but regular with well-perfused " extremities  Resp: Speaking in full sentences on room air, no respiratory distress  Abd: Abdomen nondistended and nontender to palpation, no masses appreciated, tolerated ultrasound exam without difficulty  Skin: Warm, dry   Msk: Ambulates independently, full range of gesture  Neuro: Alert and oriented, movements coordinated and symmetric, appropriate gait  Psych: Affect appropriate to conversation and situation       Results for orders placed or performed in visit on 04/12/23 (from the past 24 hour(s))   HCG qualitative urine   Result Value Ref Range    hCG Urine Qualitative Positive (A) Negative   CBC with Platelets and Reflex to Iron Studies    Narrative    The following orders were created for panel order CBC with Platelets and Reflex to Iron Studies.  Procedure                               Abnormality         Status                     ---------                               -----------         ------                     CBC with Platelets and R...[[id number]]                      In process                 Extra Green Top (Lithium...[[id number]]                      In process                   Please view results for these tests on the individual orders.       Bedside ultrasound with butterfly completed, showing intrauterine cardiac activity reducing likelihood of secondary ectopic pregnancy.  As this is bedside and unable to copy into chart, and due to patient's abdominal pain characteristics did refer for ASAP transvaginal complete ultrasound.

## 2023-04-13 ENCOUNTER — ANCILLARY PROCEDURE (OUTPATIENT)
Dept: ULTRASOUND IMAGING | Facility: CLINIC | Age: 28
End: 2023-04-13
Attending: FAMILY MEDICINE
Payer: COMMERCIAL

## 2023-04-13 DIAGNOSIS — R10.84 ABDOMINAL PAIN, GENERALIZED: ICD-10-CM

## 2023-04-13 DIAGNOSIS — Z32.00 ENCOUNTER FOR PREGNANCY TEST, RESULT UNKNOWN: ICD-10-CM

## 2023-04-13 DIAGNOSIS — Z87.59 HISTORY OF HYPEREMESIS GRAVIDARUM: ICD-10-CM

## 2023-04-13 PROCEDURE — 76801 OB US < 14 WKS SINGLE FETUS: CPT | Mod: GC | Performed by: RADIOLOGY

## 2023-04-13 PROCEDURE — 76817 TRANSVAGINAL US OBSTETRIC: CPT | Mod: GC | Performed by: RADIOLOGY

## 2023-04-14 NOTE — PROGRESS NOTES
Preceptor Attestation:   Patient seen, evaluated and discussed with the resident. I have verified the content of the note, which accurately reflects my assessment of the patient and the plan of care.   Supervising Physician:  Mayelin Ewing, DO

## 2023-04-17 ENCOUNTER — TELEPHONE (OUTPATIENT)
Dept: FAMILY MEDICINE | Facility: CLINIC | Age: 28
End: 2023-04-17
Payer: COMMERCIAL

## 2023-04-17 DIAGNOSIS — O09.90 SUPERVISION OF HIGH RISK PREGNANCY, ANTEPARTUM: ICD-10-CM

## 2023-04-17 NOTE — CONFIDENTIAL NOTE
Confirmation of pregnancy visit: 4/12/23    LMP: 3/10/23  Gestational Age: 7w3d US on 4/13/23 not c/w LMP  Estimated Date of Delivery: 12/1/23    Dating US has been completed.     Please schedule a 40 minute NOB Provider appointment with a provider listed below.     Please ask the patient and document their response:  Our OB patients are seen by female and male physicians, do you have any concerns or problems with this?     When calling to schedule NOB, please give scheduling preference to the following providers (in order). If nothing available within 2 weeks for #1 provider, move to the next.     1. Deacon  2. Lorenzo  3. Dima    Please document call and close encounter.    Leanna Gonzales MD

## 2023-04-24 LAB
ABO/RH(D): NORMAL
ANTIBODY SCREEN: NEGATIVE
SPECIMEN EXPIRATION DATE: NORMAL
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
## 2023-04-25 ENCOUNTER — OFFICE VISIT (OUTPATIENT)
Dept: FAMILY MEDICINE | Facility: CLINIC | Age: 28
End: 2023-04-25
Payer: COMMERCIAL

## 2023-04-25 VITALS
DIASTOLIC BLOOD PRESSURE: 65 MMHG | BODY MASS INDEX: 25.96 KG/M2 | WEIGHT: 141.1 LBS | HEART RATE: 95 BPM | OXYGEN SATURATION: 97 % | SYSTOLIC BLOOD PRESSURE: 105 MMHG | TEMPERATURE: 99.1 F | HEIGHT: 62 IN

## 2023-04-25 DIAGNOSIS — F32.A DEPRESSION, UNSPECIFIED DEPRESSION TYPE: ICD-10-CM

## 2023-04-25 DIAGNOSIS — O09.90 HIGH-RISK PREGNANCY, UNSPECIFIED TRIMESTER: Primary | ICD-10-CM

## 2023-04-25 DIAGNOSIS — K59.01 SLOW TRANSIT CONSTIPATION: ICD-10-CM

## 2023-04-25 DIAGNOSIS — N89.8 VAGINAL DISCHARGE: ICD-10-CM

## 2023-04-25 DIAGNOSIS — O09.90 SUPERVISION OF HIGH RISK PREGNANCY, ANTEPARTUM: ICD-10-CM

## 2023-04-25 LAB
ALBUMIN UR-MCNC: 100 MG/DL
APPEARANCE UR: ABNORMAL
BILIRUB UR QL STRIP: ABNORMAL
CLUE CELLS: NORMAL
COLOR UR AUTO: ABNORMAL
ERYTHROCYTE [DISTWIDTH] IN BLOOD BY AUTOMATED COUNT: 11.2 % (ref 10–15)
GLUCOSE UR STRIP-MCNC: NEGATIVE MG/DL
HCT VFR BLD AUTO: 42.1 % (ref 35–47)
HGB BLD-MCNC: 14.4 G/DL (ref 11.7–15.7)
HGB UR QL STRIP: NEGATIVE
KETONES UR STRIP-MCNC: >=160 MG/DL
LEUKOCYTE ESTERASE UR QL STRIP: NEGATIVE
MCH RBC QN AUTO: 31.4 PG (ref 26.5–33)
MCHC RBC AUTO-ENTMCNC: 34.2 G/DL (ref 31.5–36.5)
MCV RBC AUTO: 92 FL (ref 78–100)
NITRATE UR QL: NEGATIVE
PH UR STRIP: 5.5 [PH] (ref 5–8)
PLATELET # BLD AUTO: 263 10E3/UL (ref 150–450)
RBC # BLD AUTO: 4.59 10E6/UL (ref 3.8–5.2)
SP GR UR STRIP: >=1.03 (ref 1–1.03)
TRICHOMONAS, WET PREP: NORMAL
UROBILINOGEN UR STRIP-ACNC: 2 E.U./DL
WBC # BLD AUTO: 8 10E3/UL (ref 4–11)
WBC'S/HIGH POWER FIELD, WET PREP: NORMAL
YEAST, WET PREP: NORMAL

## 2023-04-25 PROCEDURE — 87591 N.GONORRHOEAE DNA AMP PROB: CPT

## 2023-04-25 PROCEDURE — 99207 PR FIRST OB VISIT: CPT | Mod: GC

## 2023-04-25 PROCEDURE — 86780 TREPONEMA PALLIDUM: CPT

## 2023-04-25 PROCEDURE — 85027 COMPLETE CBC AUTOMATED: CPT

## 2023-04-25 PROCEDURE — 87086 URINE CULTURE/COLONY COUNT: CPT

## 2023-04-25 PROCEDURE — 86787 VARICELLA-ZOSTER ANTIBODY: CPT

## 2023-04-25 PROCEDURE — 86901 BLOOD TYPING SEROLOGIC RH(D): CPT

## 2023-04-25 PROCEDURE — 86850 RBC ANTIBODY SCREEN: CPT

## 2023-04-25 PROCEDURE — 86706 HEP B SURFACE ANTIBODY: CPT

## 2023-04-25 PROCEDURE — 87340 HEPATITIS B SURFACE AG IA: CPT

## 2023-04-25 PROCEDURE — 36415 COLL VENOUS BLD VENIPUNCTURE: CPT

## 2023-04-25 PROCEDURE — 86900 BLOOD TYPING SEROLOGIC ABO: CPT

## 2023-04-25 PROCEDURE — 87491 CHLMYD TRACH DNA AMP PROBE: CPT

## 2023-04-25 PROCEDURE — 87210 SMEAR WET MOUNT SALINE/INK: CPT

## 2023-04-25 PROCEDURE — 87389 HIV-1 AG W/HIV-1&-2 AB AG IA: CPT

## 2023-04-25 PROCEDURE — 81003 URINALYSIS AUTO W/O SCOPE: CPT

## 2023-04-25 RX ORDER — SENNA AND DOCUSATE SODIUM 50; 8.6 MG/1; MG/1
1 TABLET, FILM COATED ORAL AT BEDTIME
Qty: 30 TABLET | Refills: 0 | Status: SHIPPED | OUTPATIENT
Start: 2023-04-25 | End: 2023-05-24 | Stop reason: ALTCHOICE

## 2023-04-25 RX ORDER — PRENATAL VIT/IRON FUM/FOLIC AC 27MG-0.8MG
1 TABLET ORAL DAILY
Qty: 90 TABLET | Refills: 3 | Status: SHIPPED | OUTPATIENT
Start: 2023-04-25 | End: 2023-05-24 | Stop reason: ALTCHOICE

## 2023-04-25 NOTE — PATIENT INSTRUCTIONS
Patient Education   Thank you for coming to [facility]!  - If you had lab testing today and your results are normal they will be be mailed to you or sent to your MyChart within seven days.   - If the lab tests need quick action we will call you with the results.  - The phone number we will call with results is # [phone] (home) . If this is not the best number please call our clinic and change the number.  - If any referrals were made to [facility] and you don't get a call, call central scheduling [phone]  - If you need any refills please call your pharmacy and they will contact us.  - If you had an XRay/CT/Ultrasound/MRI ordered the number is [phone] to schedule or change your appointment  - If you have any concerns about today's visit or wish to schedule another appointment please call our office [phone] (8-5:00 M-F)  - If you have urgent medical concerns call [phone] at any time of the day.  - If you have a medical emergency please call [phone].    Because you are pregnant, we have additional resources for you:  - You may call and ask to speak with one of our clinic nurses at [phone] during normal business hours, for non-urgent questions about your pregnancy.  - Immediate OB help is also available 24 hours a day, seven days a week via the [facility] ([facility]) - [phone]  located at [address] [place] [address]    Prenatal Timeline:  Before 14 weeks: dating ultrasound       This ultrasound helps us determine your dates accurately.  15 - 18 weeks: Optional genetic testing (quad screen)       This testing helps understand your baby's risk for some genetic abnormalities.  22 - 24 weeks: Ultrasound (fetal anatomy survey)       This testing will look for early growth abnormalities, and might tell the baby's sex.  24 - 28 weeks: One hour sugar test (GCT)        This test helps identify diabetes of pregnancy.  27  - 36 weeks: Tetanus shot (Tdap)       This shot helps protect you and your baby from tetanus and whooping cough.  36 weeks and later: Group B Strep test (GBS)       This test helps predict if you need antibiotics in labor to prevent infection in your baby.  Anytime September to April: flu shot       This shot helps protect you and your family from the flu.  This is especially important during pregnancy!  Once every trimester: blood iron test (hemoglobin)       This test helps us know if you will need extra iron to support your baby.  At any time during or after your pregnancy: Some women experience baby blues.  We can help you with: Feeling anxious, Overwhelmed or sad, Trouble sleeping, Crying uncontrollably, Trouble caring for yourself or baby.    How frequently should you see your provider?  < 28 weeks: every 4 weeks  28-36 weeks: every 2 weeks  >36 weeks: every week    Call your healthcare provider immediately if you experience any of the following symptoms:  Bleeding from nipples, rectum, bladder, or coughing up blood  Vaginal bleeding, no matter how slight (unless small amount after a pelvic exam)  Swelling of hands or face  Dimness or blurring of vision  Severe or continuous headaches  Abdominal pains or contractions that do not go away with heat and rest or a bowel movement  Chills or fever over 100.4  Persistent vomiting  Painful or burning urination  Decrease in fetal movement  Sudden or slow escape of fluid from the vagina          Here is the plan from today's visit    NAUSEA AND VOMITING:  - Keep taking Zofran every 8 hours   - Start taking Vitamin B6 every 8 hours   - Start taking Unisom at bedtime   - Come back in 1 weeks to trouble shoot further     DEPRESSION  - Therapy referral placed   - If things get worse, come back in sooner or go to ED or use numbers below     Your emotional health is important to us!  If you feel that you may hurt yourself or others, please seek help through the hospital ER,  or 9-1-1.  You may also call [employer], toll-free, at [phone] for immediate support.    CONSTIPATION   - Start senna one tablet daily       Please call or return to clinic if your symptoms don't go away.    Follow up plan  Return in about 1 week (around 5/2/2023).    Thank you for coming to [facility] today.  Lab Testing:  **If you had lab testing today and your results are reassuring or normal they will be mailed to you or sent through [employer] within 7 days.   **If the lab tests need quick action we will call you with the results.  **If you are having labs done on a different day, please call [phone] to schedule at [facility] or [phone] for other [facility] Outpatient Lab locations. Labs do not offer walk-in appointments.  The phone number we will call with results is # [phone] (home) . If this is not the best number please call our clinic and change the number.  Medication Refills:  If you need any refills please call your pharmacy and they will contact us.   If you need to  your refill at a new pharmacy, please contact the new pharmacy directly. The new pharmacy will help you get your medications transferred faster.   Scheduling:  If you have any concerns about today's visit or wish to schedule another appointment please call our office during normal business hours [phone] (8-5:00 M-F). If you can no longer make a scheduled visit, please cancel via [employer] or call us to cancel.   If a referral was made to an [facility] specialty provider and you do not get a call from central scheduling, please refer to directions on your visit summary or call our office during normal business hours for assistance.   If a Mammogram was ordered for you at the [facility] call [phone] to schedule or change your appointment.  If you had an XRay/CT/Ultrasound/MRI ordered the number is [phone] to schedule or change your radiology appointment.   [facility]  [facility] has limited ultrasound appointments available on Wednesdays, if you would like your ultrasound at [facility], please call [phone] to schedule.   Medical Concerns:  If you have urgent medical concerns please call [phone] at any time of the day.    Linda Ch MD

## 2023-04-25 NOTE — Clinical Note
Not sure if we are supposed to route NOB visits to you. Let me know if I should stop doing this.   Thanks,  Alexandra

## 2023-04-26 LAB
C TRACH DNA SPEC QL NAA+PROBE: NEGATIVE
HBV SURFACE AB SERPL IA-ACNC: 0.51 M[IU]/ML
HBV SURFACE AB SERPL IA-ACNC: NONREACTIVE M[IU]/ML
HBV SURFACE AG SERPL QL IA: NONREACTIVE
HIV 1+2 AB+HIV1 P24 AG SERPL QL IA: NONREACTIVE
N GONORRHOEA DNA SPEC QL NAA+PROBE: NEGATIVE
T PALLIDUM AB SER QL: NONREACTIVE
VZV IGG SER QL IA: 168 INDEX
VZV IGG SER QL IA: POSITIVE

## 2023-04-27 LAB — BACTERIA UR CULT: NO GROWTH

## 2023-05-04 NOTE — PROGRESS NOTES
Preceptor Attestation:   Patient seen, evaluated and discussed with the resident. I have verified the content of the note, which accurately reflects my assessment of the patient and the plan of care.   Supervising Physician:  Mayeiln Ewing, DO

## 2023-05-10 PROBLEM — F32.A DEPRESSION: Status: ACTIVE | Noted: 2023-05-10

## 2023-05-12 ENCOUNTER — TELEPHONE (OUTPATIENT)
Dept: FAMILY MEDICINE | Facility: CLINIC | Age: 28
End: 2023-05-12
Payer: COMMERCIAL

## 2023-05-12 NOTE — TELEPHONE ENCOUNTER
LVM to help schedule next EARLE visit. When Pt calls back, please verify her visit on 5/10 also included OB concerns. If so, next apt in 4wks, if not, next EARLE visit ASAP.  Mayelin QURESHI, EDGARDM

## 2023-05-16 NOTE — TELEPHONE ENCOUNTER
Got a hold of Pt today & next EARLE scheduled for 5/24 @1040a. She states she missed her 5/10 apt for N&V f/u & hadn't rescheduled. She has been feeling a bit better, not eating a lot yet & not much appetite, but states keeping down what she is eating. Rev'd sm snacks (crackers, salty nuts, whatever she can) every 2hrs (even if not hungry) to keep up blood sugar levels, trial smoothies (add yogurt or protein powder) to increase nutrients if possible. No other concerns at this time. Mayelin QURESHI, RODOLFO

## 2023-05-24 ENCOUNTER — TELEPHONE (OUTPATIENT)
Dept: FAMILY MEDICINE | Facility: CLINIC | Age: 28
End: 2023-05-24

## 2023-05-24 ENCOUNTER — OFFICE VISIT (OUTPATIENT)
Dept: FAMILY MEDICINE | Facility: CLINIC | Age: 28
End: 2023-05-24
Payer: COMMERCIAL

## 2023-05-24 VITALS
WEIGHT: 136.1 LBS | DIASTOLIC BLOOD PRESSURE: 67 MMHG | TEMPERATURE: 98.6 F | HEART RATE: 91 BPM | HEIGHT: 62 IN | SYSTOLIC BLOOD PRESSURE: 100 MMHG | OXYGEN SATURATION: 99 % | BODY MASS INDEX: 25.04 KG/M2

## 2023-05-24 DIAGNOSIS — O21.0 HYPEREMESIS GRAVIDARUM: ICD-10-CM

## 2023-05-24 DIAGNOSIS — Z87.59 HISTORY OF HYPEREMESIS GRAVIDARUM: ICD-10-CM

## 2023-05-24 DIAGNOSIS — Z32.00 ENCOUNTER FOR PREGNANCY TEST, RESULT UNKNOWN: ICD-10-CM

## 2023-05-24 DIAGNOSIS — O09.90 HIGH-RISK PREGNANCY, UNSPECIFIED TRIMESTER: Primary | ICD-10-CM

## 2023-05-24 LAB
ALBUMIN UR-MCNC: 30 MG/DL
APPEARANCE UR: ABNORMAL
BILIRUB UR QL STRIP: ABNORMAL
COLOR UR AUTO: ABNORMAL
GLUCOSE UR STRIP-MCNC: NEGATIVE MG/DL
HGB UR QL STRIP: NEGATIVE
KETONES UR STRIP-MCNC: 40 MG/DL
LEUKOCYTE ESTERASE UR QL STRIP: NEGATIVE
NITRATE UR QL: NEGATIVE
PH UR STRIP: 5.5 [PH] (ref 5–8)
SP GR UR STRIP: >=1.03 (ref 1–1.03)
UROBILINOGEN UR STRIP-ACNC: 1 E.U./DL

## 2023-05-24 PROCEDURE — 81003 URINALYSIS AUTO W/O SCOPE: CPT

## 2023-05-24 PROCEDURE — 99213 OFFICE O/P EST LOW 20 MIN: CPT | Mod: 25

## 2023-05-24 PROCEDURE — 99207 PR PRENATAL VISIT: CPT | Mod: GC

## 2023-05-24 RX ORDER — HEPARIN SODIUM,PORCINE 10 UNIT/ML
5 VIAL (ML) INTRAVENOUS
Status: CANCELLED | OUTPATIENT
Start: 2023-05-24

## 2023-05-24 RX ORDER — HEPARIN SODIUM (PORCINE) LOCK FLUSH IV SOLN 100 UNIT/ML 100 UNIT/ML
5 SOLUTION INTRAVENOUS
Status: CANCELLED | OUTPATIENT
Start: 2023-05-24

## 2023-05-24 RX ORDER — FOLIC ACID 1 MG/1
1 TABLET ORAL DAILY
Qty: 90 TABLET | Refills: 3 | Status: SHIPPED | OUTPATIENT
Start: 2023-05-24 | End: 2023-07-25

## 2023-05-24 RX ORDER — HYDROXYZINE HYDROCHLORIDE 10 MG/1
10 TABLET, FILM COATED ORAL 3 TIMES DAILY PRN
Qty: 30 TABLET | Refills: 3 | Status: SHIPPED | OUTPATIENT
Start: 2023-05-24 | End: 2023-07-11

## 2023-05-24 RX ORDER — ONDANSETRON 2 MG/ML
4 INJECTION INTRAMUSCULAR; INTRAVENOUS ONCE
Status: CANCELLED | OUTPATIENT
Start: 2023-05-24 | End: 2023-05-24

## 2023-05-24 RX ORDER — METOCLOPRAMIDE 5 MG/1
5 TABLET ORAL
Qty: 30 TABLET | Refills: 3 | Status: SHIPPED | OUTPATIENT
Start: 2023-05-24 | End: 2023-06-27

## 2023-05-24 RX ORDER — ONDANSETRON 4 MG/1
4 TABLET, ORALLY DISINTEGRATING ORAL EVERY 8 HOURS PRN
Qty: 30 TABLET | Refills: 3 | Status: SHIPPED | OUTPATIENT
Start: 2023-05-24 | End: 2023-07-25

## 2023-05-24 NOTE — TELEPHONE ENCOUNTER
Called & spoke w Pt (confirmed when Pt was in clinic that best options for apts are M-F after 830am & before 2pm) after calling [facility] & [facility] for scheduling options. Soonest option is Saturday 5/27 @ 1pm. Pt states she can make this apt. Plan to schedule for further infusions, PRN, & discussed w Pt she can schedule another when she's there Saturday if needed. No further questions. Mayelin QURESHI, EDGADRM

## 2023-05-24 NOTE — PROGRESS NOTES
Return OB visit  12-23 weeks    Assessment & plan:   IUP at 13w2d weeks by first trimester ultrasound, not consistent with LMP. Preganncy complicated by hyperemesis gravidarum, depression (not on medications), early alcohol use (none current), intermittent marijuana use, and Hep B non-immune status.     Routine OB cares  High risk pregnancy due to substance use and depression   Reassuring fetal doptones. Fundus appropriately palpate above pubic symphysis, no measurement today.   - Switch from PNV to folic acid due to hyperemesis   - Prenatal labs reviewed: Rh+, Hep B non-immune. Needs rubella testing.    - Lab closed today. Patient open to rubella at next visit.   - Pregnancy complicated by: hyperemesis gravidarum, depression (not on medications), early alcohol use (none current), and intermittent marijuana use.   - Discussed quad screen. Patient does not want to proceed with screening.   - Discussed and ordered ultrasound for fetal survey.   - Provided counseling regarding  labor symptoms, depression and social support systems, breast feeding, contraception options after delivery. The patient plans to deliver at [facility] with myself and/or OB partner.     Hyperemesis gravidarum   Weight loss during pregnancy   Patient reports ongoing N/V and aversion to food. History of hyperemesis gravidarum in previous pregnancy requiring several hospital admission. Weight loss of six pounds compared to last visit. Repeat UA with persistent ketones concerning for dehydration, though lessened from last time. Unable to draw nutrition labs due to lab closed. Overall, concerned for symptoms particularly given weight loss. Will attempt medication adjustments, outpatient IV treatment, and close follow-up. Plan for CMP and TSH at next visit.   - Discontinue Vitamin B6 and Unisom due to not helping   - Continue Zofran q8hr   - Start Reglan and Hydroxyzine (second line agents per [facility])   - Outpatient infusions ordered,  first scheduled in three days; can go PRN every three days and receive anti-emetics IV at that time   - Return in 1 week for recheck; if not improving, continue using hyperemesis algorithm for medications; recommend CMP and TSH at that time     Depression  Patient endorses worsened depression exacerbated by unexpected pregnancy and other life stressors. No SI/HI. Interested in therapy. Declined medications.   - Referred for [facility] with [facility]   - Return in 1 week for recheck     Options for treatment and follow-up care were reviewed with the patient and/or guardian. Jaqueline Argueta and/or guardian engaged in the decision making process and verbalized understanding of the options discussed and agreed with the final plan.    Linda Ch MD    Subjective:   Jaqueline is a 27 year old  female at 13w2d by T1US who returns for prenatal care. PRINCESS 2023. Preganncy complicated by depression (not on medications), early alcohol use (none current), nausea/vomiting with marijuana use for relief. History of hyperemesis gravidarum in prior pregnancy.     - Concerns today: None     Routine OB care:  - NOB labs obtained at last visit. Missing rubella. Otherwise unremarkable.   - Needs rubella today: Open to today.   - Offer AFP/Quad screen: Declines.   - Aspirin if high risk for preE: Not high risk   - Schedule L2US: Should be at 20w. Around 23.   - Patient reports no vaginal bleeding, no contractions/severe cramping, no leakage of fluid. Fetal movement is not present.   - No vaginal discharge. No dysuria.   - No headache, vision changes, lower extremity swelling, upper abdominal pain, chest pain, shortness of breath.     Nausea/vomiting with history of hyperemesis gravidarum:  - Last evaluated  for NOB. N/V at that time with weight loss and ketones in urine. Started on medications.   - Current medications: Zofran q8hr, Vitamin B6 q8hr, Unisom at bedtime   - Problems with medications: Not using B6 because not eating.  "Using Zofran. Sometimes Unisom- has not noticed more constipation.   - Still having N/V. Better with medications.   - Not eating much. Does not want to vomit. Feels hungry.   - Can vomit 10x daily. Just once today. Average 2-3.   - Hx hyperemesis: Lasted up until 7 months. Nothing worked. Was in the hospital a lot.     Wt Readings from Last 4 Encounters:   05/24/23 61.7 kg (136 lb 1.6 oz)   04/25/23 64 kg (141 lb 1.6 oz)   04/12/23 65.5 kg (144 lb 6.4 oz)   02/09/22 68 kg (150 lb)         Depression  - PHQ9 14 on last visit. No SI/HI. Declined medications. Referred for therapy.   - Mood has been \"alright\".  - Set up appointment, but got cancelled. Planning to schedule today   - Open to [facility]   - Mood same. No SI/HI.   - Declines medications, does not feel that   - Talks to sister     Objective:   LMP 03/10/2023 (Approximate)    Const: No distress  Abd: Gravid.   See flowsheet for FH, FHTs, edema     Prenatal labs:   -   Hemoglobin   Date Value Ref Range Status   04/25/2023 14.4 11.7 - 15.7 g/dL Final   04/14/2018 12.0 11.7 - 15.7 g/dL Final       "

## 2023-05-24 NOTE — PATIENT INSTRUCTIONS
Patient Education   Here is the plan from today's visit    NAUSEA AND VOMITING   - Stop B6 and Unisom   - Keep using Zofran   - Start Reglan and Hydroxyzine   - Keep trying bland foods   - We will do IV hydration   - Come back 1 back   - BLOOD TESTS: CMP, TSH     MOOD  - Behavioral health home and therapy     ROUTINE  Baby sounded good   Routine 4 weeks   US will call to schedule     Please call or return to clinic if your symptoms don't go away.    Follow up plan  No follow-ups on file.    Thank you for coming to [facility] today.  Lab Testing:  **If you had lab testing today and your results are reassuring or normal they will be mailed to you or sent through V Wave within 7 days.   **If the lab tests need quick action we will call you with the results.  **If you are having labs done on a different day, please call [phone] to schedule at [facility] or [phone] for other [facility] Outpatient Lab locations. Labs do not offer walk-in appointments.  The phone number we will call with results is # [phone] (home) . If this is not the best number please call our clinic and change the number.  Medication Refills:  If you need any refills please call your pharmacy and they will contact us.   If you need to  your refill at a new pharmacy, please contact the new pharmacy directly. The new pharmacy will help you get your medications transferred faster.   Scheduling:  If you have any concerns about today's visit or wish to schedule another appointment please call our office during normal business hours [phone] (8-5:00 M-F). If you can no longer make a scheduled visit, please cancel via V Wave or call us to cancel.   If a referral was made to an [facility] specialty provider and you do not get a call from central scheduling, please refer to directions on your visit summary or call our office during normal business hours for assistance.   If a Mammogram was ordered for you at the  [facility] call [phone] to schedule or change your appointment.  If you had an XRay/CT/Ultrasound/MRI ordered the number is [phone] to schedule or change your radiology appointment.   [facility] has limited ultrasound appointments available on Wednesdays, if you would like your ultrasound at [facility], please call [phone] to schedule.   Medical Concerns:  If you have urgent medical concerns please call [phone] at any time of the day.    Linda Ch MD

## 2023-05-27 ENCOUNTER — INFUSION THERAPY VISIT (OUTPATIENT)
Dept: INFUSION THERAPY | Facility: CLINIC | Age: 28
End: 2023-05-27
Attending: FAMILY MEDICINE
Payer: COMMERCIAL

## 2023-05-27 VITALS
DIASTOLIC BLOOD PRESSURE: 73 MMHG | RESPIRATION RATE: 16 BRPM | OXYGEN SATURATION: 98 % | SYSTOLIC BLOOD PRESSURE: 108 MMHG | HEART RATE: 92 BPM | TEMPERATURE: 98.2 F

## 2023-05-27 DIAGNOSIS — O21.0 HYPEREMESIS GRAVIDARUM: Primary | ICD-10-CM

## 2023-05-27 DIAGNOSIS — O09.90 SUPERVISION OF HIGH RISK PREGNANCY, ANTEPARTUM: ICD-10-CM

## 2023-05-27 PROCEDURE — 258N000003 HC RX IP 258 OP 636: Performed by: FAMILY MEDICINE

## 2023-05-27 PROCEDURE — 250N000011 HC RX IP 250 OP 636: Performed by: FAMILY MEDICINE

## 2023-05-27 PROCEDURE — 96374 THER/PROPH/DIAG INJ IV PUSH: CPT

## 2023-05-27 PROCEDURE — 96361 HYDRATE IV INFUSION ADD-ON: CPT

## 2023-05-27 RX ORDER — ONDANSETRON 2 MG/ML
4 INJECTION INTRAMUSCULAR; INTRAVENOUS ONCE
Status: COMPLETED | OUTPATIENT
Start: 2023-05-27 | End: 2023-05-27

## 2023-05-27 RX ORDER — HEPARIN SODIUM (PORCINE) LOCK FLUSH IV SOLN 100 UNIT/ML 100 UNIT/ML
5 SOLUTION INTRAVENOUS
Status: CANCELLED | OUTPATIENT
Start: 2023-05-27

## 2023-05-27 RX ORDER — ONDANSETRON 2 MG/ML
4 INJECTION INTRAMUSCULAR; INTRAVENOUS ONCE
Status: CANCELLED | OUTPATIENT
Start: 2023-05-27 | End: 2023-05-27

## 2023-05-27 RX ORDER — HEPARIN SODIUM,PORCINE 10 UNIT/ML
5 VIAL (ML) INTRAVENOUS
Status: CANCELLED | OUTPATIENT
Start: 2023-05-27

## 2023-05-27 RX ADMIN — ONDANSETRON 4 MG: 2 INJECTION INTRAMUSCULAR; INTRAVENOUS at 13:10

## 2023-05-27 RX ADMIN — SODIUM CHLORIDE, POTASSIUM CHLORIDE, SODIUM LACTATE AND CALCIUM CHLORIDE 1000 ML: 600; 310; 30; 20 INJECTION, SOLUTION INTRAVENOUS at 13:09

## 2023-05-27 NOTE — PROGRESS NOTES
Infusion Nursing Note:  Jaqueline Argueta presents today for IV fluids.    Patient seen by provider today: No   present during visit today: Not Applicable.    Note: N/A.      Intravenous Access:  Peripheral IV placed.    Treatment Conditions:  Not Applicable.      Post Infusion Assessment:  Patient tolerated infusion without incident.  Blood return noted pre and post infusion.  Site patent and intact, free from redness, edema or discomfort.  No evidence of extravasations.  Access discontinued per protocol.       Discharge Plan:   Patient declined prescription refills.  Discharge instructions reviewed with: Patient.  Patient and/or family verbalized understanding of discharge instructions and all questions answered.  AVS to patient via [employer].  Patient will return when scheduled for next appointment.   Patient discharged in stable condition accompanied by: self.  Departure Mode: Ambulatory.      Kurtis Barraza RN

## 2023-05-29 ENCOUNTER — INFUSION THERAPY VISIT (OUTPATIENT)
Dept: INFUSION THERAPY | Facility: CLINIC | Age: 28
End: 2023-05-29
Attending: FAMILY MEDICINE
Payer: COMMERCIAL

## 2023-05-29 VITALS
TEMPERATURE: 98 F | DIASTOLIC BLOOD PRESSURE: 71 MMHG | OXYGEN SATURATION: 100 % | RESPIRATION RATE: 16 BRPM | HEART RATE: 69 BPM | SYSTOLIC BLOOD PRESSURE: 117 MMHG

## 2023-05-29 DIAGNOSIS — O09.90 SUPERVISION OF HIGH RISK PREGNANCY, ANTEPARTUM: ICD-10-CM

## 2023-05-29 DIAGNOSIS — O21.0 HYPEREMESIS GRAVIDARUM: Primary | ICD-10-CM

## 2023-05-29 PROCEDURE — 250N000011 HC RX IP 250 OP 636: Performed by: FAMILY MEDICINE

## 2023-05-29 PROCEDURE — 96361 HYDRATE IV INFUSION ADD-ON: CPT

## 2023-05-29 PROCEDURE — 258N000003 HC RX IP 258 OP 636: Performed by: FAMILY MEDICINE

## 2023-05-29 PROCEDURE — 96374 THER/PROPH/DIAG INJ IV PUSH: CPT

## 2023-05-29 RX ORDER — ONDANSETRON 2 MG/ML
4 INJECTION INTRAMUSCULAR; INTRAVENOUS ONCE
Status: CANCELLED | OUTPATIENT
Start: 2023-06-21 | End: 2023-05-29

## 2023-05-29 RX ORDER — HEPARIN SODIUM,PORCINE 10 UNIT/ML
5 VIAL (ML) INTRAVENOUS
Status: CANCELLED | OUTPATIENT
Start: 2023-05-29

## 2023-05-29 RX ORDER — HEPARIN SODIUM (PORCINE) LOCK FLUSH IV SOLN 100 UNIT/ML 100 UNIT/ML
5 SOLUTION INTRAVENOUS
Status: CANCELLED | OUTPATIENT
Start: 2023-05-29

## 2023-05-29 RX ORDER — ONDANSETRON 2 MG/ML
4 INJECTION INTRAMUSCULAR; INTRAVENOUS ONCE
Status: COMPLETED | OUTPATIENT
Start: 2023-05-29 | End: 2023-05-29

## 2023-05-29 RX ADMIN — ONDANSETRON 4 MG: 2 INJECTION INTRAMUSCULAR; INTRAVENOUS at 10:18

## 2023-05-29 RX ADMIN — SODIUM CHLORIDE, POTASSIUM CHLORIDE, SODIUM LACTATE AND CALCIUM CHLORIDE 1000 ML: 600; 310; 30; 20 INJECTION, SOLUTION INTRAVENOUS at 10:17

## 2023-05-29 NOTE — PROGRESS NOTES
Infusion Nursing Note:  Jaqueline Argueta presents today for fluids.    Patient seen by provider today: No   present during visit today: Not Applicable.    Note: N/A.      Intravenous Access:  Peripheral IV placed.    Treatment Conditions:  Not Applicable.      Post Infusion Assessment:  Patient tolerated infusion without incident.  Blood return noted pre and post infusion.       Discharge Plan:   [facility]       Christal Gomez RN  Administrations This Visit     lactated ringers BOLUS 1,000 mL     Admin Date  05/29/2023 Action  $New Bag Dose  1,000 mL Rate  999 mL/hr Route  Intravenous Administered By  Christal Gomez RN          ondansetron (ZOFRAN) injection 4 mg     Admin Date  05/29/2023 Action  $Given Dose  4 mg Route  Intravenous Administered By  Christal Gomez RN

## 2023-05-31 ENCOUNTER — TELEPHONE (OUTPATIENT)
Dept: FAMILY MEDICINE | Facility: CLINIC | Age: 28
End: 2023-05-31
Payer: COMMERCIAL

## 2023-05-31 NOTE — TELEPHONE ENCOUNTER
LVM to call [facility] back to reschedule missed apt from 5/30. Will try again later today.  Mayelin QURESHI, EDGARDM

## 2023-05-31 NOTE — TELEPHONE ENCOUNTER
Attempt at reaching Pt again. She did not answer.  2nd message not left.  Will try again tomorrow.  Mayelin QURESHI CNM

## 2023-06-01 ENCOUNTER — TELEPHONE (OUTPATIENT)
Dept: FAMILY MEDICINE | Facility: CLINIC | Age: 28
End: 2023-06-01
Payer: COMMERCIAL

## 2023-06-01 NOTE — TELEPHONE ENCOUNTER
LVM to reschedule missed EARLE from 5/30. Asked to call [facility] back.  Will try again later today.  Mayelin QURESHI, EDGARDM

## 2023-06-01 NOTE — TELEPHONE ENCOUNTER
"Able to reach Pt. She states she wasn't feeling well on 5/30 and forgot to cancel and reschedule the apt. Able to come in on 6/6 @ 340pm w Dr. Worthy (confirmed ok w male-presenting). Also scheduled following EARLE for 7/3 @ 1pm w Dr. Ch.   Pt states feeling not the best but trying to keep some foods down. Hasn't been eating much that's substantial-strongly encouraged trialng smoothies that she can \"hide\" nutrient dense foods such as avocados in. Plans to try this.   Felt better after receiving IV fluids and hoping more can be ordered.  Advised to call if questions or concenrs arise & if unable to keep more food down.  Mayelin QURESHI, RODOLFO    "

## 2023-06-09 ENCOUNTER — TELEPHONE (OUTPATIENT)
Dept: FAMILY MEDICINE | Facility: CLINIC | Age: 28
End: 2023-06-09
Payer: COMMERCIAL

## 2023-06-09 NOTE — TELEPHONE ENCOUNTER
LVM to reschedule NS apt from 6/6.  Asked to call clinic back-will try again later today.  EDGARD FloresM

## 2023-06-13 ENCOUNTER — TELEPHONE (OUTPATIENT)
Dept: FAMILY MEDICINE | Facility: CLINIC | Age: 28
End: 2023-06-13
Payer: COMMERCIAL

## 2023-06-13 NOTE — TELEPHONE ENCOUNTER
LVM regarding rescheduling EARLE. Strongly encouraged Pt to call back and reschedule. Will try again Friday if no response.  Mayelin QURESHI, EDGARDM

## 2023-06-15 ENCOUNTER — TELEPHONE (OUTPATIENT)
Dept: FAMILY MEDICINE | Facility: CLINIC | Age: 28
End: 2023-06-15
Payer: COMMERCIAL

## 2023-06-15 NOTE — TELEPHONE ENCOUNTER
"Got a hold of Pt. Pt states she's not been feeling well at all lately. Asked if r/t her depression, nausea or both . She believes both. States didn't really eat anything for 4days and then was able to have a couple of breadsticks yesterday. Voiced she did feel better after the IV fluids before.  Discussed coming in for a visit ASAP to see if more fluids or possible increased nutritional support is needed, and that those additions would likely also improve her mental state. Voiced understanding.  States she also missed \"another apt\" but didn't gt a call to reschedule. See she missed H initiation. Will help to reschedule as Pt agrees.  Able to schedule w [facility] 6/16 @ 340pm & 6/27 @ 340pm.  Pt states she will make these next appointments.   Mayelin QURESHI CNM    "

## 2023-06-16 ENCOUNTER — OFFICE VISIT (OUTPATIENT)
Dept: FAMILY MEDICINE | Facility: CLINIC | Age: 28
End: 2023-06-16
Payer: COMMERCIAL

## 2023-06-16 VITALS
OXYGEN SATURATION: 100 % | HEIGHT: 63 IN | TEMPERATURE: 98.8 F | BODY MASS INDEX: 23.5 KG/M2 | HEART RATE: 112 BPM | DIASTOLIC BLOOD PRESSURE: 82 MMHG | SYSTOLIC BLOOD PRESSURE: 119 MMHG | WEIGHT: 132.6 LBS

## 2023-06-16 DIAGNOSIS — R63.4 WEIGHT LOSS: ICD-10-CM

## 2023-06-16 DIAGNOSIS — O09.92 ENCOUNTER FOR SUPERVISION OF HIGH RISK PREGNANCY IN SECOND TRIMESTER, ANTEPARTUM: Primary | ICD-10-CM

## 2023-06-16 DIAGNOSIS — F32.A DEPRESSION DURING PREGNANCY IN SECOND TRIMESTER: ICD-10-CM

## 2023-06-16 DIAGNOSIS — F12.90 CONTINUOUS CANNABIS USE: ICD-10-CM

## 2023-06-16 DIAGNOSIS — O99.342 DEPRESSION DURING PREGNANCY IN SECOND TRIMESTER: ICD-10-CM

## 2023-06-16 DIAGNOSIS — O21.0 HYPEREMESIS GRAVIDARUM: ICD-10-CM

## 2023-06-16 PROCEDURE — 99207 PR PRENATAL VISIT: CPT | Mod: GC | Performed by: STUDENT IN AN ORGANIZED HEALTH CARE EDUCATION/TRAINING PROGRAM

## 2023-06-16 ASSESSMENT — PATIENT HEALTH QUESTIONNAIRE - PHQ9: SUM OF ALL RESPONSES TO PHQ QUESTIONS 1-9: 20

## 2023-06-16 ASSESSMENT — PAIN SCALES - GENERAL: PAINLEVEL: NO PAIN (0)

## 2023-06-16 NOTE — Clinical Note
"Hey team.  So I see Jaqueline called and was able to get in with infusion - that is good. I think she needs this regularly.   Ms Meeks and Ms Cordova - are either of you aware of any \"mommy support groups\" (there is a better term I am sure)? Jaqueline feels very alone right now in this pregnancy - the current FOB is not involved, and minimal family involvement. I am worried about how her mood is contributing to her overall wellbeing here (including her PO).   Ms Meeks - can you help follow up with lab work? I see she never went to get labs on Saturday and we really need these metabolic labs, Id rather not have to wait till next week if we can. Also, can we make sure she has the fetal survey on the books? I see it ordered but not scheduled.   - Luis "

## 2023-06-16 NOTE — PROGRESS NOTES
"Return OB visit  12-23 weeks    Assessment & plan:   IUP at 16w4d weeks by first trimester ultrasound.     High Risk Pregnancy in 2nd trimester  (2/2 concurrent cannabis use, depression, and hyperemesis)    Jaqueline presents for a routine OB visit today at 16w4d. She carries high risk status due to the above problems, which are discussed below. In terms of routine OB cares:    She declines genetic screening    We have a fetal survey US ordered, but I do not see this scheduled - I will reach out to our OB coordinator to help follow up with this    Missing rubella testing from her pre-teresa labs, so we still need to get this - order is in     Plan for GDM testing at week 24     Hyperemesis gravidarum   Dehydration in pregnancy   Weight loss during pregnancy   Jaqueline has been dealing with hyperemesis since the start of pregnancy - and this has happened in her prior pregnancies as well (she notes she would in and out of the hospital for this). She has continued to lose weight in pregnancy - related to her hyperemesis and also very likely related to depression (see below). Additionally, she is expressed symptoms of dehydration (dizzyness, headaches, faint-like sensations, hot flushes).     In terms of her current anti emetic regimen, it consists of the following prescriptions. She also is self medicating with cannabis (smoking).     Zofran Q8hr PRN (uses - not super effective right now it seems)    Hydroxyzine 10mg TID PRN (uses - effective)    Reglan 5mg QID PRN (she has not used)    I encouraged her to call the infusion team number she has and to start scheduling visits with the infusion center on the regular - as this may take place of her \"need for hospital admissions\" from her last pregnancy. We discussed her cannabis use and how this is not good for the developing baby, and that she should really try the Reglan if she has not yet - as this stronger than the Zofran and may offer her some better " "benefit.      Encourage use of Reglan to see if this can help lessen her use of cannabis for nausea    Encourage food intake as able - recommend a food diary that we can review and go over    Encouraged her to call the infusion center and set up regular visits with them - she is someone that likely needs regular infusions for fluids and anti-emetics     Unable to get labs today d/t time - I did share with her the contact of [facility] to get these done tomorrow - we need a CMP/Mag/Phos/TSH (and rubella as above)    In terms if the folic acid - at this stage the neural tube would have completed folding at week 12, so taking the folic acid may offer little benefit and more harm if she is finding it makes her nausous    Depression affecting the 2nd trimester   Marijuana Use  We spent a lot of time today talking about her mood. She feels very alone during this pregnancy - FOB not involved and the closet family she has (both in location and emotional relationship - her sister) is busy. Part of her cannabis use is self medicating for mood. I am very concerned about this, and am worried how her mood is also affecting the above processes.     I see that she did try to get in with psychology from a prior referral, but she did not follow up with this and didn't know how to reach back out. I am going to re-refer and also FYI our [facility] and OB team about this. I am going to ask our OB coordinator if there is any \"mommy support groups\" (for lack of a better term) - as I think such would be really helpful for Jaqueline right now.     Soy Worthy, DO  PGY2 Family Medicine Resident   [facility]    Department of Family Medicine and Community Health       Subjective:   Jaqueline is a 27 year old  female at 16w4d who returns for prenatal care. PRINCESS 2023.    Patient reports no vaginal bleeding, no contractions/severe cramping, no leakage of fluid. Sometime feels \"flutters\" " "in her belly    Yes nausea/vomit/heartburn (see below)    Yes vaginal discharge - but this normal - it is white, not smelly, no purulence    No dysuria, No hematuria    Y headaches (front/forehead - this happened last pregnancy) - occurring more often now, soetimes feels like she will faint -> gets hot/dizzy -> usually associated with dehdyration    No lower extremity swelling, upper abdominal pain, chest pain, shortness of breath.     Hyperemesis gravidarum   Weight loss during pregnancy   Wt Readings from Last 4 Encounters:   06/16/23 60.1 kg (132 lb 9.6 oz)   05/24/23 61.7 kg (136 lb 1.6 oz)   04/25/23 64 kg (141 lb 1.6 oz)   04/12/23 65.5 kg (144 lb 6.4 oz)       Current Regimen     Zofran Q6hr (this past week it has not been working)    Hydroxyzine (works better - takes her nausea away)    MJ (helps with nausea - everyday; at least 5 blunts a day and helps appetite up a little bit)    Reglan (has not tried it yet)    Last vomit - was yesterday, no blood     Outpatient IV nausea med infusions - so far went to 2 as she was called for these - but was unsure what to do now - wanted to know if she needed another order for IV fluids     Has not taking the folic acid consistently - still makes her stomach feel off    Depression  Marijuana Use    Has been using MJ daily    FOB lives with her - but he doesn't seem very involved or supportive - shes tried to talk to him about this, but he is not really responsive or involved     Her family tries to be involved (namely sister, she is closet by - but she is also busy with her kids; doesn't really talk to mom)     She doesn't work, FOB does    Current kiddo (12) is doing well - half time with ex (bio father)    Doesn't have anyone to talk to - feels alone       Objective:   /82 (BP Location: Left arm, Patient Position: Sitting, Cuff Size: Adult Regular)   Pulse 112   Temp 98.8  F (37.1  C)   Ht 1.588 m (5' 2.5\")   Wt 60.1 kg (132 lb 9.6 oz)   LMP 03/10/2023 " (Approximate)   SpO2 100%   BMI 23.87 kg/m     Const: No distress  Abd: Gravid.   See flowsheet for FH, FHTs, edema     Prenatal labs:   Hemoglobin   Date Value Ref Range Status   04/25/2023 14.4 11.7 - 15.7 g/dL Final   04/14/2018 12.0 11.7 - 15.7 g/dL Final

## 2023-06-16 NOTE — PATIENT INSTRUCTIONS
[facility] [place]  Lab and Imaging Center  Floor 1  [address] [place] [address]  Hours:   Monday-Friday: 6:30AM - 5:00PM  Saturday: 7:00AM - 12:00PM  Appointment needed  Phone: [phone]    [facility]  Outpatient Lab  [facility], First Floor  [address] [place] [address]  Hours:   Monday - Friday 7:30a-5:30p  No appointment needed - Walk-Ins available  **For adult patients only          Thank you for coming to [facility]!  - If you had lab testing today and your results are normal they will be be mailed to you or sent to your MyChart within seven days.   - If the lab tests need quick action we will call you with the results.  - The phone number we will call with results is # [phone] (home) . If this is not the best number please call our clinic and change the number.  - If any referrals were made to [facility] and you don't get a call, call central scheduling [phone]  - If you need any refills please call your pharmacy and they will contact us.  - If you had an XRay/CT/Ultrasound/MRI ordered the number is [phone] to schedule or change your appointment  - If you have any concerns about today's visit or wish to schedule another appointment please call our office [phone] (8-5:00 M-F)  - If you have urgent medical concerns call [phone] at any time of the day.  - If you have a medical emergency please call [phone].    Because you are pregnant, we have additional resources for you:  - You may call and ask to speak with one of our clinic nurses at [phone] during normal business hours, for non-urgent questions about your pregnancy.  - Immediate OB help is also available 24 hours a day, seven days a week via the [facility] ([facility]) - [phone]  located at [address] [place] [id number]    Prenatal Timeline:  Before 14 weeks:  dating ultrasound       This ultrasound helps us determine your dates accurately.  15 - 18 weeks: Optional genetic testing (quad screen)       This testing helps understand your baby's risk for some genetic abnormalities.  22 - 24 weeks: Ultrasound (fetal anatomy survey)       This testing will look for early growth abnormalities, and might tell the baby's sex.  24 - 28 weeks: One hour sugar test (GCT)        This test helps identify diabetes of pregnancy.  27 - 36 weeks: Tetanus shot (Tdap)       This shot helps protect you and your baby from tetanus and whooping cough.  36 weeks and later: Group B Strep test (GBS)       This test helps predict if you need antibiotics in labor to prevent infection in your baby.  Anytime September to April: flu shot       This shot helps protect you and your family from the flu.  This is especially important during pregnancy!  Once every trimester: blood iron test (hemoglobin)       This test helps us know if you will need extra iron to support your baby.  At any time during or after your pregnancy: Some women experience baby blues.  We can help you with: Feeling anxious, Overwhelmed or sad, Trouble sleeping, Crying uncontrollably, Trouble caring for yourself or baby.    How frequently should you see your provider?  < 28 weeks: every 4 weeks  28-36 weeks: every 2 weeks  >36 weeks: every week    Call your healthcare provider immediately if you experience any of the following symptoms:  Bleeding from nipples, rectum, bladder, or coughing up blood  Vaginal bleeding, no matter how slight (unless small amount after a pelvic exam)  Swelling of hands or face  Dimness or blurring of vision  Severe or continuous headaches  Abdominal pains or contractions that do not go away with heat and rest or a bowel movement  Chills or fever over 100.4  Persistent vomiting  Painful or burning urination  Decrease in fetal movement  Sudden or slow escape of fluid from the vagina

## 2023-06-16 NOTE — Clinical Note
For OB patient that day (sent to Dr. Sky initially by mistake).  Long A/P - it is the first time I met her so it's more for me than anything else.  Main take-aways - Encouraging her to try to use her Reglan and reduce her Cannabis self medicating for n/v  - Encouraging her to get in regularly with infusion therapy  - Try to eat smaller meals, will ask for a food diary - she is losing weight still  - Reached out to our OB RN and [facility] team to help get her with scheduling with psychology, infusions, fetal US, and labs  - I will see her next week  Luis

## 2023-06-18 ENCOUNTER — HEALTH MAINTENANCE LETTER (OUTPATIENT)
Age: 28
End: 2023-06-18

## 2023-06-19 ENCOUNTER — TELEPHONE (OUTPATIENT)
Dept: PSYCHOLOGY | Facility: CLINIC | Age: 28
End: 2023-06-19
Payer: COMMERCIAL

## 2023-06-21 ENCOUNTER — INFUSION THERAPY VISIT (OUTPATIENT)
Dept: INFUSION THERAPY | Facility: CLINIC | Age: 28
End: 2023-06-21
Attending: ADVANCED PRACTICE MIDWIFE
Payer: COMMERCIAL

## 2023-06-21 VITALS
TEMPERATURE: 98.1 F | DIASTOLIC BLOOD PRESSURE: 67 MMHG | OXYGEN SATURATION: 100 % | SYSTOLIC BLOOD PRESSURE: 98 MMHG | HEART RATE: 84 BPM

## 2023-06-21 DIAGNOSIS — O21.0 HYPEREMESIS GRAVIDARUM: Primary | ICD-10-CM

## 2023-06-21 DIAGNOSIS — O09.90 SUPERVISION OF HIGH RISK PREGNANCY, ANTEPARTUM: ICD-10-CM

## 2023-06-21 PROCEDURE — 250N000011 HC RX IP 250 OP 636: Mod: JZ | Performed by: FAMILY MEDICINE

## 2023-06-21 PROCEDURE — 258N000003 HC RX IP 258 OP 636: Performed by: FAMILY MEDICINE

## 2023-06-21 PROCEDURE — 96361 HYDRATE IV INFUSION ADD-ON: CPT

## 2023-06-21 PROCEDURE — 96374 THER/PROPH/DIAG INJ IV PUSH: CPT

## 2023-06-21 RX ORDER — HEPARIN SODIUM,PORCINE 10 UNIT/ML
5 VIAL (ML) INTRAVENOUS
Status: CANCELLED | OUTPATIENT
Start: 2023-06-21

## 2023-06-21 RX ORDER — ONDANSETRON 2 MG/ML
4 INJECTION INTRAMUSCULAR; INTRAVENOUS ONCE
Status: COMPLETED | OUTPATIENT
Start: 2023-06-21 | End: 2023-06-21

## 2023-06-21 RX ORDER — ONDANSETRON 2 MG/ML
4 INJECTION INTRAMUSCULAR; INTRAVENOUS ONCE
Status: CANCELLED | OUTPATIENT
Start: 2023-06-21 | End: 2023-06-21

## 2023-06-21 RX ORDER — HEPARIN SODIUM (PORCINE) LOCK FLUSH IV SOLN 100 UNIT/ML 100 UNIT/ML
5 SOLUTION INTRAVENOUS
Status: CANCELLED | OUTPATIENT
Start: 2023-06-21

## 2023-06-21 RX ADMIN — ONDANSETRON 4 MG: 2 INJECTION INTRAMUSCULAR; INTRAVENOUS at 11:30

## 2023-06-21 RX ADMIN — SODIUM CHLORIDE, POTASSIUM CHLORIDE, SODIUM LACTATE AND CALCIUM CHLORIDE 1000 ML: 600; 310; 30; 20 INJECTION, SOLUTION INTRAVENOUS at 11:29

## 2023-06-21 NOTE — PROGRESS NOTES
Infusion Nursing Note:  Jaqueline Argueta presents today for IVF, IV Zofran.    Patient seen by provider today: No   present during visit today: Not Applicable.    Note: Patient reports to continuing to feel fatigued, nauseated with low appetite and poor oral intake. Denies vomiting.  Patient felt some dizziness on arrival to infusion today but reported some improvement with IVF.    Intravenous Access:  Peripheral IV placed.    Treatment Conditions:  Not Applicable.      Post Infusion Assessment:  Patient tolerated infusion without incident.  Blood return noted pre and post infusion.  Site patent and intact, free from redness, edema or discomfort.  No evidence of extravasations.  Access discontinued per protocol.       Discharge Plan:   Patient declined prescription refills.  Discharge instructions reviewed with: Patient.  Patient and/or family verbalized understanding of discharge instructions and all questions answered.  Patient discharged in stable condition accompanied by:significant other  Departure Mode: Ambulatory.      Sarah Vazquez RN

## 2023-06-22 ENCOUNTER — TELEPHONE (OUTPATIENT)
Dept: FAMILY MEDICINE | Facility: CLINIC | Age: 28
End: 2023-06-22
Payer: COMMERCIAL

## 2023-06-22 NOTE — TELEPHONE ENCOUNTER
Attempt at reaching Pt again-did not .   Did not leave a 2nd msg.  Will try again tomorrow.  Mayelin QURESHI CNM

## 2023-06-22 NOTE — TELEPHONE ENCOUNTER
LVM to reschedule missed lab apt over the weekend & regarding parent support options.   Will try later this afternoon.  Also calling [facility] & Parent Support Outreach Program for options.  Mayelin QURESHI CNM

## 2023-06-23 ENCOUNTER — TELEPHONE (OUTPATIENT)
Dept: FAMILY MEDICINE | Facility: CLINIC | Age: 28
End: 2023-06-23
Payer: COMMERCIAL

## 2023-06-23 DIAGNOSIS — O21.0 HYPEREMESIS GRAVIDARUM: Primary | ICD-10-CM

## 2023-06-23 NOTE — TELEPHONE ENCOUNTER
LVM regarding lab apt needs. Pt is roxane'd for fluid infusion on Sunday 6/25. Mentioned will call Infusion center to see if labs can be done when she's at her apt Sunday 6/26.     Called Infusion center to see if labs can be completed if Pt presents for apt Sunday & they can.    Mayelin QURESHI, CNM

## 2023-06-25 ENCOUNTER — INFUSION THERAPY VISIT (OUTPATIENT)
Dept: INFUSION THERAPY | Facility: CLINIC | Age: 28
End: 2023-06-25
Attending: STUDENT IN AN ORGANIZED HEALTH CARE EDUCATION/TRAINING PROGRAM
Payer: COMMERCIAL

## 2023-06-25 VITALS
SYSTOLIC BLOOD PRESSURE: 109 MMHG | DIASTOLIC BLOOD PRESSURE: 69 MMHG | TEMPERATURE: 98 F | RESPIRATION RATE: 16 BRPM | OXYGEN SATURATION: 98 % | HEART RATE: 97 BPM

## 2023-06-25 DIAGNOSIS — O09.90 SUPERVISION OF HIGH RISK PREGNANCY, ANTEPARTUM: ICD-10-CM

## 2023-06-25 DIAGNOSIS — O21.0 HYPEREMESIS GRAVIDARUM: Primary | ICD-10-CM

## 2023-06-25 LAB
ALBUMIN SERPL BCG-MCNC: 3.6 G/DL (ref 3.5–5.2)
ALP SERPL-CCNC: 63 U/L (ref 35–104)
ALT SERPL W P-5'-P-CCNC: 18 U/L (ref 0–50)
ANION GAP SERPL CALCULATED.3IONS-SCNC: 13 MMOL/L (ref 7–15)
AST SERPL W P-5'-P-CCNC: 20 U/L (ref 0–45)
BILIRUB SERPL-MCNC: 0.3 MG/DL
BUN SERPL-MCNC: 5.5 MG/DL (ref 6–20)
CALCIUM SERPL-MCNC: 9 MG/DL (ref 8.6–10)
CHLORIDE SERPL-SCNC: 104 MMOL/L (ref 98–107)
CREAT SERPL-MCNC: 0.42 MG/DL (ref 0.51–0.95)
DEPRECATED HCO3 PLAS-SCNC: 20 MMOL/L (ref 22–29)
GFR SERPL CREATININE-BSD FRML MDRD: >90 ML/MIN/1.73M2
GLUCOSE SERPL-MCNC: 89 MG/DL (ref 70–99)
MAGNESIUM SERPL-MCNC: 2.2 MG/DL (ref 1.7–2.3)
PHOSPHATE SERPL-MCNC: 4 MG/DL (ref 2.5–4.5)
POTASSIUM SERPL-SCNC: 3.7 MMOL/L (ref 3.4–5.3)
PROT SERPL-MCNC: 6.7 G/DL (ref 6.4–8.3)
SODIUM SERPL-SCNC: 137 MMOL/L (ref 136–145)
TSH SERPL DL<=0.005 MIU/L-ACNC: 0.89 UIU/ML (ref 0.3–4.2)

## 2023-06-25 PROCEDURE — 36415 COLL VENOUS BLD VENIPUNCTURE: CPT

## 2023-06-25 PROCEDURE — 250N000011 HC RX IP 250 OP 636: Mod: JZ | Performed by: FAMILY MEDICINE

## 2023-06-25 PROCEDURE — 96361 HYDRATE IV INFUSION ADD-ON: CPT

## 2023-06-25 PROCEDURE — 83735 ASSAY OF MAGNESIUM: CPT | Performed by: STUDENT IN AN ORGANIZED HEALTH CARE EDUCATION/TRAINING PROGRAM

## 2023-06-25 PROCEDURE — 84443 ASSAY THYROID STIM HORMONE: CPT | Performed by: STUDENT IN AN ORGANIZED HEALTH CARE EDUCATION/TRAINING PROGRAM

## 2023-06-25 PROCEDURE — 258N000003 HC RX IP 258 OP 636: Performed by: FAMILY MEDICINE

## 2023-06-25 PROCEDURE — 84100 ASSAY OF PHOSPHORUS: CPT | Performed by: STUDENT IN AN ORGANIZED HEALTH CARE EDUCATION/TRAINING PROGRAM

## 2023-06-25 PROCEDURE — 80053 COMPREHEN METABOLIC PANEL: CPT | Performed by: STUDENT IN AN ORGANIZED HEALTH CARE EDUCATION/TRAINING PROGRAM

## 2023-06-25 PROCEDURE — 86762 RUBELLA ANTIBODY: CPT | Performed by: STUDENT IN AN ORGANIZED HEALTH CARE EDUCATION/TRAINING PROGRAM

## 2023-06-25 PROCEDURE — 96374 THER/PROPH/DIAG INJ IV PUSH: CPT

## 2023-06-25 RX ORDER — ONDANSETRON 2 MG/ML
4 INJECTION INTRAMUSCULAR; INTRAVENOUS ONCE
Status: CANCELLED | OUTPATIENT
Start: 2023-06-25 | End: 2023-06-25

## 2023-06-25 RX ORDER — HEPARIN SODIUM (PORCINE) LOCK FLUSH IV SOLN 100 UNIT/ML 100 UNIT/ML
5 SOLUTION INTRAVENOUS
Status: CANCELLED | OUTPATIENT
Start: 2023-06-25

## 2023-06-25 RX ORDER — HEPARIN SODIUM,PORCINE 10 UNIT/ML
5 VIAL (ML) INTRAVENOUS
Status: CANCELLED | OUTPATIENT
Start: 2023-06-25

## 2023-06-25 RX ORDER — ONDANSETRON 2 MG/ML
4 INJECTION INTRAMUSCULAR; INTRAVENOUS ONCE
Status: COMPLETED | OUTPATIENT
Start: 2023-06-25 | End: 2023-06-25

## 2023-06-25 RX ADMIN — SODIUM CHLORIDE, POTASSIUM CHLORIDE, SODIUM LACTATE AND CALCIUM CHLORIDE 1000 ML: 600; 310; 30; 20 INJECTION, SOLUTION INTRAVENOUS at 09:28

## 2023-06-25 RX ADMIN — ONDANSETRON 4 MG: 2 INJECTION INTRAMUSCULAR; INTRAVENOUS at 09:28

## 2023-06-25 ASSESSMENT — PAIN SCALES - GENERAL: PAINLEVEL: NO PAIN (0)

## 2023-06-25 NOTE — PROGRESS NOTES
Infusion Nursing Note:  Jaqueline Argueta presents today for IVF/zofran.    Patient seen by provider today: No   present during visit today: Not Applicable.    Note: Pt reports to feeling dizzy this morning and having nausea. Pt feeling much better after IVF and zofran.       Intravenous Access:  Labs drawn without difficulty.  Peripheral IV placed.    Treatment Conditions:  Not Applicable.      Post Infusion Assessment:  Patient tolerated infusion without incident.  Blood return noted pre and post infusion.  Site patent and intact, free from redness, edema or discomfort.  Access discontinued per protocol.       Discharge Plan:   Discharge instructions reviewed with: Patient and Family.  Patient and/or family verbalized understanding of discharge instructions and all questions answered.  AVS to patient via [employer].  Patient will return 7/1/23 for next appointment.   Patient discharged in stable condition accompanied by: self and .  Departure Mode: Ambulatory.      Josiah Wong RN

## 2023-06-26 LAB
RUBV IGG SERPL QL IA: 0.36 INDEX
RUBV IGG SERPL QL IA: NORMAL

## 2023-06-27 ENCOUNTER — CARE COORDINATION (OUTPATIENT)
Dept: PSYCHOLOGY | Facility: CLINIC | Age: 28
End: 2023-06-27
Payer: COMMERCIAL

## 2023-06-27 ENCOUNTER — OFFICE VISIT (OUTPATIENT)
Dept: FAMILY MEDICINE | Facility: CLINIC | Age: 28
End: 2023-06-27
Payer: COMMERCIAL

## 2023-06-27 VITALS
BODY MASS INDEX: 23.27 KG/M2 | HEART RATE: 69 BPM | RESPIRATION RATE: 16 BRPM | WEIGHT: 129.3 LBS | OXYGEN SATURATION: 99 % | SYSTOLIC BLOOD PRESSURE: 112 MMHG | DIASTOLIC BLOOD PRESSURE: 74 MMHG

## 2023-06-27 DIAGNOSIS — O09.92 ENCOUNTER FOR SUPERVISION OF HIGH RISK PREGNANCY IN SECOND TRIMESTER, ANTEPARTUM: Primary | ICD-10-CM

## 2023-06-27 DIAGNOSIS — O21.0 HYPEREMESIS GRAVIDARUM: ICD-10-CM

## 2023-06-27 DIAGNOSIS — G43.819 OTHER MIGRAINE WITHOUT STATUS MIGRAINOSUS, INTRACTABLE: ICD-10-CM

## 2023-06-27 PROCEDURE — H1001 ANTEPARTUM MANAGEMENT: HCPCS | Mod: GC | Performed by: STUDENT IN AN ORGANIZED HEALTH CARE EDUCATION/TRAINING PROGRAM

## 2023-06-27 PROCEDURE — 99207 PR PRENATAL VISIT: CPT | Mod: GC | Performed by: STUDENT IN AN ORGANIZED HEALTH CARE EDUCATION/TRAINING PROGRAM

## 2023-06-27 RX ORDER — PROCHLORPERAZINE MALEATE 10 MG
10 TABLET ORAL EVERY 6 HOURS PRN
Qty: 120 TABLET | Refills: 3 | Status: SHIPPED | OUTPATIENT
Start: 2023-06-27 | End: 2023-12-04

## 2023-06-27 RX ORDER — PYRIDOXINE HCL (VITAMIN B6) 25 MG
25 TABLET ORAL AT BEDTIME
Qty: 60 TABLET | Refills: 3 | Status: SHIPPED | OUTPATIENT
Start: 2023-06-27 | End: 2023-07-25

## 2023-06-27 RX ORDER — PROCHLORPERAZINE 25 MG
25 SUPPOSITORY, RECTAL RECTAL EVERY 12 HOURS PRN
Qty: 120 SUPPOSITORY | Refills: 1 | Status: SHIPPED | OUTPATIENT
Start: 2023-06-27 | End: 2023-07-25

## 2023-06-27 ASSESSMENT — ANXIETY QUESTIONNAIRES
6. BECOMING EASILY ANNOYED OR IRRITABLE: NEARLY EVERY DAY
4. TROUBLE RELAXING: NEARLY EVERY DAY
GAD7 TOTAL SCORE: 13
2. NOT BEING ABLE TO STOP OR CONTROL WORRYING: MORE THAN HALF THE DAYS
1. FEELING NERVOUS, ANXIOUS, OR ON EDGE: SEVERAL DAYS
3. WORRYING TOO MUCH ABOUT DIFFERENT THINGS: NEARLY EVERY DAY
7. FEELING AFRAID AS IF SOMETHING AWFUL MIGHT HAPPEN: NOT AT ALL
5. BEING SO RESTLESS THAT IT IS HARD TO SIT STILL: SEVERAL DAYS

## 2023-06-27 NOTE — PROGRESS NOTES
[facility]al [facility] Home Services  Type of Contact: Face to Face in Clinic  Sandy Martel, Social Work Care Coordinator    BREANNA met with patient today in clinic for [facility] enrollment.   2 DHS forms signed by patient and faxed to patient's insurance.   Enrollment flowsheet completed.     Jaqueline is accompanied today by her boyfriend. She states they live together. Her lease ends in August. Both her and her partner are unemployed.     Jaqueline experiences feelings of worry. She is hopeful to get connected with a therapist- BREANNA to assist with this as referral is in.    Jaqueline signed up for [facility] services. She received a phone call from the [facility] for the initial assessment, but states she has been so overwhelmed to call back to do the intake.     BREANNA and OB CC working on additional supports and resources to provide to Jaqueline as she is a first time mom.     Sandy Martel, \[facility]\""  Behavioral Health Home- Social Work Care Coordinator

## 2023-06-27 NOTE — PROGRESS NOTES
Preceptor Attestation:   Patient seen, evaluated and discussed with the resident. I have verified the content of the note, which accurately reflects my assessment of the patient and the plan of care.   Supervising Physician:  Ellen Trotter MD

## 2023-06-27 NOTE — PATIENT INSTRUCTIONS
Here is today's plan  - Lets focus on snacking thru-out the day vs eating big meals regularly  - Try taking tylenol (1000mg) for your headache  -     FROM THE STORE  - Get a chewy multivitamin, mag supplement, and tylenol (500mg tablets)  - Try out a protein shake to see if you can tolerate these (Ensure shakes)  - Get some sport drinks (like store brand gatorade) and drink these regularly (like once a day)  - Try out some ginger (like ginger ale, or ginger chew, or ginger teas) - this may help with nausea\    FROM THE PHARMACY  - Lets stop the reglen  - Lets try out compazine (both an oral and rectal pill)   - Lets retry the UNISOM and B6 at night - if this makes you feel worse then stop taking it                 Thank you for coming to [facility]!  - If you had lab testing today and your results are normal they will be be mailed to you or sent to your MyChart within seven days.   - If the lab tests need quick action we will call you with the results.  - The phone number we will call with results is # [phone] (home) . If this is not the best number please call our clinic and change the number.  - If any referrals were made to [facility] and you don't get a call, call central scheduling [phone]  - If you need any refills please call your pharmacy and they will contact us.  - If you had an XRay/CT/Ultrasound/MRI ordered the number is [phone] to schedule or change your appointment  - If you have any concerns about today's visit or wish to schedule another appointment please call our office [phone] (8-5:00 M-F)  - If you have urgent medical concerns call [phone] at any time of the day.  - If you have a medical emergency please call [phone].    Because you are pregnant, we have additional resources for you:  - You may call and ask to speak with one of our clinic nurses at [phone] during normal business hours, for non-urgent questions about your pregnancy.  -  Immediate OB help is also available 24 hours a day, seven days a week via the [facility] ([facility]) - [phone]  located at [address] [place] [id number]    Prenatal Timeline:  Before 14 weeks: dating ultrasound       This ultrasound helps us determine your dates accurately.  15 - 18 weeks: Optional genetic testing (quad screen)       This testing helps understand your baby's risk for some genetic abnormalities.  22 - 24 weeks: Ultrasound (fetal anatomy survey)       This testing will look for early growth abnormalities, and might tell the baby's sex.  24 - 28 weeks: One hour sugar test (GCT)        This test helps identify diabetes of pregnancy.  27 - 36 weeks: Tetanus shot (Tdap)       This shot helps protect you and your baby from tetanus and whooping cough.  36 weeks and later: Group B Strep test (GBS)       This test helps predict if you need antibiotics in labor to prevent infection in your baby.  Anytime September to April: flu shot       This shot helps protect you and your family from the flu.  This is especially important during pregnancy!  Once every trimester: blood iron test (hemoglobin)       This test helps us know if you will need extra iron to support your baby.  At any time during or after your pregnancy: Some women experience baby blues.  We can help you with: Feeling anxious, Overwhelmed or sad, Trouble sleeping, Crying uncontrollably, Trouble caring for yourself or baby.    How frequently should you see your provider?  < 28 weeks: every 4 weeks  28-36 weeks: every 2 weeks  >36 weeks: every week    Call your healthcare provider immediately if you experience any of the following symptoms:  Bleeding from nipples, rectum, bladder, or coughing up blood  Vaginal bleeding, no matter how slight (unless small amount after a pelvic exam)  Swelling of hands or face  Dimness or blurring of vision  Severe or continuous headaches  Abdominal pains or  contractions that do not go away with heat and rest or a bowel movement  Chills or fever over 100.4  Persistent vomiting  Painful or burning urination  Decrease in fetal movement  Sudden or slow escape of fluid from the vagina

## 2023-06-27 NOTE — Clinical Note
Carlie. Note still needs to be done - but could you help us follow up on two things for Jaqueline?  Infusions - she is having a harder time scheduling these - she said the schedulars are only able to place her in like once a week. Is that the case?  MFM - she is still losing weight and still having bad emesis, and this is appropriate to get MFM involved with her first comprehensive US, so I put that order in. If you can help just make sure she gets that schedulded?  Ty!  - Luis

## 2023-06-27 NOTE — PROGRESS NOTES
"Return OB visit  12-23 weeks    Assessment & plan:   IUP at 18w1d by first trimester ultrasound.      High Risk Pregnancy in 2nd trimester  (2/2 concurrent cannabis use, depression, and hyperemesis)     Jaqueline presents for a routine OB visit today at 16w4d. She carries high risk status due to the above problems, which are discussed below. In terms of routine OB cares:    She declines genetic screening    Because of Jaqueline's severe hyperemesis AND continued weight loss AND concurrent cannabis - not only is inclusion of our MFM colleagues warranted - it is needed - will send word to our OB RN to help get this scheduled soon     Plan for GDM testing at week 24      Hyperemesis gravidarum   Dehydration in pregnancy   Weight loss during pregnancy   Jaqueline has been dealing with hyperemesis since the start of pregnancy - and this has happened in her prior pregnancies as well (she notes she would in and out of the hospital for this). She has continued to lose weight in pregnancy - related to her hyperemesis and also very likely related to depression.. Additionally, she is expressed symptoms of dehydration (dizzyness, headaches, faint-like sensations, hot flushes).     She continues to lose weight - now at 129lb - which is worrisome for severe malnutrition and dehydration. She is trying to get in with infusion centers - and I honestly believe she needs to be going there regularly multiple times a week to \"catch up\" on her dehydration, but she is having a hard time getting ready appointments. She is still using cannabis to self medicate her nausea.       She didn't like the Reglan - so we are going to try to swap this to Compazine (PO and Rectal) and see if this offers us benefit    I want her to focus more on grazing throughout the day rather than steady 3 meals - hopefully lighter but regular intake can help bring in much needed calories    I want her to also start drinking a sports drink (like powerade) regularly " in response to her vomiting to ensure electrolyte repletion    I also want to see if she can tolerate shakes - encouraged her to buy some Ensure shakes to try out first    I also think she should start taking a chewy multi-vit and mag (the former for nutrition, the latter for muscle aches 2/2 dehydration)     Let's give Unisom/B6 another shot     Let's get MFM involved, as above     Current Anti-Emetic she uses    Zofran Q8hr PRN (hasn't taken this yet since stopping it last we met, she will try again)    Compazine 10mg PO or 25mg suppository     Hydroxyzine 10mg TID PRN (most helpful)    Cannabis (not ideal)    Depression affecting the 2nd trimester   Marijuana Use  She met with Sal early this AM - per her note Jaqueline and MELIDA are unemployed. This is worrisome, as she reports her lease ends in August. Jaqueline did sign up for [facility] services, but is finding the process overwhelming. SW and OB RN are following closely. I also see she has an appointment with our clinics psychologist Dr Linares coming up on .     Headache in Pregnancy  This is most likely related to her dehydration - as it is associated with other dehydration symptoms. We should first maximize accessible meds - acetaminophen. We COULD consider adjuncts if that does not work - while not first line triptans can be used in pregnancy, so that is a move we could pivot to and try.    Soy Worthy, DO  PGY3 Family Medicine Resident   [facility]    Department of Family Medicine and Community Health       Subjective:   Jaqueline is a 27 year old  female at 18w1d who returns for prenatal care. PRINCESS 2023.    No vaginal bleeding, but has noticed asymptomatic white discharge     Has noted some cramping like pains along her back, last week has been noticing lower pelvic cramps (when get gets out the bed - a sharp pain)    Yes nausea/vomit, No heartburn    Yes headaches (often - daily,  "doesn't do anything for these, has to lay down - lasts 20-1hr - has not tried anything such as acetaminophen - with pregnancy),     Yes vision changes (with dizziness, more-so than headaches),     No leg swelling, No chest pain - but body feels \"achy\" over-all     Hyperemesis gravidarum   Dehydration in pregnancy   Weight loss during pregnancy   Wt Readings from Last 4 Encounters:   06/27/23 58.7 kg (129 lb 4.8 oz)   06/16/23 60.1 kg (132 lb 9.6 oz)   05/24/23 61.7 kg (136 lb 1.6 oz)   04/25/23 64 kg (141 lb 1.6 oz)     Food Diary 24hrs    Breakfast - in general she will sometimes have a granola bar (yesterday nothing d/t n/v)    Lunch - half of a hamburger after the IV on Sunday, but the following Monday    Dinner - nothing, in general doesn't eat    Certain foods are easier to eat than others - bread, fruit, rice, some popsicles, some ice-cream    Has not tried protein/ensure shakes        Has been trying to schedule regular infusion appt - but this has been hard with availability    Reglan - tried it but made her dizzy    Zofran - didn't take it since we last met as it was not working, has not tried it since     Hydroxyzine - the best thing that works for her    Cannabis - 5 blunts/day -> now smoking joint papers -> last few days has smoked 2-3 joints/day -> helps with the nausea and getting rest/sleep -> sometimes can eat after this -> Really depends on how severe her nausea and dizziness is     Both B6 and Unisom made her feel sick / didn't work early in pregnancy       Objective:   /74 (BP Location: Left arm, Patient Position: Sitting, Cuff Size: Adult Small)   Pulse 69   Resp 16   Wt 58.7 kg (129 lb 4.8 oz)   LMP 03/10/2023 (Approximate)   SpO2 99%   BMI 23.27 kg/m     Const: No distress  Abd: Gravid.   See flowsheet for FH, FHTs, edema     Prenatal labs:     Hemoglobin   Date Value Ref Range Status   04/25/2023 14.4 11.7 - 15.7 g/dL Final   04/14/2018 12.0 11.7 - 15.7 g/dL Final         "

## 2023-06-28 ENCOUNTER — INFUSION THERAPY VISIT (OUTPATIENT)
Dept: INFUSION THERAPY | Facility: CLINIC | Age: 28
End: 2023-06-28
Attending: FAMILY MEDICINE
Payer: COMMERCIAL

## 2023-06-28 ENCOUNTER — TELEPHONE (OUTPATIENT)
Dept: FAMILY MEDICINE | Facility: CLINIC | Age: 28
End: 2023-06-28
Payer: COMMERCIAL

## 2023-06-28 ENCOUNTER — TELEPHONE (OUTPATIENT)
Dept: PSYCHOLOGY | Facility: CLINIC | Age: 28
End: 2023-06-28

## 2023-06-28 VITALS
RESPIRATION RATE: 16 BRPM | HEART RATE: 103 BPM | OXYGEN SATURATION: 97 % | TEMPERATURE: 98.1 F | DIASTOLIC BLOOD PRESSURE: 73 MMHG | SYSTOLIC BLOOD PRESSURE: 102 MMHG

## 2023-06-28 DIAGNOSIS — O21.0 HYPEREMESIS GRAVIDARUM: Primary | ICD-10-CM

## 2023-06-28 DIAGNOSIS — O09.90 SUPERVISION OF HIGH RISK PREGNANCY, ANTEPARTUM: ICD-10-CM

## 2023-06-28 PROCEDURE — 96374 THER/PROPH/DIAG INJ IV PUSH: CPT

## 2023-06-28 PROCEDURE — 250N000011 HC RX IP 250 OP 636: Mod: JZ | Performed by: FAMILY MEDICINE

## 2023-06-28 PROCEDURE — 96361 HYDRATE IV INFUSION ADD-ON: CPT

## 2023-06-28 PROCEDURE — 258N000003 HC RX IP 258 OP 636: Performed by: FAMILY MEDICINE

## 2023-06-28 RX ORDER — ONDANSETRON 2 MG/ML
4 INJECTION INTRAMUSCULAR; INTRAVENOUS ONCE
Status: CANCELLED | OUTPATIENT
Start: 2023-07-01 | End: 2023-06-28

## 2023-06-28 RX ORDER — HEPARIN SODIUM,PORCINE 10 UNIT/ML
5 VIAL (ML) INTRAVENOUS
Status: CANCELLED | OUTPATIENT
Start: 2023-06-28

## 2023-06-28 RX ORDER — ONDANSETRON 2 MG/ML
4 INJECTION INTRAMUSCULAR; INTRAVENOUS ONCE
Status: COMPLETED | OUTPATIENT
Start: 2023-06-28 | End: 2023-06-28

## 2023-06-28 RX ORDER — HEPARIN SODIUM (PORCINE) LOCK FLUSH IV SOLN 100 UNIT/ML 100 UNIT/ML
5 SOLUTION INTRAVENOUS
Status: CANCELLED | OUTPATIENT
Start: 2023-06-28

## 2023-06-28 RX ADMIN — ONDANSETRON 4 MG: 2 INJECTION INTRAMUSCULAR; INTRAVENOUS at 12:09

## 2023-06-28 RX ADMIN — SODIUM CHLORIDE, POTASSIUM CHLORIDE, SODIUM LACTATE AND CALCIUM CHLORIDE 1000 ML: 600; 310; 30; 20 INJECTION, SOLUTION INTRAVENOUS at 12:07

## 2023-06-28 ASSESSMENT — PAIN SCALES - GENERAL: PAINLEVEL: NO PAIN (0)

## 2023-06-28 NOTE — TELEPHONE ENCOUNTER
[facility]  Type of Contact: Phone call (patient / identified key support person reached)  Sandy Martel, Social Work Care Coordinator    Care Coordination: provided care management services/referrals necessary to ensure patient and their identified supports have access to medical, behavioral health, pharmacology and recovery support services.  Ensured that patient's care is integrated across all settings and services.      Call from patient today. She states [place] called the county, she is in need of a medical opinion form to be completed by provider. Let her know we can assist with this. Has upcoming appt with Dr. Ch Monday. SW will review with provider.     Sandy Martel, DENZEL  [facility]- Social Work Care Coordinator

## 2023-06-28 NOTE — PROGRESS NOTES
Infusion Nursing Note:  Jaqueline Argueta presents today for zofran & 1 L LR.    Patient seen by provider today: No   present during visit today: Not Applicable.    Note: N/A.      Intravenous Access:  Peripheral IV placed.    Treatment Conditions:  Not Applicable.      Post Infusion Assessment:  Patient tolerated infusion without incident.  Blood return noted pre and post infusion.  Site patent and intact, free from redness, edema or discomfort.  No evidence of extravasations.  Access discontinued per protocol.       Discharge Plan:   Discharge instructions reviewed with: Patient.  Patient and/or family verbalized understanding of discharge instructions and all questions answered.  Patient discharged in stable condition accompanied by: self and .  Departure Mode: Ambulatory.      Lulú Juan RN

## 2023-06-29 DIAGNOSIS — O21.0 HYPEREMESIS GRAVIDARUM: Primary | ICD-10-CM

## 2023-07-01 ENCOUNTER — INFUSION THERAPY VISIT (OUTPATIENT)
Dept: INFUSION THERAPY | Facility: CLINIC | Age: 28
End: 2023-07-01
Attending: FAMILY MEDICINE
Payer: COMMERCIAL

## 2023-07-01 VITALS
DIASTOLIC BLOOD PRESSURE: 67 MMHG | SYSTOLIC BLOOD PRESSURE: 112 MMHG | HEART RATE: 89 BPM | TEMPERATURE: 98.1 F | RESPIRATION RATE: 20 BRPM

## 2023-07-01 DIAGNOSIS — O09.90 SUPERVISION OF HIGH RISK PREGNANCY, ANTEPARTUM: ICD-10-CM

## 2023-07-01 DIAGNOSIS — O21.0 HYPEREMESIS GRAVIDARUM: Primary | ICD-10-CM

## 2023-07-01 PROCEDURE — 96374 THER/PROPH/DIAG INJ IV PUSH: CPT

## 2023-07-01 PROCEDURE — 258N000003 HC RX IP 258 OP 636: Performed by: FAMILY MEDICINE

## 2023-07-01 PROCEDURE — 250N000011 HC RX IP 250 OP 636: Mod: JZ | Performed by: FAMILY MEDICINE

## 2023-07-01 PROCEDURE — 96361 HYDRATE IV INFUSION ADD-ON: CPT

## 2023-07-01 RX ORDER — ONDANSETRON 2 MG/ML
4 INJECTION INTRAMUSCULAR; INTRAVENOUS ONCE
Status: COMPLETED | OUTPATIENT
Start: 2023-07-01 | End: 2023-07-01

## 2023-07-01 RX ORDER — HEPARIN SODIUM (PORCINE) LOCK FLUSH IV SOLN 100 UNIT/ML 100 UNIT/ML
5 SOLUTION INTRAVENOUS
Status: DISCONTINUED | OUTPATIENT
Start: 2023-07-01 | End: 2023-07-01 | Stop reason: HOSPADM

## 2023-07-01 RX ORDER — HEPARIN SODIUM,PORCINE 10 UNIT/ML
5 VIAL (ML) INTRAVENOUS
Status: DISCONTINUED | OUTPATIENT
Start: 2023-07-01 | End: 2023-07-01 | Stop reason: HOSPADM

## 2023-07-01 RX ORDER — HEPARIN SODIUM (PORCINE) LOCK FLUSH IV SOLN 100 UNIT/ML 100 UNIT/ML
5 SOLUTION INTRAVENOUS
Status: CANCELLED | OUTPATIENT
Start: 2023-07-01

## 2023-07-01 RX ORDER — HEPARIN SODIUM,PORCINE 10 UNIT/ML
5 VIAL (ML) INTRAVENOUS
Status: CANCELLED | OUTPATIENT
Start: 2023-07-01

## 2023-07-01 RX ORDER — ONDANSETRON 2 MG/ML
4 INJECTION INTRAMUSCULAR; INTRAVENOUS ONCE
Status: CANCELLED | OUTPATIENT
Start: 2023-07-01 | End: 2023-07-01

## 2023-07-01 RX ADMIN — ONDANSETRON 4 MG: 2 INJECTION INTRAMUSCULAR; INTRAVENOUS at 10:19

## 2023-07-01 RX ADMIN — SODIUM CHLORIDE, POTASSIUM CHLORIDE, SODIUM LACTATE AND CALCIUM CHLORIDE 1000 ML: 600; 310; 30; 20 INJECTION, SOLUTION INTRAVENOUS at 10:19

## 2023-07-01 NOTE — PROGRESS NOTES
Infusion Nursing Note:  Jaqueline Argueta presents today for IV fluids and zofran.    Patient seen by provider today: No   present during visit today: Not Applicable.    Note: N/A.      Intravenous Access:  Peripheral IV placed.    Treatment Conditions:  Not Applicable.      Post Infusion Assessment:  Patient tolerated infusion without incident.  Blood return noted pre and post infusion.  Site patent and intact, free from redness, edema or discomfort.  No evidence of extravasations.  Access discontinued per protocol.       Discharge Plan:   Patient declined prescription refills.  Discharge instructions reviewed with: Patient.  Patient and/or family verbalized understanding of discharge instructions and all questions answered.  AVS to patient via [employer].  Patient will return 7/4 for next appointment.   Patient discharged in stable condition accompanied by: self.  Departure Mode: Ambulatory.      Kurtis Barraza RN

## 2023-07-04 ENCOUNTER — INFUSION THERAPY VISIT (OUTPATIENT)
Dept: INFUSION THERAPY | Facility: CLINIC | Age: 28
End: 2023-07-04
Payer: COMMERCIAL

## 2023-07-04 VITALS
RESPIRATION RATE: 18 BRPM | HEART RATE: 83 BPM | TEMPERATURE: 98.2 F | SYSTOLIC BLOOD PRESSURE: 99 MMHG | DIASTOLIC BLOOD PRESSURE: 66 MMHG

## 2023-07-04 DIAGNOSIS — O21.0 HYPEREMESIS GRAVIDARUM: Primary | ICD-10-CM

## 2023-07-04 DIAGNOSIS — O09.90 SUPERVISION OF HIGH RISK PREGNANCY, ANTEPARTUM: ICD-10-CM

## 2023-07-04 PROCEDURE — 250N000011 HC RX IP 250 OP 636: Mod: JZ | Performed by: FAMILY MEDICINE

## 2023-07-04 PROCEDURE — 96361 HYDRATE IV INFUSION ADD-ON: CPT

## 2023-07-04 PROCEDURE — 258N000003 HC RX IP 258 OP 636: Performed by: FAMILY MEDICINE

## 2023-07-04 PROCEDURE — 96374 THER/PROPH/DIAG INJ IV PUSH: CPT

## 2023-07-04 RX ORDER — HEPARIN SODIUM (PORCINE) LOCK FLUSH IV SOLN 100 UNIT/ML 100 UNIT/ML
5 SOLUTION INTRAVENOUS
Status: DISCONTINUED | OUTPATIENT
Start: 2023-07-04 | End: 2023-07-04 | Stop reason: HOSPADM

## 2023-07-04 RX ORDER — HEPARIN SODIUM,PORCINE 10 UNIT/ML
5 VIAL (ML) INTRAVENOUS
Status: CANCELLED | OUTPATIENT
Start: 2023-07-04

## 2023-07-04 RX ORDER — HEPARIN SODIUM (PORCINE) LOCK FLUSH IV SOLN 100 UNIT/ML 100 UNIT/ML
5 SOLUTION INTRAVENOUS
Status: CANCELLED | OUTPATIENT
Start: 2023-07-04

## 2023-07-04 RX ORDER — ONDANSETRON 2 MG/ML
4 INJECTION INTRAMUSCULAR; INTRAVENOUS ONCE
Status: CANCELLED | OUTPATIENT
Start: 2023-07-04 | End: 2023-07-04

## 2023-07-04 RX ORDER — HEPARIN SODIUM,PORCINE 10 UNIT/ML
5 VIAL (ML) INTRAVENOUS
Status: DISCONTINUED | OUTPATIENT
Start: 2023-07-04 | End: 2023-07-04 | Stop reason: HOSPADM

## 2023-07-04 RX ORDER — ONDANSETRON 2 MG/ML
4 INJECTION INTRAMUSCULAR; INTRAVENOUS ONCE
Status: COMPLETED | OUTPATIENT
Start: 2023-07-04 | End: 2023-07-04

## 2023-07-04 RX ADMIN — ONDANSETRON 4 MG: 2 INJECTION INTRAMUSCULAR; INTRAVENOUS at 09:19

## 2023-07-04 RX ADMIN — SODIUM CHLORIDE, POTASSIUM CHLORIDE, SODIUM LACTATE AND CALCIUM CHLORIDE 1000 ML: 600; 310; 30; 20 INJECTION, SOLUTION INTRAVENOUS at 09:18

## 2023-07-04 NOTE — PROGRESS NOTES
Infusion Nursing Note:  Jaqueline Argueta presents today for IVF plus nausea meds.    Patient seen by provider today: No   present during visit today: Not Applicable.    Note: N/A.      Intravenous Access:  Peripheral IV placed.    Treatment Conditions:  Not Applicable.      Post Infusion Assessment:  Patient tolerated infusion without incident.  Site patent and intact, free from redness, edema or discomfort.  No evidence of extravasations.  Access discontinued per protocol.       Discharge Plan:   Copy of AVS reviewed with patient and/or family.  Patient will return 7/8 for next appointment.  Patient discharged in stable condition accompanied by: self.  Departure Mode: Ambulatory.      Fozia Ramsey RN

## 2023-07-05 ENCOUNTER — TELEPHONE (OUTPATIENT)
Dept: FAMILY MEDICINE | Facility: CLINIC | Age: 28
End: 2023-07-05
Payer: COMMERCIAL

## 2023-07-05 DIAGNOSIS — O21.0 HYPEREMESIS GRAVIDARUM: Primary | ICD-10-CM

## 2023-07-05 NOTE — TELEPHONE ENCOUNTER
Called to discuss rescheduling EARLE from 7/3. Pt states She thought the provider was ill. Audibly sounds like she's feeling better-increased frequency of IV infusions which leads to feelings of actual hunger.   Mikael'd for 7/11 and assisted in scheduling 2wks later on 7/25 w Dr. Worthy.  [facility] had called to schedule anatomy scan (she thinks) but she wasn't feeling well and said she'd call back-has not yet. Will f/u with [facility].  No further questions at this time.  Mayelin QURESHI, EDGARDM     Presents to ED with nausea, vomiting, and diarrhea since 8/1/19.  Unable to keep fluids or food down. Concerned for electrolyte imbalances and dehydration with this.

## 2023-07-08 ENCOUNTER — INFUSION THERAPY VISIT (OUTPATIENT)
Dept: INFUSION THERAPY | Facility: CLINIC | Age: 28
End: 2023-07-08
Attending: FAMILY MEDICINE
Payer: COMMERCIAL

## 2023-07-08 VITALS
SYSTOLIC BLOOD PRESSURE: 106 MMHG | RESPIRATION RATE: 18 BRPM | TEMPERATURE: 97.6 F | HEART RATE: 62 BPM | DIASTOLIC BLOOD PRESSURE: 71 MMHG

## 2023-07-08 DIAGNOSIS — O21.0 HYPEREMESIS GRAVIDARUM: Primary | ICD-10-CM

## 2023-07-08 DIAGNOSIS — O09.90 SUPERVISION OF HIGH RISK PREGNANCY, ANTEPARTUM: ICD-10-CM

## 2023-07-08 PROCEDURE — 258N000003 HC RX IP 258 OP 636: Performed by: FAMILY MEDICINE

## 2023-07-08 PROCEDURE — 96361 HYDRATE IV INFUSION ADD-ON: CPT

## 2023-07-08 PROCEDURE — 250N000011 HC RX IP 250 OP 636: Mod: JZ | Performed by: FAMILY MEDICINE

## 2023-07-08 PROCEDURE — 96374 THER/PROPH/DIAG INJ IV PUSH: CPT

## 2023-07-08 RX ORDER — HEPARIN SODIUM (PORCINE) LOCK FLUSH IV SOLN 100 UNIT/ML 100 UNIT/ML
5 SOLUTION INTRAVENOUS
Status: CANCELLED | OUTPATIENT
Start: 2023-07-08

## 2023-07-08 RX ORDER — HEPARIN SODIUM,PORCINE 10 UNIT/ML
5 VIAL (ML) INTRAVENOUS
Status: CANCELLED | OUTPATIENT
Start: 2023-07-08

## 2023-07-08 RX ORDER — ONDANSETRON 2 MG/ML
4 INJECTION INTRAMUSCULAR; INTRAVENOUS ONCE
Status: CANCELLED | OUTPATIENT
Start: 2023-07-12 | End: 2023-07-08

## 2023-07-08 RX ORDER — ONDANSETRON 2 MG/ML
4 INJECTION INTRAMUSCULAR; INTRAVENOUS ONCE
Status: COMPLETED | OUTPATIENT
Start: 2023-07-08 | End: 2023-07-08

## 2023-07-08 RX ADMIN — SODIUM CHLORIDE, POTASSIUM CHLORIDE, SODIUM LACTATE AND CALCIUM CHLORIDE 1000 ML: 600; 310; 30; 20 INJECTION, SOLUTION INTRAVENOUS at 11:14

## 2023-07-08 RX ADMIN — ONDANSETRON 4 MG: 2 INJECTION INTRAMUSCULAR; INTRAVENOUS at 11:13

## 2023-07-08 NOTE — PROGRESS NOTES
Infusion Nursing Note:  Jaqueline Argueta presents today for IVF, Zofran.    Patient seen by provider today: No   present during visit today: Not Applicable.    Note: N/A.      Intravenous Access:  Peripheral IV placed.    Treatment Conditions:  Not Applicable.      Post Infusion Assessment:  Patient tolerated infusion without incident.  Site patent and intact, free from redness, edema or discomfort.  Access discontinued per protocol.       Discharge Plan:   Discharge instructions reviewed with: Patient.  Patient and/or family verbalized understanding of discharge instructions and all questions answered.  Patient discharged in stable condition accompanied by: friend.  Departure Mode: Ambulatory.      CARLO Lizarraga RN

## 2023-07-11 ENCOUNTER — PRE VISIT (OUTPATIENT)
Dept: MATERNAL FETAL MEDICINE | Facility: CLINIC | Age: 28
End: 2023-07-11

## 2023-07-11 ENCOUNTER — OFFICE VISIT (OUTPATIENT)
Dept: FAMILY MEDICINE | Facility: CLINIC | Age: 28
End: 2023-07-11
Payer: COMMERCIAL

## 2023-07-11 VITALS
RESPIRATION RATE: 16 BRPM | HEART RATE: 91 BPM | TEMPERATURE: 98.6 F | DIASTOLIC BLOOD PRESSURE: 76 MMHG | HEIGHT: 61 IN | OXYGEN SATURATION: 100 % | SYSTOLIC BLOOD PRESSURE: 111 MMHG | WEIGHT: 134.6 LBS | BODY MASS INDEX: 25.41 KG/M2

## 2023-07-11 DIAGNOSIS — O21.0 HYPEREMESIS GRAVIDARUM: ICD-10-CM

## 2023-07-11 DIAGNOSIS — K59.00 CONSTIPATION, UNSPECIFIED CONSTIPATION TYPE: Primary | ICD-10-CM

## 2023-07-11 PROCEDURE — 99207 PR PRENATAL VISIT: CPT | Mod: GC

## 2023-07-11 RX ORDER — HYDROXYZINE HYDROCHLORIDE 10 MG/1
10 TABLET, FILM COATED ORAL 3 TIMES DAILY PRN
Qty: 30 TABLET | Refills: 3 | Status: SHIPPED | OUTPATIENT
Start: 2023-07-11 | End: 2023-11-10

## 2023-07-11 RX ORDER — SENNA AND DOCUSATE SODIUM 50; 8.6 MG/1; MG/1
1 TABLET, FILM COATED ORAL AT BEDTIME
Qty: 90 TABLET | Refills: 3 | Status: SHIPPED | OUTPATIENT
Start: 2023-07-11 | End: 2023-07-25

## 2023-07-11 ASSESSMENT — PAIN SCALES - GENERAL: PAINLEVEL: MILD PAIN (3)

## 2023-07-11 ASSESSMENT — PATIENT HEALTH QUESTIONNAIRE - PHQ9: SUM OF ALL RESPONSES TO PHQ QUESTIONS 1-9: 10

## 2023-07-11 NOTE — PROGRESS NOTES
Return OB visit  12-23 weeks    Assessment & plan:   IUP at 20w1d weeks by first trimester ultrasound.     Routine OB cares   High-risk pregnancy in second trimester (2/2 hyperemesis, depression, cannabis use)  Reassuring fetal doptones. Measuring appropriately.   - Prenatal labs reviewed: Rh+, Hep B non-immune.  - Pregnancy complicated by: hyperemesis gravidarum, depression (not on medications), early alcohol use (none current), and intermittent marijuana use.   - Scheduled for fetal survey 23/   - Discussed screening for gestational diabetes at 24-28 weeks.  - Provided counseling regarding  labor symptoms, depression and social support systems, breast feeding, contraception options after delivery. The patient plans to deliver at [facility] with myself and/or OB partner Dr. Worthy.   - Return to clinic in 2 weeks for recheck.    Hyperemesis gravidarum   Weight loss during pregnancy, improving    Patient reports improving hyperemesis with continued outpatient infusions. Gained weight for the first time during this pregnancy. Will continue close monitoring.   - Continue Hydroxyzine and Compazine   - Continue outpatient infusions ordered; can go PRN every three days and receive anti-emetics IV at that time   - Return to clinic in 2 weeks for recheck     Depression  Patient endorses stable mood. No SI/HI. Interested in therapy. Declined medications. Financial barriers contributing to mood.   - Forms for [facility] support completed   - [facility]   - Therapy with Dr. Linares scheduled    - Return to clinic in 2 weeks for recheck.    Constipation  Reports BM every 3-4 days. Benign abdominal exam. Would like to avoid Miralax due to nausea.   - Prescribed Senna; start with daily, increase to BID if no BM in 3 days   - Can consider suppository if not effective     Options for treatment and follow-up care were reviewed with the patient and/or guardian. Jaqueline Argueta and/or guardian engaged in the  "decision making process and verbalized understanding of the options discussed and agreed with the final plan.    Linda Ch MD    >>>>>>>>>>>>>>>>>>>>>>>>>>>>>>>>>>>>>>>>>>>>>>>>>>>>>>>>>    Subjective:   Jaqueline is a 27 year old  female at 20w1d by T1US who returns for prenatal care. PRINCESS 2023. Preganncy complicated by hyperemesis gravidarum with weight loss, depression (not on medications), early alcohol use (none current), intermittent marijuana use, and rubella/Hep B non-immune status.     - Concerns today:     Forms:  - Needs form for [facility] support     Hyperemesis:  - Still ongoing   - With regular IV infusions (2 days), helps. Has less vomiting.   - With spaced out sessions, has worse symptoms   - Medications: Hydroxyzine and Compazine, have been helping \  - Has appointment with [facility] set up for next week     Wt Readings from Last 4 Encounters:   23 61.1 kg (134 lb 9.6 oz)   23 58.7 kg (129 lb 4.8 oz)   23 60.1 kg (132 lb 9.6 oz)   23 61.7 kg (136 lb 1.6 oz)     Dizziness:  - Gets dizzy in the mornings or if moves too fast/stands up too fast   - Has not drank water today    - No fainting     Stomach pain  - Feels constipated   - Taking Senna every other day   - Having BM every 3-4 days   - Would like to avoid suppository at this time     Depression:  - Mood has been \"okay\".  - Not as bad as it was   - Has been hard to schedule therapy   - [facility]   - Scheduled with Vijay      - Patient reports no vaginal bleeding, no contractions/severe cramping, no leakage of fluid. Fetal movement is present.   - No vaginal discharge. No dysuria.   - No headache, vision changes, lower extremity swelling, upper abdominal pain, chest pain, shortness of breath.       Objective:   /76   Pulse 91   Temp 98.6  F (37  C)   Resp 16   Ht 1.549 m (5' 1\")   Wt 61.1 kg (134 lb 9.6 oz)   LMP 03/10/2023 (Approximate)   SpO2 100%   BMI 25.43 kg/m     Const: " No distress  Abd: Gravid.   See flowsheet for FH, FHTs, edema     Prenatal labs:   -   Hemoglobin   Date Value Ref Range Status   04/25/2023 14.4 11.7 - 15.7 g/dL Final   04/14/2018 12.0 11.7 - 15.7 g/dL Final

## 2023-07-11 NOTE — PATIENT INSTRUCTIONS
KEEP DOING COMPAZINE AND HYDROXYZINE   WE WILL MAKE SURE INFUSIONS GET SCHEDULED   INCREASE SENNA TO DAILY OR TWICE A DAY   I WILL GET FORM COMPLETED AND SUGEY WILL FAX OVER    UPCOMING:  ULTRASOUND AND MFM SCHEDULED   THERAPY WITH DR NAPOLES   COME BACK IN 2 WEEKS       Thank you for coming to [facility]!  - If you had lab testing today and your results are normal they will be be mailed to you or sent to your MyChart within seven days.   - If the lab tests need quick action we will call you with the results.  - The phone number we will call with results is # [phone] (home) . If this is not the best number please call our clinic and change the number.  - If any referrals were made to [facility] and you don't get a call, call central scheduling [phone]  - If you need any refills please call your pharmacy and they will contact us.  - If you had an XRay/CT/Ultrasound/MRI ordered the number is [phone] to schedule or change your appointment  - If you have any concerns about today's visit or wish to schedule another appointment please call our office [phone] (8-5:00 M-F)  - If you have urgent medical concerns call [phone] at any time of the day.  - If you have a medical emergency please call [phone].    Because you are pregnant, we have additional resources for you:  - You may call and ask to speak with one of our clinic nurses at [phone] during normal business hours, for non-urgent questions about your pregnancy.  - Immediate OB help is also available 24 hours a day, seven days a week via the [facility] and Delivery ([facility]) - [phone]  located at [address] [place] [id number]    Prenatal Timeline:  Before 14 weeks: dating ultrasound       This ultrasound helps us determine your dates accurately.  15 - 18 weeks: Optional genetic testing (quad screen)       This testing helps understand your baby's risk for some genetic  abnormalities.  22 - 24 weeks: Ultrasound (fetal anatomy survey)       This testing will look for early growth abnormalities, and might tell the baby's sex.  24 - 28 weeks: One hour sugar test (GCT)        This test helps identify diabetes of pregnancy.  27 - 36 weeks: Tetanus shot (Tdap)       This shot helps protect you and your baby from tetanus and whooping cough.  36 weeks and later: Group B Strep test (GBS)       This test helps predict if you need antibiotics in labor to prevent infection in your baby.  Anytime September to April: flu shot       This shot helps protect you and your family from the flu.  This is especially important during pregnancy!  Once every trimester: blood iron test (hemoglobin)       This test helps us know if you will need extra iron to support your baby.  At any time during or after your pregnancy: Some women experience baby blues.  We can help you with: Feeling anxious, Overwhelmed or sad, Trouble sleeping, Crying uncontrollably, Trouble caring for yourself or baby.    How frequently should you see your provider?  < 28 weeks: every 4 weeks  28-36 weeks: every 2 weeks  >36 weeks: every week    Call your healthcare provider immediately if you experience any of the following symptoms:  Bleeding from nipples, rectum, bladder, or coughing up blood  Vaginal bleeding, no matter how slight (unless small amount after a pelvic exam)  Swelling of hands or face  Dimness or blurring of vision  Severe or continuous headaches  Abdominal pains or contractions that do not go away with heat and rest or a bowel movement  Chills or fever over 100.4  Persistent vomiting  Painful or burning urination  Decrease in fetal movement  Sudden or slow escape of fluid from the vagina

## 2023-07-12 ENCOUNTER — INFUSION THERAPY VISIT (OUTPATIENT)
Dept: INFUSION THERAPY | Facility: CLINIC | Age: 28
End: 2023-07-12
Attending: ADVANCED PRACTICE MIDWIFE
Payer: COMMERCIAL

## 2023-07-12 VITALS
DIASTOLIC BLOOD PRESSURE: 63 MMHG | TEMPERATURE: 97.9 F | RESPIRATION RATE: 18 BRPM | HEART RATE: 86 BPM | SYSTOLIC BLOOD PRESSURE: 102 MMHG

## 2023-07-12 DIAGNOSIS — O09.90 SUPERVISION OF HIGH RISK PREGNANCY, ANTEPARTUM: ICD-10-CM

## 2023-07-12 DIAGNOSIS — O21.0 HYPEREMESIS GRAVIDARUM: Primary | ICD-10-CM

## 2023-07-12 PROCEDURE — 96374 THER/PROPH/DIAG INJ IV PUSH: CPT

## 2023-07-12 PROCEDURE — 258N000003 HC RX IP 258 OP 636: Performed by: FAMILY MEDICINE

## 2023-07-12 PROCEDURE — 96361 HYDRATE IV INFUSION ADD-ON: CPT

## 2023-07-12 PROCEDURE — 250N000011 HC RX IP 250 OP 636: Mod: JZ | Performed by: FAMILY MEDICINE

## 2023-07-12 RX ORDER — ONDANSETRON 2 MG/ML
4 INJECTION INTRAMUSCULAR; INTRAVENOUS ONCE
Status: CANCELLED | OUTPATIENT
Start: 2023-07-12 | End: 2023-07-12

## 2023-07-12 RX ORDER — HEPARIN SODIUM (PORCINE) LOCK FLUSH IV SOLN 100 UNIT/ML 100 UNIT/ML
5 SOLUTION INTRAVENOUS
Status: CANCELLED | OUTPATIENT
Start: 2023-07-12

## 2023-07-12 RX ORDER — HEPARIN SODIUM,PORCINE 10 UNIT/ML
5 VIAL (ML) INTRAVENOUS
Status: CANCELLED | OUTPATIENT
Start: 2023-07-12

## 2023-07-12 RX ORDER — ONDANSETRON 2 MG/ML
4 INJECTION INTRAMUSCULAR; INTRAVENOUS ONCE
Status: COMPLETED | OUTPATIENT
Start: 2023-07-12 | End: 2023-07-12

## 2023-07-12 RX ADMIN — SODIUM CHLORIDE, POTASSIUM CHLORIDE, SODIUM LACTATE AND CALCIUM CHLORIDE 1000 ML: 600; 310; 30; 20 INJECTION, SOLUTION INTRAVENOUS at 13:13

## 2023-07-12 RX ADMIN — ONDANSETRON 4 MG: 2 INJECTION INTRAMUSCULAR; INTRAVENOUS at 13:12

## 2023-07-12 NOTE — PROGRESS NOTES
"Infusion Nursing Note:  Jaqueline Greenzman presents today for ***.    Patient seen by provider today: {YES (EXPLAIN)/NO:774844}   present during visit today: {UMHLANGUAGE:215323}    Note: {Not Applicable or free text:112243:s:\"N/A\"}.      Intravenous Access:  {UMHIVACCESS:433242}    Treatment Conditions:  {UMHTXCONDITIONS:400882}      Post Infusion Assessment:  {UMHPOSTINFUSION:840119}       Discharge Plan:   {UMHDISCHARGE:361659}      Lolita Segal RN    "

## 2023-07-12 NOTE — PROGRESS NOTES
Infusion Nursing Note:  Jaqueline Argueta presents today for IVF/zofran.    Patient seen by provider today: No   present during visit today: Not Applicable.    Note: N/A.      Intravenous Access:  Peripheral IV placed.    Treatment Conditions:  Not Applicable.      Post Infusion Assessment:  Patient tolerated infusion without incident.  Blood return noted pre and post infusion.  Site patent and intact, free from redness, edema or discomfort.  No evidence of extravasations.  Access discontinued per protocol.       Discharge Plan:   Discharge instructions reviewed with: Patient.  Patient and/or family verbalized understanding of discharge instructions and all questions answered.  Patient discharged in stable condition accompanied by: self.  Departure Mode: Ambulatory.      Lolita Segal RN

## 2023-07-13 ENCOUNTER — TELEPHONE (OUTPATIENT)
Dept: FAMILY MEDICINE | Facility: CLINIC | Age: 28
End: 2023-07-13
Payer: COMMERCIAL

## 2023-07-13 DIAGNOSIS — O21.0 HYPEREMESIS GRAVIDARUM: Primary | ICD-10-CM

## 2023-07-13 NOTE — TELEPHONE ENCOUNTER
"Called Pt to help schedule more EARLE apts.  Pt audibly sounded not as upbeat as the last time I spoke w her. Pt states she missed one dose of Compazine last week, which she feels causes a downward trend in how she feels nausea wise. Has been current w her doses since & had a fluid infusion yesterday-feels a bit better. Still unable to have large meals.  Pt voiced she has not gotten out of bed yet so hasn't eaten today yet. Rev'd importance to starting snacking early and continuing every 1.5-2hrs throughout the day, of nutrient dense foods (avocado, nuts, nut butters, smoothies, etc) to help prevent the isela effect when eating late occurs.  Pt also states that more frequent IV infusions tends to help how she feels-thinks Dr. Ch was able to \"write that\" to increase frequency. Encouraged to alert clinic if scheduling difficulties arise.  Voiced understanding of plan.  Helped to schedule EARLE apts for 8/8, 8/22 & 9/6.   Mayelin QURESHI CNM    "

## 2023-07-15 ENCOUNTER — INFUSION THERAPY VISIT (OUTPATIENT)
Dept: INFUSION THERAPY | Facility: CLINIC | Age: 28
End: 2023-07-15
Payer: COMMERCIAL

## 2023-07-15 VITALS
SYSTOLIC BLOOD PRESSURE: 105 MMHG | RESPIRATION RATE: 16 BRPM | OXYGEN SATURATION: 98 % | DIASTOLIC BLOOD PRESSURE: 72 MMHG | HEART RATE: 82 BPM

## 2023-07-15 DIAGNOSIS — O09.90 SUPERVISION OF HIGH RISK PREGNANCY, ANTEPARTUM: ICD-10-CM

## 2023-07-15 DIAGNOSIS — O21.0 HYPEREMESIS GRAVIDARUM: Primary | ICD-10-CM

## 2023-07-15 PROBLEM — F12.90 LONG TERM CURRENT USE OF CANNABIS: Status: ACTIVE | Noted: 2023-07-15

## 2023-07-15 PROBLEM — O99.332 PREGNANCY COMPLICATED BY TOBACCO USE IN SECOND TRIMESTER: Status: ACTIVE | Noted: 2023-07-15

## 2023-07-15 PROBLEM — O99.342 DEPRESSION AFFECTING PREGNANCY IN SECOND TRIMESTER, ANTEPARTUM: Status: ACTIVE | Noted: 2023-05-10

## 2023-07-15 PROCEDURE — 96374 THER/PROPH/DIAG INJ IV PUSH: CPT

## 2023-07-15 PROCEDURE — 96361 HYDRATE IV INFUSION ADD-ON: CPT

## 2023-07-15 PROCEDURE — 250N000011 HC RX IP 250 OP 636: Mod: JZ | Performed by: FAMILY MEDICINE

## 2023-07-15 PROCEDURE — 258N000003 HC RX IP 258 OP 636: Performed by: FAMILY MEDICINE

## 2023-07-15 RX ORDER — ONDANSETRON 2 MG/ML
4 INJECTION INTRAMUSCULAR; INTRAVENOUS ONCE
Status: CANCELLED | OUTPATIENT
Start: 2023-07-15 | End: 2023-07-15

## 2023-07-15 RX ORDER — HEPARIN SODIUM (PORCINE) LOCK FLUSH IV SOLN 100 UNIT/ML 100 UNIT/ML
5 SOLUTION INTRAVENOUS
Status: CANCELLED | OUTPATIENT
Start: 2023-07-15

## 2023-07-15 RX ORDER — HEPARIN SODIUM,PORCINE 10 UNIT/ML
5 VIAL (ML) INTRAVENOUS
Status: CANCELLED | OUTPATIENT
Start: 2023-07-15

## 2023-07-15 RX ORDER — ONDANSETRON 2 MG/ML
4 INJECTION INTRAMUSCULAR; INTRAVENOUS ONCE
Status: COMPLETED | OUTPATIENT
Start: 2023-07-15 | End: 2023-07-15

## 2023-07-15 RX ADMIN — ONDANSETRON 4 MG: 2 INJECTION INTRAMUSCULAR; INTRAVENOUS at 10:11

## 2023-07-15 RX ADMIN — SODIUM CHLORIDE, POTASSIUM CHLORIDE, SODIUM LACTATE AND CALCIUM CHLORIDE 1000 ML: 600; 310; 30; 20 INJECTION, SOLUTION INTRAVENOUS at 10:11

## 2023-07-15 NOTE — PROGRESS NOTES
Infusion Nursing Note:  Jaqueline Argueta presents today for IVF, IV Zofran.    Patient seen by provider today: No   present during visit today: Not Applicable.    Note: Patient reporting continuing nausea.  Last emesis was last week. IVF help her with appetite and energy.  Denies new concerns today.      Intravenous Access:  Peripheral IV placed.    Treatment Conditions:  Not Applicable.      Post Infusion Assessment:  Patient tolerated infusion without incident.  Blood return noted pre and post infusion.  Site patent and intact, free from redness, edema or discomfort.  No evidence of extravasations.  Access discontinued per protocol.       Discharge Plan:   Patient declined prescription refills.  Discharge instructions reviewed with: Patient.  Patient and/or family verbalized understanding of discharge instructions and all questions answered.  AVS to patient via [employer].  Patient will return 7/18/23 for next appointment.   Patient discharged in stable condition accompanied by: significant other.  Departure Mode: Ambulatory.      Sarah Vazquez RN

## 2023-07-18 ENCOUNTER — OFFICE VISIT (OUTPATIENT)
Dept: MATERNAL FETAL MEDICINE | Facility: CLINIC | Age: 28
End: 2023-07-18
Attending: OBSTETRICS & GYNECOLOGY
Payer: COMMERCIAL

## 2023-07-18 ENCOUNTER — HOSPITAL ENCOUNTER (OUTPATIENT)
Dept: ULTRASOUND IMAGING | Facility: CLINIC | Age: 28
Discharge: HOME | End: 2023-07-18
Attending: OBSTETRICS & GYNECOLOGY
Payer: COMMERCIAL

## 2023-07-18 ENCOUNTER — INFUSION THERAPY VISIT (OUTPATIENT)
Dept: INFUSION THERAPY | Facility: CLINIC | Age: 28
End: 2023-07-18
Payer: COMMERCIAL

## 2023-07-18 VITALS
SYSTOLIC BLOOD PRESSURE: 101 MMHG | TEMPERATURE: 98.2 F | HEART RATE: 83 BPM | DIASTOLIC BLOOD PRESSURE: 68 MMHG | RESPIRATION RATE: 16 BRPM

## 2023-07-18 DIAGNOSIS — O09.90 SUPERVISION OF HIGH RISK PREGNANCY, ANTEPARTUM: ICD-10-CM

## 2023-07-18 DIAGNOSIS — O21.0 HYPEREMESIS GRAVIDARUM: ICD-10-CM

## 2023-07-18 DIAGNOSIS — O21.0 HYPEREMESIS GRAVIDARUM: Primary | ICD-10-CM

## 2023-07-18 DIAGNOSIS — Z36.2 ENCOUNTER FOR FOLLOW-UP ULTRASOUND OF FETAL ANATOMY: ICD-10-CM

## 2023-07-18 PROCEDURE — 76811 OB US DETAILED SNGL FETUS: CPT | Mod: 26 | Performed by: OBSTETRICS & GYNECOLOGY

## 2023-07-18 PROCEDURE — 96361 HYDRATE IV INFUSION ADD-ON: CPT

## 2023-07-18 PROCEDURE — 96374 THER/PROPH/DIAG INJ IV PUSH: CPT

## 2023-07-18 PROCEDURE — 250N000011 HC RX IP 250 OP 636: Mod: JZ | Performed by: FAMILY MEDICINE

## 2023-07-18 PROCEDURE — 99203 OFFICE O/P NEW LOW 30 MIN: CPT | Mod: 25 | Performed by: OBSTETRICS & GYNECOLOGY

## 2023-07-18 PROCEDURE — 76811 OB US DETAILED SNGL FETUS: CPT

## 2023-07-18 PROCEDURE — 258N000003 HC RX IP 258 OP 636: Performed by: FAMILY MEDICINE

## 2023-07-18 RX ORDER — HEPARIN SODIUM (PORCINE) LOCK FLUSH IV SOLN 100 UNIT/ML 100 UNIT/ML
5 SOLUTION INTRAVENOUS
Status: CANCELLED | OUTPATIENT
Start: 2023-07-18

## 2023-07-18 RX ORDER — ONDANSETRON 2 MG/ML
4 INJECTION INTRAMUSCULAR; INTRAVENOUS ONCE
Status: CANCELLED | OUTPATIENT
Start: 2023-07-18 | End: 2023-07-18

## 2023-07-18 RX ORDER — ONDANSETRON 2 MG/ML
4 INJECTION INTRAMUSCULAR; INTRAVENOUS ONCE
Status: COMPLETED | OUTPATIENT
Start: 2023-07-18 | End: 2023-07-18

## 2023-07-18 RX ORDER — HEPARIN SODIUM,PORCINE 10 UNIT/ML
5 VIAL (ML) INTRAVENOUS
Status: CANCELLED | OUTPATIENT
Start: 2023-07-18

## 2023-07-18 RX ADMIN — SODIUM CHLORIDE, POTASSIUM CHLORIDE, SODIUM LACTATE AND CALCIUM CHLORIDE 1000 ML: 600; 310; 30; 20 INJECTION, SOLUTION INTRAVENOUS at 10:38

## 2023-07-18 RX ADMIN — ONDANSETRON 4 MG: 2 INJECTION INTRAMUSCULAR; INTRAVENOUS at 10:38

## 2023-07-18 NOTE — PROGRESS NOTES
The patient was seen for an ultrasound in the Maternal[facility]etal [facility] Center at the [facility] today.  For a detailed report of the ultrasound examination, please see the ultrasound report which can be found under the imaging tab.    If you have questions regarding today's evaluation or if we can be of further service, please contact the Maternal-Fetal Medicine Center.    Beth Moise MD  , OB/GYN  Maternal-Fetal Medicine  [phone] (Pager)

## 2023-07-18 NOTE — NURSING NOTE
Patient presents to [facility] for L2US at 21+1 weeks due to severe hyperemesis with maternal weight loss and cannabis use in pregnancy. Denies LOF, vaginal bleeding, cramping/contractions. SBAR given to [facility] MD, see their note in Epic.

## 2023-07-19 ENCOUNTER — TELEPHONE (OUTPATIENT)
Dept: FAMILY MEDICINE | Facility: CLINIC | Age: 28
End: 2023-07-19
Payer: COMMERCIAL

## 2023-07-19 ENCOUNTER — TELEPHONE (OUTPATIENT)
Dept: PSYCHOLOGY | Facility: CLINIC | Age: 28
End: 2023-07-19
Payer: COMMERCIAL

## 2023-07-19 DIAGNOSIS — F12.90 LONG TERM CURRENT USE OF CANNABIS: ICD-10-CM

## 2023-07-19 DIAGNOSIS — O21.0 HYPEREMESIS GRAVIDARUM: Primary | ICD-10-CM

## 2023-07-19 NOTE — TELEPHONE ENCOUNTER
[facility]  Type of Contact: Phone call (not reached/unavailable)  Chante Aly Community Health Worker    Care Coordination: provided care management services/referrals necessary to ensure patient and their identified supports have access to medical, behavioral health, pharmacology and recovery support services.  Ensured that patient's care is integrated across all settings and services.        Writer left message asking patient to call back. Wanting to remind of her of upcoming therapy appointment on 7/24/23.    Chante Aly LPN/MAMIEW

## 2023-07-19 NOTE — TELEPHONE ENCOUNTER
LVM to see if Jaqueline needs any assistance scheduling an IV infusion apt during the week next week. Currently roxane'd 7/21 & 7/28, as well as multiple after 7/28. If not assistance is needed, she does not need to call back.  Mayelin QURESHI, EDGARDM

## 2023-07-21 ENCOUNTER — INFUSION THERAPY VISIT (OUTPATIENT)
Dept: INFUSION THERAPY | Facility: CLINIC | Age: 28
End: 2023-07-21
Payer: COMMERCIAL

## 2023-07-21 ENCOUNTER — TELEPHONE (OUTPATIENT)
Dept: FAMILY MEDICINE | Facility: CLINIC | Age: 28
End: 2023-07-21

## 2023-07-21 DIAGNOSIS — O21.0 HYPEREMESIS GRAVIDARUM: Primary | ICD-10-CM

## 2023-07-21 DIAGNOSIS — O09.90 SUPERVISION OF HIGH RISK PREGNANCY, ANTEPARTUM: ICD-10-CM

## 2023-07-21 PROCEDURE — 96361 HYDRATE IV INFUSION ADD-ON: CPT

## 2023-07-21 PROCEDURE — 258N000003 HC RX IP 258 OP 636: Performed by: FAMILY MEDICINE

## 2023-07-21 PROCEDURE — 96374 THER/PROPH/DIAG INJ IV PUSH: CPT

## 2023-07-21 PROCEDURE — 250N000011 HC RX IP 250 OP 636: Mod: JZ | Performed by: FAMILY MEDICINE

## 2023-07-21 RX ORDER — HEPARIN SODIUM,PORCINE 10 UNIT/ML
5 VIAL (ML) INTRAVENOUS
Status: CANCELLED | OUTPATIENT
Start: 2023-07-21

## 2023-07-21 RX ORDER — ONDANSETRON 2 MG/ML
4 INJECTION INTRAMUSCULAR; INTRAVENOUS ONCE
Status: COMPLETED | OUTPATIENT
Start: 2023-07-21 | End: 2023-07-21

## 2023-07-21 RX ORDER — HEPARIN SODIUM (PORCINE) LOCK FLUSH IV SOLN 100 UNIT/ML 100 UNIT/ML
5 SOLUTION INTRAVENOUS
Status: CANCELLED | OUTPATIENT
Start: 2023-07-21

## 2023-07-21 RX ORDER — ONDANSETRON 2 MG/ML
4 INJECTION INTRAMUSCULAR; INTRAVENOUS ONCE
Status: CANCELLED | OUTPATIENT
Start: 2023-07-21 | End: 2023-07-21

## 2023-07-21 RX ADMIN — SODIUM CHLORIDE, POTASSIUM CHLORIDE, SODIUM LACTATE AND CALCIUM CHLORIDE 1000 ML: 600; 310; 30; 20 INJECTION, SOLUTION INTRAVENOUS at 12:13

## 2023-07-21 RX ADMIN — ONDANSETRON 4 MG: 2 INJECTION INTRAMUSCULAR; INTRAVENOUS at 12:15

## 2023-07-21 ASSESSMENT — PAIN SCALES - GENERAL: PAINLEVEL: NO PAIN (0)

## 2023-07-21 NOTE — TELEPHONE ENCOUNTER
Called Pt back and she voiced needing assistance in scheduling another IV fluids apt btwn today at 7/28-her next roxane'd. Recv'd one today and was told she would need to call to try and schedule one before 7/28.  Prefers Monday or Tuesday if possible, as has other apts those days also.    [facility] called-they are booked.    Called [facility] & Pt booked for 7/25 @ 1pm. EARLE @ 320pm and notes made to still room Pt if she is a few minutes late.    Called Jaqueline back to informed, voiced understanding of diff location & confirmed MyChart apt reminders. Asked to call if any other concerns arise.    Mayelin QURESHI, RODOLFO

## 2023-07-21 NOTE — PROGRESS NOTES
Infusion Nursing Note:  Jaqueline Argueta presents today for IVF & Zofran.    Patient seen by provider today: No   present during visit today: Not Applicable.    Note: N/A.      Intravenous Access:  Peripheral IV placed.    Treatment Conditions:  Not Applicable.      Post Infusion Assessment:  Patient tolerated infusion without incident.  Blood return noted pre and post infusion.  Site patent and intact, free from redness, edema or discomfort.  No evidence of extravasations.  Access discontinued per protocol.      Discharge Plan:   Discharge instructions reviewed with: Patient.  Patient and/or family verbalized understanding of discharge instructions and all questions answered.  Patient discharged in stable condition accompanied by: self.  Departure Mode: Ambulatory.      Lulú Juan RN

## 2023-07-21 NOTE — TELEPHONE ENCOUNTER
[facility] phone call message- general phone call:    Reason for call: The patient is requesting a call back.    Return call needed: Yes    OK to leave a message on voice mail? Yes    Primary language: English      needed? No    Call taken on July 21, 2023 at 2:43 PM by Anjali Meeks

## 2023-07-24 ENCOUNTER — TELEPHONE (OUTPATIENT)
Dept: PSYCHOLOGY | Facility: CLINIC | Age: 28
End: 2023-07-24
Payer: COMMERCIAL

## 2023-07-24 NOTE — TELEPHONE ENCOUNTER
"CHW called and spoke with [facility] services. 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     Alexandra at [facility] recommended putting an online referral in for \"[employer]\"  services. They reimburse for services and will match whatever insurance may not cover.  If any further questions will reach out to - [email or web address]    Online referral was placed, they will be reaching out to patient shortly. Notified patient via my chart message.      Chante Aly LPN/BRADLEY  "

## 2023-07-24 NOTE — TELEPHONE ENCOUNTER
[facility]  Type of Contact: Phone call (patient / identified key support person reached)  Chante Aly Community Health Worker    Care Coordination: provided care management services/referrals necessary to ensure patient and their identified supports have access to medical, behavioral health, pharmacology and recovery support services.  Ensured that patient's care is integrated across all settings and services.      Patient called back. CHW was able to reschedule patient's appointment with  to 8/16/23 at 4:00pm. Patient states she missed today's appt due to nausea and over slept.     Reminded her of her upcoming appointments tomorrow.     Patient is interested in seeing if any  services are available through her insurance. Notified patient writer will look into this and get back to her.     Chante Aly LPN/BRADLEY

## 2023-07-24 NOTE — TELEPHONE ENCOUNTER
[facility]  Type of Contact: Phone call (not reached/unavailable)  Chante Aly Community Health Worker    Care Coordination: provided care management services/referrals necessary to ensure patient and their identified supports have access to medical, behavioral health, pharmacology and recovery support services.  Ensured that patient's care is integrated across all settings and services.     Patient missed appointment today with Dr. Davidson.     CHW left message for patient to call back to see if she'd like to reschedule and to also remind her about upcoming appointment tomorrow with Dr. Pride.     Chante Aly LPN

## 2023-07-25 ENCOUNTER — INFUSION THERAPY VISIT (OUTPATIENT)
Dept: INFUSION THERAPY | Facility: CLINIC | Age: 28
End: 2023-07-25
Payer: COMMERCIAL

## 2023-07-25 ENCOUNTER — OFFICE VISIT (OUTPATIENT)
Dept: FAMILY MEDICINE | Facility: CLINIC | Age: 28
End: 2023-07-25
Payer: COMMERCIAL

## 2023-07-25 VITALS — TEMPERATURE: 98.4 F | HEART RATE: 92 BPM | DIASTOLIC BLOOD PRESSURE: 69 MMHG | SYSTOLIC BLOOD PRESSURE: 106 MMHG

## 2023-07-25 VITALS
SYSTOLIC BLOOD PRESSURE: 106 MMHG | WEIGHT: 138 LBS | HEART RATE: 122 BPM | DIASTOLIC BLOOD PRESSURE: 74 MMHG | OXYGEN SATURATION: 97 % | RESPIRATION RATE: 22 BRPM | BODY MASS INDEX: 26.07 KG/M2

## 2023-07-25 DIAGNOSIS — O99.342 DEPRESSION DURING PREGNANCY IN SECOND TRIMESTER: ICD-10-CM

## 2023-07-25 DIAGNOSIS — O09.90 SUPERVISION OF HIGH RISK PREGNANCY, ANTEPARTUM: Primary | ICD-10-CM

## 2023-07-25 DIAGNOSIS — O21.0 HYPEREMESIS GRAVIDARUM: ICD-10-CM

## 2023-07-25 DIAGNOSIS — F32.A DEPRESSION DURING PREGNANCY IN SECOND TRIMESTER: ICD-10-CM

## 2023-07-25 DIAGNOSIS — F12.90 LONG TERM CURRENT USE OF CANNABIS: ICD-10-CM

## 2023-07-25 DIAGNOSIS — K59.00 CONSTIPATION, UNSPECIFIED CONSTIPATION TYPE: ICD-10-CM

## 2023-07-25 DIAGNOSIS — O09.92 ENCOUNTER FOR SUPERVISION OF HIGH RISK PREGNANCY IN SECOND TRIMESTER, ANTEPARTUM: Primary | ICD-10-CM

## 2023-07-25 PROCEDURE — 258N000003 HC RX IP 258 OP 636: Performed by: FAMILY MEDICINE

## 2023-07-25 PROCEDURE — 96374 THER/PROPH/DIAG INJ IV PUSH: CPT

## 2023-07-25 PROCEDURE — 99213 OFFICE O/P EST LOW 20 MIN: CPT | Mod: GC | Performed by: STUDENT IN AN ORGANIZED HEALTH CARE EDUCATION/TRAINING PROGRAM

## 2023-07-25 PROCEDURE — 250N000011 HC RX IP 250 OP 636: Mod: JZ | Performed by: FAMILY MEDICINE

## 2023-07-25 RX ORDER — HEPARIN SODIUM (PORCINE) LOCK FLUSH IV SOLN 100 UNIT/ML 100 UNIT/ML
5 SOLUTION INTRAVENOUS
Status: CANCELLED | OUTPATIENT
Start: 2023-07-25

## 2023-07-25 RX ORDER — ONDANSETRON 2 MG/ML
4 INJECTION INTRAMUSCULAR; INTRAVENOUS ONCE
Status: CANCELLED | OUTPATIENT
Start: 2023-07-25 | End: 2023-07-25

## 2023-07-25 RX ORDER — HEPARIN SODIUM,PORCINE 10 UNIT/ML
5 VIAL (ML) INTRAVENOUS
Status: CANCELLED | OUTPATIENT
Start: 2023-07-25

## 2023-07-25 RX ORDER — ONDANSETRON 2 MG/ML
4 INJECTION INTRAMUSCULAR; INTRAVENOUS ONCE
Status: COMPLETED | OUTPATIENT
Start: 2023-07-25 | End: 2023-07-25

## 2023-07-25 RX ADMIN — SODIUM CHLORIDE, POTASSIUM CHLORIDE, SODIUM LACTATE AND CALCIUM CHLORIDE 1000 ML: 600; 310; 30; 20 INJECTION, SOLUTION INTRAVENOUS at 12:57

## 2023-07-25 RX ADMIN — ONDANSETRON 4 MG: 2 INJECTION INTRAMUSCULAR; INTRAVENOUS at 13:01

## 2023-07-25 ASSESSMENT — PAIN SCALES - GENERAL: PAINLEVEL: NO PAIN (0)

## 2023-07-25 NOTE — PROGRESS NOTES
"Return OB visit  12-23 weeks    Assessment & plan:   Jaqueline is a 27 year old  female at 22w1d who returns for prenatal care. PRINCESS 2023.    High Risk Pregnancy in 2nd trimester  (2/2 concurrent cannabis use, depression, and hyperemesis)  Rubella non-immune  1hr GCT / Hgb / Trep needed upcoming at 25w  Tdap needed upcoming at 27w  US w./ MFM upcoming     Hyperemesis gravidarum   Dehydration in pregnancy   Weight loss during pregnancy   This has positively improved as we have gotten her in with the IVF clinic more often now - she is able to eat more AND we have documented weight GAIN this time. She is using the Compazine and the Hydroxyzine for her nausea - stopped Zofran as it was not effective. She also noted that when she does drink the sports drinks it helps her dizziness.     Continue with IVF sessions  Current Anit-Emetics: Compazine 10mg PO and Hydroxyzine 10mg TID    Depression affecting the 2nd trimester   Marijuana Use  She has reduced her marijuana usage since we last saw her, which is really good. She is meeting with Dr. Linares (psychology) upcoming soon as well on . Her SNAP benefits went thru as well, so this is good. [facility] team will continue to follow.    Constipation  Will have Jaqueline try out Dulocolex or Colace instead of Senna and see if her stomach sits better with either of these.     Breast tenderness  No concerning symptoms or signs for mastitis. Continue to monitor.       Soy Worthy, DO    ________________________________________    Subjective:   Jaqueline is a 27 year old  female at 22w1d who returns for prenatal care. PRINCESS 2023.  - Concerns today: Nothing specific   - Patient reports no vaginal bleeding, no contractions/severe cramping, no leakage of fluid. Fetal movement is present (\"a lot honestly\")  - Yes nausea and some vomit (known); no acid reflux  - No vaginal discharge. No dysuria.   - No headache, vision changes, lower extremity " "swelling, upper abdominal pain, chest pain, shortness of breath.   - Mood has been \"ok\".      Hyperemesis  Weight Loss  Now going to IVF clinic more often - trying to go every 3 days  Vomiting has gotten better with more frequent visits  But last week felt sick x3 days, couldn't keep anything down, but then had another infusion which helped her feel a lot better  While still struggling with big meals, is able to snack more often  Does have nausea / dizziness - the dizziness is better but still there, as well as the nausea  However, its when its a bigger meal or heavy (like meat) will get nauseous and lightheaded   Current Meds - zofran (NOT USING), compazine (tablet, every 7 hours on the dot); atarax (TID on the dot); Cannabis (cut down a lot - but has noticed being on more edge since decreasing - was smoking 5 blunts a day, now 1-2 joints a day, sometimes not even)  Has gained weight again  Tolerates yogurt, furits, granola the best; not so much lettuce (tried a couple of salads but those dont stay down)  Was drinking powerade, but drinks a lot of juice - but did find drinking the sports drink helped her symptoms       Wt Readings from Last 4 Encounters:   07/25/23 62.6 kg (138 lb)   07/11/23 61.1 kg (134 lb 9.6 oz)   06/27/23 58.7 kg (129 lb 4.8 oz)   06/16/23 60.1 kg (132 lb 9.6 oz)       Depression  Active Cannabis Use  Wasn't able to make the 7/24 appt with Dr. HERNANDEZ, but re scheduled to the 16th  [facility] - \"really tired lately, so I don't really do much of anything - still feel weak going up and down the stairs - but not as bad as it was before\"  [place] Forms - for benefits from the state - these forms went thru - now has SNAP benefits     Constipation  Taking the senna sometimes 1 tab BID - has noticed sometimes it makes her gassy and make her stomach hurt   Actually unsure her response to miralax   BM - when takes the senna once EoD or so, if she doesn't take it but still EoD christiano   Wants to try something " else    Contraception  Has not really though about but does want something  Is more interested in BC as opposed to tubal right now     MFM Visit 7/18  Fetal survey normal, though some suboptimal views  Plan to see again 7/27    Breat tenderness  Started noticing some minimal leakage on breast last week  Clear, whitish sometimes  Lately - last couple of days - happens at night - no red or blood  Breast are sometimes tender   NOT noticing redness or painful swelling erythema around the breast    Objective:   LMP 03/10/2023 (Approximate)    Const: No distress  Abd: Gravid.   See flowsheet for FH, FHTs, edema     Prenatal labs:     Hemoglobin   Date Value Ref Range Status   04/25/2023 14.4 11.7 - 15.7 g/dL Final   04/14/2018 12.0 11.7 - 15.7 g/dL Final

## 2023-07-25 NOTE — PROGRESS NOTES
Infusion Nursing Note:  Jaqueline Argueta presents today for IVF, zofran.    Patient seen by provider today: No   present during visit today: Not Applicable.    Note: N/A.      Intravenous Access:  Peripheral IV placed.    Treatment Conditions:  Not Applicable.      Post Infusion Assessment:  Patient tolerated infusion without incident.  Blood return noted pre and post infusion.  No evidence of extravasations.  Access discontinued per protocol.   States she is feeling better.      Discharge Plan:   Patient discharged in stable condition accompanied by: self.  Departure Mode: Ambulatory.  RTC (Gen[place]) 7/28.      Rajani Root

## 2023-07-27 ENCOUNTER — HOSPITAL ENCOUNTER (OUTPATIENT)
Dept: ULTRASOUND IMAGING | Facility: CLINIC | Age: 28
Discharge: HOME | End: 2023-07-27
Attending: OBSTETRICS & GYNECOLOGY
Payer: COMMERCIAL

## 2023-07-27 ENCOUNTER — OFFICE VISIT (OUTPATIENT)
Dept: MATERNAL FETAL MEDICINE | Facility: CLINIC | Age: 28
End: 2023-07-27
Attending: OBSTETRICS & GYNECOLOGY
Payer: COMMERCIAL

## 2023-07-27 VITALS
RESPIRATION RATE: 18 BRPM | SYSTOLIC BLOOD PRESSURE: 103 MMHG | DIASTOLIC BLOOD PRESSURE: 69 MMHG | OXYGEN SATURATION: 99 % | HEART RATE: 83 BPM

## 2023-07-27 DIAGNOSIS — F12.90 LONG TERM CURRENT USE OF CANNABIS: ICD-10-CM

## 2023-07-27 DIAGNOSIS — O21.0 HYPEREMESIS GRAVIDARUM: Primary | ICD-10-CM

## 2023-07-27 DIAGNOSIS — O99.342 DEPRESSION AFFECTING PREGNANCY IN SECOND TRIMESTER, ANTEPARTUM: ICD-10-CM

## 2023-07-27 DIAGNOSIS — F32.A DEPRESSION AFFECTING PREGNANCY IN SECOND TRIMESTER, ANTEPARTUM: ICD-10-CM

## 2023-07-27 DIAGNOSIS — Z36.2 ENCOUNTER FOR FOLLOW-UP ULTRASOUND OF FETAL ANATOMY: ICD-10-CM

## 2023-07-27 PROCEDURE — 76816 OB US FOLLOW-UP PER FETUS: CPT | Mod: 26 | Performed by: OBSTETRICS & GYNECOLOGY

## 2023-07-27 PROCEDURE — 99214 OFFICE O/P EST MOD 30 MIN: CPT | Mod: 25 | Performed by: OBSTETRICS & GYNECOLOGY

## 2023-07-27 PROCEDURE — G0463 HOSPITAL OUTPT CLINIC VISIT: HCPCS | Mod: 25 | Performed by: OBSTETRICS & GYNECOLOGY

## 2023-07-27 PROCEDURE — 76816 OB US FOLLOW-UP PER FETUS: CPT

## 2023-07-27 NOTE — PROGRESS NOTES
Maternal-Fetal Medicine Consultation    Jaqueline Argueta  : 1995  MRN: [id number]    REFERRAL:  Jaqueline Argueta is a 27 year old sent by Dr. Trotter from [facility] for MFM consultation.    HPI:  Jaqueline Argueta is a 27 year old  at 22w3d by 7w3d US not c/w LMP here for MFM consultation regarding hyperemesis, cannabis use.    Patient reports significant nausea and vomiting since early pregnancy, which she is currently managing with hydroxyzine, Compazine, and fluid infusions every 3-4 days via peripheral IV. She previously tried Zofran and Reglan, which did not help her symptoms. Patient also had hyperemesis with her first pregnancy, for which she reports being hospitalized in that pregnancy. She has not been hospitalized in this pregnancy, which she attributes to being able to receive outpatient IV fluid infusions.     Patient's symptoms have improved slightly over the course of this pregnancy. She is able to eat snacks but typically not full meals. She reports certain foods will trigger her symptoms and make her feel sick for several days. She previously had lost approximately 10 pounds from 6 weeks gestation onward, but more recently had gained 5 pounds between 18-20 weeks.     Patient reports smoking marijuana frequently in early pregnancy, which seemed to help with her nausea. She has since cut down on her marijuana use. She denies any history of significant nausea/vomiting outside of pregnancy.    Prenatal Care:  Primary OB care this pregnancy has been with Dr. Trotter from [facility].    Obstetrics History:  OB History    Para Term  AB Living   3 1 1 0 1 1   SAB IAB Ectopic Multiple Live Births   0 1 0 0 1      # Outcome Date GA Lbr Paul/2nd Weight Sex Delivery Anes PTL Lv   3 Current            2 Term 11 39w1d 02:05 / 00:11 2.977 kg (6 lb 9 oz) F Vag-Spont None N SONG      Name: LIZZY ARGUETA      Apgar1: 9  Apgar5: 9   1 IAB      IAB        Gynecologic  History:  - LMP: 3/10/2023  - Last Pap: NILM (2021)  - Denies any history of abnormal pap smears  - Denies prior cervical surgery or procedures  - Denies any history of frequent UTIs, vaginal infections, or STIs    Past Medical History:  Past Medical History:   Diagnosis Date     Anemia due to blood loss, acute 2011     Depressive disorder      Past Surgical History:  Past Surgical History:   Procedure Laterality Date     NO HISTORY OF SURGERY       Current Medications:  Prior to Admission medications    Medication Sig Last Dose Taking? Auth Provider Long Term End Date   aspirin-acetaminophen-caffeine (EXCEDRIN MIGRAINE) 250-250-65 MG tablet Take 1 tablet by mouth daily as needed for headaches   Soy Worthy DO     hydrOXYzine (ATARAX) 10 MG tablet Take 1 tablet (10 mg) by mouth 3 times daily as needed for other (Nausea)   LakelandLelain R, DO     prochlorperazine (COMPAZINE) 10 MG tablet Take 1 tablet (10 mg) by mouth every 6 hours as needed for nausea or vomiting   Soy Worthy DO       Allergies:  Patient has no known allergies.    Social History:   Endorses marijuana use    Family History:  Denies history of genetic disorders, preeclampsia, thromboembolic disease, bleeding disorders.    ROS:  10-point ROS negative except as in HPI     PHYSICAL EXAM:  Deferred     ASSESSMENT/PLAN:  Jaqueline Argueta is a 27 year old  at 22w3d by 7w3d US not c/w LMP here for [facility] consultation regarding hyperemesis, cannabis use.    # Hyperemesis gravidarum  We discussed the diagnosis of hyperemesis gravidarum. Though the patient appeared to be well informed by her primary OB/GYN, we again discussed the treatment goals (prevention of severe dehydration/electrolyte abnormalities, improvement of symptoms and minimize/prevent fetal effects).    We reviewed standard management of hyperemesis in pregnancy, including the use of pyroxidine, doxylamine, antihistamines and serotonin antagonists. In refractory  cases, we will consider other options such as tube feeding or glucocorticoids (methylprednisolone or hydrocortisone IV). We briefly discussed the risks of both, and why we would not recommend either option at this time.     She reports a slight improvement in her symptoms since earlier pregnancy. We discussed her recent increased weight gain and ability to tolerate some PO. We expressed hope that this is a self-limited condition that will improve with increasing gestational age, and encouraged her to continue her current course (hydroxyzine, Compazine, IV fluid infusions). She was also encouraged to meet with a dietician to help identify food triggers. We discussed that maternal weight gain and electrolytes should continue to be monitored throughout the pregnancy. We briefly discussed that fetal weight and birth weight should not be negatively affected by these events if she is able to appropriately gain weight in the second half of her pregnancy.    # Marijuana use  Fetal impact is unclear, but may include smaller head circumferences and trend towards lower birth weight. Children of chronic marijuana users may develop cognitive deficits (especially in visuospatial function, impulsivity, inattention, hyperactivity, depressive symptoms, and substance abuse). We discussed that while some patients find marijuana use seems to improve their nausea in pregnancy, it can actually worsen nausea and vomiting, and may develop into a cyclic pattern of increasing marijuana use and worsening symptoms (cannabinoid hyperemesis syndrome). She reports that she has cut down on her marijuana use in this pregnancy.    Recommendations:  - Referral to dietician through primary OB ([place])  - Ultrasound with MFM at 28 weeks to reassess fetal growth, given weight loss in early pregnancy    The patient was seen and evaluated with Dr. Colvin    Thank you for allowing us to participate in the care of your patient. Please do not hesitate to  contact us if you have further questions regarding the management of your patient.     Laureen Gao MD  OB/GYN PGY-2  07/26/2023 10:04 PM     Physician Attestation   I, Ji Colvin MD, saw this patient and agree with the findings and plan of care as documented in the note.      Items personally reviewed/procedural attestation: History, counseling and recommendations. The total time spent in all patient care activities on the day of this visit was 30 minutes..    Ji Colvin MD

## 2023-07-28 ENCOUNTER — INFUSION THERAPY VISIT (OUTPATIENT)
Dept: INFUSION THERAPY | Facility: CLINIC | Age: 28
End: 2023-07-28
Payer: COMMERCIAL

## 2023-07-28 VITALS
OXYGEN SATURATION: 99 % | TEMPERATURE: 98.2 F | RESPIRATION RATE: 20 BRPM | HEART RATE: 66 BPM | SYSTOLIC BLOOD PRESSURE: 101 MMHG | DIASTOLIC BLOOD PRESSURE: 67 MMHG

## 2023-07-28 DIAGNOSIS — O21.0 HYPEREMESIS GRAVIDARUM: ICD-10-CM

## 2023-07-28 DIAGNOSIS — O09.90 SUPERVISION OF HIGH RISK PREGNANCY, ANTEPARTUM: Primary | ICD-10-CM

## 2023-07-28 PROCEDURE — 96374 THER/PROPH/DIAG INJ IV PUSH: CPT

## 2023-07-28 PROCEDURE — 96361 HYDRATE IV INFUSION ADD-ON: CPT

## 2023-07-28 PROCEDURE — 258N000003 HC RX IP 258 OP 636: Performed by: FAMILY MEDICINE

## 2023-07-28 PROCEDURE — 250N000011 HC RX IP 250 OP 636: Mod: JZ | Performed by: FAMILY MEDICINE

## 2023-07-28 RX ORDER — ONDANSETRON 2 MG/ML
4 INJECTION INTRAMUSCULAR; INTRAVENOUS ONCE
Status: COMPLETED | OUTPATIENT
Start: 2023-07-28 | End: 2023-07-28

## 2023-07-28 RX ORDER — HEPARIN SODIUM,PORCINE 10 UNIT/ML
5 VIAL (ML) INTRAVENOUS
Status: CANCELLED | OUTPATIENT
Start: 2023-07-28

## 2023-07-28 RX ORDER — ONDANSETRON 2 MG/ML
4 INJECTION INTRAMUSCULAR; INTRAVENOUS ONCE
Status: CANCELLED | OUTPATIENT
Start: 2023-07-28 | End: 2023-07-28

## 2023-07-28 RX ORDER — HEPARIN SODIUM (PORCINE) LOCK FLUSH IV SOLN 100 UNIT/ML 100 UNIT/ML
5 SOLUTION INTRAVENOUS
Status: CANCELLED | OUTPATIENT
Start: 2023-07-28

## 2023-07-28 RX ADMIN — ONDANSETRON 4 MG: 2 INJECTION INTRAMUSCULAR; INTRAVENOUS at 14:07

## 2023-07-28 RX ADMIN — SODIUM CHLORIDE, POTASSIUM CHLORIDE, SODIUM LACTATE AND CALCIUM CHLORIDE 1000 ML: 600; 310; 30; 20 INJECTION, SOLUTION INTRAVENOUS at 14:07

## 2023-07-28 ASSESSMENT — PAIN SCALES - GENERAL: PAINLEVEL: NO PAIN (0)

## 2023-07-28 NOTE — PROGRESS NOTES
Infusion Nursing Note:  Jaqueline Argueta presents today for IVF, Zofran.    Patient seen by provider today: No   present during visit today: Not Applicable.    Note: N/A.      Intravenous Access:  Peripheral IV placed.    Treatment Conditions:  Not Applicable.      Post Infusion Assessment:  Patient tolerated infusion without incident.  Blood return noted pre and post infusion.  Site patent and intact, free from redness, edema or discomfort.  No evidence of extravasations.  Access discontinued per protocol.       Discharge Plan:   Patient declined prescription refills.  Discharge instructions reviewed with: Patient.  Patient verbalized understanding of discharge instructions and all questions answered.  AVS to patient via [employer].  Patient will return 8/1/23 for next appointment.   Patient discharged in stable condition accompanied by: self.  Departure Mode: Ambulatory.      Ellen Jara RN

## 2023-08-01 ENCOUNTER — INFUSION THERAPY VISIT (OUTPATIENT)
Dept: INFUSION THERAPY | Facility: CLINIC | Age: 28
End: 2023-08-01
Payer: COMMERCIAL

## 2023-08-01 VITALS
SYSTOLIC BLOOD PRESSURE: 113 MMHG | HEART RATE: 93 BPM | OXYGEN SATURATION: 98 % | DIASTOLIC BLOOD PRESSURE: 74 MMHG | RESPIRATION RATE: 16 BRPM

## 2023-08-01 DIAGNOSIS — O09.90 SUPERVISION OF HIGH RISK PREGNANCY, ANTEPARTUM: Primary | ICD-10-CM

## 2023-08-01 DIAGNOSIS — O21.0 HYPEREMESIS GRAVIDARUM: ICD-10-CM

## 2023-08-01 PROCEDURE — 250N000011 HC RX IP 250 OP 636: Mod: JZ | Performed by: FAMILY MEDICINE

## 2023-08-01 PROCEDURE — 96361 HYDRATE IV INFUSION ADD-ON: CPT

## 2023-08-01 PROCEDURE — 96374 THER/PROPH/DIAG INJ IV PUSH: CPT

## 2023-08-01 PROCEDURE — 258N000003 HC RX IP 258 OP 636: Performed by: FAMILY MEDICINE

## 2023-08-01 RX ORDER — HEPARIN SODIUM,PORCINE 10 UNIT/ML
5 VIAL (ML) INTRAVENOUS
Status: CANCELLED | OUTPATIENT
Start: 2023-08-01

## 2023-08-01 RX ORDER — ONDANSETRON 2 MG/ML
4 INJECTION INTRAMUSCULAR; INTRAVENOUS ONCE
Status: COMPLETED | OUTPATIENT
Start: 2023-08-01 | End: 2023-08-01

## 2023-08-01 RX ORDER — HEPARIN SODIUM (PORCINE) LOCK FLUSH IV SOLN 100 UNIT/ML 100 UNIT/ML
5 SOLUTION INTRAVENOUS
Status: CANCELLED | OUTPATIENT
Start: 2023-08-01

## 2023-08-01 RX ORDER — ONDANSETRON 2 MG/ML
4 INJECTION INTRAMUSCULAR; INTRAVENOUS ONCE
Status: CANCELLED | OUTPATIENT
Start: 2023-08-01 | End: 2023-08-01

## 2023-08-01 RX ADMIN — ONDANSETRON 4 MG: 2 INJECTION INTRAMUSCULAR; INTRAVENOUS at 14:59

## 2023-08-01 RX ADMIN — SODIUM CHLORIDE, POTASSIUM CHLORIDE, SODIUM LACTATE AND CALCIUM CHLORIDE 1000 ML: 600; 310; 30; 20 INJECTION, SOLUTION INTRAVENOUS at 14:55

## 2023-08-01 NOTE — PROGRESS NOTES
Infusion Nursing Note:  Jaqueline Argueta presents today for IVF, IV Zofran.    Patient seen by provider today: No   present during visit today: Not Applicable.    Note: Patient reports to feeling improvement.  Continues to have intermittent nausea. Feels like she has been able to take in more foods over the past couple of weeks.  Patient is planning on seeing her OB this week and will discuss with her provider at that time if future IVF is needed.      Intravenous Access:  Peripheral IV placed.    Treatment Conditions:  Not Applicable.      Post Infusion Assessment:  Patient tolerated infusion without incident.  Blood return noted pre and post infusion.  Site patent and intact, free from redness, edema or discomfort.  No evidence of extravasations.  Access discontinued per protocol.       Discharge Plan:   Patient declined prescription refills.  Discharge instructions reviewed with: Patient.  Patient and/or family verbalized understanding of discharge instructions and all questions answered.  AVS to patient via [employer].  Patient will return 8/4/23 for next appointment.   Patient discharged in stable condition accompanied by: self.  Departure Mode: Ambulatory.      Sarah Vazquez RN

## 2023-08-04 ENCOUNTER — INFUSION THERAPY VISIT (OUTPATIENT)
Dept: INFUSION THERAPY | Facility: CLINIC | Age: 28
End: 2023-08-04
Payer: COMMERCIAL

## 2023-08-04 VITALS
RESPIRATION RATE: 16 BRPM | HEART RATE: 81 BPM | DIASTOLIC BLOOD PRESSURE: 65 MMHG | TEMPERATURE: 98.9 F | SYSTOLIC BLOOD PRESSURE: 102 MMHG | OXYGEN SATURATION: 97 %

## 2023-08-04 DIAGNOSIS — O09.90 SUPERVISION OF HIGH RISK PREGNANCY, ANTEPARTUM: Primary | ICD-10-CM

## 2023-08-04 DIAGNOSIS — O21.0 HYPEREMESIS GRAVIDARUM: ICD-10-CM

## 2023-08-04 PROCEDURE — 96361 HYDRATE IV INFUSION ADD-ON: CPT

## 2023-08-04 PROCEDURE — 250N000011 HC RX IP 250 OP 636: Mod: JZ | Performed by: FAMILY MEDICINE

## 2023-08-04 PROCEDURE — 96374 THER/PROPH/DIAG INJ IV PUSH: CPT

## 2023-08-04 PROCEDURE — 258N000003 HC RX IP 258 OP 636: Performed by: FAMILY MEDICINE

## 2023-08-04 RX ORDER — HEPARIN SODIUM (PORCINE) LOCK FLUSH IV SOLN 100 UNIT/ML 100 UNIT/ML
5 SOLUTION INTRAVENOUS
Status: CANCELLED | OUTPATIENT
Start: 2023-08-04

## 2023-08-04 RX ORDER — ONDANSETRON 2 MG/ML
4 INJECTION INTRAMUSCULAR; INTRAVENOUS ONCE
Status: COMPLETED | OUTPATIENT
Start: 2023-08-04 | End: 2023-08-04

## 2023-08-04 RX ORDER — ONDANSETRON 2 MG/ML
4 INJECTION INTRAMUSCULAR; INTRAVENOUS ONCE
Status: CANCELLED | OUTPATIENT
Start: 2023-08-04 | End: 2023-08-04

## 2023-08-04 RX ORDER — HEPARIN SODIUM,PORCINE 10 UNIT/ML
5 VIAL (ML) INTRAVENOUS
Status: CANCELLED | OUTPATIENT
Start: 2023-08-04

## 2023-08-04 RX ADMIN — SODIUM CHLORIDE, POTASSIUM CHLORIDE, SODIUM LACTATE AND CALCIUM CHLORIDE 1000 ML: 600; 310; 30; 20 INJECTION, SOLUTION INTRAVENOUS at 14:12

## 2023-08-04 RX ADMIN — ONDANSETRON 4 MG: 2 INJECTION INTRAMUSCULAR; INTRAVENOUS at 14:12

## 2023-08-04 ASSESSMENT — PAIN SCALES - GENERAL: PAINLEVEL: NO PAIN (0)

## 2023-08-07 NOTE — PROGRESS NOTES
Return OB visit  24-28 weeks    Assessment & plan:   IUP at 24w0d weeks by T1US.     - Follow up:   1.) Return for EARLE in 1 week due to weight loss; consider inpatient admission if additional weight loss at that time   2..) Will need routine TDAP and GBS in third trimester   3.) Repeat growth US at 28W through MFM, scheduled     Routine OB cares   High-risk pregnancy in second trimester (2/2 hyperemesis, depression, cannabis use)  Reassuring fetal doptones. Measuring appropriately.   - Prenatal labs reviewed: Rh+, Hep B non-immune.  - Passed 1-HR GTT. Second trimester labs reassuring.   - Pregnancy complicated by: hyperemesis gravidarum, depression (not on medications), early alcohol use (none current), and intermittent marijuana use.   - Provided counseling regarding  labor symptoms, depression and social support systems, breast feeding, contraception options after delivery, baby supplies and car seat, proper seatbelt use during pregnancy. The patient plans to deliver at [facility] with myself and/or OB partner Dr. Worthy.   - Patient will continue taking prenatal vitamins and avoiding cigarettes & alcohol.     Hyperemesis gravidarum   Weight loss during pregnancy  Patient with recurrent weight loss after initial improvement in symptoms. Reports that she is vomiting less, but has overall low appetite. Patient feels that appetite is more reduced after IV infusions discontinued. Mood may also be impacting appetite as seemed to have flatter affect today. BMP with reassuring creatine and electrolytes. Will plan to resume frequent IVF and have close follow-up. If not improving, consider inpatient status. Patient declined additional medications today as current regimen as worked the best for her nausea.   - Continue Hydroxyzine and Compazine   - Continue outpatient infusions ordered; can go PRN every three days and receive anti-emetics IV at that time   - Return to clinic in 1 week for recheck      Depression  Patient endorses stable mood. No SI/HI. Has therapy scheduled, but has not initiated. Declined medications.   - Continue [facility]   - Therapy with Dr. Linares scheduled   - Return to clinic in 1 week for recheck.    Folliculitis   Patient with superficial cysts to axilla consistent with folliculitis. Does not appear to be deep sinus tract concerning for HA.   - Advised using antibiotic wash and warm compresses   - Recommended electric shaver to not remove hair as deeply compared to razor   - Return in 1 week for recheck     Options for treatment and follow-up care were reviewed with the patient and/or guardian. Jaqueline Argueta and/or guardian engaged in the decision making process and verbalized understanding of the options discussed and agreed with the final plan.    Linda Ch MD      >>>>>>>>>>>>>>>>>>>>>>>>>>>>>>>>>>>>>>>    Subjective:   Jaqueline is a 27 year old  female at 24w0d who returns for prenatal care. PRINCESS 2023. Preganncy complicated by hyperemesis gravidarum with weight loss, depression (not on medications), early alcohol use (none current), intermittent marijuana use, and rubella/Hep B non-immune status.    - Patient reports no vaginal bleeding, no contractions, no leakage of fluid. Fetal movement is present.   - Occasional heartburn- does not want medications.    - No vaginal discharge. No dysuria.   - No headache, vision changes, lower extremity swelling, upper abdominal pain, chest pain, shortness of breath.     Lumps in axilla:  - Has had cysts in axilla, previously evaluated by [facility] and then referred to Derm but did not go   - Intermittently painful and swelling   - Has not tried any treatments   - No fevers     Hyperemesis:  - Still ongoing   - Using Compazine and Hydroxyzine   - Completed IVF, last appointment    - Weekly vomiting 3-4 times   - Does not eat a lot, just snacks   - Appetite is not there, not sure why   - Referred to Dietician  by MFM     Wt Readings from Last 4 Encounters:   08/08/23 59.3 kg (130 lb 12.8 oz)   07/25/23 62.6 kg (138 lb)   07/11/23 61.1 kg (134 lb 9.6 oz)   06/27/23 58.7 kg (129 lb 4.8 oz)     Depression:  - Still the same   - Appointment with Dr. Linares 8/16   - SNAP benefits went through  - No SI/HI     THC use:  - Working on cutting down     MFM:  - US completed 7/27. Scheduled for another growth US at 28W.     Objective:   LMP 03/10/2023 (Approximate)    Const: No distress  Abd: Gravid.   See flowsheet for FH, FHTs, edema.    Prenatal labs:   Hemoglobin   Date Value Ref Range Status   04/25/2023 14.4 11.7 - 15.7 g/dL Final   04/14/2018 12.0 11.7 - 15.7 g/dL Final

## 2023-08-08 ENCOUNTER — OFFICE VISIT (OUTPATIENT)
Dept: FAMILY MEDICINE | Facility: CLINIC | Age: 28
End: 2023-08-08
Payer: COMMERCIAL

## 2023-08-08 VITALS
OXYGEN SATURATION: 98 % | HEART RATE: 80 BPM | SYSTOLIC BLOOD PRESSURE: 104 MMHG | RESPIRATION RATE: 16 BRPM | BODY MASS INDEX: 24.7 KG/M2 | HEIGHT: 61 IN | TEMPERATURE: 98.5 F | WEIGHT: 130.8 LBS | DIASTOLIC BLOOD PRESSURE: 69 MMHG

## 2023-08-08 DIAGNOSIS — O09.92 ENCOUNTER FOR SUPERVISION OF HIGH RISK PREGNANCY IN SECOND TRIMESTER, ANTEPARTUM: Primary | ICD-10-CM

## 2023-08-08 DIAGNOSIS — O21.0 HYPEREMESIS GRAVIDARUM: ICD-10-CM

## 2023-08-08 DIAGNOSIS — L73.9 FOLLICULITIS: ICD-10-CM

## 2023-08-08 LAB
GLUCOSE 1H P 50 G GLC PO SERPL-MCNC: 109 MG/DL (ref 70–129)
HGB BLD-MCNC: 12.2 G/DL (ref 11.7–15.7)

## 2023-08-08 PROCEDURE — 36415 COLL VENOUS BLD VENIPUNCTURE: CPT

## 2023-08-08 PROCEDURE — 99207 PR PRENATAL VISIT: CPT | Mod: GC

## 2023-08-08 PROCEDURE — 82950 GLUCOSE TEST: CPT

## 2023-08-08 PROCEDURE — 86780 TREPONEMA PALLIDUM: CPT

## 2023-08-08 PROCEDURE — 80048 BASIC METABOLIC PNL TOTAL CA: CPT

## 2023-08-08 PROCEDURE — 85018 HEMOGLOBIN: CPT

## 2023-08-08 NOTE — PATIENT INSTRUCTIONS
GLUCOSE TESTING AND BLOOD DRAW TODAY    I WILL WORK TO GET INFUSIONS SET UP     COME BACK IN 1 WEEK    TRY ANTIBIOTIC WASH AND WARM COMPRESSES TO RIGHT AXILLA  TRY USING ELECTRIC SHAVER RATHER THAN RAZOR     Thank you for coming to [facility]!  - If you had lab testing today and your results are normal they will be be mailed to you or sent to your MyChart within seven days.   - If the lab tests need quick action we will call you with the results.  - The phone number we will call with results is # [phone] (home) . If this is not the best number please call our clinic and change the number.  - If any referrals were made to [facility] and you don't get a call, call central scheduling [phone]  - If you need any refills please call your pharmacy and they will contact us.  - If you had an XRay/CT/Ultrasound/MRI ordered the number is [phone] to schedule or change your appointment  - If you have any concerns about today's visit or wish to schedule another appointment please call our office [phone] (8-5:00 M-F)  - If you have urgent medical concerns call [phone] at any time of the day.  - If you have a medical emergency please call 311.    Because you are pregnant, we have additional resources for you:  - You may call and ask to speak with one of our clinic nurses at [phone] during normal business hours, for non-urgent questions about your pregnancy.  - Immediate OB help is also available 24 hours a day, seven days a week via the [facility] ([facility] - [phone]  located at [address] [place] [id number]    Prenatal Timeline:  Before 14 weeks: dating ultrasound       This ultrasound helps us determine your dates accurately.  15 - 18 weeks: Optional genetic testing (quad screen)       This testing helps understand your baby's risk for some genetic abnormalities.  22 - 24 weeks: Ultrasound (fetal anatomy survey)        This testing will look for early growth abnormalities, and might tell the baby's sex.  24 - 28 weeks: One hour sugar test (GCT)        This test helps identify diabetes of pregnancy.  27 - 36 weeks: Tetanus shot (Tdap)       This shot helps protect you and your baby from tetanus and whooping cough.  36 weeks and later: Group B Strep test (GBS)       This test helps predict if you need antibiotics in labor to prevent infection in your baby.  Anytime September to April: flu shot       This shot helps protect you and your family from the flu.  This is especially important during pregnancy!  Once every trimester: blood iron test (hemoglobin)       This test helps us know if you will need extra iron to support your baby.  At any time during or after your pregnancy: Some women experience baby blues.  We can help you with: Feeling anxious, Overwhelmed or sad, Trouble sleeping, Crying uncontrollably, Trouble caring for yourself or baby.    How frequently should you see your provider?  < 28 weeks: every 4 weeks  28-36 weeks: every 2 weeks  >36 weeks: every week    Call your healthcare provider immediately if you experience any of the following symptoms:  Bleeding from nipples, rectum, bladder, or coughing up blood  Vaginal bleeding, no matter how slight (unless small amount after a pelvic exam)  Swelling of hands or face  Dimness or blurring of vision  Severe or continuous headaches  Abdominal pains or contractions that do not go away with heat and rest or a bowel movement  Chills or fever over 100.4  Persistent vomiting  Painful or burning urination  Decrease in fetal movement  Sudden or slow escape of fluid from the vagina

## 2023-08-08 NOTE — Clinical Note
Sussy Bainzabeth lost weight again because she said her IVF stopped. Would you be able to call and get her more scheduled? Let me know if I need a new order for these. Also, if we can get her into clinic this week with either myself or Dr. Worthy, that would be great. I just want to have a weight recheck because we may need to recommend admission if she continues to lose weight.   Thanks,  Alexandra

## 2023-08-09 ENCOUNTER — TELEPHONE (OUTPATIENT)
Dept: PSYCHOLOGY | Facility: CLINIC | Age: 28
End: 2023-08-09
Payer: COMMERCIAL

## 2023-08-09 LAB
ANION GAP SERPL CALCULATED.3IONS-SCNC: 13 MMOL/L (ref 7–15)
BUN SERPL-MCNC: 5 MG/DL (ref 6–20)
CALCIUM SERPL-MCNC: 8.9 MG/DL (ref 8.6–10)
CHLORIDE SERPL-SCNC: 104 MMOL/L (ref 98–107)
CREAT SERPL-MCNC: 0.49 MG/DL (ref 0.51–0.95)
DEPRECATED HCO3 PLAS-SCNC: 18 MMOL/L (ref 22–29)
GFR SERPL CREATININE-BSD FRML MDRD: >90 ML/MIN/1.73M2
GLUCOSE SERPL-MCNC: 117 MG/DL (ref 70–99)
POTASSIUM SERPL-SCNC: 3.6 MMOL/L (ref 3.4–5.3)
SODIUM SERPL-SCNC: 135 MMOL/L (ref 136–145)
T PALLIDUM AB SER QL: NONREACTIVE

## 2023-08-09 NOTE — TELEPHONE ENCOUNTER
[facility]  Type of Contact: Phone call (not reached/unavailable)  Chante Aly Community Health Worker    Care Coordination: provided care management services/referrals necessary to ensure patient and their identified supports have access to medical, behavioral health, pharmacology and recovery support services.  Ensured that patient's care is integrated across all settings and services.     CHW left message asking pt to call back.    Calling to check in and see how she is doing and if she got in contact with the  services.     Pt is due for an HAP, she has an appt on 8/16/23 at 4pm with , was calling to see if she could come in a little earlier on that same day and see [facility] SW/CHW.     Chante Aly LPN

## 2023-08-09 NOTE — TELEPHONE ENCOUNTER
Patient called back. She is okay with coming in on 8/16/23 at 3:30pm prior to Therapy intake with Dr. Davidson to complete HAP.      Chante Aly LPN/BRADLEY

## 2023-08-15 ENCOUNTER — TELEPHONE (OUTPATIENT)
Dept: PSYCHOLOGY | Facility: CLINIC | Age: 28
End: 2023-08-15
Payer: COMMERCIAL

## 2023-08-16 ENCOUNTER — INFUSION THERAPY VISIT (OUTPATIENT)
Dept: INFUSION THERAPY | Facility: CLINIC | Age: 28
End: 2023-08-16
Payer: COMMERCIAL

## 2023-08-16 ENCOUNTER — CARE COORDINATION (OUTPATIENT)
Dept: PSYCHOLOGY | Facility: CLINIC | Age: 28
End: 2023-08-16
Payer: COMMERCIAL

## 2023-08-16 ENCOUNTER — TELEPHONE (OUTPATIENT)
Dept: FAMILY MEDICINE | Facility: CLINIC | Age: 28
End: 2023-08-16
Payer: COMMERCIAL

## 2023-08-16 ENCOUNTER — OFFICE VISIT (OUTPATIENT)
Dept: PSYCHOLOGY | Facility: CLINIC | Age: 28
End: 2023-08-16
Payer: COMMERCIAL

## 2023-08-16 VITALS
SYSTOLIC BLOOD PRESSURE: 107 MMHG | TEMPERATURE: 98.1 F | OXYGEN SATURATION: 99 % | HEART RATE: 82 BPM | DIASTOLIC BLOOD PRESSURE: 68 MMHG

## 2023-08-16 DIAGNOSIS — F10.91 ALCOHOL USE DISORDER IN REMISSION: ICD-10-CM

## 2023-08-16 DIAGNOSIS — O21.0 HYPEREMESIS GRAVIDARUM: Primary | ICD-10-CM

## 2023-08-16 DIAGNOSIS — F33.1 MODERATE EPISODE OF RECURRENT MAJOR DEPRESSIVE DISORDER (H): Primary | ICD-10-CM

## 2023-08-16 DIAGNOSIS — O21.0 HYPEREMESIS GRAVIDARUM: ICD-10-CM

## 2023-08-16 DIAGNOSIS — F12.90 LONG TERM CURRENT USE OF CANNABIS: ICD-10-CM

## 2023-08-16 DIAGNOSIS — O09.90 SUPERVISION OF HIGH RISK PREGNANCY, ANTEPARTUM: Primary | ICD-10-CM

## 2023-08-16 PROCEDURE — 250N000011 HC RX IP 250 OP 636: Mod: JZ | Performed by: FAMILY MEDICINE

## 2023-08-16 PROCEDURE — 96374 THER/PROPH/DIAG INJ IV PUSH: CPT

## 2023-08-16 PROCEDURE — 258N000003 HC RX IP 258 OP 636: Performed by: FAMILY MEDICINE

## 2023-08-16 PROCEDURE — 96361 HYDRATE IV INFUSION ADD-ON: CPT

## 2023-08-16 PROCEDURE — 90791 PSYCH DIAGNOSTIC EVALUATION: CPT | Performed by: PSYCHOLOGIST

## 2023-08-16 RX ORDER — HEPARIN SODIUM,PORCINE 10 UNIT/ML
5 VIAL (ML) INTRAVENOUS
Status: CANCELLED | OUTPATIENT
Start: 2023-08-16

## 2023-08-16 RX ORDER — ONDANSETRON 2 MG/ML
4 INJECTION INTRAMUSCULAR; INTRAVENOUS ONCE
Status: CANCELLED | OUTPATIENT
Start: 2023-08-16 | End: 2023-08-16

## 2023-08-16 RX ORDER — ONDANSETRON 2 MG/ML
4 INJECTION INTRAMUSCULAR; INTRAVENOUS ONCE
Status: COMPLETED | OUTPATIENT
Start: 2023-08-16 | End: 2023-08-16

## 2023-08-16 RX ORDER — HEPARIN SODIUM (PORCINE) LOCK FLUSH IV SOLN 100 UNIT/ML 100 UNIT/ML
5 SOLUTION INTRAVENOUS
Status: CANCELLED | OUTPATIENT
Start: 2023-08-16

## 2023-08-16 RX ADMIN — SODIUM CHLORIDE, POTASSIUM CHLORIDE, SODIUM LACTATE AND CALCIUM CHLORIDE 1000 ML: 600; 310; 30; 20 INJECTION, SOLUTION INTRAVENOUS at 12:09

## 2023-08-16 RX ADMIN — ONDANSETRON 4 MG: 2 INJECTION INTRAMUSCULAR; INTRAVENOUS at 12:09

## 2023-08-16 NOTE — LETTER
Behavioral Health Home ([facility]): Health Action Plan  [facility]: [facility]      My Goals  Goal Areas:   Health; Mental Health; Financial and Social Service Benefits    Patient stated goals:   Health:   -Continue to arrive to scheduled appointments with PCP and infusion clinic  -focus on weight gain / maintain during pregnancy      Mental health:   -arrive for scheduled therapy appointments with Dr. Linares until appt in November      Housing:   -connect with [facility] resources  -be in contact with [facility] for family support (referral made to family home visiting)   -talk with [facility] about renewing lease and plan moving forward for housing    Strengths related to each goal: Jaqueline is able to voice her needs and opinions towards things. She is able to be an advocate for herself.  Services and Supports Needed: Jaqueline will benefit from ongoing reminders, calls and messages from [facility] team.  Activities / Actions of Team to support goal(s): [facility] team to continue to have open communication with Jaqueline. Connect with OB CC for additional support.  Activities / Actions of Patient / Parent / Guardian to support goal(s): Jaqueline will continue to reach out to [facility] team and primary care clinic when needs arise.      Recommended Referral  Tobacco cessation referrals made?: NA  Mental Health / Chemical Dependency Referrals: No  Substance Use Referrals: Not Applicable  Mental Health Referrals: None    My Team Members and Their Contact Information  Patient Care Team         Relationship Specialty Notifications Start End    Clinic - [facility] PCP - General Clinic  3/27/18     pt states sees first available    Phone: [phone] Fax: [phone]         [address] [address]    Malena Lambert APRN CNM Assigned OBGYN Provider   2/13/22     Phone: [phone] Fax: [phone]         [address] [facility] [address] [facility] [address]    Linda Ch MD  Assigned PCP   6/10/23     Phone: [phone] Fax: [phone]         [address] [place] [address]    Mayelin Meeks, RN Lead Care Coordinator OB/Gyn Abnormal results only 6/28/23             My Wellness Plan  Safety Concerns: None Reported / Observed  Recommendations / Plan for safety concerns: Call 911, ensure phone is charged.    Jaqueline Argueta co-developed the Health Action Plan with the [facility] Team and received a copy of this document.  No data recorded

## 2023-08-16 NOTE — TELEPHONE ENCOUNTER
LVM to have Pt call back and schedule extra OB visit this week Thursday or Friday.  Will try again tomorrow.  EDGARD FloresM

## 2023-08-16 NOTE — PROGRESS NOTES
Infusion Nursing Note:  Jaqueline Argueta presents today for IV fluids.    Patient seen by provider today: No   present during visit today: Not Applicable.    Note: pt reports some nausea. Zofran given x1 at start of infusion. No other acute issues.       Intravenous Access:  Peripheral IV placed.    Treatment Conditions:  Not Applicable.      Post Infusion Assessment:  Patient tolerated infusion without incident.  Site patent and intact, free from redness, edema or discomfort.  No evidence of extravasations.  Access discontinued per protocol.       Discharge Plan:   Discharge instructions reviewed with: Patient.  Patient and/or family verbalized understanding of discharge instructions and all questions answered.  Patient discharged in stable condition accompanied by: self.  Departure Mode: Ambulatory.      Sunita Peterson RN

## 2023-08-16 NOTE — PROGRESS NOTES
"        [facility]      PATIENT'S NAME: Jaqueline Argueta  PREFERRED NAME: Jaqueline  PRONOUNS: she/her  MRN: [id number]  : 1995  ADDRESS: [address]  [facility] [address]  ACCT. NUMBER:  [id number]  DATE OF SERVICE: 23  START TIME: 4pm  END TIME: 4:45 pm  PREFERRED PHONE: [phone]  May we leave a program related message: Yes  SERVICE MODALITY:  In-person    UNIVERSAL ADULT Mental Health DIAGNOSTIC ASSESSMENT    Identifying Information:  Patient is a 27 year old,    individual.  Patient was referred for an assessment by  the [facility] program for DA for program enrollment .  Patient attended the session alone.    Chief Complaint:   The reason for seeking services at this time is related to depression that started 2-3 years before she was pregnant that resulted in her drinking more to cope with the depression. Since she learned she was pregnant, she states that she stopped alcohol use, but depression has worsened.  Jaqueline describes feeling down, sad, sleeping a lot, eating less frequently - losing weight in pregnancy which has her doctors concerned.  Low energy, feels down on herself much of the time.  Also feeling anxious and worried at times. Has had panic attacks in the past, but none recently.  She has been offered medications to assist with mood and anxiety symptoms, but she worries this will make her sick.  Denies any previous symptoms of elliot such as euphoric mood, impulsive behaviors, or significantly reduced need for sleep.    Social/Family History:  Patient reported they grew up in [place], [place].  They were raised by biological mother.  Parents were not together.  Patient reported that their childhood was \"rough\". Mom had six kids, two were left in [place] and her mom really struggled to care for the 4 she had with her here in the [place].  Jaqueline states she had her first child at 14 yo. States her mom made her leave the home, so Jaqueline " "reports she has been on her own, caring for herself since that time.  Jaqueline states she is closest to her oldest sister who checks on her frequently.  There is another sister who was in recovery, but has starting using again, so Jaqueline has limited contact with her.  She has very little contact with mom, although mom lives in the area.      The patient describes their cultural background as \"not sure\".  Cultural influences and impact on patient's life structure, values, norms, and healthcare:  \"no\" .  Contextual influences on patient's health include: Contextual Factors: Economic Factors on leave from work, worried about losing apartment .  Cultural, Contextual, and socioeconomic factors do affect the patient's access to services.  Patient identified their preferred language to be English. Patient reported they do not  need the assistance of an  or other support involved in therapy.     Patient reported had no significant delays in developmental tasks.   Patient's highest education level was  went to 12th grade, didn't graduate . Patient identified the following learning problems: none reported.  Modifications will not be used to assist communication in therapy.  Patient reports they are  able to understand written materials.    Patient's current relationship status is in a relationship  for 10 months.   Patient identified their sexual orientation as heterosexual.  Patient reported having one child and currently pregnant with her second. Patient identified partner and siblings as part of their support system.  Patient identified the quality of these relationships as stable and meaningful.     Patient's current living/housing situation involves staying in own home/apartment.  They live with her daughter, her baby's father, and her niece and they report that housing is not stable. She does not have custody of her niece which has made it complicated to get her enrolled in school, etc.    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 Due to her illness during pregnancy, as well as stressors with her , they recently stopped this business. Patient reports their finances are obtained through  partner's job, approved for [facility] .  Patient does identify finances as a current stressor.      Patient reported that they have not been involved with the legal system.  Patient denies being on probation / parole / under the jurisdiction of the court.    Patient's Strengths and Limitations:  Patient identified the following strengths or resources that will help them succeed in treatment:  hard working, good communicator, take care of others . Things that may interfere with the patient's success in treatment include: financial hardship, lack of social support, and housing instability.     Assessments:  The following assessments were completed by patient for this visit:  PHQ9:       6/29/2011     2:15 PM 12/5/2011    11:45 AM 3/11/2020    10:25 AM 11/24/2021    10:00 AM 12/7/2021     1:46 PM 6/16/2023     4:06 PM 7/11/2023    10:00 AM   PHQ-9 SCORE   PHQ-9 Total Score 0 1        PHQ-9 Total Score MyChart     16 (Moderately severe depression)     PHQ-9 Total Score   2 21 16 20 10     GAD7:       11/24/2021    10:00 AM   SHERIDAN-7 SCORE   Total Score 15     CAGE-AID:       11/24/2021    10:00 AM 8/21/2023     2:00 PM   CAGE-AID Total Score   Total Score 4 3       Personal and Family Medical History:  Patient does report a family history of mental health concerns.  Patient reports family history includes Bipolar Disorder in her sister; Depression in her mother, sister, and sister; Substance Abuse in her brother, father, mother, sister, and sister..     Patient does report Mental Health Diagnosis and/or Treatment.  Patient Patient reported the following previous diagnoses which include(s): Depression.  Patient reported symptoms began several years ago.   Patient has not received  mental health services in the past:  Currently enrolled in behavioral heatlh home for additional support .  Psychiatric Hospitalizations: None.  Patient denies a history of civil commitment.  Patient is not receiving other mental health services.        Patient has had a physical exam to rule out medical causes for current symptoms.  The patient  Dr. Ch .  Patient reports  she is currently pregnant and has been very sick throughout the pregnancy which has resulted in her not being able to work .  Patient denies any issues with pain..   There are significant appetite / nutritional concerns / weight changes. These include: low appetite and getting sick when she eats which has resulted in weight loss during her pregnancy. Patient reports the following sleep concerns:  Delayed sleep onset.   Patient does not report a history of head injury / trauma / cognitive impairment.      Patient reports current meds as:     Medication Adherence:  Patient reports taking prescribed medications as prescribed.    Patient Allergies:  No Known Allergies    Medical History:    Past Medical History:   Diagnosis Date    Anemia due to blood loss, acute 12/27/2011    Depressive disorder          Current Mental Status Exam:   Appearance:  Appropriate    Eye Contact:  Fair   Psychomotor:  Normal       Gait / station:  no problem  Attitude / Demeanor: Guarded  Attentive  Speech      Rate / Production: Monotone  Slow       Volume:  Soft  volume      Language:  intact  Mood:   Depressed  Sad   Affect:   Blunted    Thought Content: Clear   Thought Process: Coherent  Logical       Associations: No loosening of associations  Insight:   Fair   Judgment:  Intact   Orientation:  All  Attention/concentration: Fair    Substance Use:  Patient did report a family history of substance use concerns; see medical history section for details.  Patient has not received chemical dependency treatment in the past.  Patient has not ever been to detox.       Patient is not currently receiving any chemical dependency treatment. Patient reported the following problems as a result of their substance use:      Patient denies using alcohol. Was drinking alcohol more frequently before pregnancy, had concerns about her alcohol use  Patient denies using tobacco.  Patient denies using cannabis.  Denies using cannabis currently due to pregnancy, but would like to use it to help with nausea  Patient denies using caffeine.  Patient reports using/abusing the following substance(s). Patient denies cocaine/crack use. Was using this prior to pregnancy as well, denies current use    Substance Use:  Need to assess prior impact of alcohol use on functioning furhter    Based on the positive CAGE score and clinical interview there   are indications of previous problematic alcohol use.  Patient has not been drinking during pregnancy, so will likely need additional support to maintain abstinence from alcohol following delivery .    Significant Losses / Trauma / Abuse / Neglect Issues:   Patient   did not serve in the .  There are indications or report of significant loss, trauma, abuse or neglect issues related to: are indications or report of significant loss, trauma, abuse or neglect issues related to and her previous experiences growing up .  Concerns for possible neglect are not present.     Safety Assessment:   Patient denies current homicidal ideation and behaviors.  Patient denies current self-injurious ideation and behaviors.    Patient denied risk behaviors associated with substance use.  Patient denies any high risk behaviors associated with mental health symptoms.  Patient reports the following current concerns for their personal safety: None.  Patient reports there   firearms in the house.       There are no firearms in the home..    History of Safety Concerns:will get irritable but not anger  Patient denied a history of homicidal ideation.     Patient denied a history  of personal safety concerns.    Patient denied a history of assaultive behaviors.    Patient denied a history of sexual assault behaviors.     Patient denied a history of risk behaviors associated with substance use.  Patient denies any history of high risk behaviors associated with mental health symptoms.  Patient reports the following protective factors:      Risk Plan:  See Recommendations for Safety and Risk Management Plan    Review of Symptoms per patient report:   Depression: Change in sleep, Lack of interest, Change in energy level, Difficulties concentrating, Change in appetite, Low self-worth, Feeling sad, down, or depressed, and Withdrawn  Camille:  No Symptoms  Psychosis: No Symptoms  Anxiety: Excessive worry and Physical complaints, such as headaches, stomachaches, muscle tension  Panic:  No symptoms  Post Traumatic Stress Disorder:   need to assess further    Eating Disorder: Need to assess further    Diagnostic Criteria:   Major Depressive Disorder Substance Use Disorder  Alcohol Use Disorder in remission    Plan:    - Begin individual therapy to address mood symptoms and eating behaviors  - Enrollment in [facility] program  - Talk to Sal re: housing and car concerns, as well as concerns related to enrolling niece that is living with her in school  - Return in two weeks  - Complete treatment plan at future visit    Provider Name/ Credentials:  Aura Linares PsyD, SONIDO

## 2023-08-17 ENCOUNTER — INFUSION THERAPY VISIT (OUTPATIENT)
Dept: INFUSION THERAPY | Facility: CLINIC | Age: 28
End: 2023-08-17
Payer: COMMERCIAL

## 2023-08-17 VITALS
TEMPERATURE: 97.5 F | OXYGEN SATURATION: 100 % | DIASTOLIC BLOOD PRESSURE: 66 MMHG | HEART RATE: 66 BPM | SYSTOLIC BLOOD PRESSURE: 104 MMHG

## 2023-08-17 DIAGNOSIS — O21.0 HYPEREMESIS GRAVIDARUM: ICD-10-CM

## 2023-08-17 DIAGNOSIS — O09.90 SUPERVISION OF HIGH RISK PREGNANCY, ANTEPARTUM: Primary | ICD-10-CM

## 2023-08-17 PROCEDURE — 96361 HYDRATE IV INFUSION ADD-ON: CPT

## 2023-08-17 PROCEDURE — 258N000003 HC RX IP 258 OP 636: Performed by: FAMILY MEDICINE

## 2023-08-17 PROCEDURE — 250N000011 HC RX IP 250 OP 636: Mod: JZ | Performed by: FAMILY MEDICINE

## 2023-08-17 PROCEDURE — 96374 THER/PROPH/DIAG INJ IV PUSH: CPT

## 2023-08-17 RX ORDER — ONDANSETRON 2 MG/ML
4 INJECTION INTRAMUSCULAR; INTRAVENOUS ONCE
Status: COMPLETED | OUTPATIENT
Start: 2023-08-17 | End: 2023-08-17

## 2023-08-17 RX ORDER — ONDANSETRON 2 MG/ML
4 INJECTION INTRAMUSCULAR; INTRAVENOUS ONCE
Status: CANCELLED | OUTPATIENT
Start: 2023-08-17 | End: 2023-08-17

## 2023-08-17 RX ORDER — HEPARIN SODIUM,PORCINE 10 UNIT/ML
5 VIAL (ML) INTRAVENOUS
Status: CANCELLED | OUTPATIENT
Start: 2023-08-17

## 2023-08-17 RX ORDER — HEPARIN SODIUM (PORCINE) LOCK FLUSH IV SOLN 100 UNIT/ML 100 UNIT/ML
5 SOLUTION INTRAVENOUS
Status: CANCELLED | OUTPATIENT
Start: 2023-08-17

## 2023-08-17 RX ADMIN — ONDANSETRON 4 MG: 2 INJECTION INTRAMUSCULAR; INTRAVENOUS at 14:05

## 2023-08-17 RX ADMIN — SODIUM CHLORIDE, POTASSIUM CHLORIDE, SODIUM LACTATE AND CALCIUM CHLORIDE 1000 ML: 600; 310; 30; 20 INJECTION, SOLUTION INTRAVENOUS at 14:05

## 2023-08-17 NOTE — PROGRESS NOTES
8/17  SW received confirmation email from [facility] visiting program.   Per email : Thank you for sending a family home visiting referral for Jaqueline VIEIRA  Parent was referred to the nursing team to receive family support services.  Thank you, Mary Martel, [facility] [place]  Behavioral Health Home- Social Work Care Coordinator

## 2023-08-17 NOTE — PROGRESS NOTES
Behavioral Health Home Services  Type of Contact: Face to Face in Clinic  Sandy Martel Social Work Care Coordinator    Health and Wellness Promotion    Patient met with BREANNA and CHW today in clinic for scheduled appointment. Updated Health action plan. Stated goals are listed below:       Health:   -Continue to arrive to scheduled appointments with PCP and infusion clinic  -focus on weight gain / maintain during pregnancy    Mental health:   -arrive for scheduled therapy appointments with Dr. Linares until appt in November    Housing:   -connect with Critical access hospital resources  -be in contact with [facility] for family support (referral made to family home visiting)   -talk with [facility] about renewing lease and plan moving forward for housing    DENZEL Cleary  Behavioral Health Home- Social Work Care Coordinator

## 2023-08-17 NOTE — PROGRESS NOTES
Infusion Nursing Note:  Jaqueline Argueta presents today for IV fluids and zofran.    Patient seen by provider today: No   present during visit today: Not Applicable.    Note: Zofran given x1 prior to infusion.      Intravenous Access:  Peripheral IV placed.    Treatment Conditions:  Not Applicable.      Post Infusion Assessment:  Patient tolerated infusion without incident.  Site patent and intact, free from redness, edema or discomfort.  No evidence of extravasations.  Access discontinued per protocol.       Discharge Plan:   Discharge instructions reviewed with: Patient.  Patient and/or family verbalized understanding of discharge instructions and all questions answered.  Patient discharged in stable condition accompanied by: self.  Departure Mode: Ambulatory.      Sunita Peterson RN

## 2023-08-21 ENCOUNTER — DOCUMENTATION ONLY (OUTPATIENT)
Dept: PSYCHOLOGY | Facility: CLINIC | Age: 28
End: 2023-08-21
Payer: COMMERCIAL

## 2023-08-21 NOTE — PROGRESS NOTES
Behavioral Health Home Diagnostic Assessment Review    PATIENT'S NAME: Jaqueline Argueta  MRN:   [id number]  :   1995  DATE OF Review 2023    Date of Diagnostic Assessment:  23  Mental Health Provider and Clinic: [facility]    DSM5 Diagnoses:  296.32 (F33.1) Major Depressive Disorder, Recurrent Episode, Moderate _  Alcohol Use Disorder in remission    Cage-AID score is: 3/4     [facility] RECOMMENDATIONS/PLAN:     Diagnostic assessment has been reviewed and patient has met diagnostic criteria for enrollment into the Behavioral Health Home program.      Aura Linares PsyD, LP  Behavioral Health Clinician  [facility]

## 2023-08-22 ENCOUNTER — OFFICE VISIT (OUTPATIENT)
Dept: FAMILY MEDICINE | Facility: CLINIC | Age: 28
End: 2023-08-22
Payer: COMMERCIAL

## 2023-08-22 ENCOUNTER — INFUSION THERAPY VISIT (OUTPATIENT)
Dept: INFUSION THERAPY | Facility: CLINIC | Age: 28
End: 2023-08-22
Payer: COMMERCIAL

## 2023-08-22 VITALS — HEART RATE: 93 BPM | DIASTOLIC BLOOD PRESSURE: 70 MMHG | OXYGEN SATURATION: 99 % | SYSTOLIC BLOOD PRESSURE: 105 MMHG

## 2023-08-22 VITALS
HEART RATE: 78 BPM | BODY MASS INDEX: 25.36 KG/M2 | DIASTOLIC BLOOD PRESSURE: 70 MMHG | OXYGEN SATURATION: 99 % | RESPIRATION RATE: 18 BRPM | SYSTOLIC BLOOD PRESSURE: 105 MMHG | WEIGHT: 134.2 LBS

## 2023-08-22 DIAGNOSIS — O99.342 DEPRESSION AFFECTING PREGNANCY IN SECOND TRIMESTER, ANTEPARTUM: ICD-10-CM

## 2023-08-22 DIAGNOSIS — O09.90 SUPERVISION OF HIGH RISK PREGNANCY, ANTEPARTUM: Primary | ICD-10-CM

## 2023-08-22 DIAGNOSIS — O21.0 HYPEREMESIS GRAVIDARUM: ICD-10-CM

## 2023-08-22 DIAGNOSIS — O09.90 SUPERVISION OF HIGH RISK PREGNANCY, ANTEPARTUM: ICD-10-CM

## 2023-08-22 DIAGNOSIS — F32.A DEPRESSION AFFECTING PREGNANCY IN SECOND TRIMESTER, ANTEPARTUM: ICD-10-CM

## 2023-08-22 DIAGNOSIS — O99.332 PREGNANCY COMPLICATED BY TOBACCO USE IN SECOND TRIMESTER: ICD-10-CM

## 2023-08-22 DIAGNOSIS — L73.9 FOLLICULITIS: Primary | ICD-10-CM

## 2023-08-22 PROCEDURE — 99207 PR PRENATAL VISIT: CPT | Mod: GC

## 2023-08-22 PROCEDURE — 96361 HYDRATE IV INFUSION ADD-ON: CPT

## 2023-08-22 PROCEDURE — 258N000003 HC RX IP 258 OP 636: Performed by: FAMILY MEDICINE

## 2023-08-22 PROCEDURE — 96374 THER/PROPH/DIAG INJ IV PUSH: CPT

## 2023-08-22 PROCEDURE — 250N000011 HC RX IP 250 OP 636: Mod: JZ | Performed by: FAMILY MEDICINE

## 2023-08-22 PROCEDURE — 99213 OFFICE O/P EST LOW 20 MIN: CPT | Mod: 25

## 2023-08-22 RX ORDER — ONDANSETRON 2 MG/ML
4 INJECTION INTRAMUSCULAR; INTRAVENOUS ONCE
Status: CANCELLED | OUTPATIENT
Start: 2023-08-22 | End: 2023-08-22

## 2023-08-22 RX ORDER — ONDANSETRON 2 MG/ML
4 INJECTION INTRAMUSCULAR; INTRAVENOUS ONCE
Status: COMPLETED | OUTPATIENT
Start: 2023-08-22 | End: 2023-08-22

## 2023-08-22 RX ORDER — CLINDAMYCIN PHOSPHATE 10 MG/G
GEL TOPICAL 2 TIMES DAILY
Qty: 75 ML | Refills: 0 | Status: SHIPPED | OUTPATIENT
Start: 2023-08-22 | End: 2023-09-25

## 2023-08-22 RX ORDER — HEPARIN SODIUM (PORCINE) LOCK FLUSH IV SOLN 100 UNIT/ML 100 UNIT/ML
5 SOLUTION INTRAVENOUS
Status: CANCELLED | OUTPATIENT
Start: 2023-08-22

## 2023-08-22 RX ORDER — HEPARIN SODIUM,PORCINE 10 UNIT/ML
5 VIAL (ML) INTRAVENOUS
Status: CANCELLED | OUTPATIENT
Start: 2023-08-22

## 2023-08-22 RX ADMIN — SODIUM CHLORIDE, POTASSIUM CHLORIDE, SODIUM LACTATE AND CALCIUM CHLORIDE 1000 ML: 600; 310; 30; 20 INJECTION, SOLUTION INTRAVENOUS at 12:22

## 2023-08-22 RX ADMIN — ONDANSETRON 4 MG: 2 INJECTION INTRAMUSCULAR; INTRAVENOUS at 12:26

## 2023-08-22 ASSESSMENT — PAIN SCALES - GENERAL: PAINLEVEL: NO PAIN (0)

## 2023-08-22 NOTE — PROGRESS NOTES
Return OB visit  24-28 weeks    Assessment & plan:   IUP at 26w1d weeks by T1US.     - Follow up:   1.) Return for EARLE in 2 weeks; recheck weight, consider inpatient if losing weight  2.) Will need routine TDAP and GBS in third trimester   3.) Repeat growth US at 28W through MFM, scheduled     Routine OB cares   High-risk pregnancy in second trimester (2/2 hyperemesis, depression, cannabis use)  - Reassuring doptones, measuring appropriately   - Pregnancy complicated by: hyperemesis gravidarum, depression (not on medications), early alcohol use (none current), and intermittent marijuana use.    - Prenatal labs reviewed: Rh+, Hep B non-immune.  - Passed 1-HR GTT. Second trimester labs reassuring.    - Reviewed fetal survey results with patient: normal anatomy. Repeat ultrasound is recommended, growth US 28W.   - Discussed need for Tdap, patient agreed to vaccination at next visit. Patient has not received flu shot this pregnancy.   - Provided counseling regarding  labor symptoms, depression and social support systems, breast feeding, contraception options after delivery, baby supplies and car seat, proper seatbelt use during pregnancy. The patient plans to deliver at [facility] with myself and/or OB partner.   - Patient will continue taking prenatal vitamins and avoiding cigarettes & alcohol.     Hyperemesis gravidarum   Weight loss during pregnancy  Patient with weight gain this visit. Reports minimal vomiting and improved appetite after re-starting IVF infusions. Has also started therapy, which may be helping with mood. Follow closely. If not improving, consider inpatient status. Patient declined additional medications today as current regimen as worked the best for her nausea.   - Continue Hydroxyzine and Compazine   - Continue outpatient infusions ordered; can go PRN every three days and receive anti-emetics IV at that time   - Return to clinic in 2 weeks for recheck     Depression  Patient  endorses stable mood. No SI/HI. Initiated therapy. Declined medications.   - Continue [facility] and therapy  - Return to clinic in 2 weeks for recheck.     Folliculitis vs HS  Patient with cysts to axilla consistent. Not improved with antibiotic wash and warm compresses. Mainly superficial, but some appear to have sinus tracts that may be hidraadenitis. Patient requested Derm referral, which is reasonable. Will also continue to trial treatment.   - Start Clindamycin topical   - Derm referral      Options for treatment and follow-up care were reviewed with the patient and/or guardian. Jaqueline Argueta and/or guardian engaged in the decision making process and verbalized understanding of the options discussed and agreed with the final plan.    Linda Ch MD    >>>>>>>>>>>>>>>>>>>>>>>>>>>>>>>>>>>>>>>>>>>>>>  Subjective:   Jaqueline is a 27 year old  female at 26w1d who returns for prenatal care. PRINCESS 2023.  - Patient reports no vaginal bleeding, no contractions, no leakage of fluid. Fetal movement is present.   - No vaginal discharge. No dysuria.   - No headache, vision changes, lower extremity swelling, upper abdominal pain, chest pain, shortness of breath.   - Has repeat growth US at 28W scheduled     Hyperemesis:  - Has not vomited in a week   - Able to restart IVF infusions; these are helpful   - Using Compazine and Hydroxyzine   - Does not eat a lot, just snacks and fruits and bread   - Appetite is not there, not sure why   - Does not like Ensure, makes her more sick   - Referred to Dietician by [facility]; has not met with them yet      Wt Readings from Last 4 Encounters:   23 60.9 kg (134 lb 3.2 oz)   23 59.3 kg (130 lb 12.8 oz)   23 62.6 kg (138 lb)   23 61.1 kg (134 lb 9.6 oz)     Axilla lesions:   - Has not improved with warm compresses and antibiotic wash  - Wants Derm referral     Depression:  - Still the same   - Met with Dr Linares and went well, next appointment in 2  weeks   - Declined medications   - No SI/HI     Objective:   LMP 03/10/2023 (Approximate)    Const: No distress  Abd: Gravid.   See flowsheet for FH, FHTs, edema.    Prenatal labs:   Hemoglobin   Date Value Ref Range Status   08/08/2023 12.2 11.7 - 15.7 g/dL Final   04/14/2018 12.0 11.7 - 15.7 g/dL Final

## 2023-08-22 NOTE — PROGRESS NOTES
Infusion Nursing Note:  Jaqueline Argueta presents today for Fluids, Zofran.    Patient seen by provider today: No   present during visit today: Not Applicable.    Note: N/A.      Intravenous Access:  Peripheral IV placed.    Treatment Conditions:  Not Applicable.      Post Infusion Assessment:  Patient tolerated infusion without incident.  No evidence of extravasations.  Access discontinued per protocol.       Discharge Plan:   Patient and/or family verbalized understanding of discharge instructions and all questions answered.  Patient discharged in stable condition accompanied by: self.      ANEESH HINES RN

## 2023-08-22 NOTE — PATIENT INSTRUCTIONS
KEEP DOING IV INFUSIONS   COME BACK IN 2 WEEKS FOR RECHECK- PLAN FOR TETANUS VACCINE AT THAT TIME     I WILL TALK WITH RUBINA TO MAKE SURE GROWTH US IS SCHEDULED FOR 28W AND NUTRITION APPOINTMENT     [facility] REFERRAL; THEY SHOULD CALL YOU TO SCHEDULE. IN THE MEANTIME, NEW ANTIBIOTIC CREAM      Thank you for coming to [facility]!  - If you had lab testing today and your results are normal they will be be mailed to you or sent to your MyChart within seven days.   - If the lab tests need quick action we will call you with the results.  - The phone number we will call with results is # [phone] (home) . If this is not the best number please call our clinic and change the number.  - If any referrals were made to [facility] and you don't get a call, call central scheduling [phone]  - If you need any refills please call your pharmacy and they will contact us.  - If you had an XRay/CT/Ultrasound/MRI ordered the number is [phone] to schedule or change your appointment  - If you have any concerns about today's visit or wish to schedule another appointment please call our office [phone] (8-5:00 M-F)  - If you have urgent medical concerns call [phone] at any time of the day.  - If you have a medical emergency please call [phone].    Because you are pregnant, we have additional resources for you:  - You may call and ask to speak with one of our clinic nurses at [phone] during normal business hours, for non-urgent questions about your pregnancy.  - Immediate OB help is also available 24 hours a day, seven days a week via the [facility] ([facility]) - [phone]  located at [address] [place] [id number]    Prenatal Timeline:  Before 14 weeks: dating ultrasound       This ultrasound helps us determine your dates accurately.  15 - 18 weeks: Optional genetic testing (quad screen)       This testing helps understand your baby's risk  for some genetic abnormalities.  22 - 24 weeks: Ultrasound (fetal anatomy survey)       This testing will look for early growth abnormalities, and might tell the baby's sex.  24 - 28 weeks: One hour sugar test (GCT)        This test helps identify diabetes of pregnancy.  27 - 36 weeks: Tetanus shot (Tdap)       This shot helps protect you and your baby from tetanus and whooping cough.  36 weeks and later: Group B Strep test (GBS)       This test helps predict if you need antibiotics in labor to prevent infection in your baby.  Anytime September to April: flu shot       This shot helps protect you and your family from the flu.  This is especially important during pregnancy!  Once every trimester: blood iron test (hemoglobin)       This test helps us know if you will need extra iron to support your baby.  At any time during or after your pregnancy: Some women experience baby blues.  We can help you with: Feeling anxious, Overwhelmed or sad, Trouble sleeping, Crying uncontrollably, Trouble caring for yourself or baby.    How frequently should you see your provider?  < 28 weeks: every 4 weeks  28-36 weeks: every 2 weeks  >36 weeks: every week    Call your healthcare provider immediately if you experience any of the following symptoms:  Bleeding from nipples, rectum, bladder, or coughing up blood  Vaginal bleeding, no matter how slight (unless small amount after a pelvic exam)  Swelling of hands or face  Dimness or blurring of vision  Severe or continuous headaches  Abdominal pains or contractions that do not go away with heat and rest or a bowel movement  Chills or fever over 100.4  Persistent vomiting  Painful or burning urination  Decrease in fetal movement  Sudden or slow escape of fluid from the vagina

## 2023-08-25 ENCOUNTER — INFUSION THERAPY VISIT (OUTPATIENT)
Dept: INFUSION THERAPY | Facility: CLINIC | Age: 28
End: 2023-08-25
Payer: COMMERCIAL

## 2023-08-25 VITALS
SYSTOLIC BLOOD PRESSURE: 106 MMHG | HEART RATE: 96 BPM | RESPIRATION RATE: 16 BRPM | OXYGEN SATURATION: 99 % | TEMPERATURE: 97.9 F | DIASTOLIC BLOOD PRESSURE: 71 MMHG

## 2023-08-25 DIAGNOSIS — O09.90 SUPERVISION OF HIGH RISK PREGNANCY, ANTEPARTUM: Primary | ICD-10-CM

## 2023-08-25 DIAGNOSIS — O21.0 HYPEREMESIS GRAVIDARUM: ICD-10-CM

## 2023-08-25 PROCEDURE — 96361 HYDRATE IV INFUSION ADD-ON: CPT

## 2023-08-25 PROCEDURE — 250N000011 HC RX IP 250 OP 636: Mod: JZ | Performed by: FAMILY MEDICINE

## 2023-08-25 PROCEDURE — 258N000003 HC RX IP 258 OP 636: Performed by: FAMILY MEDICINE

## 2023-08-25 PROCEDURE — 96374 THER/PROPH/DIAG INJ IV PUSH: CPT

## 2023-08-25 RX ORDER — HEPARIN SODIUM (PORCINE) LOCK FLUSH IV SOLN 100 UNIT/ML 100 UNIT/ML
5 SOLUTION INTRAVENOUS
Status: CANCELLED | OUTPATIENT
Start: 2023-08-25

## 2023-08-25 RX ORDER — ONDANSETRON 2 MG/ML
4 INJECTION INTRAMUSCULAR; INTRAVENOUS ONCE
Status: CANCELLED | OUTPATIENT
Start: 2023-08-25 | End: 2023-08-25

## 2023-08-25 RX ORDER — HEPARIN SODIUM,PORCINE 10 UNIT/ML
5 VIAL (ML) INTRAVENOUS
Status: CANCELLED | OUTPATIENT
Start: 2023-08-25

## 2023-08-25 RX ORDER — ONDANSETRON 2 MG/ML
4 INJECTION INTRAMUSCULAR; INTRAVENOUS ONCE
Status: COMPLETED | OUTPATIENT
Start: 2023-08-25 | End: 2023-08-25

## 2023-08-25 RX ADMIN — ONDANSETRON 4 MG: 2 INJECTION INTRAMUSCULAR; INTRAVENOUS at 14:35

## 2023-08-25 RX ADMIN — SODIUM CHLORIDE, POTASSIUM CHLORIDE, SODIUM LACTATE AND CALCIUM CHLORIDE 1000 ML: 600; 310; 30; 20 INJECTION, SOLUTION INTRAVENOUS at 14:32

## 2023-08-25 ASSESSMENT — PAIN SCALES - GENERAL: PAINLEVEL: NO PAIN (0)

## 2023-08-25 NOTE — PROGRESS NOTES
Infusion Nursing Note:  Jaqueline Argueta presents today for fluids and zofran.    Patient seen by provider today: No   present during visit today: Not Applicable.    Note: Patient states she continues to have nausea, no vomiting today.      Intravenous Access:  Peripheral IV placed.    Treatment Conditions:  Not Applicable.      Post Infusion Assessment:  Patient tolerated infusion without incident.  Blood return noted pre and post infusion.  Site patent and intact, free from redness, edema or discomfort.  No evidence of extravasations.  Access discontinued per protocol.       Discharge Plan:   Patient discharged in stable condition accompanied by: self.  Departure Mode: Ambulatory.  Will return to clinic next week.    Georgiana Hendrix RN

## 2023-08-29 ENCOUNTER — INFUSION THERAPY VISIT (OUTPATIENT)
Dept: INFUSION THERAPY | Facility: CLINIC | Age: 28
End: 2023-08-29
Payer: COMMERCIAL

## 2023-08-29 VITALS — DIASTOLIC BLOOD PRESSURE: 73 MMHG | HEART RATE: 84 BPM | OXYGEN SATURATION: 99 % | SYSTOLIC BLOOD PRESSURE: 111 MMHG

## 2023-08-29 DIAGNOSIS — O09.90 SUPERVISION OF HIGH RISK PREGNANCY, ANTEPARTUM: Primary | ICD-10-CM

## 2023-08-29 DIAGNOSIS — O21.0 HYPEREMESIS GRAVIDARUM: ICD-10-CM

## 2023-08-29 PROCEDURE — 258N000003 HC RX IP 258 OP 636: Performed by: FAMILY MEDICINE

## 2023-08-29 PROCEDURE — 250N000011 HC RX IP 250 OP 636: Mod: JZ | Performed by: FAMILY MEDICINE

## 2023-08-29 PROCEDURE — 96361 HYDRATE IV INFUSION ADD-ON: CPT

## 2023-08-29 PROCEDURE — 96374 THER/PROPH/DIAG INJ IV PUSH: CPT

## 2023-08-29 RX ORDER — HEPARIN SODIUM (PORCINE) LOCK FLUSH IV SOLN 100 UNIT/ML 100 UNIT/ML
5 SOLUTION INTRAVENOUS
Status: CANCELLED | OUTPATIENT
Start: 2023-08-29

## 2023-08-29 RX ORDER — HEPARIN SODIUM,PORCINE 10 UNIT/ML
5 VIAL (ML) INTRAVENOUS
Status: CANCELLED | OUTPATIENT
Start: 2023-08-29

## 2023-08-29 RX ORDER — ONDANSETRON 2 MG/ML
4 INJECTION INTRAMUSCULAR; INTRAVENOUS ONCE
Status: COMPLETED | OUTPATIENT
Start: 2023-08-29 | End: 2023-08-29

## 2023-08-29 RX ORDER — ONDANSETRON 2 MG/ML
4 INJECTION INTRAMUSCULAR; INTRAVENOUS ONCE
Status: CANCELLED | OUTPATIENT
Start: 2023-08-29 | End: 2023-08-29

## 2023-08-29 RX ADMIN — ONDANSETRON 4 MG: 2 INJECTION INTRAMUSCULAR; INTRAVENOUS at 14:21

## 2023-08-29 RX ADMIN — SODIUM CHLORIDE, POTASSIUM CHLORIDE, SODIUM LACTATE AND CALCIUM CHLORIDE 1000 ML: 600; 310; 30; 20 INJECTION, SOLUTION INTRAVENOUS at 14:21

## 2023-08-29 NOTE — PROGRESS NOTES
Infusion Nursing Note:  Jaqueline Argueta presents today for IV Fluids and zofran.    Patient seen by provider today: No   present during visit today: Not Applicable.    Note: No acute changes. Pt states that infusions have been helpful.      Intravenous Access:  Peripheral IV placed.    Treatment Conditions:  Not Applicable.      Post Infusion Assessment:  Patient tolerated infusion without incident.  Site patent and intact, free from redness, edema or discomfort.  No evidence of extravasations.  Access discontinued per protocol.       Discharge Plan:   Discharge instructions reviewed with: Patient.  Patient and/or family verbalized understanding of discharge instructions and all questions answered.  Patient discharged in stable condition accompanied by: self.  Departure Mode: Ambulatory.      Sunita Peterson RN

## 2023-08-30 ENCOUNTER — INFUSION THERAPY VISIT (OUTPATIENT)
Dept: INFUSION THERAPY | Facility: CLINIC | Age: 28
End: 2023-08-30
Payer: COMMERCIAL

## 2023-08-30 ENCOUNTER — TELEPHONE (OUTPATIENT)
Dept: FAMILY MEDICINE | Facility: CLINIC | Age: 28
End: 2023-08-30
Payer: COMMERCIAL

## 2023-08-30 VITALS — OXYGEN SATURATION: 98 % | SYSTOLIC BLOOD PRESSURE: 107 MMHG | HEART RATE: 90 BPM | DIASTOLIC BLOOD PRESSURE: 72 MMHG

## 2023-08-30 DIAGNOSIS — O09.90 SUPERVISION OF HIGH RISK PREGNANCY, ANTEPARTUM: Primary | ICD-10-CM

## 2023-08-30 DIAGNOSIS — O21.0 HYPEREMESIS GRAVIDARUM: ICD-10-CM

## 2023-08-30 PROCEDURE — 96374 THER/PROPH/DIAG INJ IV PUSH: CPT

## 2023-08-30 PROCEDURE — 258N000003 HC RX IP 258 OP 636: Performed by: FAMILY MEDICINE

## 2023-08-30 PROCEDURE — 96361 HYDRATE IV INFUSION ADD-ON: CPT

## 2023-08-30 PROCEDURE — 250N000011 HC RX IP 250 OP 636: Mod: JZ | Performed by: FAMILY MEDICINE

## 2023-08-30 RX ORDER — HEPARIN SODIUM (PORCINE) LOCK FLUSH IV SOLN 100 UNIT/ML 100 UNIT/ML
5 SOLUTION INTRAVENOUS
Status: CANCELLED | OUTPATIENT
Start: 2023-08-30

## 2023-08-30 RX ORDER — HEPARIN SODIUM,PORCINE 10 UNIT/ML
5 VIAL (ML) INTRAVENOUS
Status: CANCELLED | OUTPATIENT
Start: 2023-08-30

## 2023-08-30 RX ORDER — ONDANSETRON 2 MG/ML
4 INJECTION INTRAMUSCULAR; INTRAVENOUS ONCE
Status: CANCELLED | OUTPATIENT
Start: 2023-08-30 | End: 2023-08-30

## 2023-08-30 RX ORDER — ONDANSETRON 2 MG/ML
4 INJECTION INTRAMUSCULAR; INTRAVENOUS ONCE
Status: COMPLETED | OUTPATIENT
Start: 2023-08-30 | End: 2023-08-30

## 2023-08-30 RX ADMIN — ONDANSETRON 4 MG: 2 INJECTION INTRAMUSCULAR; INTRAVENOUS at 12:25

## 2023-08-30 RX ADMIN — SODIUM CHLORIDE, POTASSIUM CHLORIDE, SODIUM LACTATE AND CALCIUM CHLORIDE 1000 ML: 600; 310; 30; 20 INJECTION, SOLUTION INTRAVENOUS at 12:26

## 2023-08-30 NOTE — TELEPHONE ENCOUNTER
[facility]  Type of Contact: Phone call (not reached/unavailable)  Chante Aly Community Health Worker    Care Coordination: provided care management services/referrals necessary to ensure patient and their identified supports have access to medical, behavioral health, pharmacology and recovery support services.  Ensured that patient's care is integrated across all settings and services.       CHW returning patient's call from yesterday. Patient left message wanting more resources for her niece she currently has living with her.    Left message asking for a call back.     Chante Aly LPN/MAMIEW

## 2023-08-30 NOTE — PROGRESS NOTES
Infusion Nursing Note:  Jaqueline Argueta presents today for IV fluids and zofran.    Patient seen by provider today: No   present during visit today: Not Applicable.    Note: No new issues. Zofran given for nausea at start of infusion.       Intravenous Access:  Peripheral IV placed.    Treatment Conditions:  Not Applicable.      Post Infusion Assessment:  Patient tolerated infusion without incident.  Site patent and intact, free from redness, edema or discomfort.  No evidence of extravasations.  Access discontinued per protocol.       Discharge Plan:   Discharge instructions reviewed with: Patient.  Patient and/or family verbalized understanding of discharge instructions and all questions answered.  Patient discharged in stable condition accompanied by: self.  Departure Mode: Ambulatory.      Sunita Peterson RN

## 2023-08-31 ENCOUNTER — INFUSION THERAPY VISIT (OUTPATIENT)
Dept: INFUSION THERAPY | Facility: CLINIC | Age: 28
End: 2023-08-31
Payer: COMMERCIAL

## 2023-08-31 VITALS
RESPIRATION RATE: 16 BRPM | DIASTOLIC BLOOD PRESSURE: 69 MMHG | HEART RATE: 94 BPM | OXYGEN SATURATION: 98 % | SYSTOLIC BLOOD PRESSURE: 106 MMHG

## 2023-08-31 DIAGNOSIS — O09.90 SUPERVISION OF HIGH RISK PREGNANCY, ANTEPARTUM: Primary | ICD-10-CM

## 2023-08-31 DIAGNOSIS — O21.0 HYPEREMESIS GRAVIDARUM: ICD-10-CM

## 2023-08-31 PROCEDURE — 250N000011 HC RX IP 250 OP 636: Mod: JZ | Performed by: FAMILY MEDICINE

## 2023-08-31 PROCEDURE — 96374 THER/PROPH/DIAG INJ IV PUSH: CPT

## 2023-08-31 PROCEDURE — 258N000003 HC RX IP 258 OP 636: Performed by: FAMILY MEDICINE

## 2023-08-31 PROCEDURE — 96361 HYDRATE IV INFUSION ADD-ON: CPT

## 2023-08-31 RX ORDER — HEPARIN SODIUM,PORCINE 10 UNIT/ML
5 VIAL (ML) INTRAVENOUS
Status: CANCELLED | OUTPATIENT
Start: 2023-08-31

## 2023-08-31 RX ORDER — HEPARIN SODIUM (PORCINE) LOCK FLUSH IV SOLN 100 UNIT/ML 100 UNIT/ML
5 SOLUTION INTRAVENOUS
Status: CANCELLED | OUTPATIENT
Start: 2023-08-31

## 2023-08-31 RX ORDER — ONDANSETRON 2 MG/ML
4 INJECTION INTRAMUSCULAR; INTRAVENOUS ONCE
Status: COMPLETED | OUTPATIENT
Start: 2023-08-31 | End: 2023-08-31

## 2023-08-31 RX ORDER — ONDANSETRON 2 MG/ML
4 INJECTION INTRAMUSCULAR; INTRAVENOUS ONCE
Status: CANCELLED | OUTPATIENT
Start: 2023-08-31 | End: 2023-08-31

## 2023-08-31 RX ADMIN — SODIUM CHLORIDE, POTASSIUM CHLORIDE, SODIUM LACTATE AND CALCIUM CHLORIDE 1000 ML: 600; 310; 30; 20 INJECTION, SOLUTION INTRAVENOUS at 14:25

## 2023-08-31 RX ADMIN — ONDANSETRON 4 MG: 2 INJECTION INTRAMUSCULAR; INTRAVENOUS at 14:29

## 2023-08-31 NOTE — PROGRESS NOTES
Infusion Nursing Note:  Jaqueline Argueta presents today for IVF and zofran.    Patient seen by provider today: No   present during visit today: Not Applicable.    Note: Patient states she is doing well since starting IVF infusions.      Intravenous Access:  Peripheral IV placed.    Treatment Conditions:  Not Applicable.      Post Infusion Assessment:  Patient tolerated infusion without incident.  Blood return noted pre and post infusion.  Site patent and intact, free from redness, edema or discomfort.  No evidence of extravasations.  Access discontinued per protocol.       Discharge Plan:   Discharge instructions reviewed with: Patient.  Patient and/or family verbalized understanding of discharge instructions and all questions answered.  Patient discharged in stable condition accompanied by: self.  Departure Mode: Ambulatory.      Cassandra Metzger RN

## 2023-09-05 ENCOUNTER — INFUSION THERAPY VISIT (OUTPATIENT)
Dept: INFUSION THERAPY | Facility: CLINIC | Age: 28
End: 2023-09-05
Payer: COMMERCIAL

## 2023-09-05 VITALS — HEART RATE: 72 BPM | TEMPERATURE: 98.5 F | DIASTOLIC BLOOD PRESSURE: 76 MMHG | SYSTOLIC BLOOD PRESSURE: 102 MMHG

## 2023-09-05 DIAGNOSIS — O09.90 SUPERVISION OF HIGH RISK PREGNANCY, ANTEPARTUM: Primary | ICD-10-CM

## 2023-09-05 DIAGNOSIS — O21.0 HYPEREMESIS GRAVIDARUM: ICD-10-CM

## 2023-09-05 PROCEDURE — 250N000011 HC RX IP 250 OP 636: Mod: JZ | Performed by: FAMILY MEDICINE

## 2023-09-05 PROCEDURE — 258N000003 HC RX IP 258 OP 636: Performed by: FAMILY MEDICINE

## 2023-09-05 PROCEDURE — 96374 THER/PROPH/DIAG INJ IV PUSH: CPT

## 2023-09-05 PROCEDURE — 96361 HYDRATE IV INFUSION ADD-ON: CPT

## 2023-09-05 RX ORDER — HEPARIN SODIUM (PORCINE) LOCK FLUSH IV SOLN 100 UNIT/ML 100 UNIT/ML
5 SOLUTION INTRAVENOUS
Status: CANCELLED | OUTPATIENT
Start: 2023-09-05

## 2023-09-05 RX ORDER — HEPARIN SODIUM,PORCINE 10 UNIT/ML
5 VIAL (ML) INTRAVENOUS
Status: CANCELLED | OUTPATIENT
Start: 2023-09-05

## 2023-09-05 RX ORDER — ONDANSETRON 2 MG/ML
4 INJECTION INTRAMUSCULAR; INTRAVENOUS ONCE
Status: CANCELLED | OUTPATIENT
Start: 2023-09-05 | End: 2023-09-05

## 2023-09-05 RX ORDER — ONDANSETRON 2 MG/ML
4 INJECTION INTRAMUSCULAR; INTRAVENOUS ONCE
Status: COMPLETED | OUTPATIENT
Start: 2023-09-05 | End: 2023-09-05

## 2023-09-05 RX ADMIN — SODIUM CHLORIDE, POTASSIUM CHLORIDE, SODIUM LACTATE AND CALCIUM CHLORIDE 1000 ML: 600; 310; 30; 20 INJECTION, SOLUTION INTRAVENOUS at 14:22

## 2023-09-05 RX ADMIN — ONDANSETRON 4 MG: 2 INJECTION INTRAMUSCULAR; INTRAVENOUS at 14:29

## 2023-09-05 ASSESSMENT — PAIN SCALES - GENERAL: PAINLEVEL: NO PAIN (0)

## 2023-09-05 NOTE — PROGRESS NOTES
Infusion Nursing Note:  Jaqueline Argueta presents today for IVF and zofran.    Patient seen by provider today: No   present during visit today: Not Applicable.    Note: N/A.      Intravenous Access:  Peripheral IV placed.    Treatment Conditions:  Not Applicable.      Post Infusion Assessment:  Patient tolerated infusion without incident.  Blood return noted pre and post infusion.  Site patent and intact, free from redness, edema or discomfort.  No evidence of extravasations.  Access discontinued per protocol.       Discharge Plan:   Patient discharged in stable condition accompanied by: self.  Departure Mode: Ambulatory.  RTC 9/7/23.      Rajani Root

## 2023-09-06 ENCOUNTER — ALLIED HEALTH/NURSE VISIT (OUTPATIENT)
Dept: FAMILY MEDICINE | Facility: CLINIC | Age: 28
End: 2023-09-06
Payer: COMMERCIAL

## 2023-09-06 ENCOUNTER — OFFICE VISIT (OUTPATIENT)
Dept: FAMILY MEDICINE | Facility: CLINIC | Age: 28
End: 2023-09-06
Payer: COMMERCIAL

## 2023-09-06 VITALS
BODY MASS INDEX: 26.11 KG/M2 | OXYGEN SATURATION: 99 % | RESPIRATION RATE: 19 BRPM | TEMPERATURE: 97.7 F | WEIGHT: 138.2 LBS | DIASTOLIC BLOOD PRESSURE: 73 MMHG | SYSTOLIC BLOOD PRESSURE: 107 MMHG | HEART RATE: 82 BPM

## 2023-09-06 DIAGNOSIS — Z23 NEED FOR TDAP VACCINATION: ICD-10-CM

## 2023-09-06 DIAGNOSIS — O99.342 DEPRESSION AFFECTING PREGNANCY IN SECOND TRIMESTER, ANTEPARTUM: ICD-10-CM

## 2023-09-06 DIAGNOSIS — O09.90 SUPERVISION OF HIGH RISK PREGNANCY, ANTEPARTUM: Primary | ICD-10-CM

## 2023-09-06 DIAGNOSIS — O09.899 RUBELLA NON-IMMUNE STATUS, ANTEPARTUM: ICD-10-CM

## 2023-09-06 DIAGNOSIS — Z28.39 RUBELLA NON-IMMUNE STATUS, ANTEPARTUM: ICD-10-CM

## 2023-09-06 DIAGNOSIS — O21.0 HYPEREMESIS GRAVIDARUM: ICD-10-CM

## 2023-09-06 DIAGNOSIS — F32.A DEPRESSION AFFECTING PREGNANCY IN SECOND TRIMESTER, ANTEPARTUM: ICD-10-CM

## 2023-09-06 PROCEDURE — H1003 PRENATAL AT RISK EDUCATION: HCPCS

## 2023-09-06 PROCEDURE — 99207 PR PRENATAL VISIT: CPT | Mod: GC

## 2023-09-06 NOTE — PATIENT INSTRUCTIONS
WE WILL RESCHEDULE YOUR GROWTH US- I WANT TO MAKE SURE BABY IS GROWING APPROPRIATELY     PLAN FOR TDAP AT NEXT VISIT     KEEP DOING INFUSIONS     THERAPY APPOINTMENT 9/20     Thank you for coming to [facility]!  - If you had lab testing today and your results are normal they will be be mailed to you or sent to your MyChart within seven days.   - If the lab tests need quick action we will call you with the results.  - The phone number we will call with results is # [phone] (home) . If this is not the best number please call our clinic and change the number.  - If any referrals were made to [facility] and you don't get a call, call central scheduling [phone]  - If you need any refills please call your pharmacy and they will contact us.  - If you had an XRay/CT/Ultrasound/MRI ordered the number is [phone] to schedule or change your appointment  - If you have any concerns about today's visit or wish to schedule another appointment please call our office [phone] (8-5:00 M-F)  - If you have urgent medical concerns call [phone] at any time of the day.  - If you have a medical emergency please call 811.    Because you are pregnant, we have additional resources for you:  - You may call and ask to speak with one of our clinic nurses at [phone] during normal business hours, for non-urgent questions about your pregnancy.  - Immediate OB help is also available 24 hours a day, seven days a week via the [facility] Delivery ([facility]) - [phone]  located at [address] [place] [id number]    Prenatal Timeline:  Before 14 weeks: dating ultrasound       This ultrasound helps us determine your dates accurately.  15 - 18 weeks: Optional genetic testing (quad screen)       This testing helps understand your baby's risk for some genetic abnormalities.  22 - 24 weeks: Ultrasound (fetal anatomy survey)       This testing will look for  early growth abnormalities, and might tell the baby's sex.  24 - 28 weeks: One hour sugar test (GCT)        This test helps identify diabetes of pregnancy.  27 - 36 weeks: Tetanus shot (Tdap)       This shot helps protect you and your baby from tetanus and whooping cough.  36 weeks and later: Group B Strep test (GBS)       This test helps predict if you need antibiotics in labor to prevent infection in your baby.  Anytime September to April: flu shot       This shot helps protect you and your family from the flu.  This is especially important during pregnancy!  Once every trimester: blood iron test (hemoglobin)       This test helps us know if you will need extra iron to support your baby.  At any time during or after your pregnancy: Some women experience baby blues.  We can help you with: Feeling anxious, Overwhelmed or sad, Trouble sleeping, Crying uncontrollably, Trouble caring for yourself or baby.    How frequently should you see your provider?  < 28 weeks: every 4 weeks  28-36 weeks: every 2 weeks  >36 weeks: every week    Call your healthcare provider immediately if you experience any of the following symptoms:  Bleeding from nipples, rectum, bladder, or coughing up blood  Vaginal bleeding, no matter how slight (unless small amount after a pelvic exam)  Swelling of hands or face  Dimness or blurring of vision  Severe or continuous headaches  Abdominal pains or contractions that do not go away with heat and rest or a bowel movement  Chills or fever over 100.4  Persistent vomiting  Painful or burning urination  Decrease in fetal movement  Sudden or slow escape of fluid from the vagina

## 2023-09-06 NOTE — PROGRESS NOTES
Return OB visit  24-28 weeks    Assessment & plan:   IUP at 28w2d weeks by T1US.     - Follow up:   1.) Return for EARLE in 2 weeks; needs TDAP at that time   2.) Repeat growth US per [facility]; OB CC will call and reschedule as patient cannot make tomorrow       Routine OB cares   High-risk pregnancy in second trimester (2/2 hyperemesis, depression, cannabis use)  - Reassuring doptones, measuring small for dates   - Pregnancy complicated by: hyperemesis gravidarum, depression (not on medications), early alcohol use (none current), and intermittent marijuana use.    - Prenatal labs reviewed: Rh+, Hep B non-immune.  - Passed 1-HR GTT. Second trimester labs reassuring.    - Reviewed fetal survey results with patient: normal anatomy. Repeat ultrasound is recommended, growth US 28W.   - Discussed need for TDAP today- patient declined, wants it next time. Patient has not received flu shot this pregnancy.   - Still deciding on feeding and contraception.   - Provided counseling regarding  labor symptoms, depression and social support systems, breast feeding, contraception options after delivery, baby supplies and car seat, proper seatbelt use during pregnancy. The patient plans to deliver at [facility] with myself and/or OB partner.   - Patient will continue taking prenatal vitamins and avoiding cigarettes & alcohol.      Hyperemesis gravidarum   Weight loss during pregnancy  Pre-gravid weight 144lbs. Weight today 138lbs, which is an upwards trend over past two visits. Reports minimal vomiting. Patient measuring 27cm today. Following with [facility] and has growth US scheduled tomorrow. Patient reports that she cannot make appointment- will reschedule to ensure appropriate fetal growth.  - Repeat growth US tomorrow; patient states that she cannot make it, OB CC will call and reschedule   - Continue Hydroxyzine and Compazine   - Continue outpatient infusions ordered; can go PRN every three days and receive anti-emetics  IV at that time   - Return to clinic in 2 weeks for recheck     Depression  Patient endorses stable mood. Continues to have flat and withdrawn affect. No SI/HI. Initiated therapy. Declined medications.   - Continue BHH and therapy  - Return to clinic in 2 weeks for recheck.     Options for treatment and follow-up care were reviewed with the patient and/or guardian. Jaqueline Argueta and/or guardian engaged in the decision making process and verbalized understanding of the options discussed and agreed with the final plan.    Linda Ch MD    >>>>>>>>>>>>>>>>>>>>>>>>>>>>>>>>>>>>>>>>>>>    Subjective:   Jaqueline is a 27 year old  female at 28w2d who returns for prenatal care. PRINCESS 2023.    - Concerns today: None   - Vomiting: Gained 4 pounds since last visit. Still doing infusions. Eating a little, but not too much. Vomiting, but not every day. Occasionally.   - Mood: Been okay. Feels ok. Has appointment with Dr. HERNANDEZ . Had to cancel appointment earlier today. Does not want additional resources. Currently living with daughter and niece. No SI/HI. Does not want meds   - Needs TDAP today. States she does not want it today.   - Has [facility] US scheduled for tomorrow, but will not be able to make it. Open to rescheduling.   - Axilla: Derm appointment scheduled for March. Has not tried Clindamycin.     - Patient reports no vaginal bleeding, no contractions, no leakage of fluid. Fetal movement is present.   - No heartburn.   - No vaginal discharge. No dysuria.   - No headache, vision changes, lower extremity swelling, upper abdominal pain, chest pain, shortness of breath.     Wt Readings from Last 4 Encounters:   23 62.7 kg (138 lb 3.2 oz)   23 60.9 kg (134 lb 3.2 oz)   23 59.3 kg (130 lb 12.8 oz)   23 62.6 kg (138 lb)       Objective:   LMP 03/10/2023 (Approximate)    Const: No distress  Abd: Gravid.   See flowsheet for FH, FHTs, edema.    Prenatal labs:   Hemoglobin   Date Value Ref  Range Status   08/08/2023 12.2 11.7 - 15.7 g/dL Final   04/14/2018 12.0 11.7 - 15.7 g/dL Final       - Specific concerns addressed today:   There are no diagnoses linked to this encounter.

## 2023-09-06 NOTE — PROGRESS NOTES
Preceptor Attestation:   Patient seen, evaluated and discussed with the resident. I have verified the content of the note, which accurately reflects my assessment of the patient and the plan of care.   Supervising Physician:  Nick Cobian MD

## 2023-09-06 NOTE — PROGRESS NOTES
Family Medicine OB Education    I provided the following OB education to Jaqueline Argueta.    28week OB Education      Mother/Birther education:     labor symptoms/warning signs with reasons to call clinic or L&D (phone numbers included)   Tdap recommendation after 27wks gestation & importance of family members getting immunized before delivery  Postpartum depression and social support systems   Contraception options after delivery (thinking about Depo or IUD-cramped entire time w IUD, so leaning towards Depo)  Important visits after the birth  My Labor & Birth Wishes, Any Day Now, & What I Wish I Would Have Known  [place] education:  Breast/chest feeding (storage & preparation)-hasn't decided if breastfeeding this time-pain & bldg w last child which lead to early cessation.  CCHD & [place] Screening handouts  Baby supplies and car seat ([facility] office [phone] or [phone] to find your nearest [facility] office OR [employer] [phone]), proper seatbelt use during pregnancy. Hasn't gotten car seat yet-states no concerns regarding access    Breastfeeding Videos  https://globalhealthmedia.org/videos/breastfeeding/  https://www.Shortlistbreastfeeding.com/    LaLeche League [phone] (24 hours a day)  www.lllofmndas.org  www.lalecheleague.org  Phone, Email, and Group support    National Women's Health Information Center [phone]  www.womenshealth.gov/breastfeeding  Offers a breastfeeding information line English and Anguillan    [employer]  EMAIL  hello@[employer].org  PHONE  [phone]  FAX  [phone]  [address] [place] [address] [address]  [place] [address]    Lactation Referrals in [place]:  [facility] [phone]  [facility] [phone]    [facility] Breastfeeding Services Resources  -The following locations provide outpatient lactation consultation and some provide phone consultation with certified lactation consultants. Call for information and  appointments  Lactation Consultants at [facility] [phone]  [facility] [phone]  [facility] [phone]  [facility] [phone]  [facility] [phone]  [facility] [phone]  [facility] [phone]  [facility] Breastfeeding Connection [phone]  -The following location provides outpatient lactation counseling with referral to certified lactation consultants if needed.  [facility] [phone]    [facility]  Outpatient lactation clinic [phone]  Phone consultation and outpatient clinic appointments at [facility] and [facility]    Adapated from [facility] Breastfeeding Services Resources 1/15/18  phrase here.    Legal Screener completed and there were no concerns for government benefits, housing, or immigration.  Safety/IPV reviewed.   Continues to have very flat affect-seeing BHT at [facility]-states going ok      Name of provider who requested the OB education: Dima  Name of provider on site (faculty or community preceptor) at the time of performing the OB education: Trina Meeks RN, OB CC

## 2023-09-07 ENCOUNTER — INFUSION THERAPY VISIT (OUTPATIENT)
Dept: INFUSION THERAPY | Facility: CLINIC | Age: 28
End: 2023-09-07
Payer: COMMERCIAL

## 2023-09-07 VITALS — TEMPERATURE: 97.4 F | SYSTOLIC BLOOD PRESSURE: 106 MMHG | DIASTOLIC BLOOD PRESSURE: 69 MMHG | HEART RATE: 83 BPM

## 2023-09-07 DIAGNOSIS — O21.0 HYPEREMESIS GRAVIDARUM: ICD-10-CM

## 2023-09-07 DIAGNOSIS — O09.90 SUPERVISION OF HIGH RISK PREGNANCY, ANTEPARTUM: Primary | ICD-10-CM

## 2023-09-07 PROCEDURE — 258N000003 HC RX IP 258 OP 636: Performed by: FAMILY MEDICINE

## 2023-09-07 PROCEDURE — 250N000011 HC RX IP 250 OP 636: Mod: JZ | Performed by: FAMILY MEDICINE

## 2023-09-07 PROCEDURE — 96361 HYDRATE IV INFUSION ADD-ON: CPT

## 2023-09-07 PROCEDURE — 96374 THER/PROPH/DIAG INJ IV PUSH: CPT

## 2023-09-07 RX ORDER — HEPARIN SODIUM,PORCINE 10 UNIT/ML
5 VIAL (ML) INTRAVENOUS
Status: CANCELLED | OUTPATIENT
Start: 2023-09-07

## 2023-09-07 RX ORDER — ONDANSETRON 2 MG/ML
4 INJECTION INTRAMUSCULAR; INTRAVENOUS ONCE
Status: CANCELLED | OUTPATIENT
Start: 2023-09-07 | End: 2023-09-07

## 2023-09-07 RX ORDER — HEPARIN SODIUM (PORCINE) LOCK FLUSH IV SOLN 100 UNIT/ML 100 UNIT/ML
5 SOLUTION INTRAVENOUS
Status: CANCELLED | OUTPATIENT
Start: 2023-09-07

## 2023-09-07 RX ORDER — ONDANSETRON 2 MG/ML
4 INJECTION INTRAMUSCULAR; INTRAVENOUS ONCE
Status: COMPLETED | OUTPATIENT
Start: 2023-09-07 | End: 2023-09-07

## 2023-09-07 RX ADMIN — SODIUM CHLORIDE, POTASSIUM CHLORIDE, SODIUM LACTATE AND CALCIUM CHLORIDE 1000 ML: 600; 310; 30; 20 INJECTION, SOLUTION INTRAVENOUS at 13:22

## 2023-09-07 RX ADMIN — ONDANSETRON 4 MG: 2 INJECTION INTRAMUSCULAR; INTRAVENOUS at 13:24

## 2023-09-07 ASSESSMENT — PAIN SCALES - GENERAL: PAINLEVEL: NO PAIN (0)

## 2023-09-07 NOTE — PROGRESS NOTES
Infusion Nursing Note:  Jaqueline Argueta presents today for IVF, zofran.    Patient seen by provider today: No   present during visit today: Not Applicable.    Note: N/A.      Intravenous Access:  Peripheral IV placed.    Treatment Conditions:  Not Applicable.      Post Infusion Assessment:  Patient tolerated infusion without incident.  Blood return noted pre and post infusion.  Site patent and intact, free from redness, edema or discomfort.  No evidence of extravasations.  Access discontinued per protocol.       Discharge Plan:   Patient discharged in stable condition accompanied by: self.  Departure Mode: Ambulatory.  RTC 9/11/23.      Rajani Root

## 2023-09-11 ENCOUNTER — INFUSION THERAPY VISIT (OUTPATIENT)
Dept: INFUSION THERAPY | Facility: CLINIC | Age: 28
End: 2023-09-11
Payer: COMMERCIAL

## 2023-09-11 VITALS
DIASTOLIC BLOOD PRESSURE: 66 MMHG | TEMPERATURE: 98 F | RESPIRATION RATE: 16 BRPM | SYSTOLIC BLOOD PRESSURE: 104 MMHG | HEART RATE: 94 BPM | OXYGEN SATURATION: 98 %

## 2023-09-11 DIAGNOSIS — O09.90 SUPERVISION OF HIGH RISK PREGNANCY, ANTEPARTUM: Primary | ICD-10-CM

## 2023-09-11 DIAGNOSIS — O21.0 HYPEREMESIS GRAVIDARUM: ICD-10-CM

## 2023-09-11 PROCEDURE — 258N000003 HC RX IP 258 OP 636: Performed by: FAMILY MEDICINE

## 2023-09-11 PROCEDURE — 96361 HYDRATE IV INFUSION ADD-ON: CPT

## 2023-09-11 PROCEDURE — 250N000011 HC RX IP 250 OP 636: Mod: JZ | Performed by: FAMILY MEDICINE

## 2023-09-11 PROCEDURE — 96374 THER/PROPH/DIAG INJ IV PUSH: CPT

## 2023-09-11 RX ORDER — HEPARIN SODIUM,PORCINE 10 UNIT/ML
5 VIAL (ML) INTRAVENOUS
Status: CANCELLED | OUTPATIENT
Start: 2023-09-11

## 2023-09-11 RX ORDER — ONDANSETRON 2 MG/ML
4 INJECTION INTRAMUSCULAR; INTRAVENOUS ONCE
Status: CANCELLED | OUTPATIENT
Start: 2023-09-11 | End: 2023-09-11

## 2023-09-11 RX ORDER — ONDANSETRON 2 MG/ML
4 INJECTION INTRAMUSCULAR; INTRAVENOUS ONCE
Status: COMPLETED | OUTPATIENT
Start: 2023-09-11 | End: 2023-09-11

## 2023-09-11 RX ORDER — HEPARIN SODIUM (PORCINE) LOCK FLUSH IV SOLN 100 UNIT/ML 100 UNIT/ML
5 SOLUTION INTRAVENOUS
Status: CANCELLED | OUTPATIENT
Start: 2023-09-11

## 2023-09-11 RX ADMIN — SODIUM CHLORIDE, POTASSIUM CHLORIDE, SODIUM LACTATE AND CALCIUM CHLORIDE 1000 ML: 600; 310; 30; 20 INJECTION, SOLUTION INTRAVENOUS at 13:22

## 2023-09-11 RX ADMIN — ONDANSETRON 4 MG: 2 INJECTION INTRAMUSCULAR; INTRAVENOUS at 13:25

## 2023-09-11 ASSESSMENT — PAIN SCALES - GENERAL: PAINLEVEL: NO PAIN (0)

## 2023-09-11 NOTE — PROGRESS NOTES
Infusion Nursing Note:  Jaqueline Argueta presents today for fluids and zofran.    Patient seen by provider today: No   present during visit today: Not Applicable.    Note: Patient continues to have nausea, denies vomiting today.  Using hydroxyzine and compazine with good results..      Intravenous Access:  Peripheral IV placed.    Treatment Conditions:  Not Applicable.      Post Infusion Assessment:  Patient tolerated infusion without incident.  Blood return noted pre and post infusion.  Site patent and intact, free from redness, edema or discomfort.  No evidence of extravasations.  Access discontinued per protocol.       Discharge Plan:   Patient discharged in stable condition accompanied by: self.  Departure Mode: Ambulatory.  Will return to clinic on Wednesday.    Georgiana Hendrix RN

## 2023-09-13 ENCOUNTER — INFUSION THERAPY VISIT (OUTPATIENT)
Dept: INFUSION THERAPY | Facility: CLINIC | Age: 28
End: 2023-09-13
Attending: OBSTETRICS & GYNECOLOGY
Payer: COMMERCIAL

## 2023-09-13 VITALS — HEART RATE: 82 BPM | OXYGEN SATURATION: 99 % | SYSTOLIC BLOOD PRESSURE: 109 MMHG | DIASTOLIC BLOOD PRESSURE: 73 MMHG

## 2023-09-13 DIAGNOSIS — O09.90 SUPERVISION OF HIGH RISK PREGNANCY, ANTEPARTUM: Primary | ICD-10-CM

## 2023-09-13 DIAGNOSIS — O21.0 HYPEREMESIS GRAVIDARUM: ICD-10-CM

## 2023-09-13 PROCEDURE — 96361 HYDRATE IV INFUSION ADD-ON: CPT

## 2023-09-13 PROCEDURE — 96374 THER/PROPH/DIAG INJ IV PUSH: CPT

## 2023-09-13 PROCEDURE — 250N000011 HC RX IP 250 OP 636: Mod: JZ | Performed by: FAMILY MEDICINE

## 2023-09-13 PROCEDURE — 258N000003 HC RX IP 258 OP 636: Performed by: FAMILY MEDICINE

## 2023-09-13 RX ORDER — HEPARIN SODIUM (PORCINE) LOCK FLUSH IV SOLN 100 UNIT/ML 100 UNIT/ML
5 SOLUTION INTRAVENOUS
Status: CANCELLED | OUTPATIENT
Start: 2023-09-13

## 2023-09-13 RX ORDER — HEPARIN SODIUM,PORCINE 10 UNIT/ML
5 VIAL (ML) INTRAVENOUS
Status: CANCELLED | OUTPATIENT
Start: 2023-09-13

## 2023-09-13 RX ORDER — ONDANSETRON 2 MG/ML
4 INJECTION INTRAMUSCULAR; INTRAVENOUS ONCE
Status: COMPLETED | OUTPATIENT
Start: 2023-09-13 | End: 2023-09-13

## 2023-09-13 RX ORDER — ONDANSETRON 2 MG/ML
4 INJECTION INTRAMUSCULAR; INTRAVENOUS ONCE
Status: CANCELLED | OUTPATIENT
Start: 2023-09-13 | End: 2023-09-13

## 2023-09-13 RX ADMIN — ONDANSETRON 4 MG: 2 INJECTION INTRAMUSCULAR; INTRAVENOUS at 09:57

## 2023-09-13 RX ADMIN — SODIUM CHLORIDE, POTASSIUM CHLORIDE, SODIUM LACTATE AND CALCIUM CHLORIDE 1000 ML: 600; 310; 30; 20 INJECTION, SOLUTION INTRAVENOUS at 09:56

## 2023-09-13 NOTE — PROGRESS NOTES
Infusion Nursing Note:  Jaqueline Argueta presents today for IV fluids and zofran.    Patient seen by provider today: No   present during visit today: Not Applicable.    Note: No new issues.      Intravenous Access:  Peripheral IV placed.    Treatment Conditions:  Not Applicable.      Post Infusion Assessment:  Patient tolerated infusion without incident.  Site patent and intact, free from redness, edema or discomfort.  No evidence of extravasations.  Access discontinued per protocol.       Discharge Plan:   Discharge instructions reviewed with: Patient.  Patient and/or family verbalized understanding of discharge instructions and all questions answered.  Patient discharged in stable condition accompanied by: self.  Departure Mode: Ambulatory.    Pt to return on Friday.       Sunita Peterson RN,

## 2023-09-14 ENCOUNTER — TELEPHONE (OUTPATIENT)
Dept: FAMILY MEDICINE | Facility: CLINIC | Age: 28
End: 2023-09-14
Payer: COMMERCIAL

## 2023-09-14 DIAGNOSIS — O21.0 HYPEREMESIS GRAVIDARUM: Primary | ICD-10-CM

## 2023-09-14 DIAGNOSIS — F12.90 LONG TERM CURRENT USE OF CANNABIS: ICD-10-CM

## 2023-09-14 NOTE — TELEPHONE ENCOUNTER
LVM regarding importance of Pt rescheduling her MFM apts-none seen on her apt list. Asked for her to call clinic back.  Will follow up next week if no return call.  Mayelin QURESHI, EDGARDM, OB CC

## 2023-09-18 ENCOUNTER — INFUSION THERAPY VISIT (OUTPATIENT)
Dept: INFUSION THERAPY | Facility: CLINIC | Age: 28
End: 2023-09-18
Payer: COMMERCIAL

## 2023-09-18 VITALS
RESPIRATION RATE: 16 BRPM | HEART RATE: 105 BPM | OXYGEN SATURATION: 98 % | DIASTOLIC BLOOD PRESSURE: 74 MMHG | SYSTOLIC BLOOD PRESSURE: 110 MMHG

## 2023-09-18 DIAGNOSIS — O21.0 HYPEREMESIS GRAVIDARUM: ICD-10-CM

## 2023-09-18 DIAGNOSIS — O09.90 SUPERVISION OF HIGH RISK PREGNANCY, ANTEPARTUM: Primary | ICD-10-CM

## 2023-09-18 PROCEDURE — 258N000003 HC RX IP 258 OP 636: Performed by: FAMILY MEDICINE

## 2023-09-18 PROCEDURE — 96374 THER/PROPH/DIAG INJ IV PUSH: CPT

## 2023-09-18 PROCEDURE — 250N000011 HC RX IP 250 OP 636: Mod: JZ | Performed by: FAMILY MEDICINE

## 2023-09-18 RX ORDER — HEPARIN SODIUM,PORCINE 10 UNIT/ML
5 VIAL (ML) INTRAVENOUS
Status: CANCELLED | OUTPATIENT
Start: 2023-09-18

## 2023-09-18 RX ORDER — ONDANSETRON 2 MG/ML
4 INJECTION INTRAMUSCULAR; INTRAVENOUS ONCE
Status: COMPLETED | OUTPATIENT
Start: 2023-09-18 | End: 2023-09-18

## 2023-09-18 RX ORDER — HEPARIN SODIUM (PORCINE) LOCK FLUSH IV SOLN 100 UNIT/ML 100 UNIT/ML
5 SOLUTION INTRAVENOUS
Status: CANCELLED | OUTPATIENT
Start: 2023-09-18

## 2023-09-18 RX ORDER — ONDANSETRON 2 MG/ML
4 INJECTION INTRAMUSCULAR; INTRAVENOUS ONCE
Status: CANCELLED | OUTPATIENT
Start: 2023-09-18 | End: 2023-09-18

## 2023-09-18 RX ADMIN — SODIUM CHLORIDE, POTASSIUM CHLORIDE, SODIUM LACTATE AND CALCIUM CHLORIDE 1000 ML: 600; 310; 30; 20 INJECTION, SOLUTION INTRAVENOUS at 13:14

## 2023-09-18 RX ADMIN — ONDANSETRON 4 MG: 2 INJECTION INTRAMUSCULAR; INTRAVENOUS at 13:13

## 2023-09-18 NOTE — PROGRESS NOTES
Infusion Nursing Note:  Jaqueline Argueta presents today for IVF and zofran administration.    Patient seen by provider today: No   present during visit today: Not Applicable.    Note: Patient states that she had some difficulty this past weekend with nausea and vomiting.      Intravenous Access:  Peripheral IV placed.    Treatment Conditions:  Not Applicable.      Post Infusion Assessment:  Patient tolerated infusion without incident.  Blood return noted pre and post infusion.  Site patent and intact, free from redness, edema or discomfort.  No evidence of extravasations.  Access discontinued per protocol.       Discharge Plan:   Discharge instructions reviewed with: Patient.  Patient and/or family verbalized understanding of discharge instructions and all questions answered.  Patient discharged in stable condition accompanied by: self.  Departure Mode: Ambulatory.      Cassandra Metzger RN

## 2023-09-20 ENCOUNTER — OFFICE VISIT (OUTPATIENT)
Dept: PSYCHOLOGY | Facility: CLINIC | Age: 28
End: 2023-09-20
Payer: COMMERCIAL

## 2023-09-20 ENCOUNTER — TELEPHONE (OUTPATIENT)
Dept: FAMILY MEDICINE | Facility: CLINIC | Age: 28
End: 2023-09-20
Payer: COMMERCIAL

## 2023-09-20 ENCOUNTER — INFUSION THERAPY VISIT (OUTPATIENT)
Dept: INFUSION THERAPY | Facility: CLINIC | Age: 28
End: 2023-09-20
Payer: COMMERCIAL

## 2023-09-20 VITALS
DIASTOLIC BLOOD PRESSURE: 70 MMHG | HEART RATE: 91 BPM | OXYGEN SATURATION: 97 % | SYSTOLIC BLOOD PRESSURE: 108 MMHG | RESPIRATION RATE: 16 BRPM

## 2023-09-20 DIAGNOSIS — O09.90 SUPERVISION OF HIGH RISK PREGNANCY, ANTEPARTUM: Primary | ICD-10-CM

## 2023-09-20 DIAGNOSIS — O21.0 HYPEREMESIS GRAVIDARUM: ICD-10-CM

## 2023-09-20 DIAGNOSIS — F10.91 ALCOHOL USE DISORDER IN REMISSION: ICD-10-CM

## 2023-09-20 DIAGNOSIS — F33.1 MODERATE EPISODE OF RECURRENT MAJOR DEPRESSIVE DISORDER (H): Primary | ICD-10-CM

## 2023-09-20 PROCEDURE — 258N000003 HC RX IP 258 OP 636: Performed by: FAMILY MEDICINE

## 2023-09-20 PROCEDURE — 250N000011 HC RX IP 250 OP 636: Mod: JZ | Performed by: FAMILY MEDICINE

## 2023-09-20 PROCEDURE — 90834 PSYTX W PT 45 MINUTES: CPT | Performed by: PSYCHOLOGIST

## 2023-09-20 PROCEDURE — 96361 HYDRATE IV INFUSION ADD-ON: CPT

## 2023-09-20 PROCEDURE — 96374 THER/PROPH/DIAG INJ IV PUSH: CPT

## 2023-09-20 RX ORDER — ONDANSETRON 2 MG/ML
4 INJECTION INTRAMUSCULAR; INTRAVENOUS ONCE
Status: COMPLETED | OUTPATIENT
Start: 2023-09-20 | End: 2023-09-20

## 2023-09-20 RX ORDER — ONDANSETRON 2 MG/ML
4 INJECTION INTRAMUSCULAR; INTRAVENOUS ONCE
Status: CANCELLED | OUTPATIENT
Start: 2023-09-20 | End: 2023-09-20

## 2023-09-20 RX ORDER — HEPARIN SODIUM (PORCINE) LOCK FLUSH IV SOLN 100 UNIT/ML 100 UNIT/ML
5 SOLUTION INTRAVENOUS
Status: CANCELLED | OUTPATIENT
Start: 2023-09-20

## 2023-09-20 RX ORDER — HEPARIN SODIUM,PORCINE 10 UNIT/ML
5 VIAL (ML) INTRAVENOUS
Status: CANCELLED | OUTPATIENT
Start: 2023-09-20

## 2023-09-20 RX ADMIN — SODIUM CHLORIDE, POTASSIUM CHLORIDE, SODIUM LACTATE AND CALCIUM CHLORIDE 1000 ML: 600; 310; 30; 20 INJECTION, SOLUTION INTRAVENOUS at 13:27

## 2023-09-20 RX ADMIN — ONDANSETRON 4 MG: 2 INJECTION INTRAMUSCULAR; INTRAVENOUS at 13:30

## 2023-09-20 NOTE — PROGRESS NOTES
Behavioral Health Progress Note    Client's Legal Name: Jaqueline Argueta    Client's Preferred Name: Jaqueline  YOB: 1995  Type of Service: in-person  Length of Service:   Start time: 3:20    End time: 4:00  Duration: 40 minutes  Attendees: patient    Identifying Information and Presenting Problem:  Jaqueline is a 28 year old female who is being seen for problematic symptoms of significant depression.    Treatment Objective(s) Addressed in This Session:  Will complete treatment plan at next visit.     Progress on / Status of Treatment Objective(s) / Homework:  N/a - need to complete treatment plan    Mental Health Screening Questionnaires:  PHQ-9:       12/7/2021     1:46 PM 6/16/2023     4:06 PM 7/11/2023    10:00 AM   PHQ   PHQ-9 Total Score 16 20 10   Q9: Thoughts of better off dead/self-harm past 2 weeks Not at all Not at all Not at all      SHERIDAN-7:       11/24/2021    10:00 AM   SHERIDAN-7 SCORE   Total Score 15     Topics Discussed/Interventions Provided:  Jaqueline stated that she has not been feeling physically well, mood and anxiety symptoms continue to make daily functioning more difficult. Today's session focused on providing supportive therapy, as well as sharing strategies to address anxiety symptoms.  Completed the 5 senses grounding technique in session. Discussed rationale for using this coping strategy.  Explored stressors she is currently experiencing, particularly as it relates to caring for her niece.  Engaged in problem-solving around some of the situations she has been experiencing with her niece. Normalized and validated her emotions.     Assessment:   The patient appeared to be active and engaged in today's session and was receptive to feedback.     Mental Status Exam:  During interaction with the provider today, Jaqueline was hesitant and respectful. Patient was generally alert and oriented to person, place, time, and situation. They were casually dressed and appropriately  groomed. Patient's attire was appropriate for the weather and occasion. Eye contact adequate. Psychomotor functioning: normal or unremarkable. Speech was hesitant and soft; largely coherent and relevant to topic. Mood was depressed and anxious; affect appropriate and constricted. Thought processes: logical and coherent. Thought content: no evidence of psychotic features and no evidence of suicide, homicide, or nonsuicidal self-injury related concerns. Memory appeared grossly intact without being formally evaluated. Insight: adequate. Judgment good. Patient exhibited good impulse control during the appointment.     Does the patient appear to be at imminent risk of harm to self/others at this time? No    The session was necessary to address symptoms of anxiety and low mood that have been interfering with patient's ability to function in areas related to parenting, social relationships, intimate partner relationships, maintaining personal health and physical well-being, day to day self care or health behaviors, and participating in meaningful activities. Ongoing psychotherapy is necessary to stabilize mood, improve functioning with daily activities, and provide support.    DSM-5-TR Diagnosis:  Major Depressive Disorder Substance Use Disorder  Alcohol Use Disorder in remission    Plan:  1. Follow up with this provider 10/4  2. [place]out on [address] exercise in avs  3. Utilize crisis resources as needed.  4. After Visit Summary was printed for patient    uAra Linares PsyD, SONIDO    NOTE: Treatment plan update due next visit.  Diagnostic assessment update due 8/16/24.

## 2023-09-20 NOTE — PROGRESS NOTES
Infusion Nursing Note:  Jaqueline Argueta presents today for Fluids.    Patient seen by provider today: No   present during visit today: Not Applicable.    Note: N/A.      Intravenous Access:  Peripheral IV placed.    Treatment Conditions:  Not Applicable.      Post Infusion Assessment:  Patient tolerated infusion without incident.  No evidence of extravasations.  Access discontinued per protocol.       Discharge Plan:   Patient and/or family verbalized understanding of discharge instructions and all questions answered.  Patient discharged in stable condition accompanied by: self.      ANEESH HINES RN

## 2023-09-22 ENCOUNTER — INFUSION THERAPY VISIT (OUTPATIENT)
Dept: INFUSION THERAPY | Facility: CLINIC | Age: 28
End: 2023-09-22
Payer: COMMERCIAL

## 2023-09-22 VITALS
RESPIRATION RATE: 16 BRPM | TEMPERATURE: 98.1 F | OXYGEN SATURATION: 99 % | HEART RATE: 88 BPM | SYSTOLIC BLOOD PRESSURE: 113 MMHG | DIASTOLIC BLOOD PRESSURE: 73 MMHG

## 2023-09-22 DIAGNOSIS — O21.0 HYPEREMESIS GRAVIDARUM: ICD-10-CM

## 2023-09-22 DIAGNOSIS — O09.90 SUPERVISION OF HIGH RISK PREGNANCY, ANTEPARTUM: Primary | ICD-10-CM

## 2023-09-22 PROCEDURE — 258N000003 HC RX IP 258 OP 636: Performed by: FAMILY MEDICINE

## 2023-09-22 PROCEDURE — 250N000011 HC RX IP 250 OP 636: Mod: JZ | Performed by: FAMILY MEDICINE

## 2023-09-22 PROCEDURE — 96361 HYDRATE IV INFUSION ADD-ON: CPT

## 2023-09-22 PROCEDURE — 96374 THER/PROPH/DIAG INJ IV PUSH: CPT

## 2023-09-22 RX ORDER — ONDANSETRON 2 MG/ML
4 INJECTION INTRAMUSCULAR; INTRAVENOUS ONCE
Status: COMPLETED | OUTPATIENT
Start: 2023-09-22 | End: 2023-09-22

## 2023-09-22 RX ORDER — HEPARIN SODIUM,PORCINE 10 UNIT/ML
5 VIAL (ML) INTRAVENOUS
Status: CANCELLED | OUTPATIENT
Start: 2023-09-22

## 2023-09-22 RX ORDER — ONDANSETRON 2 MG/ML
4 INJECTION INTRAMUSCULAR; INTRAVENOUS ONCE
Status: CANCELLED | OUTPATIENT
Start: 2023-09-22 | End: 2023-09-22

## 2023-09-22 RX ORDER — HEPARIN SODIUM (PORCINE) LOCK FLUSH IV SOLN 100 UNIT/ML 100 UNIT/ML
5 SOLUTION INTRAVENOUS
Status: CANCELLED | OUTPATIENT
Start: 2023-09-22

## 2023-09-22 RX ADMIN — ONDANSETRON 4 MG: 2 INJECTION INTRAMUSCULAR; INTRAVENOUS at 12:17

## 2023-09-22 RX ADMIN — SODIUM CHLORIDE, POTASSIUM CHLORIDE, SODIUM LACTATE AND CALCIUM CHLORIDE 1000 ML: 600; 310; 30; 20 INJECTION, SOLUTION INTRAVENOUS at 12:17

## 2023-09-22 ASSESSMENT — PAIN SCALES - GENERAL: PAINLEVEL: NO PAIN (0)

## 2023-09-22 NOTE — PROGRESS NOTES
Infusion Nursing Note:  Jaqueline Argueta presents today for fluids and zofran.    Patient seen by provider today: No   present during visit today: Not Applicable.    Note: Patient states she continues to have nausea.      Intravenous Access:  Peripheral IV placed.    Treatment Conditions:  Not Applicable.      Post Infusion Assessment:  Patient tolerated infusion without incident.  Blood return noted pre and post infusion.  Site patent and intact, free from redness, edema or discomfort.  No evidence of extravasations.  Access discontinued per protocol.       Discharge Plan:   Patient discharged in stable condition accompanied by: self.  Departure Mode: Ambulatory.  Will return to clinic on Monday.    Georgiana Hendrix RN

## 2023-09-25 ENCOUNTER — OFFICE VISIT (OUTPATIENT)
Dept: FAMILY MEDICINE | Facility: CLINIC | Age: 28
End: 2023-09-25
Payer: COMMERCIAL

## 2023-09-25 ENCOUNTER — INFUSION THERAPY VISIT (OUTPATIENT)
Dept: INFUSION THERAPY | Facility: CLINIC | Age: 28
End: 2023-09-25
Payer: COMMERCIAL

## 2023-09-25 VITALS
WEIGHT: 138 LBS | HEIGHT: 61 IN | DIASTOLIC BLOOD PRESSURE: 74 MMHG | OXYGEN SATURATION: 97 % | RESPIRATION RATE: 16 BRPM | HEART RATE: 89 BPM | TEMPERATURE: 98.2 F | BODY MASS INDEX: 26.06 KG/M2 | SYSTOLIC BLOOD PRESSURE: 112 MMHG

## 2023-09-25 VITALS
SYSTOLIC BLOOD PRESSURE: 107 MMHG | RESPIRATION RATE: 18 BRPM | HEART RATE: 94 BPM | TEMPERATURE: 98 F | OXYGEN SATURATION: 98 % | DIASTOLIC BLOOD PRESSURE: 73 MMHG

## 2023-09-25 DIAGNOSIS — Z28.39 RUBELLA NON-IMMUNE STATUS, ANTEPARTUM: ICD-10-CM

## 2023-09-25 DIAGNOSIS — O09.90 SUPERVISION OF HIGH RISK PREGNANCY, ANTEPARTUM: Primary | ICD-10-CM

## 2023-09-25 DIAGNOSIS — L73.9 FOLLICULITIS: ICD-10-CM

## 2023-09-25 DIAGNOSIS — O21.0 HYPEREMESIS GRAVIDARUM: ICD-10-CM

## 2023-09-25 DIAGNOSIS — O09.899 RUBELLA NON-IMMUNE STATUS, ANTEPARTUM: ICD-10-CM

## 2023-09-25 DIAGNOSIS — F32.A DEPRESSION COMPLICATING PREGNANCY, ANTEPARTUM, THIRD TRIMESTER: ICD-10-CM

## 2023-09-25 DIAGNOSIS — O99.343 DEPRESSION COMPLICATING PREGNANCY, ANTEPARTUM, THIRD TRIMESTER: ICD-10-CM

## 2023-09-25 PROCEDURE — 96374 THER/PROPH/DIAG INJ IV PUSH: CPT

## 2023-09-25 PROCEDURE — 90471 IMMUNIZATION ADMIN: CPT

## 2023-09-25 PROCEDURE — 90715 TDAP VACCINE 7 YRS/> IM: CPT

## 2023-09-25 PROCEDURE — 96361 HYDRATE IV INFUSION ADD-ON: CPT

## 2023-09-25 PROCEDURE — 250N000011 HC RX IP 250 OP 636: Mod: JZ | Performed by: FAMILY MEDICINE

## 2023-09-25 PROCEDURE — 99207 PR PRENATAL VISIT: CPT | Mod: 25

## 2023-09-25 PROCEDURE — 258N000003 HC RX IP 258 OP 636: Performed by: FAMILY MEDICINE

## 2023-09-25 RX ORDER — METRONIDAZOLE 7.5 MG/G
GEL VAGINAL
COMMUNITY
Start: 2023-01-18 | End: 2023-09-25

## 2023-09-25 RX ORDER — ONDANSETRON 2 MG/ML
4 INJECTION INTRAMUSCULAR; INTRAVENOUS ONCE
Status: CANCELLED | OUTPATIENT
Start: 2023-09-25 | End: 2023-09-25

## 2023-09-25 RX ORDER — HEPARIN SODIUM,PORCINE 10 UNIT/ML
5 VIAL (ML) INTRAVENOUS
Status: CANCELLED | OUTPATIENT
Start: 2023-09-25

## 2023-09-25 RX ORDER — FLUCONAZOLE 150 MG/1
TABLET ORAL
COMMUNITY
Start: 2023-01-30 | End: 2023-09-25

## 2023-09-25 RX ORDER — CLINDAMYCIN PHOSPHATE 10 MG/G
GEL TOPICAL 2 TIMES DAILY
Qty: 75 ML | Refills: 0 | Status: SHIPPED | OUTPATIENT
Start: 2023-09-25 | End: 2023-12-04

## 2023-09-25 RX ORDER — ONDANSETRON 2 MG/ML
4 INJECTION INTRAMUSCULAR; INTRAVENOUS ONCE
Status: COMPLETED | OUTPATIENT
Start: 2023-09-25 | End: 2023-09-25

## 2023-09-25 RX ORDER — HEPARIN SODIUM (PORCINE) LOCK FLUSH IV SOLN 100 UNIT/ML 100 UNIT/ML
5 SOLUTION INTRAVENOUS
Status: CANCELLED | OUTPATIENT
Start: 2023-09-25

## 2023-09-25 RX ADMIN — ONDANSETRON 4 MG: 2 INJECTION INTRAMUSCULAR; INTRAVENOUS at 12:13

## 2023-09-25 RX ADMIN — SODIUM CHLORIDE, POTASSIUM CHLORIDE, SODIUM LACTATE AND CALCIUM CHLORIDE 1000 ML: 600; 310; 30; 20 INJECTION, SOLUTION INTRAVENOUS at 12:13

## 2023-09-25 ASSESSMENT — PAIN SCALES - GENERAL
PAINLEVEL: NO PAIN (0)
PAINLEVEL: NO PAIN (0)

## 2023-09-25 NOTE — PROGRESS NOTES
Preceptor Attestation:   Patient seen, evaluated and discussed with the resident. I have verified the content of the note, which accurately reflects my assessment of the patient and the plan of care.   Supervising Physician:  Avtar Mathews MD

## 2023-09-25 NOTE — PATIENT INSTRUCTIONS
GROWTH US TOMORROW  WE WILL SEE YOU AGAIN 10/3   APPOINTMENT WITH DR NAPOLES 10/4     TDAP VACCINE TODAY   CLINDAMYCIN GEL     KEEP DOING INFUSIONS     Thank you for coming to [facility]!  - If you had lab testing today and your results are normal they will be be mailed to you or sent to your MyChart within seven days.   - If the lab tests need quick action we will call you with the results.  - The phone number we will call with results is # [phone] (home) . If this is not the best number please call our clinic and change the number.  - If any referrals were made to [facility] and you don't get a call, call central scheduling [phone]  - If you need any refills please call your pharmacy and they will contact us.  - If you had an XRay/CT/Ultrasound/MRI ordered the number is [phone] to schedule or change your appointment  - If you have any concerns about today's visit or wish to schedule another appointment please call our office [phone] (8-5:00 M-F)  - If you have urgent medical concerns call [phone] at any time of the day.  - If you have a medical emergency please call [phone].    Because you are pregnant, we have additional resources for you:  - You may call and ask to speak with one of our clinic nurses at [phone] during normal business hours, for non-urgent questions about your pregnancy.  - Immediate OB help is also available 24 hours a day, seven days a week via the [facility] - [phone]  located at [address] [place] [id number]    Prenatal Timeline:  Before 14 weeks: dating ultrasound       This ultrasound helps us determine your dates accurately.  15 - 18 weeks: Optional genetic testing (quad screen)       This testing helps understand your baby's risk for some genetic abnormalities.  22 - 24 weeks: Ultrasound (fetal anatomy survey)       This testing will look for early growth  abnormalities, and might tell the baby's sex.  24 - 28 weeks: One hour sugar test (GCT)        This test helps identify diabetes of pregnancy.  27 - 36 weeks: Tetanus shot (Tdap)       This shot helps protect you and your baby from tetanus and whooping cough.  36 weeks and later: Group B Strep test (GBS)       This test helps predict if you need antibiotics in labor to prevent infection in your baby.  Anytime September to April: flu shot       This shot helps protect you and your family from the flu.  This is especially important during pregnancy!  Once every trimester: blood iron test (hemoglobin)       This test helps us know if you will need extra iron to support your baby.  At any time during or after your pregnancy: Some women experience baby blues.  We can help you with: Feeling anxious, Overwhelmed or sad, Trouble sleeping, Crying uncontrollably, Trouble caring for yourself or baby.    How frequently should you see your provider?  < 28 weeks: every 4 weeks  28-36 weeks: every 2 weeks  >36 weeks: every week    Call your healthcare provider immediately if you experience any of the following symptoms:  Bleeding from nipples, rectum, bladder, or coughing up blood  Vaginal bleeding, no matter how slight (unless small amount after a pelvic exam)  Swelling of hands or face  Dimness or blurring of vision  Severe or continuous headaches  Abdominal pains or contractions that do not go away with heat and rest or a bowel movement  Chills or fever over 100.4  Persistent vomiting  Painful or burning urination  Decrease in fetal movement  Sudden or slow escape of fluid from the vagina

## 2023-09-25 NOTE — PROGRESS NOTES
Infusion Nursing Note:  Jaqueline Argueta presents today for IVF and zofran.    Patient seen by provider today: No   present during visit today: Not Applicable.    Note: Patient states that she has been doing well.       Intravenous Access:  Peripheral IV placed.    Treatment Conditions:  Not Applicable.      Post Infusion Assessment:  Patient tolerated infusion without incident.  Blood return noted pre and post infusion.  Site patent and intact, free from redness, edema or discomfort.  No evidence of extravasations.  Access discontinued per protocol.       Discharge Plan:   Discharge instructions reviewed with: Patient.  Patient and/or family verbalized understanding of discharge instructions and all questions answered.  Patient discharged in stable condition accompanied by: self.  Departure Mode: Ambulatory.      Cassandra Metzger RN

## 2023-09-26 ENCOUNTER — OFFICE VISIT (OUTPATIENT)
Dept: MATERNAL FETAL MEDICINE | Facility: CLINIC | Age: 28
End: 2023-09-26
Attending: OBSTETRICS & GYNECOLOGY
Payer: COMMERCIAL

## 2023-09-26 ENCOUNTER — HOSPITAL ENCOUNTER (OUTPATIENT)
Dept: ULTRASOUND IMAGING | Facility: CLINIC | Age: 28
Discharge: HOME | End: 2023-09-26
Attending: OBSTETRICS & GYNECOLOGY
Payer: COMMERCIAL

## 2023-09-26 DIAGNOSIS — O21.0 HYPEREMESIS GRAVIDARUM: ICD-10-CM

## 2023-09-26 DIAGNOSIS — O99.342 DEPRESSION AFFECTING PREGNANCY IN SECOND TRIMESTER, ANTEPARTUM: ICD-10-CM

## 2023-09-26 DIAGNOSIS — F32.A DEPRESSION AFFECTING PREGNANCY IN SECOND TRIMESTER, ANTEPARTUM: ICD-10-CM

## 2023-09-26 DIAGNOSIS — O21.9 NAUSEA AND VOMITING DURING PREGNANCY: Primary | ICD-10-CM

## 2023-09-26 DIAGNOSIS — F12.90 LONG TERM CURRENT USE OF CANNABIS: ICD-10-CM

## 2023-09-26 PROCEDURE — 76816 OB US FOLLOW-UP PER FETUS: CPT

## 2023-09-26 PROCEDURE — 76816 OB US FOLLOW-UP PER FETUS: CPT | Mod: 26 | Performed by: OBSTETRICS & GYNECOLOGY

## 2023-09-26 NOTE — NURSING NOTE
Patient presents to [facility] for RL2 at 31w1d due to severe hyperemesis with weight loss during pregnancy. Positive fetal movement. Denies LOF, vaginal bleeding or cramping/contractions. SBAR given to [facility] MD, see their note in Epic.

## 2023-09-26 NOTE — PROGRESS NOTES
Please see full imaging report from [employer] program under imaging tab.    Logan Daniel MD  Maternal Fetal Medicine

## 2023-09-27 ENCOUNTER — INFUSION THERAPY VISIT (OUTPATIENT)
Dept: INFUSION THERAPY | Facility: CLINIC | Age: 28
End: 2023-09-27
Payer: COMMERCIAL

## 2023-09-27 VITALS
OXYGEN SATURATION: 99 % | HEART RATE: 98 BPM | RESPIRATION RATE: 16 BRPM | SYSTOLIC BLOOD PRESSURE: 101 MMHG | DIASTOLIC BLOOD PRESSURE: 71 MMHG

## 2023-09-27 DIAGNOSIS — O21.0 HYPEREMESIS GRAVIDARUM: ICD-10-CM

## 2023-09-27 DIAGNOSIS — O09.90 SUPERVISION OF HIGH RISK PREGNANCY, ANTEPARTUM: Primary | ICD-10-CM

## 2023-09-27 PROCEDURE — 258N000003 HC RX IP 258 OP 636: Performed by: FAMILY MEDICINE

## 2023-09-27 PROCEDURE — 250N000011 HC RX IP 250 OP 636: Mod: JZ | Performed by: FAMILY MEDICINE

## 2023-09-27 PROCEDURE — 96374 THER/PROPH/DIAG INJ IV PUSH: CPT

## 2023-09-27 PROCEDURE — 96361 HYDRATE IV INFUSION ADD-ON: CPT

## 2023-09-27 RX ORDER — HEPARIN SODIUM,PORCINE 10 UNIT/ML
5 VIAL (ML) INTRAVENOUS
Status: CANCELLED | OUTPATIENT
Start: 2023-09-27

## 2023-09-27 RX ORDER — HEPARIN SODIUM (PORCINE) LOCK FLUSH IV SOLN 100 UNIT/ML 100 UNIT/ML
5 SOLUTION INTRAVENOUS
Status: CANCELLED | OUTPATIENT
Start: 2023-09-27

## 2023-09-27 RX ORDER — ONDANSETRON 2 MG/ML
4 INJECTION INTRAMUSCULAR; INTRAVENOUS ONCE
Status: COMPLETED | OUTPATIENT
Start: 2023-09-27 | End: 2023-09-27

## 2023-09-27 RX ORDER — ONDANSETRON 2 MG/ML
4 INJECTION INTRAMUSCULAR; INTRAVENOUS ONCE
Status: CANCELLED | OUTPATIENT
Start: 2023-09-27 | End: 2023-09-27

## 2023-09-27 RX ADMIN — ONDANSETRON 4 MG: 2 INJECTION INTRAMUSCULAR; INTRAVENOUS at 12:22

## 2023-09-27 RX ADMIN — SODIUM CHLORIDE, POTASSIUM CHLORIDE, SODIUM LACTATE AND CALCIUM CHLORIDE 1000 ML: 600; 310; 30; 20 INJECTION, SOLUTION INTRAVENOUS at 12:19

## 2023-09-29 ENCOUNTER — INFUSION THERAPY VISIT (OUTPATIENT)
Dept: INFUSION THERAPY | Facility: CLINIC | Age: 28
End: 2023-09-29
Payer: COMMERCIAL

## 2023-09-29 VITALS — DIASTOLIC BLOOD PRESSURE: 75 MMHG | OXYGEN SATURATION: 98 % | SYSTOLIC BLOOD PRESSURE: 109 MMHG | HEART RATE: 98 BPM

## 2023-09-29 DIAGNOSIS — O21.0 HYPEREMESIS GRAVIDARUM: ICD-10-CM

## 2023-09-29 DIAGNOSIS — O09.90 SUPERVISION OF HIGH RISK PREGNANCY, ANTEPARTUM: Primary | ICD-10-CM

## 2023-09-29 PROCEDURE — 96374 THER/PROPH/DIAG INJ IV PUSH: CPT

## 2023-09-29 PROCEDURE — 250N000011 HC RX IP 250 OP 636: Mod: JZ | Performed by: FAMILY MEDICINE

## 2023-09-29 PROCEDURE — 258N000003 HC RX IP 258 OP 636: Performed by: FAMILY MEDICINE

## 2023-09-29 PROCEDURE — 96361 HYDRATE IV INFUSION ADD-ON: CPT

## 2023-09-29 RX ORDER — ONDANSETRON 2 MG/ML
4 INJECTION INTRAMUSCULAR; INTRAVENOUS ONCE
Status: COMPLETED | OUTPATIENT
Start: 2023-09-29 | End: 2023-09-29

## 2023-09-29 RX ORDER — ONDANSETRON 2 MG/ML
4 INJECTION INTRAMUSCULAR; INTRAVENOUS ONCE
Status: CANCELLED | OUTPATIENT
Start: 2023-09-29 | End: 2023-09-29

## 2023-09-29 RX ORDER — HEPARIN SODIUM (PORCINE) LOCK FLUSH IV SOLN 100 UNIT/ML 100 UNIT/ML
5 SOLUTION INTRAVENOUS
Status: CANCELLED | OUTPATIENT
Start: 2023-09-29

## 2023-09-29 RX ORDER — HEPARIN SODIUM,PORCINE 10 UNIT/ML
5 VIAL (ML) INTRAVENOUS
Status: CANCELLED | OUTPATIENT
Start: 2023-09-29

## 2023-09-29 RX ADMIN — ONDANSETRON 4 MG: 2 INJECTION INTRAMUSCULAR; INTRAVENOUS at 12:15

## 2023-09-29 RX ADMIN — SODIUM CHLORIDE, POTASSIUM CHLORIDE, SODIUM LACTATE AND CALCIUM CHLORIDE 1000 ML: 600; 310; 30; 20 INJECTION, SOLUTION INTRAVENOUS at 12:15

## 2023-09-29 NOTE — PROGRESS NOTES
Infusion Nursing Note:  Jaqueline Argueta presents today for IV fluids.    Patient seen by provider today: No   present during visit today: Not Applicable.    Note: No new issues.      Intravenous Access:  Peripheral IV placed.    Treatment Conditions:  Not Applicable.      Post Infusion Assessment:  Patient tolerated infusion without incident.  Site patent and intact, free from redness, edema or discomfort.  No evidence of extravasations.  Access discontinued per protocol.       Discharge Plan:   Discharge instructions reviewed with: Patient.  Patient and/or family verbalized understanding of discharge instructions and all questions answered.  Patient discharged in stable condition accompanied by: self.  Departure Mode: Ambulatory.      Sunita Peterson RN

## 2023-10-03 ENCOUNTER — TELEPHONE (OUTPATIENT)
Dept: FAMILY MEDICINE | Facility: CLINIC | Age: 28
End: 2023-10-03

## 2023-10-03 NOTE — PROGRESS NOTES
"Dr. Worthy requested Pt be contacted-missed [facility] apt today.  Pt voiced she didn't realize she had an apt today.  Has apt w [facility] tomorrow-no schedule holes w OB providers.  Found spot on Monday afternoon w Dr. Worthy & Pt voiced she can make that.   Apt rescheduled for Monday at 340pm w Dr. Worthy.  Pt voiced \"I'm doing better.\" Affirmed that is good news!  Mayelin QURESHI, CNM, OB CC    "

## 2023-10-06 ENCOUNTER — TELEPHONE (OUTPATIENT)
Dept: DERMATOLOGY | Facility: CLINIC | Age: 28
End: 2023-10-06
Payer: COMMERCIAL

## 2023-10-06 NOTE — TELEPHONE ENCOUNTER
Left Voicemail (1st Attempt) and Sent Mychart (1st Attempt) for the patient to call back and schedule the following:        Appointment type: New  Provider: Marcie Do  Return date: 3/27/24  Specialty phone number: [phone]

## 2023-10-12 ENCOUNTER — TELEPHONE (OUTPATIENT)
Dept: FAMILY MEDICINE | Facility: CLINIC | Age: 28
End: 2023-10-12
Payer: COMMERCIAL

## 2023-10-12 NOTE — TELEPHONE ENCOUNTER
LVM regarding rescheduling missed E[facility] this week.  Asked to call OB CC back on direct line.  Mayelin QURESHI, RODOLFO, OB CC

## 2023-10-13 NOTE — TELEPHONE ENCOUNTER
Attempting calling back again after seeing Pt has not had IV infusions recently-was going 2-3x/week.  LVM to call OB CC back directly.   Will try again next week.  Mayelin QURESHI, EDGARDM, OB CC

## 2023-10-17 ENCOUNTER — TELEPHONE (OUTPATIENT)
Dept: FAMILY MEDICINE | Facility: CLINIC | Age: 28
End: 2023-10-17
Payer: COMMERCIAL

## 2023-10-17 NOTE — TELEPHONE ENCOUNTER
LVM to verify [facility] apt on 10/19 and to check in on IV infusions that haven't been happening.  Asked to call OB CC back and if she doesn't, we'll touch base Thursday.  Mayelin QURESHI, RODOLFO, OB CC

## 2023-10-17 NOTE — TELEPHONE ENCOUNTER
Called [facility] infusion center to see if they had heard from the Pt-no word. PRN order for IV fluid infusions.  Will continue to attempt connecting w PT.    Mayelin QURESHI, CNM, OB CC

## 2023-10-18 NOTE — TELEPHONE ENCOUNTER
LVM regarding apt reminder for tomorrow 10/19.  Asked to call back if unable to make it.  Maeylin QURESHI, CNM, OB CC

## 2023-10-19 ENCOUNTER — CARE COORDINATION (OUTPATIENT)
Dept: PSYCHOLOGY | Facility: CLINIC | Age: 28
End: 2023-10-19

## 2023-10-19 ENCOUNTER — OFFICE VISIT (OUTPATIENT)
Dept: FAMILY MEDICINE | Facility: CLINIC | Age: 28
End: 2023-10-19
Payer: COMMERCIAL

## 2023-10-19 VITALS
BODY MASS INDEX: 27.72 KG/M2 | RESPIRATION RATE: 16 BRPM | HEIGHT: 61 IN | WEIGHT: 146.8 LBS | HEART RATE: 123 BPM | SYSTOLIC BLOOD PRESSURE: 117 MMHG | OXYGEN SATURATION: 99 % | DIASTOLIC BLOOD PRESSURE: 82 MMHG

## 2023-10-19 DIAGNOSIS — O21.0 HYPEREMESIS GRAVIDARUM: ICD-10-CM

## 2023-10-19 DIAGNOSIS — O09.93 HIGH-RISK PREGNANCY IN THIRD TRIMESTER: Primary | ICD-10-CM

## 2023-10-19 PROCEDURE — 99207 PR PRENATAL VISIT: CPT | Mod: GC | Performed by: STUDENT IN AN ORGANIZED HEALTH CARE EDUCATION/TRAINING PROGRAM

## 2023-10-19 NOTE — PROGRESS NOTES
Return OB visit  29-34 weeks    Assessment & plan:   Jaqueline is a 28 year old  female at 34w3d who returns for prenatal care. PRINCESS 2023.    High Risk Pregnancy in 3rd trimester  (2/2 concurrent cannabis use, depression, and hyperemesis)  Hyperemesis gravidarum   Dehydration in pregnancy   Jaqueline has been doing much better since her infusions - and despite not having them recently she is still doing very well with her nausea - and has eliezer gained weight. She also stopped her Cannabis use as well. Her last MFM US was on  and was very reassuring - they recommended no need for further MFM surveillance. For now, her nausea is well controlled with PRN Compazaine. Continue to encourage IVF as able. We will see her again on 10/31.    Pre- Monitoring  Baby visualized on bedside US today, vertex positioning, baby spine to maternal right    Passed GCT  Got TDAP last visit, declines flu vaccine  Plan for GBS next visit  Plan for IUD post partum     Options for treatment and follow-up care were reviewed with the patient and/or guardian. Jaqueline Argueta and/or guardian engaged in the decision making process and verbalized understanding of the options discussed and agreed with the final plan.    Soy Worthy, DO   ____________________________________________    Subjective:   Jaqueline is a 28 year old  female at 34w3d who returns for prenatal care. PRINCESS 2023.  No specific concerns today - doing a lot better now  Denies bleed, but is reporting discharge - non painful, a lot, white and sticky  Reporting a lot of FM    Nausea / Vomit  Improved A LOT  Taking Compazine as needed, not that often - not everyday now, mayne twice-thrice week  Has not needed her Atarax  Has not been to the infusions recently (since end of Sept) as the pokes were getting painful BUT also has not noticed a worsening of symptoms since stopping  Able to eat more too - sometimes once or twice a day - ie was  "able tolerate pizza well yesterday, probtone gonna eat some fruits nad bagles later     Cannabis  Stopped around 6 months into pregnancy   Since the nausea calmed down with the infusions - the cannabis was not needed  Also started hating the smell     Depression  Was going well with Dr HERNANDEZ   Hasn't gotten around to scheduling more appt - been tired cause her body hurts  FOB is still around - not really helpful - still managing stuff on her own \"ok I guess\" - hasn't seen her sis much but did have a baby shower the other day  Just been hanging out at home - sleeps a lot  Moodier - \"gets irritated really fast with a lot of things\"   Back pain   Getting help from financially from the [facility] - no income coming, fob not working, she will need to get to work after birth (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   See flowsheet for FH, FHTs, fetal presentation, edema.    Prenatal labs:   -   Hemoglobin   Date Value Ref Range Status   08/08/2023 12.2 11.7 - 15.7 g/dL Final   04/14/2018 12.0 11.7 - 15.7 g/dL Final       "

## 2023-10-19 NOTE — PATIENT INSTRUCTIONS
Schedule of Routine Prenatal Appointments and Tests    First Trimester: Prenatal appointments should be scheduled at least every 4 weeks or as recommended by your doctor.    6-8 weeks (2nd month of pregnancy):  First prenatal visit is approximately 2 hour visit which may include: prenatal interview by nurse, complete physical exam by doctor (brest, pelvic exam and pap smear if needed), prenatal blood work (blood type, antibody screen, hemoglobin, hepatitis B screen, syphilis, HIV, and rubella titer)    8-12 weeks (2nd to 3rd months of pregnancy): Dating Ultrasound. Options of early genetic screening.    Second Trimester: Prenatal appointments should be scheduled at least every 4 weeks or as recommended by your doctor.    16-20 weeks (4th-5th month of pregnancy): AFP 4 marker screen (quad screen). It is an optional blood test to check for neural tube defects and Down s Syndrome.  For women that will be 35 years old or older at the time of delivery, further testing such as referral to Maternal Fetal Medicine to have level 2 ultrasound, genetic counseling and amniocentesis may be offered.    20-22 weeks (5th month of pregnancy): ultrasound to check fetal anatomy and growth. May also be able to find out if having a boy or girl.    24-28 weeks (6th-7th month of pregnancy): Blood work for gestational diabetes screening, hemoglobin, and repeat syphilis testing.    Third Trimester: Prenatal appointments should be scheduled every 2 weeks between 28-36 weeks (7th-9th month of pregnancy) and then every week until delivery.     28 weeks (7th month of pregnancy): TDaP injection will be given.  If blood type is Rh negative, an antibody screen will also be drawn and you will be given a Rhogam injection.    36-37 weeks (9th month of pregnancy): Group B streptococcus screen is performed with a pelvic exam.  Birth plan is discussed with the provider.    Thank you for coming to [facility] today.  Lab Testing:  **If you had lab  testing today and your results are reassuring or normal they will be mailed to you or sent through [employer] within 7 days.   **If the lab tests need quick action we will call you with the results.  The phone number we will call with results is # [phone] (home) . If this is not the best number please call our clinic and change the number.  Medication Refills:  If you need any refills please call your pharmacy and they will contact us.   If you need to  your refill at a new pharmacy, please contact the new pharmacy directly. The new pharmacy will help you get your medications transferred faster.   Scheduling:  If you have any concerns about today's visit or wish to schedule another appointment please call our office during normal business hours [phone] (8-5:00 M-F)  If a referral was made to a [facility] and you don't get a call from central scheduling please call [phone].  If a Mammogram was ordered for you at [facility] call [phone] to schedule or change your appointment.  If you had an XRay/CT/Ultrasound/MRI ordered the number is [phone] to schedule or change your radiology appointment.   Medical Concerns:  If you have urgent medical concerns please call [phone] at any time of the day.    Soy Worthy, DO

## 2023-10-30 ENCOUNTER — TELEPHONE (OUTPATIENT)
Dept: PSYCHOLOGY | Facility: CLINIC | Age: 28
End: 2023-10-30
Payer: COMMERCIAL

## 2023-10-30 NOTE — TELEPHONE ENCOUNTER
[facility]  Type of Contact: Phone call (not reached/unavailable)  Chante Aly Community Health Worker    Phone call to pt for appointment reminder. Pt has follow up with PCP 10/31 at 2:40pm.     No answer, left detailed message for pt.     Chante Aly LPN/CHW.

## 2023-10-30 NOTE — PROGRESS NOTES
[facility]  Type of Contact: Face to Face in Clinic  Sandy Martel Social Work Care Coordinator    Individual and Family Support: aimed to help clients reduce barriers to achieving goals, increase health literacy and knowledge about chronic condition(s), increase self-efficacy skills, and improve health outcomes    SW met with patent today prior to seeing Dr. Worthy.     Overall Jaqueline states she is doing okay. Mood has been stable, no major ups or downs. Not interested at this time getting rescheduled with Dr. Linares / any mental health appt.     Baby will be coming soon, due date in November. Currently still in same apartment. She states she isnt sure what she wants to do with her housing. Has been communicating with her landlord though.     Receives services through [employer] Home visiting program. She states a nurse comes out and does check ins, and they just talk through things. She says that is good and will continue with it.     Sandy Hunter, DENZEL  [facility] Social Work Care Coordinator

## 2023-10-31 ENCOUNTER — TELEPHONE (OUTPATIENT)
Dept: FAMILY MEDICINE | Facility: CLINIC | Age: 28
End: 2023-10-31

## 2023-10-31 NOTE — TELEPHONE ENCOUNTER
LVM to reschedule missed [facility] apt today.  Asked to call me back directly or the  if needed.  Will try again Friday.  Mayelin UQRESHI, CNM, OB CC

## 2023-11-03 ENCOUNTER — TELEPHONE (OUTPATIENT)
Dept: FAMILY MEDICINE | Facility: CLINIC | Age: 28
End: 2023-11-03
Payer: COMMERCIAL

## 2023-11-03 NOTE — TELEPHONE ENCOUNTER
LVM regarding [facility]LE apts and importance of calling back to reschedule. MyChart message sent also.  Mayelin DARBY, EDGARDM, OB CC

## 2023-11-07 ENCOUNTER — TELEPHONE (OUTPATIENT)
Dept: FAMILY MEDICINE | Facility: CLINIC | Age: 28
End: 2023-11-07
Payer: COMMERCIAL

## 2023-11-07 DIAGNOSIS — F12.90 LONG TERM CURRENT USE OF CANNABIS: ICD-10-CM

## 2023-11-07 DIAGNOSIS — O21.0 HYPEREMESIS GRAVIDARUM: Primary | ICD-10-CM

## 2023-11-07 NOTE — TELEPHONE ENCOUNTER
"Jaqueline called back to schedule [facility].  Friday @ 340pm w Dr. Worthy.  Pt asking questions about IOL-\"I'm in pain-back pain & some contractions, not labor contractions though.\"  Asked about hydration & nutrition-1 meal/day \"I don't really snack anymore and I eat when I'm hungry.\"  Rev'd importance of nutrition, hydration & sleep to aid her body in efficient laboring & birthing. Encouraged small snacks/sips of protein shake (feels less like eating) every couple of hrs even if not hungry. Pt states she'll try.  Confirmed she can make Friday's apt.  Mayelin DARBY, CNM, OB CC      "

## 2023-11-07 NOTE — TELEPHONE ENCOUNTER
LVM regarding [facility] apts.   Reviewed importance of scheduling them and asked to call back.  Mayelin DARBY, CNM, OB CC

## 2023-11-10 ENCOUNTER — OFFICE VISIT (OUTPATIENT)
Dept: FAMILY MEDICINE | Facility: CLINIC | Age: 28
End: 2023-11-10
Payer: COMMERCIAL

## 2023-11-10 VITALS
OXYGEN SATURATION: 99 % | SYSTOLIC BLOOD PRESSURE: 114 MMHG | HEART RATE: 131 BPM | DIASTOLIC BLOOD PRESSURE: 79 MMHG | BODY MASS INDEX: 28.08 KG/M2 | WEIGHT: 148.6 LBS

## 2023-11-10 DIAGNOSIS — O09.93 HIGH-RISK PREGNANCY IN THIRD TRIMESTER: Primary | ICD-10-CM

## 2023-11-10 DIAGNOSIS — M99.03 SOMATIC DYSFUNCTION OF LUMBAR REGION: ICD-10-CM

## 2023-11-10 DIAGNOSIS — O21.0 HYPEREMESIS GRAVIDARUM: ICD-10-CM

## 2023-11-10 DIAGNOSIS — M99.04 SOMATIC DYSFUNCTION OF SACRAL REGION: ICD-10-CM

## 2023-11-10 DIAGNOSIS — M99.05 SOMATIC DYSFUNCTION OF PELVIC REGION: ICD-10-CM

## 2023-11-10 DIAGNOSIS — K21.9 GASTROESOPHAGEAL REFLUX DISEASE WITHOUT ESOPHAGITIS: ICD-10-CM

## 2023-11-10 PROCEDURE — 98926 OSTEOPATH MANJ 3-4 REGIONS: CPT | Mod: GC | Performed by: STUDENT IN AN ORGANIZED HEALTH CARE EDUCATION/TRAINING PROGRAM

## 2023-11-10 PROCEDURE — 99207 PR PRENATAL VISIT: CPT | Mod: 25 | Performed by: STUDENT IN AN ORGANIZED HEALTH CARE EDUCATION/TRAINING PROGRAM

## 2023-11-10 RX ORDER — HYDROXYZINE HYDROCHLORIDE 10 MG/1
10 TABLET, FILM COATED ORAL 3 TIMES DAILY PRN
Qty: 60 TABLET | Refills: 3 | Status: ON HOLD | OUTPATIENT
Start: 2023-11-10 | End: 2023-11-18

## 2023-11-10 RX ORDER — FAMOTIDINE 20 MG/1
20 TABLET, FILM COATED ORAL 2 TIMES DAILY PRN
Qty: 60 TABLET | Refills: 0 | Status: SHIPPED | OUTPATIENT
Start: 2023-11-10 | End: 2023-12-04

## 2023-11-10 NOTE — PROGRESS NOTES
Preceptor Attestation:    I  evaluated the patient in person. OB precepting done with Dr Saul. See her note.    Supervising Physician:  Blake William MD.

## 2023-11-10 NOTE — Clinical Note
Baby ended up delivering precipitously in ED on the 17th! Was on our service for a bit and safely discharged on the 18th w/ baby.

## 2023-11-10 NOTE — PROGRESS NOTES
"Return OB visit  35-39 weeks    Assessment & plan:   Jaqueline is a 28 year old  female at 37w4d who returns for prenatal care. PRINCESS 2023.     High Risk Pregnancy in 3rd trimester  (2/2 concurrent cannabis use, depression, and hyperemesis)  Hyperemesis gravidarum   Dehydration in pregnancy   Is still doing well from a nausea standpoint, its coming back but not to the intensity as before - has not reached out to her PRNs yet and still feels like she doesn't need an infusion yet. She has still abstained from her cannabis which is positive to hear. Deferred POCUS today to perform OMT for her low back (see note below)     Pre- Monitoring  FHT 145ish  Passed GCT  Got TDAP last visit, declines flu vaccine  Plan for GBS next visit as we did OMT today   Plan for IUD post partum      Options for treatment and follow-up care were reviewed with the patient and/or guardian. Jaqueline Argueta and/or guardian engaged in the decision making process and verbalized understanding of the options discussed and agreed with the final plan.     Soy Worthy, DO     _________________________________________________________________________    Subjective:   Jaqueline is a 28 year old  female at 37w4d who returns for prenatal care. PRINCESS 2023.  FM present  NO bleeding, less discharge than now  Mood has been - \"it's been alright, been tired\"  Sleep is difficult - lays down at nothing happens, or will wake up 2-3 hours later - not taking anything for this - gotten worse thru pregnancy - \"just kind lay there\"  NO headache, vision, LE swelling, chest pain, SOB  FOB still isn't contributing much right now   Car broke down - it won't start - getting it looked at this weekend     Nausea / Vomit  Is coming back somewhat - but still not as bad as it was before  Not going to infusions  recently - has been tired recently  Compazine - ran out, didn't go back for a refill - didn't feel like it  Has not used the " Atarax  Only eating one meal a day - lack of appetite / nausea - really hungry in the AM, but once she eats she doesn't get hungry again    Cannabis  Still stopped    Depression  Still not made appts with Dr HERNANDEZ  Really tired - lack of motivation and tired / body hurt    Body Hurt  Legs hurt a lot - when laying down w/ her legs bent they hurt   Back hurts bad  Sometimes does the stretches I showed her - helps      Wt Readings from Last 4 Encounters:   11/10/23 67.4 kg (148 lb 9.6 oz)   10/19/23 66.6 kg (146 lb 12.8 oz)   09/25/23 62.6 kg (138 lb)   09/06/23 62.7 kg (138 lb 3.2 oz)       Objective:   LMP 03/10/2023 (Approximate)    Const: No distress  Abd: Gravid.   See flowsheet for FH, FHTs, fetal presentation, EFW, edema.     Prenatal labs:   Hemoglobin   Date Value Ref Range Status   08/08/2023 12.2 11.7 - 15.7 g/dL Final   04/14/2018 12.0 11.7 - 15.7 g/dL Final        OMT PROCEDURE NOTE    Body Region:  L-spine, Sacrum, and Pelvis/ Innominates  Somatic Dysfunction: Flexed Sacrum, SIJ compression, Lumbar Lordosis Strain  Treatment: Pelvic Decompression, MET Techniques.   Outcome: Improved    The patient actively participated in OMT and was able to communicate both positive and negative feedback throughout. OMT completed without incident. Patient tolerated treatment well. Patient reported that ROM, function, and/or pain level were improved. Advised that pain is occasionally worse during the first 24 hours after treatment and that drinking more water and taking Tylenol or Ibuprofen often help. Patient to return in 2 week/s or as needed for repeat osteopathic evaluation.       Soy Worthy, DO

## 2023-11-15 ENCOUNTER — OFFICE VISIT (OUTPATIENT)
Dept: FAMILY MEDICINE | Facility: CLINIC | Age: 28
End: 2023-11-15
Payer: COMMERCIAL

## 2023-11-15 VITALS
WEIGHT: 152.4 LBS | HEART RATE: 94 BPM | DIASTOLIC BLOOD PRESSURE: 84 MMHG | OXYGEN SATURATION: 99 % | BODY MASS INDEX: 28.8 KG/M2 | SYSTOLIC BLOOD PRESSURE: 122 MMHG

## 2023-11-15 DIAGNOSIS — O09.93 HIGH-RISK PREGNANCY IN THIRD TRIMESTER: Primary | ICD-10-CM

## 2023-11-15 PROCEDURE — 59426 ANTEPARTUM CARE ONLY: CPT | Mod: GC | Performed by: STUDENT IN AN ORGANIZED HEALTH CARE EDUCATION/TRAINING PROGRAM

## 2023-11-15 PROCEDURE — 87653 STREP B DNA AMP PROBE: CPT | Performed by: STUDENT IN AN ORGANIZED HEALTH CARE EDUCATION/TRAINING PROGRAM

## 2023-11-15 NOTE — PROGRESS NOTES
Return OB visit  35-39 weeks    Assessment & plan:   Jaqueline is a 28 year old  female at 38w2d who returns for prenatal care today. PRINCESS 2023    High Risk Pregnancy in 3rd trimester  (2/2 concurrent cannabis use, depression, and hyperemesis)  Hyperemesis gravidarum   Dehydration in pregnancy   Jaqueline his doing well since last week, less pain since our OMT session. Still eating less than ideal, but she is gaining weight which is good. Her nauseas is still quite controlled - has not felt like she needs the compazine / atarax, but is picking it up today. Educated on how she is supposed to do the pelvic decompression as she was attempting to bend to place her fists between her knees rather than using a rolled up towel.      Pre- Monitoring  Baby visualized on bedside US today, vertex positioning, baby spine to maternal left    Passed GCT  Got TDAP in prior visit  Obtained GBS today (returned negative)  Plan for IUD post partum      Options for treatment and follow-up care were reviewed with the patient and/or guardian. Jaqueline Argueta and/or guardian engaged in the decision making process and verbalized understanding of the options discussed and agreed with the final plan.    ADDENDUM   Jaqueline presented to [facility] ED w/ precipitous delivery in the ED. OB was at bedside following immediate delivary - Apgars of baby 7/9; EBL 500mL. However was given methergine prior to transfer from  to , BP were monitored closely with no complications arising. She was discharged home w/ baby safely on .      Soy Worthy, DO   ____________________________________________________________________________    Subjective:   Jaqueline is a 28 year old  female at 38w2d who returns for prenatal care. PRINCESS 2023.  Denies vaginal bleed, endorsing some discharge still (the same as last week), endorse a lot of FM  Denies headache, vision changes, LE swelling, RUQ pains, chest pains, and  dyspnea  Endorses some pelvic and low back pains    Nausea / Vomit  Stable, baseline  Still hasn't felt the need for compazine / atarax   Still has trouble w/ sleeping - getting 2-3hrs of sleep; does not have to nap during the day - can't go to sleep during the daytime  Still minimal PO - at some cereal and deli sandwich today   Car - tension pulley - uncle fixed     Back Pain  Better since last week after OMT  Has not been doing the stretches too consistently     Wt Readings from Last 4 Encounters:   11/15/23 69.1 kg (152 lb 6.4 oz)   11/10/23 67.4 kg (148 lb 9.6 oz)   10/19/23 66.6 kg (146 lb 12.8 oz)   09/25/23 62.6 kg (138 lb)     Objective:   /84   Pulse 94   Wt 69.1 kg (152 lb 6.4 oz)   LMP 03/10/2023 (Approximate)   SpO2 99%   BMI 28.80 kg/m     Const: No distress  Abd: Gravid.   See flowsheet for FH, FHTs, fetal presentation, EFW, edema.     Prenatal labs:   Hemoglobin   Date Value Ref Range Status   08/08/2023 12.2 11.7 - 15.7 g/dL Final   04/14/2018 12.0 11.7 - 15.7 g/dL Final

## 2023-11-16 LAB — GP B STREP DNA SPEC QL NAA+PROBE: NEGATIVE

## 2023-11-17 ENCOUNTER — HOSPITAL ENCOUNTER (INPATIENT)
Facility: CLINIC | Age: 28
LOS: 1 days | Discharge: HOME | End: 2023-11-18
Attending: EMERGENCY MEDICINE | Admitting: OBSTETRICS & GYNECOLOGY
Payer: COMMERCIAL

## 2023-11-17 ENCOUNTER — NURSE TRIAGE (OUTPATIENT)
Dept: NURSING | Facility: CLINIC | Age: 28
End: 2023-11-17
Payer: COMMERCIAL

## 2023-11-17 ENCOUNTER — HOSPITAL ENCOUNTER (OUTPATIENT)
Facility: CLINIC | Age: 28
End: 2023-11-17
Attending: STUDENT IN AN ORGANIZED HEALTH CARE EDUCATION/TRAINING PROGRAM | Admitting: STUDENT IN AN ORGANIZED HEALTH CARE EDUCATION/TRAINING PROGRAM
Payer: COMMERCIAL

## 2023-11-17 DIAGNOSIS — O09.90 SUPERVISION OF HIGH RISK PREGNANCY, ANTEPARTUM: Primary | ICD-10-CM

## 2023-11-17 DIAGNOSIS — Z3A.38 38 WEEKS GESTATION OF PREGNANCY: ICD-10-CM

## 2023-11-17 LAB
ABO/RH(D): NORMAL
ALBUMIN SERPL BCG-MCNC: 3.2 G/DL (ref 3.5–5.2)
ALP SERPL-CCNC: 189 U/L (ref 40–150)
ALT SERPL W P-5'-P-CCNC: 16 U/L (ref 0–50)
AMPHETAMINES UR QL SCN: ABNORMAL
ANION GAP SERPL CALCULATED.3IONS-SCNC: 15 MMOL/L (ref 7–15)
ANTIBODY SCREEN: NEGATIVE
AST SERPL W P-5'-P-CCNC: 24 U/L (ref 0–45)
BARBITURATES UR QL SCN: ABNORMAL
BENZODIAZ UR QL SCN: ABNORMAL
BILIRUB SERPL-MCNC: 0.3 MG/DL
BUN SERPL-MCNC: 6.3 MG/DL (ref 6–20)
BZE UR QL SCN: ABNORMAL
CALCIUM SERPL-MCNC: 8.5 MG/DL (ref 8.6–10)
CANNABINOIDS UR QL SCN: ABNORMAL
CHLORIDE SERPL-SCNC: 105 MMOL/L (ref 98–107)
CREAT SERPL-MCNC: 0.49 MG/DL (ref 0.51–0.95)
DEPRECATED HCO3 PLAS-SCNC: 17 MMOL/L (ref 22–29)
EGFRCR SERPLBLD CKD-EPI 2021: >90 ML/MIN/1.73M2
ERYTHROCYTE [DISTWIDTH] IN BLOOD BY AUTOMATED COUNT: 13.6 % (ref 10–15)
FENTANYL UR QL: ABNORMAL
GLUCOSE SERPL-MCNC: 109 MG/DL (ref 70–99)
HCT VFR BLD AUTO: 38.1 % (ref 35–47)
HGB BLD-MCNC: 12.8 G/DL (ref 11.7–15.7)
MCH RBC QN AUTO: 30 PG (ref 26.5–33)
MCHC RBC AUTO-ENTMCNC: 33.6 G/DL (ref 31.5–36.5)
MCV RBC AUTO: 89 FL (ref 78–100)
OPIATES UR QL SCN: ABNORMAL
PCP QUAL URINE (ROCHE): ABNORMAL
PLATELET # BLD AUTO: 204 10E3/UL (ref 150–450)
POTASSIUM SERPL-SCNC: 3.3 MMOL/L (ref 3.4–5.3)
PROT SERPL-MCNC: 6.2 G/DL (ref 6.4–8.3)
RBC # BLD AUTO: 4.26 10E6/UL (ref 3.8–5.2)
SODIUM SERPL-SCNC: 137 MMOL/L (ref 135–145)
SPECIMEN EXPIRATION DATE: NORMAL
WBC # BLD AUTO: 9.1 10E3/UL (ref 4–11)

## 2023-11-17 PROCEDURE — 250N000011 HC RX IP 250 OP 636

## 2023-11-17 PROCEDURE — 80307 DRUG TEST PRSMV CHEM ANLYZR: CPT | Performed by: FAMILY MEDICINE

## 2023-11-17 PROCEDURE — 99231 SBSQ HOSP IP/OBS SF/LOW 25: CPT | Mod: GC

## 2023-11-17 PROCEDURE — 722N000001 HC LABOR CARE VAGINAL DELIVERY SINGLE

## 2023-11-17 PROCEDURE — 250N000011 HC RX IP 250 OP 636: Performed by: STUDENT IN AN ORGANIZED HEALTH CARE EDUCATION/TRAINING PROGRAM

## 2023-11-17 PROCEDURE — 120N000002 HC R&B MED SURG/OB UMMC

## 2023-11-17 PROCEDURE — 99285 EMERGENCY DEPT VISIT HI MDM: CPT | Mod: 25 | Performed by: EMERGENCY MEDICINE

## 2023-11-17 PROCEDURE — 85027 COMPLETE CBC AUTOMATED: CPT | Performed by: STUDENT IN AN ORGANIZED HEALTH CARE EDUCATION/TRAINING PROGRAM

## 2023-11-17 PROCEDURE — 86901 BLOOD TYPING SEROLOGIC RH(D): CPT | Performed by: STUDENT IN AN ORGANIZED HEALTH CARE EDUCATION/TRAINING PROGRAM

## 2023-11-17 PROCEDURE — 80053 COMPREHEN METABOLIC PANEL: CPT | Performed by: STUDENT IN AN ORGANIZED HEALTH CARE EDUCATION/TRAINING PROGRAM

## 2023-11-17 PROCEDURE — 59409 OBSTETRICAL CARE: CPT

## 2023-11-17 PROCEDURE — 250N000013 HC RX MED GY IP 250 OP 250 PS 637

## 2023-11-17 PROCEDURE — 99291 CRITICAL CARE FIRST HOUR: CPT | Performed by: EMERGENCY MEDICINE

## 2023-11-17 PROCEDURE — 250N000009 HC RX 250

## 2023-11-17 PROCEDURE — 36415 COLL VENOUS BLD VENIPUNCTURE: CPT | Performed by: STUDENT IN AN ORGANIZED HEALTH CARE EDUCATION/TRAINING PROGRAM

## 2023-11-17 PROCEDURE — 96374 THER/PROPH/DIAG INJ IV PUSH: CPT | Performed by: EMERGENCY MEDICINE

## 2023-11-17 RX ORDER — MODIFIED LANOLIN
OINTMENT (GRAM) TOPICAL
Status: DISCONTINUED | OUTPATIENT
Start: 2023-11-17 | End: 2023-11-18 | Stop reason: HOSPADM

## 2023-11-17 RX ORDER — CARBOPROST TROMETHAMINE 250 UG/ML
INJECTION, SOLUTION INTRAMUSCULAR
Status: DISCONTINUED
Start: 2023-11-17 | End: 2023-11-17 | Stop reason: HOSPADM

## 2023-11-17 RX ORDER — OXYTOCIN 10 [USP'U]/ML
INJECTION, SOLUTION INTRAMUSCULAR; INTRAVENOUS
Status: DISPENSED
Start: 2023-11-17 | End: 2023-11-17

## 2023-11-17 RX ORDER — SODIUM CHLORIDE, SODIUM LACTATE, POTASSIUM CHLORIDE, CALCIUM CHLORIDE 600; 310; 30; 20 MG/100ML; MG/100ML; MG/100ML; MG/100ML
INJECTION, SOLUTION INTRAVENOUS
Status: DISPENSED
Start: 2023-11-17 | End: 2023-11-17

## 2023-11-17 RX ORDER — MISOPROSTOL 200 UG/1
TABLET ORAL
Status: DISPENSED
Start: 2023-11-17 | End: 2023-11-17

## 2023-11-17 RX ORDER — OXYTOCIN 10 [USP'U]/ML
10 INJECTION, SOLUTION INTRAMUSCULAR; INTRAVENOUS
Status: DISCONTINUED | OUTPATIENT
Start: 2023-11-17 | End: 2023-11-18 | Stop reason: HOSPADM

## 2023-11-17 RX ORDER — OXYTOCIN/0.9 % SODIUM CHLORIDE 30/500 ML
340 PLASTIC BAG, INJECTION (ML) INTRAVENOUS CONTINUOUS PRN
Status: COMPLETED | OUTPATIENT
Start: 2023-11-17 | End: 2023-11-17

## 2023-11-17 RX ORDER — CARBOPROST TROMETHAMINE 250 UG/ML
250 INJECTION, SOLUTION INTRAMUSCULAR
Status: DISCONTINUED | OUTPATIENT
Start: 2023-11-17 | End: 2023-11-18 | Stop reason: HOSPADM

## 2023-11-17 RX ORDER — FENTANYL CITRATE 50 UG/ML
50 INJECTION, SOLUTION INTRAMUSCULAR; INTRAVENOUS ONCE
Status: COMPLETED | OUTPATIENT
Start: 2023-11-17 | End: 2023-11-17

## 2023-11-17 RX ORDER — IBUPROFEN 800 MG/1
800 TABLET, FILM COATED ORAL EVERY 6 HOURS PRN
Status: DISCONTINUED | OUTPATIENT
Start: 2023-11-17 | End: 2023-11-18 | Stop reason: HOSPADM

## 2023-11-17 RX ORDER — TRANEXAMIC ACID 10 MG/ML
1 INJECTION, SOLUTION INTRAVENOUS EVERY 30 MIN PRN
Status: DISCONTINUED | OUTPATIENT
Start: 2023-11-17 | End: 2023-11-18 | Stop reason: HOSPADM

## 2023-11-17 RX ORDER — OXYTOCIN 10 [USP'U]/ML
INJECTION, SOLUTION INTRAMUSCULAR; INTRAVENOUS
Status: DISCONTINUED
Start: 2023-11-17 | End: 2023-11-17 | Stop reason: HOSPADM

## 2023-11-17 RX ORDER — BISACODYL 10 MG
10 SUPPOSITORY, RECTAL RECTAL DAILY PRN
Status: DISCONTINUED | OUTPATIENT
Start: 2023-11-17 | End: 2023-11-18 | Stop reason: HOSPADM

## 2023-11-17 RX ORDER — LIDOCAINE HYDROCHLORIDE 10 MG/ML
INJECTION, SOLUTION EPIDURAL; INFILTRATION; INTRACAUDAL; PERINEURAL
Status: DISPENSED
Start: 2023-11-17 | End: 2023-11-17

## 2023-11-17 RX ORDER — ACETAMINOPHEN 325 MG/1
650 TABLET ORAL EVERY 4 HOURS PRN
Status: DISCONTINUED | OUTPATIENT
Start: 2023-11-17 | End: 2023-11-18 | Stop reason: HOSPADM

## 2023-11-17 RX ORDER — MISOPROSTOL 200 UG/1
400 TABLET ORAL
Status: DISCONTINUED | OUTPATIENT
Start: 2023-11-17 | End: 2023-11-18 | Stop reason: HOSPADM

## 2023-11-17 RX ORDER — OXYTOCIN/0.9 % SODIUM CHLORIDE 30/500 ML
PLASTIC BAG, INJECTION (ML) INTRAVENOUS
Status: DISPENSED
Start: 2023-11-17 | End: 2023-11-17

## 2023-11-17 RX ORDER — METHYLERGONOVINE MALEATE 0.2 MG/ML
INJECTION INTRAVENOUS
Status: COMPLETED
Start: 2023-11-17 | End: 2023-11-17

## 2023-11-17 RX ORDER — DOCUSATE SODIUM 100 MG/1
100 CAPSULE, LIQUID FILLED ORAL DAILY
Status: DISCONTINUED | OUTPATIENT
Start: 2023-11-17 | End: 2023-11-18 | Stop reason: HOSPADM

## 2023-11-17 RX ORDER — MISOPROSTOL 200 UG/1
800 TABLET ORAL
Status: DISCONTINUED | OUTPATIENT
Start: 2023-11-17 | End: 2023-11-18 | Stop reason: HOSPADM

## 2023-11-17 RX ORDER — METHYLERGONOVINE MALEATE 0.2 MG/ML
200 INJECTION INTRAVENOUS
Status: DISCONTINUED | OUTPATIENT
Start: 2023-11-17 | End: 2023-11-18 | Stop reason: HOSPADM

## 2023-11-17 RX ORDER — OXYTOCIN/0.9 % SODIUM CHLORIDE 30/500 ML
PLASTIC BAG, INJECTION (ML) INTRAVENOUS
Status: DISCONTINUED
Start: 2023-11-17 | End: 2023-11-17 | Stop reason: HOSPADM

## 2023-11-17 RX ORDER — HYDROCORTISONE 25 MG/G
CREAM TOPICAL 3 TIMES DAILY PRN
Status: DISCONTINUED | OUTPATIENT
Start: 2023-11-17 | End: 2023-11-18 | Stop reason: HOSPADM

## 2023-11-17 RX ADMIN — Medication 340 ML/HR: at 08:45

## 2023-11-17 RX ADMIN — IBUPROFEN 800 MG: 800 TABLET, FILM COATED ORAL at 17:31

## 2023-11-17 RX ADMIN — ACETAMINOPHEN 650 MG: 325 TABLET, FILM COATED ORAL at 09:05

## 2023-11-17 RX ADMIN — ACETAMINOPHEN 650 MG: 325 TABLET, FILM COATED ORAL at 17:31

## 2023-11-17 RX ADMIN — IBUPROFEN 800 MG: 800 TABLET, FILM COATED ORAL at 10:46

## 2023-11-17 RX ADMIN — FENTANYL CITRATE 50 MCG: 50 INJECTION, SOLUTION INTRAMUSCULAR; INTRAVENOUS at 06:51

## 2023-11-17 RX ADMIN — BENZOCAINE AND LEVOMENTHOL: 200; 5 SPRAY TOPICAL at 17:32

## 2023-11-17 RX ADMIN — ACETAMINOPHEN 650 MG: 325 TABLET, FILM COATED ORAL at 22:22

## 2023-11-17 RX ADMIN — METHYLERGONOVINE MALEATE 200 MCG: 0.2 INJECTION INTRAVENOUS at 07:04

## 2023-11-17 RX ADMIN — DOCUSATE SODIUM 100 MG: 100 CAPSULE, LIQUID FILLED ORAL at 10:45

## 2023-11-17 RX ADMIN — ACETAMINOPHEN 650 MG: 325 TABLET, FILM COATED ORAL at 13:08

## 2023-11-17 ASSESSMENT — ACTIVITIES OF DAILY LIVING (ADL)
ADLS_ACUITY_SCORE: 20
ADLS_ACUITY_SCORE: 20
ADLS_ACUITY_SCORE: 37
ADLS_ACUITY_SCORE: 41
ADLS_ACUITY_SCORE: 20
ADLS_ACUITY_SCORE: 37
ADLS_ACUITY_SCORE: 41

## 2023-11-17 NOTE — PROGRESS NOTES
Was notified that unfortunately patient received methergine prior to transfer via ambulance from [place] after having mild range blood pressures. Methergine was also given IV instead of intended IM. Recommend very close blood pressure monitoring after arrival to [facility].    Patrice Ventura MD  Obstetrics, Gynecology, and Women's Health  PGY4  8:02 AM 11/17/2023

## 2023-11-17 NOTE — PLAN OF CARE
Pt arrived via medics and brought to room 444.  SO present as well.  Fundus boggy but firms with massage/-1 SR.  IV pit started.  Rates pain 7/10.  136/87 upon arrival.  Fentanyl was given in ED prior to transfer per pt.  Pt plans to BF and will as soon as possible.  POC discussed with pt and agrees.  Pt stable at this time.  Will monitor BP's closely.

## 2023-11-17 NOTE — ED PROVIDER NOTES
ED Provider Note  [facility]      History   No chief complaint on file.    HPI  Jaqueline Argueta is a 28 year old female G3, P1 who presents to the ED for evaluation of contractions.  She reportedly has a due date of 11/28/2023.  The majority of history is provided by her partner.  States that the contractions started approximately 5 AM.  Also reports vaginal bleeding.  Says the pregnancy has been uncomplicated thus far.  Previous delivery via spontaneous vaginal.    Past Medical History  Past Medical History:   Diagnosis Date    Anemia due to blood loss, acute 12/27/2011    Depressive disorder      Past Surgical History:   Procedure Laterality Date    NO HISTORY OF SURGERY       aspirin-acetaminophen-caffeine (EXCEDRIN MIGRAINE) 250-250-65 MG tablet  clindamycin (CLINDAMAX) 1 % external gel  famotidine (PEPCID) 20 MG tablet  hydrOXYzine (ATARAX) 10 MG tablet  prochlorperazine (COMPAZINE) 10 MG tablet      No Known Allergies  Family History  Family History   Problem Relation Age of Onset    Substance Abuse Mother     Depression Mother     Substance Abuse Father     Substance Abuse Brother     Substance Abuse Sister     Bipolar Disorder Sister     Depression Sister     Substance Abuse Sister     Depression Sister     Family History Negative No family hx of      Social History   Social History     Tobacco Use    Smoking status: Former    Smokeless tobacco: Never    Tobacco comments:     smoke marijuana once or twice a wk   Vaping Use    Vaping Use: Never used   Substance Use Topics    Alcohol use: Yes     Comment:      Drug use: Yes     Types: Marijuana     Comment: marijuana once or twice a wk         A medically appropriate review of systems was performed with pertinent positives and negatives noted in the HPI, and all other systems negative.    Physical Exam   BP: (!) 143/95  SpO2: 99 %  Physical Exam  Vitals and nursing note reviewed.   Constitutional:       General: She is in acute  distress.      Appearance: Normal appearance.      Comments: Patient screaming and clutching lower abdomen, consistent with labor contractions   HENT:      Head: Normocephalic.      Nose: Nose normal.   Eyes:      Pupils: Pupils are equal, round, and reactive to light.   Cardiovascular:      Rate and Rhythm: Normal rate and regular rhythm.   Pulmonary:      Effort: Pulmonary effort is normal.   Abdominal:      General: There is no distension.   Genitourinary:     Comments: Cervix dilated approximately 4 fingerbreadths.  Bloody discharge noted  Musculoskeletal:         General: No deformity. Normal range of motion.      Cervical back: Normal range of motion.   Skin:     General: Skin is warm.   Neurological:      Mental Status: She is alert and oriented to person, place, and time.   Psychiatric:         Mood and Affect: Mood normal.         ED Course, Procedures, & Data           Results for orders placed or performed during the hospital encounter of 11/17/23   ABO/Rh type and screen     Status: None ()    Narrative    The following orders were created for panel order ABO/Rh type and screen.  Procedure                               Abnormality         Status                     ---------                               -----------         ------                     Adult Type and Screen[[id number]]                                                         Please view results for these tests on the individual orders.     Medications   oxytocin (PITOCIN) 10 UNIT/ML injection (has no administration in time range)   oxytocin in 0.9% NaCl (PITOCIN) 30 units/500 mL infusion (has no administration in time range)   methylergonovine (METHERGINE) 0.2 MG/ML injection (has no administration in time range)   carboprost (HEMABATE) 250 MCG/ML injection (has no administration in time range)   fentaNYL (PF) (SUBLIMAZE) injection 50 mcg (50 mcg Intravenous $Given 11/17/23 0651)     Labs Ordered and Resulted from Time of ED Arrival to Time  of ED Departure - No data to display  No orders to display          Critical care was performed.   Critical Care Addendum  My initial assessment, based on my review of nursing observations, focused history, physical exam, and discussion with partner , established a high suspicion that Jaqueline Argueta has / is in active labor, which requires immediate intervention, and therefore she is critically ill.     After the initial assessment, the care team initiated medication therapy with pitocin (after delivery), consulted with OB GYN, performed supervisory role during delivery, and initiated direct pressure on hemorrhage to provide stabilization care. Due to the critical nature of this patient, I reassessed nursing observations, vital signs, physical exam, review of cardiac rhythm monitor, mental status, neurologic status, and respiratory status multiple times prior to her disposition.     Time also spent performing documentation, discussion with consultants, and coordination of care.     Critical care time (excluding teaching time and procedures): 60 minutes.     Assessment & Plan    Gallbladder registration the patient is in the waiting room complaining of labor pains.  Assessed immediately by me and patient appears to be in active labor and reports vaginal bleeding.  Do not feel that transfer to alternate facility is appropriate.  Patient was brought back to trauma room immediately.    On exam, patient is approximately 5 to 7 cm dilated and contractions are increasing to approximately every 3 minutes.  OB was consulted who presented down to the ED promptly.  OB staff involved in discussion as well.  Transfer is not an option.  Proceed with delivery in the ED.  Uncomplicated vaginal delivery of male infant.  Postpartum bleeding improved with uterine massage and Pitocin infusion as directed by the OB team.  Currently awaiting emergent transfer to labor and delivery unit.      I have reviewed the nursing notes. I have  reviewed the findings, diagnosis, plan and need for follow up with the patient.    New Prescriptions    No medications on file       Final diagnoses:   None       Jonny Fuentes DO  [facility] EMERGENCY DEPARTMENT  11/17/2023     Jonny Fuentes DO  11/18/23 0618

## 2023-11-17 NOTE — PLAN OF CARE
Pt able to void in large amounts.  Will collect urine due to pt hx.  BP's elevated, MD's aware.  Denies HA, blurred vision, epigastric pain or visual changes.  Reflexes 2+ no clonus noted.  Breastfeeding with a good latch observed.  Pit IV running at 340.  Will go to room 7764 on [facility] with baby and SO, Zach.

## 2023-11-17 NOTE — PLAN OF CARE
Goal Outcome Evaluation:      Plan of Care Reviewed With: patient    Overall Patient Progress: improvingOverall Patient Progress: improving    Outcome Evaluation: VSS, postpartum assessment WDL, voiding without difficulty.  Reports cramping with breastfeeding, using tylenol, ibuprofen and hot packs with good effect. Lactation consult placed, per pt request.  Birth certificate and ROP completed and turned in.  Encouraged to complete EDS  .Partner, Zach, supportive at bedside.  Denies questions or concerns at this time, continue with plan of care.

## 2023-11-17 NOTE — PLAN OF CARE
Data: Jaqueline Argueta transferred to [facility] room 36 via wheelchair at 1015. Baby transferred via parent's arms. Pt voided prior to transfer.   Action: Receiving unit notified of transfer: Yes. Patient and family notified of room change. Report given to CARLO Lam at 0940. Belongings sent to receiving unit. Accompanied by Registered Nurse. Oriented patient to surroundings. Call light within reach. Infant ID bands double-checked with receiving RN.    Response: Patient tolerated transfer and is stable.

## 2023-11-17 NOTE — CONSULTS
SW received consult for patient due to reported marijuana use during her pregnancy. SW spoke with bedside RN who confirmed that umbilical cord testing was ordered for baby, and mother is aware and understanding.     SW will follow results of the cord blood and make referral to [facility] if results are positive for marijuana.     SW can be available for any other needs or questions.     KISHORE Harden, [facility]  Maternal and Child Health   Office: [phone]  Pager: [phone]  After Hours Pager: [phone]  [email or web address]

## 2023-11-17 NOTE — PROGRESS NOTES
[facility] Postpartum Progress Note  2023 8:10 AM  Date of service: 2023.         Interval History:   Jaqueline Argueta is a 28 year old P2 s/p precipitous  at 38w4d. Currently, the patient reports moderate pain. She is ambulating. She has not eaten yet, but has been able to drink. She has voided. She is having moderate bleeding. She has not tried breastfeeding yet. She is interested in IUD for contraception post-partum. Blood pressures have improved since transfer- diastolic pressure continues to be mild range. She denies headache, vision changes, RUQ pain, SOB, CP, and swelling.          Physical Exam:     Vitals:    23 0645 23 0655 23 0748 23 0751   BP: (!) 142/90 (!) 138/91 136/87 (!) 134/90   Pulse: 91      Resp: 18 18     SpO2: 100% 100%         General: Alert, well-appearing, no acute distress   HEENT: PERRL, moist oral mucus membranes  Pulm: Clear to auscultation bilaterally   CV: RRR, no murmur  Abd: firm uterine fundus   Ext: Warm, well perfused, 2+ BL radial pulses, no LE edema  Skin: No rash on limited skin exam  Psych: Mood appropriate to visit content, full affect, rational thought content and process         Data:     Hemoglobin   Date Value Ref Range Status   2023 12.2 11.7 - 15.7 g/dL Final   2023 14.4 11.7 - 15.7 g/dL Final   2018 12.0 11.7 - 15.7 g/dL Final   2018 14.1 11.7 - 15.7 g/dL Final     Lab Results   Component Value Date    RH Pos 2018    RUBELLAABIGG 15 2011            Assessment and Plan:      Jaqueline Argueta is a 28 year old  PPD#0 s/p  at 38w4d. Pregnancy was complicated by hyperemesis gravidarum, depression (not on medications), early alcohol use (none current), and intermittent marijuana use. Delivery was complicated by precipitous status, mild range blood pressures, and inadvertent IV Methergine administration.     #  Routine post-partum cares  # Rubella non-immune   # Post-partum contraception   S/P precipitous vaginal delivery 11/17. Currently with 7/10 pain. Ambulating, urinating, moderate bleeding. Breastfeeding.  - Normal diet   - Encourage ambulation  - Continue breastfeeding   - Plan for Mirena IUD for post-partum contraception   - Influenza vaccine: Declined   - Rh positive : Rhogam not indicated  - Rubella non-immune: MMR indicated and ordered   - Tdap (for pertussis): Given antepartum.   - Dispo: Routine post-partum cares, anticipate DC home PPD# 1-2 depending on blood pressures as below.    # Mild range blood pressures   # S/P Methergine   Patient with mild range pressures in ED. Denies preE symptoms. HELLP labs normal 11/17. UPCR not indicated at this time. Patient received IV Methergine (0.2 mg) prior to transfer for unclear indication-  mL and not meeting criteria for PPH. Blood pressures have improved since transfer- diastolic continues to be mild range. Will require close monitoring.    - BP q15 minutes   - If sustained elevated blood pressures, plan for UPCR and additional management     # Depression   Not on medications. Reports stable mood.   - Monitor for PPD     Linda Ch MD  [facility], PGY-2    Patient Active Problem List   Diagnosis    Condyloma acuminata    Need for Tdap vaccination    Nausea/vomiting in pregnancy    Nausea and vomiting of pregnancy, antepartum    Rubella non-immune status, antepartum    Supervision of high risk pregnancy, antepartum    Depression complicating pregnancy, antepartum, third trimester    Hyperemesis gravidarum    Long term current use of cannabis    Pregnancy complicated by tobacco use in second trimester

## 2023-11-17 NOTE — DISCHARGE INSTRUCTIONS
Postpartum Vaginal Delivery Instructions    Activity     Ask family and friends for help when you need it.  Do not place anything in your vagina for 6 weeks.  You are not restricted on other activities, but take it easy for a few weeks to allow your body to recover from delivery.  You are able to do any activities you feel up to that point.  No driving until you have stopped taking your pain medications (usually two weeks after delivery).     Call your health care provider if you have any of these symptoms:     Increased pain, swelling, redness, or fluid around your stiches from an episiotomy or perineal tear.  A fever above 100.4 F (38 C) with or without chills when placing a thermometer under your tongue.  You soak a sanitary pad with blood within 1 hour, or you see blood clots larger than a golf ball.  Bleeding that lasts more than 6 weeks.  Vaginal discharge that smells bad.  Severe pain, cramping or tenderness in your lower belly area.  A need to urinate more frequently (use the toilet more often), more urgently (use the toilet very quickly), or it burns when you urinate.  Nausea and vomiting.  Redness, swelling or pain around a vein in your leg.  Problems breastfeeding or a red or painful area on your breast.  Chest pain and cough or are gasping for air.  Problems coping with sadness, anxiety, or depression.  If you have any concerns about hurting yourself or the baby, call your provider immediately.   You have questions or concerns after you return home.     Keep your hands clean:  Always wash your hands before touching your perineal area and stitches.  This helps reduce your risk of infection.  If your hands aren't dirty, you may use an alcohol hand-rub to clean your hands. Keep your nails clean and short.        Warning Signs after Having a Baby    Keep this paper on your fridge or somewhere else where you can see it.    Call your provider if you have any of these symptoms up to 12 weeks after having your  baby.    Thoughts of hurting yourself or your baby  Pain in your chest or trouble breathing  Severe headache not helped by pain medicine  Eyesight concerns (blurry vision, seeing spots or flashes of light, other changes to eyesight)  Fainting, shaking or other signs of a seizure    Call 9-1-1 if you feel that it is an emergency.     The symptoms below can happen to anyone after giving birth. They can be very serious. Call your provider if you have any of these warning signs.    My provider s phone number: _______________________    Losing too much blood (hemorrhage)    Call your provider if you soak through a pad in less than an hour or pass blood clots bigger than a golf ball. These may be signs that you are bleeding too much.    Blood clots in the legs or lungs    After you give birth, your body naturally clots its blood to help prevent blood loss. Sometimes this increased clotting can happen in other areas of the body, like the legs or lungs. This can block your blood flow and be very dangerous.     Call your provider if you:  Have a red, swollen spot on the back of your leg that is warm or painful when you touch it.   Are coughing up blood.     Infection    Call your provider if you have any of these symptoms:  Fever of 100.4 F (38 C) or higher.  Pain or redness around your stitches if you had an incision.   Any yellow, white, or green fluid coming from places where you had stitches or surgery.    Mood Problems (postpartum depression)    Many people feel sad or have mood changes after having a baby. But for some people, these mood swings are worse.     Call your provider right away if you feel so anxious or nervous that you can't care for yourself or your baby.    Preeclampsia (high blood pressure)    Even if you didn't have high blood pressure when you were pregnant, you are at risk for the high blood pressure disease called preeclampsia. This risk can last up to 12 weeks after giving birth.     Call your  provider if you have:   Pain on your right side under your rib cage  Sudden swelling in the hands and face    Remember: You know your body. If something doesn't feel right, get medical help.     For informational purposes only. Not to replace the advice of your health care provider. Copyright 2020 [employer]. All rights reserved. Clinically reviewed by Justa Morales, RNC-OB, MSN. [employer] [id number] - Rev 02/23.

## 2023-11-17 NOTE — PROGRESS NOTES
Patient arrived to [facility] unit via wheelchair at 1015 ,with belongings, accompanied by spouse/ significant other, with infant in arms. Received report from  CARLO Vale  and checked bands. Unit and room orientation completd. Call light given; no concerns present at this time. Continue with plan of care.

## 2023-11-17 NOTE — TELEPHONE ENCOUNTER
Pt is calling to report contractions and spotting.  She is 38w4d.  She is a patient at [facility].    Advised patient to call clinic.  Patient states that she was told to call the hospital.  Pt call was transferred to L&D.    Verito Garces RN, BSN Nurse Triage Advisor 11/17/2023 5:41 AM

## 2023-11-17 NOTE — L&D DELIVERY NOTE
Delivery Note:    Jaqueline Argueta is a 28 year old  at 38w4d who presented to the [place] ED in active labor. History limited by pain and precipitous delivery. Patient reports prenatal care done with [facility], no complications. No medications, previous delivery , no history of other surgeries, no hx HTN, asthma, no alcohol or other drug use. She is here with her partner. Contractions started at 0500 today.     Patient was moved to an ED room. SVE upon my arrival was 7/90/0 with bloody show. Ultrasound not available, cephalic by exam. Bedside dopplers performed with fetal heart rate 140-150s, decreases to 90-100s with contractions, unable to get bedside fetal monitoring set up. The patient quickly progressed to complete. She pushed with good effort over two contractions and delivered a live  male in the WU position on 2023 at approximately 0620. Good tone and respiratory effort. The cord was allowed to pulse for approximately 60 seconds and then cut and clamped. A routine cord segment was obtained. The placenta was delivered with gentle downward traction. The perineum was examined and there were no tears noted. Fundal massage was performed with some brisk bleeding at the time of the massage and good uterine tone with no ongoing bleeding. Pitocin and other uterotonics were called for, Dr. Patrice Ventura then arrived to the ED and resumed care of the patient.      mL.     Jessi Bautista MD, MPH  Obstetrics and Gyncology, PGY-3  2023 , 6:47 AM

## 2023-11-17 NOTE — H&P
OB History and Physical     Jaqueline Argueta    MRN# [id number]  YOB: 1995      HPI: Jaqueline Argueta is a 28 year old  at 38w4d who presented today with precipitous delivery in the ED.    Delivery attended by Dr. Erik Bautista, see separate note. I arrived after delivery of baby and placenta. ED nurse incidentally was prior L&D nurse who indicated baby Apgars of 7 and 9, baby and patient doing well at this time.     Patient denied complications in her pregnancy. Patient had IV access at time of my arrival. Requested pitocin to be started, which was started immediately. Some trickle on initial fundal checks. Gave 50 mcg IV fentanyl for comfort. No trickle noted with further fundal checks after starting pitocin. Patient was noted to have a mild range blood pressure 142/90 while I was in the room. Ordered T&S, CBC, CMP. Patient and baby stable at this time and are to transport to L&D via ambulance in 5 minutes with pitocin running. Patient feeling well. Total  mL.     Prenatal Labs:   Lab Results   Component Value Date    ABO O 2018    RH Pos 2018    AS Negative 2023    HEPBANG Nonreactive 2023    CHPCRT Negative 2023    GCPCRT Negative 2023    TREPAB Negative 2018    RUBELLAABIGG 15 2011    HGB 12.2 2023    HIV Negative 2011       GBS Status:   Lab Results   Component Value Date    GBS  2011     Negative: No GBS DNA detected, presumed negative for GBS or number of bacteria   may be below the limit of detection of the assay.   Assay performed on incubated broth culture of specimen using Cepheid   SmartCycler(R) real-time PCR.         OBHX:   OB History    Para Term  AB Living   3 1 1 0 1 1   SAB IAB Ectopic Multiple Live Births   0 1 0 0 1      # Outcome Date GA Lbr Paul/2nd Weight Sex Delivery Anes PTL Lv   3 Current            2 Term 11 39w1d 02:05 / 00:11 2.977 kg (6 lb 9 oz) F Vag-Spont None N SONG       Name: LIZZY VALADEZ      Apgar1: 9  Apgar5: 9   1 IAB      IAB          MedicalHX:   Past Medical History:   Diagnosis Date    Anemia due to blood loss, acute 12/27/2011    Depressive disorder        SurgicalHX:   Past Surgical History:   Procedure Laterality Date    NO HISTORY OF SURGERY         Medications:   lactated ringers infusion  lidocaine (PF) (XYLOCAINE) 1 % injection  misoprostol (CYTOTEC) 200 MCG tablet  oxytocin (PITOCIN) 10 UNIT/ML injection  oxytocin in 0.9% NaCl (PITOCIN) 30 units/500 mL infusion    aspirin-acetaminophen-caffeine (EXCEDRIN MIGRAINE) 250-250-65 MG tablet, Take 1 tablet by mouth daily as needed for headaches  clindamycin (CLINDAMAX) 1 % external gel, Apply topically 2 times daily  famotidine (PEPCID) 20 MG tablet, Take 1 tablet (20 mg) by mouth 2 times daily as needed (Heart burn)  hydrOXYzine (ATARAX) 10 MG tablet, Take 1 tablet (10 mg) by mouth 3 times daily as needed for other (Nausea)  prochlorperazine (COMPAZINE) 10 MG tablet, Take 1 tablet (10 mg) by mouth every 6 hours as needed for nausea or vomiting        Allergies:  No Known Allergies    FamilyHX:    Family History   Problem Relation Age of Onset    Substance Abuse Mother     Depression Mother     Substance Abuse Father     Substance Abuse Brother     Substance Abuse Sister     Bipolar Disorder Sister     Depression Sister     Substance Abuse Sister     Depression Sister     Family History Negative No family hx of        SocialHX:   Social History     Socioeconomic History    Marital status: Single   Tobacco Use    Smoking status: Former    Smokeless tobacco: Never    Tobacco comments:     smoke marijuana once or twice a wk   Vaping Use    Vaping Use: Never used   Substance and Sexual Activity    Alcohol use: Yes     Comment:      Drug use: Yes     Types: Marijuana     Comment: marijuana once or twice a wk    Sexual activity: Yes     Partners: Male, Female     Birth control/protection: None   Social History Narrative          Social Determinants of Health     Financial Resource Strain: Unknown (11/10/2023)    Financial Resource Strain     Within the past 12 months, have you or your family members you live with been unable to get utilities (heat, electricity) when it was really needed?: Patient refused   Food Insecurity: Low Risk  (11/10/2023)    Food Insecurity     Within the past 12 months, did you worry that your food would run out before you got money to buy more?: No     Within the past 12 months, did the food you bought just not last and you didn t have money to get more?: Patient refused   Transportation Needs: Unknown (11/10/2023)    Transportation Needs     Within the past 12 months, has lack of transportation kept you from medical appointments, getting your medicines, non-medical meetings or appointments, work, or from getting things that you need?: Patient refused   Interpersonal Safety: Low Risk  (11/10/2023)    Interpersonal Safety     Do you feel physically and emotionally safe where you currently live?: Yes     Within the past 12 months, have you been hit, slapped, kicked or otherwise physically hurt by someone?: No     Within the past 12 months, have you been humiliated or emotionally abused in other ways by your partner or ex-partner?: No   Housing Stability: Unknown (11/10/2023)    Housing Stability     Do you have housing? : Patient refused     Are you worried about losing your housing?: Patient refused       ROS: 10 point ROS negative other than above    Physical Exam:  Patient Vitals for the past 24 hrs:   BP Pulse SpO2   23 0655 (!) 138/91 -- 100 %   23 0645 (!) 142/90 91 100 %   23 0614 (!) 143/95 -- 99 %     General: AAOx3, appropriately interactive, NAD, appears generally well  CV: RRR  Lungs: No increased work of breathing  Abdomen: soft, gravid, non-tende  Extremities: Non-tender with no edema bilaterally in LE    Labs:   T&S, CBC, CMP pending    Assessment & Plan: 28 year old   at 38w4d who delivered precipitously in the [place] ED. Delivery was uncomplicated. . Had mild range blood pressure immediately after delivery at 0614. No prior in her     #Precipitous   - Has been getting care with family medicine, may consider transfer to family medicine after transfer to [facility]  - patient stable at this time, bleeding stopped with fundal checks  - OK for patient and baby to transfer to [facility], both stable  - CBC, T&S pending    #Mild range BP  - No prior elevated blood pressures noted in her pregnancy on chart review  - CMP pending  - No initial symptoms of preeclampsia, but patient overwhelmed with precipitous delivery, should re-evaluate after transfer    Patient discussed with Dr. Juares.    Patrice Ventura MD  Obstetrics, Gynecology, and Women's Health  PGY4  6:56 AM 2023      Staff MD Note    I appreciate the note by Dr. Ventura.  Any necessary changes have been made by me.  I discussed the patient with the resident and agree with the findings and plan of care as documented in the note.  I was notified of emergent delivery on [facility] while attending to OB patient's on the [facility].  Patient was cared for by OBGYN resident team on that side for emergent delivery and plans made for immediate postpartum transfer to the [facility] for continued postpartum cares when patient clinically stable.  Team will evaluate patient on her arrival to the [facility].    Fozia Juares MD

## 2023-11-18 VITALS
RESPIRATION RATE: 18 BRPM | WEIGHT: 143.08 LBS | BODY MASS INDEX: 27.03 KG/M2 | HEART RATE: 117 BPM | SYSTOLIC BLOOD PRESSURE: 117 MMHG | DIASTOLIC BLOOD PRESSURE: 81 MMHG | TEMPERATURE: 97.6 F | OXYGEN SATURATION: 100 %

## 2023-11-18 LAB
HGB BLD-MCNC: 10.4 G/DL (ref 11.7–15.7)
HOLD SPECIMEN: NORMAL

## 2023-11-18 PROCEDURE — G0010 ADMIN HEPATITIS B VACCINE: HCPCS

## 2023-11-18 PROCEDURE — 85018 HEMOGLOBIN: CPT | Performed by: FAMILY MEDICINE

## 2023-11-18 PROCEDURE — 250N000011 HC RX IP 250 OP 636: Performed by: FAMILY MEDICINE

## 2023-11-18 PROCEDURE — 90746 HEPB VACCINE 3 DOSE ADULT IM: CPT | Performed by: FAMILY MEDICINE

## 2023-11-18 PROCEDURE — 90707 MMR VACCINE SC: CPT

## 2023-11-18 PROCEDURE — 250N000011 HC RX IP 250 OP 636

## 2023-11-18 PROCEDURE — 90472 IMMUNIZATION ADMIN EACH ADD: CPT | Performed by: FAMILY MEDICINE

## 2023-11-18 PROCEDURE — 3E0234Z INTRODUCTION OF SERUM, TOXOID AND VACCINE INTO MUSCLE, PERCUTANEOUS APPROACH: ICD-10-PCS | Performed by: FAMILY MEDICINE

## 2023-11-18 PROCEDURE — 36415 COLL VENOUS BLD VENIPUNCTURE: CPT | Performed by: FAMILY MEDICINE

## 2023-11-18 PROCEDURE — 99239 HOSP IP/OBS DSCHRG MGMT >30: CPT | Performed by: FAMILY MEDICINE

## 2023-11-18 PROCEDURE — 250N000013 HC RX MED GY IP 250 OP 250 PS 637

## 2023-11-18 PROCEDURE — 90471 IMMUNIZATION ADMIN: CPT

## 2023-11-18 RX ORDER — IBUPROFEN 800 MG/1
800 TABLET, FILM COATED ORAL EVERY 8 HOURS PRN
Qty: 30 TABLET | Refills: 0 | Status: SHIPPED | OUTPATIENT
Start: 2023-11-18 | End: 2023-12-04

## 2023-11-18 RX ORDER — ACETAMINOPHEN 325 MG/1
650 TABLET ORAL EVERY 6 HOURS PRN
Qty: 100 TABLET | Refills: 0 | Status: SHIPPED | OUTPATIENT
Start: 2023-11-18 | End: 2023-12-04

## 2023-11-18 RX ADMIN — IBUPROFEN 800 MG: 800 TABLET, FILM COATED ORAL at 07:32

## 2023-11-18 RX ADMIN — DOCUSATE SODIUM 100 MG: 100 CAPSULE, LIQUID FILLED ORAL at 07:32

## 2023-11-18 RX ADMIN — MEASLES, MUMPS, AND RUBELLA VIRUS VACCINE LIVE 0.5 ML: 1000; 12500; 1000 INJECTION, POWDER, LYOPHILIZED, FOR SUSPENSION SUBCUTANEOUS at 17:36

## 2023-11-18 RX ADMIN — ACETAMINOPHEN 650 MG: 325 TABLET, FILM COATED ORAL at 07:32

## 2023-11-18 RX ADMIN — ACETAMINOPHEN 650 MG: 325 TABLET, FILM COATED ORAL at 14:34

## 2023-11-18 RX ADMIN — IBUPROFEN 800 MG: 800 TABLET, FILM COATED ORAL at 14:34

## 2023-11-18 RX ADMIN — HEPATITIS B VACCINE (RECOMBINANT) 20 MCG: 20 INJECTION, SUSPENSION INTRAMUSCULAR at 17:35

## 2023-11-18 ASSESSMENT — ACTIVITIES OF DAILY LIVING (ADL)
ADLS_ACUITY_SCORE: 20

## 2023-11-18 NOTE — LACTATION NOTE
This note was copied from a baby's chart.  Consult for:  Lactation, sore nipple on right side, wanting support at bedside for positioning    Infant Name: Luis    Infant's Primary Care Clinic: unknown    Delivery Information:  Luis was born at Gestational Age: 38w4d via vaginal delivery on 11/17/2023 6:22 AM     Maternal Health History:  (NOTE - see maternal data and prenatal history report to review, select from baby index report), Maternal past medical history, problem list and prior to admission medications reviewed and unremarkable., Maternal past medical history, problem list and prior to admission medications reviewed and notable for depression,   Information for the patient's mother:  Jaqueline Argueta [[id number]]     Past Medical History:   Diagnosis Date    Anemia due to blood loss, acute 12/27/2011    Depressive disorder     ,   Information for the patient's mother:  Jaqueline Argueta [[id number]]     Patient Active Problem List   Diagnosis    Condyloma acuminata    Need for Tdap vaccination    Nausea/vomiting in pregnancy    Nausea and vomiting of pregnancy, antepartum    Rubella non-immune status, antepartum    Supervision of high risk pregnancy, antepartum    Depression complicating pregnancy, antepartum, third trimester    Hyperemesis gravidarum    Long term current use of cannabis    Pregnancy complicated by tobacco use in second trimester    Delivery normal    , and   Information for the patient's mother:  Jaqueline Argueta [[id number]]     Medications Prior to Admission   Medication Sig Dispense Refill Last Dose    aspirin-acetaminophen-caffeine (EXCEDRIN MIGRAINE) 250-250-65 MG tablet Take 1 tablet by mouth daily as needed for headaches 270 tablet 3     clindamycin (CLINDAMAX) 1 % external gel Apply topically 2 times daily 75 mL 0     famotidine (PEPCID) 20 MG tablet Take 1 tablet (20 mg) by mouth 2 times daily as needed (Heart burn) 60 tablet 0     hydrOXYzine (ATARAX) 10 MG tablet Take 1 tablet  (10 mg) by mouth 3 times daily as needed for other (Nausea) 60 tablet 3     prochlorperazine (COMPAZINE) 10 MG tablet Take 1 tablet (10 mg) by mouth every 6 hours as needed for nausea or vomiting 120 tablet 3           Maternal Breast Exam/Breastfeeding History:  Jaqueline noted breast growth and sensitivity in early pregnancy. She denies any history of breast/chest injury or surgery. Her breasts are soft and symmetrical with bilateral intact, everted nipples. She has been able to hand express colostrum. ?    Infant information: Luis was AGA at birth and has age appropriate output and weight loss.      Weight Change Since Birth: 0% at 0 day old < 24 hours of age    Oral exam of baby:  Luis has mildly ecessed jaw, moderate arched palate, good length of tongue beyond lingual frenulum attachment, good extension well beyond gum ridge & organized when sucking on finger after a few sucks (he has also been using a bottle with formula for some feedings as this is family's preference, so he needed a little suck training before he was able to latch and get into a normal pattern without chewing at the nipple).    Feeding History: Wanted to breastfeed her first infant (Jaqueline tried for short time but ended up with bleeding nipples so she quit).    Feeding Assessment:  Able to latch deeply, Jaqueline felt the cross-cradle on the left breast and football on the right breast was comfortable, better than previous feeding where she described the left nipple as being really sore. Both nipples are intact. Infant able to latch deeply and fed for about 30 minutes between both sides, swallows audible!  Reviewed positioning and Jaqueline has hydrogel dressing on after feeding for soreness.  Nipples appeared well rounded after the feeding, right breast slightly compressed after feeding.    Education:   - Expected  feeding patterns in the first few days (pg. 38 of Your Guide to To Postpartum and  Care)/ the Second  Night  - Stages of milk production  - Benefits of hand expression of colostrum  - Early feeding cues     - Benefits of feeding on cue  - Benefits of skin to skin  - Breastfeeding positions  - Tips to get and maintain a deep latch  - Nutritive vs.non-nutritive sucking  - Gentle breast compressions as needed to enhance milk transfer  - How to tell when baby is finished  - How to tell if baby is getting enough  - Infant Feeding Log  - Signs breastfeeding is going well (comfortable latch, audible swallows, age appropriate output and weight loss)    - Inpatient breastfeeding support  - Outpatient lactation resources    Handouts: Infant Feeding Log (Week 1, Your Guide to Postpartum &  Care Book)    Home Breast Pump: unknown    Plan: Continue breastfeeding on cue with RN support as needed with a goal of 8-12 feedings per day.     Encourage frequent skin to skin, breast massage and hand expression.     Encouraged follow up with outpatient lactation consultant  as needed after discharge.      Kathy Mcpherson RN, IBCLC   Lactation Consultant  Ascom: *[phone]  Office: [phone]

## 2023-11-18 NOTE — PROGRESS NOTES
[facility] Postpartum Progress Note  2023 11:16 AM  Date of service: 2023.         Interval History:   Jaqueline Argueta doing well. Not dizzy or bleeding heavily.     Pain controlled? yes  Ambulating? yes  Regular diet? yes  Voiding? yes  Lochia? Decreased but was heavier post delivery  Breastfeeding? yes  Contraception? Considering Mirena         Physical Exam:     Vitals:    23 1736 23 2203 23 0129 23 0548   BP: 127/82 110/76 114/81 101/64   BP Location: Left arm Left arm Left arm Left arm   Patient Position:  Left side Left side Left side   Cuff Size:  Infant Adult Regular Adult Regular   Pulse: 104 101 109 101   Resp:    Temp: 98.2  F (36.8  C) 98.2  F (36.8  C) 98.6  F (37  C) 97.9  F (36.6  C)   TempSrc: Oral Oral Oral Oral   SpO2:           GENERAL:         healthy, alert, and no distress  RESPIRATORY:   No increased work of breathing, good air exchange, clear to auscultation bilaterally, no crackles or wheezing   CARDIOVASCULAR:   Slightly tachy, regular rate and rhythm, normal S1 and S2, no S3 or S4, and no murmur noted   ABDOMEN:   No scars, normal bowel sounds, soft, non-distended, non-tender, no masses palpated, no hepatosplenomegally  Fundal height at level of umbilicus + 1 cm.  Moderately firm and non tender.   EXTREMITIES:          no edema, non-tender          Data:     Hemoglobin   Date Value Ref Range Status   2023 12.8 11.7 - 15.7 g/dL Final   2023 12.2 11.7 - 15.7 g/dL Final   2018 12.0 11.7 - 15.7 g/dL Final   2018 14.1 11.7 - 15.7 g/dL Final     Lab Results   Component Value Date    RH Pos 2018    RUBELLAABIGG 15 2011            Assessment and Plan:    Jaqueline Argueta is a 28 year old  PPD#1 s/p  in the ED in the [place]. Received Methergine IV which likely caused transient elevated BPs.  Persistently tachy this am,  asymptomatic, but will do Hgb to ensure that she has not lost significant blood.  Uterus relatively firm and not bleeding much at this time.  Does not want to start empiric depression treatment at this time. Denies mood changes.   Is post 3 Hep B vaccines per [facility] - will give fourth one today since serologically non-immune.   L&D complications: None     Patient Active Problem List   Diagnosis    Condyloma acuminata    Need for Tdap vaccination    Nausea/vomiting in pregnancy    Nausea and vomiting of pregnancy, antepartum    Rubella non-immune status, antepartum    Supervision of high risk pregnancy, antepartum    Depression complicating pregnancy, antepartum, third trimester    Hyperemesis gravidarum    Long term current use of cannabis    Pregnancy complicated by tobacco use in second trimester    Delivery normal       Pain: continue Ibuprofen and Tylenol   Anemia?: Hgb pending  Diet: Normal   Encourage ambulation  Baby feeding by bottle and breast - although mostly breast  Contraception: Mirena IUD  Influenza vaccine: not done   Rh +: Rhogam not indicated  Rubella non Immune: MMR is indicated and plan to get  Tdap (for pertussis): not indicated   Counseled about: follow up and depression.  Will monitor closely.    Dispo: Routine post-partum cares, anticipate DC home PPD# late this pm or early in the am. Her daughter required biliblanket so will err on the side of keeping overnight if need be.    # Pain Assessment:      2023     7:32 AM   Current Pain Score   Patient currently in pain? yes   - Jaqueline is experiencing pain due to . Pain management was discussed and the plan was created in a collaborative fashion.  Jaqueline's response to the current recommendations: engaged  - Ibuprofen and Tylenol      Miriam Anderson MD

## 2023-11-18 NOTE — PLAN OF CARE
Data: VSS, postpartum assessments WNL. She is voiding without difficulty, up ad sparkle, eating and drinking without nausea. Perineum appears to be healing well, lochia WNL, no s/s infection. Breastfeeding infant  with min assistance. Taking tylenol for pain and pt reports adequate pain management.   Action: Education provided on discharge goals, plan of care and pain management.  Response: Pt is agreeable with her plan of care. Pt is independent providing  cares and is attentive to infant needs. Support person,  significant other, present. Will continue with plan of care and notify provider of any changes in status.

## 2023-11-18 NOTE — DISCHARGE SUMMARY
[facility] Medicine  Post-partum Discharge Summary    Jaqueline Argueta MRN# [id number]   Age: 28 year old YOB: 1995     Date of Admission:  11/17/2023  Date of Discharge::  11/18/2023  Admitting Physician:  Leanna Gonzales MD  Discharge Physician:  Miriam Anderson MD      Home clinic: [facility]            Admission Diagnoses:   Delivery normal [O80]          Discharge Diagnosis:     Normal spontaneous vaginal delivery  Intrauterine pregnancy at 38 4/7 weeks gestation  Patient Active Problem List   Diagnosis    Condyloma acuminata    Need for Tdap vaccination    Nausea/vomiting in pregnancy    Nausea and vomiting of pregnancy, antepartum    Rubella non-immune status, antepartum    Supervision of high risk pregnancy, antepartum    Depression complicating pregnancy, antepartum, third trimester    Hyperemesis gravidarum    Long term current use of cannabis    Pregnancy complicated by tobacco use in second trimester    Delivery normal             Procedures:     Procedure(s): No additional procedures performed       No procedures performed during this admission           Medications Prior to Admission:     Medications Prior to Admission   Medication Sig Dispense Refill Last Dose    aspirin-acetaminophen-caffeine (EXCEDRIN MIGRAINE) 250-250-65 MG tablet Take 1 tablet by mouth daily as needed for headaches 270 tablet 3     clindamycin (CLINDAMAX) 1 % external gel Apply topically 2 times daily 75 mL 0     famotidine (PEPCID) 20 MG tablet Take 1 tablet (20 mg) by mouth 2 times daily as needed (Heart burn) 60 tablet 0     prochlorperazine (COMPAZINE) 10 MG tablet Take 1 tablet (10 mg) by mouth every 6 hours as needed for nausea or vomiting 120 tablet 3     [DISCONTINUED] hydrOXYzine (ATARAX) 10 MG tablet Take 1 tablet (10 mg) by mouth 3 times daily as needed for other (Nausea) 60 tablet 3              Discharge Medications:     Current Discharge Medication List        START taking these  medications    Details   acetaminophen (TYLENOL) 325 MG tablet Take 2 tablets (650 mg) by mouth every 6 hours as needed for pain  Qty: 100 tablet, Refills: 0    Associated Diagnoses: Delivery normal      ibuprofen (ADVIL/MOTRIN) 800 MG tablet Take 1 tablet (800 mg) by mouth every 8 hours as needed for other (cramping)  Qty: 30 tablet, Refills: 0    Associated Diagnoses: Delivery normal           CONTINUE these medications which have NOT CHANGED    Details   aspirin-acetaminophen-caffeine (EXCEDRIN MIGRAINE) 250-250-65 MG tablet Take 1 tablet by mouth daily as needed for headaches  Qty: 270 tablet, Refills: 3    Comments: If not covered, please direct them to the OTC version.  Associated Diagnoses: Hyperemesis gravidarum      clindamycin (CLINDAMAX) 1 % external gel Apply topically 2 times daily  Qty: 75 mL, Refills: 0    Associated Diagnoses: Folliculitis      famotidine (PEPCID) 20 MG tablet Take 1 tablet (20 mg) by mouth 2 times daily as needed (Heart burn)  Qty: 60 tablet, Refills: 0    Associated Diagnoses: Gastroesophageal reflux disease without esophagitis      prochlorperazine (COMPAZINE) 10 MG tablet Take 1 tablet (10 mg) by mouth every 6 hours as needed for nausea or vomiting  Qty: 120 tablet, Refills: 3    Associated Diagnoses: Hyperemesis gravidarum           STOP taking these medications       hydrOXYzine (ATARAX) 10 MG tablet Comments:   Reason for Stopping:             Results for orders placed or performed during the hospital encounter of 11/17/23   CBC with platelets     Status: Normal   Result Value Ref Range    WBC Count 9.1 4.0 - 11.0 10e3/uL    RBC Count 4.26 3.80 - 5.20 10e6/uL    Hemoglobin 12.8 11.7 - 15.7 g/dL    Hematocrit 38.1 35.0 - 47.0 %    MCV 89 78 - 100 fL    MCH 30.0 26.5 - 33.0 pg    MCHC 33.6 31.5 - 36.5 g/dL    RDW 13.6 10.0 - 15.0 %    Platelet Count 204 150 - 450 10e3/uL   Comprehensive metabolic panel     Status: Abnormal   Result Value Ref Range    Sodium 137 135 - 145 mmol/L     Potassium 3.3 (L) 3.4 - 5.3 mmol/L    Carbon Dioxide (CO2) 17 (L) 22 - 29 mmol/L    Anion Gap 15 7 - 15 mmol/L    Urea Nitrogen 6.3 6.0 - 20.0 mg/dL    Creatinine 0.49 (L) 0.51 - 0.95 mg/dL    GFR Estimate >90 >60 mL/min/1.73m2    Calcium 8.5 (L) 8.6 - 10.0 mg/dL    Chloride 105 98 - 107 mmol/L    Glucose 109 (H) 70 - 99 mg/dL    Alkaline Phosphatase 189 (H) 40 - 150 U/L    AST 24 0 - 45 U/L    ALT 16 0 - 50 U/L    Protein Total 6.2 (L) 6.4 - 8.3 g/dL    Albumin 3.2 (L) 3.5 - 5.2 g/dL    Bilirubin Total 0.3 <=1.2 mg/dL   Urine Drug Screen Panel     Status: Abnormal   Result Value Ref Range    Amphetamines Urine Screen Negative Screen Negative    Barbituates Urine Screen Negative Screen Negative    Benzodiazepine Urine Screen Negative Screen Negative    Cannabinoids Urine Screen Negative Screen Negative    Cocaine Urine Screen Negative Screen Negative    Fentanyl Qual Urine Screen Positive (A) Screen Negative    Opiates Urine Screen Negative Screen Negative    PCP Urine Screen Negative Screen Negative   Hemoglobin     Status: Abnormal   Result Value Ref Range    Hemoglobin 10.4 (L) 11.7 - 15.7 g/dL   Extra Tube     Status: None    Narrative    The following orders were created for panel order Extra Tube.  Procedure                               Abnormality         Status                     ---------                               -----------         ------                     Extra Green Top (Lithium...[[id number]]                      Final result                 Please view results for these tests on the individual orders.   Extra Green Top (Lithium Heparin) Tube     Status: None   Result Value Ref Range    Hold Specimen [facility]    Adult Type and Screen     Status: None   Result Value Ref Range    ABO/RH(D) O POS     Antibody Screen Negative Negative    SPECIMEN EXPIRATION DATE [id number]    ABO/Rh type and screen     Status: None    Narrative    The following orders were created for panel order ABO/Rh type and  screen.  Procedure                               Abnormality         Status                     ---------                               -----------         ------                     Adult Type and Screen[[id number]]                            Final result                 Please view results for these tests on the individual orders.   Urine Drug Screen     Status: Abnormal    Narrative    The following orders were created for panel order Urine Drug Screen.  Procedure                               Abnormality         Status                     ---------                               -----------         ------                     Urine Drug Screen Panel[[id number]]      Abnormal            Final result                 Please view results for these tests on the individual orders.               Consultations:   No consultations were requested during this admission          Brief History of Labor:   On 2023 at 6;222 am, this 28 year old year old  under IV sedation analgesia delivered a viable Male infant weighing 6# 12 oz with APGAR scores below:  APGARs 1 Min 5Min 10Min   Totals:  7  9              Hospital Course (HPI):   The patient's hospital course was unremarkable.  On discharge, her pain was well controlled. Vaginal bleeding is decreased.  Voiding without difficulty.  Ambulating well and tolerating a normal diet.  Had mild tachycardia, without symptoms. Hgb check was 10.4.No fever. Breast feeding going well.  Infant is stable.  She was discharged on post-partum day #2. Contraception plan:Mirena. Post partum depression education was provided. She was not interested in starting antidepressants at this time, but rather follow.          D/C Physical Exam:     Vitals:    23 1736 23 2203 23 0129 23 0548   BP: 127/82 110/76 114/81 101/64   BP Location: Left arm Left arm Left arm Left arm   Patient Position:  Left side Left side Left side   Cuff Size:  Infant Adult Regular Adult Regular    Pulse: 104 101 109 101   Resp: 16 16 16 16   Temp: 98.2  F (36.8  C) 98.2  F (36.8  C) 98.6  F (37  C) 97.9  F (36.6  C)   TempSrc: Oral Oral Oral Oral   SpO2:           GENERAL:         healthy, alert, and no distress  RESPIRATORY:   No increased work of breathing, good air exchange, clear to auscultation bilaterally, no crackles or wheezing   CARDIOVASCULAR:   Slight tachy, regular rate and rhythm, normal S1 and S2, no S3 or S4, and no murmur noted   ABDOMEN:   No scars, normal bowel sounds, soft, non-distended, non-tender, no masses palpated, no hepatosplenomegally  Fundal height at level of umbilicus + 0 cm.  Firm and non tender.   EXTREMITIES:          no edema, non-tender     Post-partum hemoglobin:   Hemoglobin   Date Value Ref Range Status   2023 10.4 (L) 11.7 - 15.7 g/dL Final   2018 12.0 11.7 - 15.7 g/dL Final           Discharge Instructions and Follow-Up:     Discharge diet: Regular   Discharge activity: Activity as tolerated   Discharge follow-up: Follow up with primary care provider in 2 weeks   Wound care: N/A     # Discharge Pain Plan:   - During her hospitalization, Jaqueline experienced pain due to .  The pain plan for discharge was discussed with Jaqueline and the plan was created in a collaborative fashion.    - Discharge on Ibuprofen and Tylenol            Discharge Disposition:     Discharged to home        Mother desires IUD or Nexplanon, Rosa s GYN clinic referral placed. Please Schedule  Yes         Miriam Anderson MD    Total time greater than 30 minutes preparing and managing discharge.

## 2023-11-19 NOTE — PLAN OF CARE
Goal Outcome Evaluation:      Plan of Care Reviewed With: patient    Overall Patient Progress: improvingOverall Patient Progress: improving    Outcome Evaluation: VSS, postpartum assessment WDL, voiding without difficulty, pain well managed with ibuprofen and tylenol, meeting all postpartum discharge goals.  Disccharge instructions and home medications reviewed with patient, who denies questions or concerns.  Discharge to home with infant.

## 2023-11-20 ENCOUNTER — TELEPHONE (OUTPATIENT)
Dept: FAMILY MEDICINE | Facility: CLINIC | Age: 28
End: 2023-11-20
Payer: COMMERCIAL

## 2023-11-20 NOTE — PROGRESS NOTES
Preceptor Attestation:  I evaluated the patient in person and served as remote preceptor.   The patient was discussed with me at the time of visit.   The content of the note has been verified which accurately reflects the  assessment of the patient and the plan of care.   Supervising Physician:  Stephanie Saul DO.

## 2023-11-20 NOTE — PROGRESS NOTES
Preceptor Attestation:  Patient seen and evaluated in person. I discussed the patient with the resident. I have verified the content of the note, which accurately reflects my assessment of the patient and the plan of care.   Supervising Physician:  Alice Sky DO

## 2023-11-20 NOTE — TELEPHONE ENCOUNTER
[facility] Mother & Baby Post Delivery Process  Follow up Phone Call- Baby  2023     Jaqueline called back.    Male-Jaqueline Argueta, a 3 day old male born on 2021  Weight change since birth is:  -4%    Family Concerns: none    Did the family receive a home health nurse visit? No     Questions for family:  NUTRITION: breastfeeding going well, every 1-3 hrs, 8-12 times/24 hours  JAUNDICE: none   SLEEP: Arrangements:    crib  Patterns:    has at least 1-2 waking periods during the day    wakes at night for feedings  Position:    on back   ELIMINATION: Stools:    transitional stool    # per day: 7-8  Urination:    normal wet diapers    # wet diapers/day: 7-8  Anticipatory Guidance - The following topics were discussed:  SOCIAL/FAMILY  NUTRITION:  HEALTH/ SAFETY:    Follow Up  2-3 day  visit has been scheduled? Yes  w Janet  2 week mom/baby appointment has been scheduled? Yes  w Dima   6 week postpartum mom visit has been scheduled? Yes  w Dima  6wk IUD apt scheduled  w Lorenzo  8 week baby well child visit has been scheduled? Yes  w Dima DARBY, CNM, OB CC

## 2023-12-04 ENCOUNTER — OFFICE VISIT (OUTPATIENT)
Dept: FAMILY MEDICINE | Facility: CLINIC | Age: 28
End: 2023-12-04
Payer: COMMERCIAL

## 2023-12-04 ENCOUNTER — CARE COORDINATION (OUTPATIENT)
Dept: PSYCHOLOGY | Facility: CLINIC | Age: 28
End: 2023-12-04

## 2023-12-04 VITALS
HEART RATE: 100 BPM | OXYGEN SATURATION: 96 % | SYSTOLIC BLOOD PRESSURE: 129 MMHG | RESPIRATION RATE: 17 BRPM | BODY MASS INDEX: 27.47 KG/M2 | DIASTOLIC BLOOD PRESSURE: 88 MMHG | WEIGHT: 145.4 LBS

## 2023-12-04 DIAGNOSIS — F12.90 MARIJUANA USE: ICD-10-CM

## 2023-12-04 DIAGNOSIS — F32.A DEPRESSION, UNSPECIFIED DEPRESSION TYPE: ICD-10-CM

## 2023-12-04 ASSESSMENT — PATIENT HEALTH QUESTIONNAIRE - PHQ9: SUM OF ALL RESPONSES TO PHQ QUESTIONS 1-9: 5

## 2023-12-04 NOTE — PROGRESS NOTES
Assessment and Plan     Routine postpartum cares  P2 parent. S/P  23. Mood concerns addressed as below. Otherwise doing well. Breastfeeding without difficulty. No significant pain. Minimal lochia. No intercourse since delivery. Interested in IUD for contraception. UTD on pap smear and TDAP.   - Return 23 for 6WK postpartum visit   - Return 23 for Mirena IUD placement     Depression, uncontrolled   Patient with longstanding history of depression. Reports worsened mood in setting of stressors of caring for new infant. Intermittently tearful on exam. No SI/HI. Interested in continuing therapy and [facility]. Not interested at medications at this time, though considering for future. No acute concerns for safety at this time, but will continue close monitoring and support of patient as below.   - Met with [facility]; scheduled for therapy appointment with Dr. Pardo 23   - Return 23 for 6WK postpartum visit; patient declined sooner follow up   - Reviewed at length return precautions for worsening mood, SI/HI; patient voiced understanding and agreement with plan     Marijuana use  Patient with intermittent THC use for stress and anxiety. Has not used since delivery. Reviewed formal recommendation to abstain from use and lack of clear evidence on impact on . Ultimately discussed risk-benefit situation of uncontrolled mood versus effects on fetus. Patient agreeable to implementing above methods to improve mood and continue abstaining from THC use.     Options for treatment and follow-up care were reviewed with the patient and/or guardian. Jaqueline Argueta and/or guardian engaged in the decision making process and verbalized understanding of the options discussed and agreed with the final plan.    Linda Ch MD    HPI     Jaqueline Argueta is a 28 year old  female  with a significant past medical history of depression who presents for a postpartum visit.     S/P  at 38w4d on  23 of viable male infant. Pregnancy was complicated by hyperemesis gravidarum, depression (not on medications), early alcohol use (none current), and intermittent marijuana use. Delivery was complicated by precipitous status, mild range blood pressures, and inadvertent IV Methergine administration.          - Breast feeding: breastfeeding going well, every 1-3 hrs, 8-12 times/24 hours   - Abdominal pain: No  - Bleeding since delivery: HGB at discharge 10.4. Small amount of lochia.   - Contraception choice: Mirena IUD. Has appointment    - Patient has not had intercourse since delivery.     - Last PAP: NIL , not due.     Questions for Caregiver to screen for Post Partum Depression:    During the past month, have you often been bothered by feeling down, depressed, or hopeless? Yes  During the past month, have you often been bothered by having little interest or pleasure in doing things? Yes    Has been sleeping a little, but mostly taking care of baby by herself.   Thought therapy was helpful, wants to continue.   Will think about medications.   No SI/HI.     Pospartum Depression screen:    At least one item positive so mother completed PHQ-9. Result was        2021     1:46 PM 2023     4:06 PM 2023    10:00 AM 2023     1:28 PM   PHQ-9 SCORE   PHQ-9 Total Score MyChart 16 (Moderately severe depression)      PHQ-9 Total Score 16 20 10 5     Receiving dental care YES      Calcium intake is adequate      Obstetric History:  OB History    Para Term  AB Living   3 2 2 0 1 2   SAB IAB Ectopic Multiple Live Births   0 1 0 0 2      # Outcome Date GA Lbr Paul/2nd Weight Sex Delivery Anes PTL Lv   3 Term 23 38w4d  3.06 kg (6 lb 11.9 oz) M Vag-Spont None N SONG      Name: Luis Cameron   2 Term 11 39w1d 02:05 / 00:11 2.977 kg (6 lb 9 oz) F Vag-Spont None N SONG      Name: LIZZY VALADEZ      Apgar1: 9  Apgar5: 9   1 IAB      IAB        Patient Active Problem List    Diagnosis    Condyloma acuminata    Need for Tdap vaccination    Nausea/vomiting in pregnancy    Nausea and vomiting of pregnancy, antepartum    Rubella non-immune status, antepartum    Supervision of high risk pregnancy, antepartum    Depression complicating pregnancy, antepartum, third trimester    Hyperemesis gravidarum    Long term current use of cannabis    Pregnancy complicated by tobacco use in second trimester    Delivery normal       Current Outpatient Medications   Medication Sig Dispense Refill    acetaminophen (TYLENOL) 325 MG tablet Take 2 tablets (650 mg) by mouth every 6 hours as needed for pain (Patient not taking: Reported on 12/4/2023) 100 tablet 0    aspirin-acetaminophen-caffeine (EXCEDRIN MIGRAINE) 250-250-65 MG tablet Take 1 tablet by mouth daily as needed for headaches (Patient not taking: Reported on 12/4/2023) 270 tablet 3    clindamycin (CLINDAMAX) 1 % external gel Apply topically 2 times daily (Patient not taking: Reported on 12/4/2023) 75 mL 0    famotidine (PEPCID) 20 MG tablet Take 1 tablet (20 mg) by mouth 2 times daily as needed (Heart burn) (Patient not taking: Reported on 12/4/2023) 60 tablet 0    ibuprofen (ADVIL/MOTRIN) 800 MG tablet Take 1 tablet (800 mg) by mouth every 8 hours as needed for other (cramping) (Patient not taking: Reported on 12/4/2023) 30 tablet 0    prochlorperazine (COMPAZINE) 10 MG tablet Take 1 tablet (10 mg) by mouth every 6 hours as needed for nausea or vomiting (Patient not taking: Reported on 12/4/2023) 120 tablet 3                         No Known Allergies         Review of Systems:     CONSTITUTIONAL: no fatigue, no unexpected change in weight  SKIN: no worrisome rashes or lesions  EYES: no acute vision problems or changes  ENT: no ear problems, no mouth problems, no throat problems  RESP: no significant cough, no shortness of breath  CV: no chest pain, no palpitations, no new or worsening peripheral edema  GI: no nausea, no vomiting, no constipation,  no diarrhea  : no frequency, no dysuria, no hematuria  NEURO: no weakness, no dizziness, no headaches  ENDOCRINE: no temperature intolerance, no skin/hair changes  PSYCHIATRIC: NEGATIVE for changes in mood or trouble with sleep            Physical Exam:     Vitals:    12/04/23 1326   BP: 129/88   BP Location: Left arm   Patient Position: Sitting   Cuff Size: Adult Large   Pulse: 100   Resp: 17   SpO2: 96%   Weight: 66 kg (145 lb 6.4 oz)     General: Alert, well-appearing, no acute distress   HEENT: PERRL, moist oral mucus membranes  Pulm: Clear to auscultation bilaterally   CV: RRR, no murmur  Abd: soft, NTND, no masses  Ext: Warm, well perfused, 2+ BL radial pulses, no LE edema  Skin: No rash on limited skin exam  Psych: Intermittently tearful.     Admission on 11/17/2023, Discharged on 11/18/2023   Component Date Value Ref Range Status    WBC Count 11/17/2023 9.1  4.0 - 11.0 10e3/uL Final    RBC Count 11/17/2023 4.26  3.80 - 5.20 10e6/uL Final    Hemoglobin 11/17/2023 12.8  11.7 - 15.7 g/dL Final    Hematocrit 11/17/2023 38.1  35.0 - 47.0 % Final    MCV 11/17/2023 89  78 - 100 fL Final    MCH 11/17/2023 30.0  26.5 - 33.0 pg Final    MCHC 11/17/2023 33.6  31.5 - 36.5 g/dL Final    RDW 11/17/2023 13.6  10.0 - 15.0 % Final    Platelet Count 11/17/2023 204  150 - 450 10e3/uL Final    Sodium 11/17/2023 137  135 - 145 mmol/L Final    Reference intervals for this test were updated on 09/26/2023 to more accurately reflect our healthy population. There may be differences in the flagging of prior results with similar values performed with this method. Interpretation of those prior results can be made in the context of the updated reference intervals.     Potassium 11/17/2023 3.3 (L)  3.4 - 5.3 mmol/L Final    Carbon Dioxide (CO2) 11/17/2023 17 (L)  22 - 29 mmol/L Final    Anion Gap 11/17/2023 15  7 - 15 mmol/L Final    Urea Nitrogen 11/17/2023 6.3  6.0 - 20.0 mg/dL Final    Creatinine 11/17/2023 0.49 (L)  0.51 - 0.95  mg/dL Final    GFR Estimate 11/17/2023 >90  >60 mL/min/1.73m2 Final    Calcium 11/17/2023 8.5 (L)  8.6 - 10.0 mg/dL Final    Chloride 11/17/2023 105  98 - 107 mmol/L Final    Glucose 11/17/2023 109 (H)  70 - 99 mg/dL Final    Alkaline Phosphatase 11/17/2023 189 (H)  40 - 150 U/L Final    Reference intervals for this test were updated on 11/14/2023 to more accurately reflect our healthy population. There may be differences in the flagging of prior results with similar values performed with this method. Interpretation of those prior results can be made in the context of the updated reference intervals.    AST 11/17/2023 24  0 - 45 U/L Final    Reference intervals for this test were updated on 6/12/2023 to more accurately reflect our healthy population. There may be differences in the flagging of prior results with similar values performed with this method. Interpretation of those prior results can be made in the context of the updated reference intervals.    ALT 11/17/2023 16  0 - 50 U/L Final    Reference intervals for this test were updated on 6/12/2023 to more accurately reflect our healthy population. There may be differences in the flagging of prior results with similar values performed with this method. Interpretation of those prior results can be made in the context of the updated reference intervals.      Protein Total 11/17/2023 6.2 (L)  6.4 - 8.3 g/dL Final    Albumin 11/17/2023 3.2 (L)  3.5 - 5.2 g/dL Final    Bilirubin Total 11/17/2023 0.3  <=1.2 mg/dL Final    ABO/RH(D) 11/17/2023 O POS   Final    Antibody Screen 11/17/2023 Negative  Negative Final    SPECIMEN EXPIRATION DATE 11/17/2023 [id number]   Final    Amphetamines Urine 11/17/2023 Screen Negative  Screen Negative Final    Cutoff for a negative amphetamine is less than 500 ng/mL.    Barbituates Urine 11/17/2023 Screen Negative  Screen Negative Final    Cutoff for a negative barbiturate is less than 200 ng/mL.    Benzodiazepine Urine 11/17/2023  Screen Negative  Screen Negative Final    Cutoff for a negative benzodiazepine is less than 100 ng/mL.    Cannabinoids Urine 11/17/2023 Screen Negative  Screen Negative Final    Cutoff for a negative cannabinoid is less than 50 ng/mL.    Cocaine Urine 11/17/2023 Screen Negative  Screen Negative Final    Cutoff for a negative cocaine is less than 300 ng/mL.    Fentanyl Qual Urine 11/17/2023 Screen Positive (A)  Screen Negative Final    Cutoff for positive fentanyl is 5 ng/mL or greater.   This is an unconfirmed screening result to be used for medical purposes only.    Opiates Urine 11/17/2023 Screen Negative  Screen Negative Final    Cutoff for a negative opiate is less than 300 ng/mL.    PCP Urine 11/17/2023 Screen Negative  Screen Negative Final    Cutoff for a negative PCP is less than 25 ng/mL.    Hemoglobin 11/18/2023 10.4 (L)  11.7 - 15.7 g/dL Final    Hold Specimen 11/18/2023 [facility]   Final

## 2023-12-04 NOTE — PATIENT INSTRUCTIONS
Patient Education   Here is the plan from today's visit    WE WILL TOUCH BASE WITH DR NAPOLES         Please call or return to clinic if your symptoms don't go away.    Follow up plan  No follow-ups on file.    Thank you for coming to [facility] today.  Lab Testing:  **If you had lab testing today and your results are reassuring or normal they will be mailed to you or sent through [employer] within 7 days.   **If the lab tests need quick action we will call you with the results.  **If you are having labs done on a different day, please call [phone] to schedule at [facility] or [phone] for other [facility] locations. Labs do not offer walk-in appointments.  The phone number we will call with results is # [phone] (home) . If this is not the best number please call our clinic and change the number.  Medication Refills:  If you need any refills please call your pharmacy and they will contact us.   If you need to  your refill at a new pharmacy, please contact the new pharmacy directly. The new pharmacy will help you get your medications transferred faster.   Scheduling:  If you have any concerns about today's visit or wish to schedule another appointment please call our office during normal business hours [phone] (8-5:00 M-F). If you can no longer make a scheduled visit, please cancel via [employer] or call us to cancel.   If a referral was made to an [facility] specialty provider and you do not get a call from central scheduling, please refer to directions on your visit summary or call our office during normal business hours for assistance.   If a Mammogram was ordered for you at the [facility] call [phone] to schedule or change your appointment.  If you had an XRay/CT/Ultrasound/MRI ordered the number is [phone] to schedule or change your radiology appointment.   [facility] has limited ultrasound appointments available on Wednesdays, if you  would like your ultrasound at [facility], please call [phone] to schedule.   Medical Concerns:  If you have urgent medical concerns please call [phone] at any time of the day.    Linda Ch MD

## 2023-12-18 ENCOUNTER — HOSPITAL ENCOUNTER (EMERGENCY)
Facility: CLINIC | Age: 28
Discharge: HOME | End: 2023-12-18
Attending: EMERGENCY MEDICINE | Admitting: EMERGENCY MEDICINE
Payer: COMMERCIAL

## 2023-12-18 VITALS
HEART RATE: 80 BPM | TEMPERATURE: 98.4 F | SYSTOLIC BLOOD PRESSURE: 144 MMHG | DIASTOLIC BLOOD PRESSURE: 94 MMHG | BODY MASS INDEX: 26.66 KG/M2 | WEIGHT: 141.1 LBS | RESPIRATION RATE: 16 BRPM | OXYGEN SATURATION: 100 %

## 2023-12-18 DIAGNOSIS — L02.219 CELLULITIS AND ABSCESS OF TRUNK: ICD-10-CM

## 2023-12-18 DIAGNOSIS — L03.319 CELLULITIS AND ABSCESS OF TRUNK: ICD-10-CM

## 2023-12-18 LAB
ALBUMIN SERPL BCG-MCNC: 4 G/DL (ref 3.5–5.2)
ALBUMIN UR-MCNC: NEGATIVE MG/DL
ALP SERPL-CCNC: 114 U/L (ref 40–150)
ALT SERPL W P-5'-P-CCNC: 40 U/L (ref 0–50)
ANION GAP SERPL CALCULATED.3IONS-SCNC: 10 MMOL/L (ref 7–15)
APPEARANCE UR: ABNORMAL
AST SERPL W P-5'-P-CCNC: 30 U/L (ref 0–45)
BASOPHILS # BLD AUTO: 0 10E3/UL (ref 0–0.2)
BASOPHILS NFR BLD AUTO: 1 %
BILIRUB SERPL-MCNC: 0.2 MG/DL
BILIRUB UR QL STRIP: NEGATIVE
BUN SERPL-MCNC: 9 MG/DL (ref 6–20)
CALCIUM SERPL-MCNC: 9.4 MG/DL (ref 8.6–10)
CHLORIDE SERPL-SCNC: 105 MMOL/L (ref 98–107)
COLOR UR AUTO: ABNORMAL
CREAT SERPL-MCNC: 0.53 MG/DL (ref 0.51–0.95)
DEPRECATED HCO3 PLAS-SCNC: 24 MMOL/L (ref 22–29)
EGFRCR SERPLBLD CKD-EPI 2021: >90 ML/MIN/1.73M2
EOSINOPHIL # BLD AUTO: 0.2 10E3/UL (ref 0–0.7)
EOSINOPHIL NFR BLD AUTO: 3 %
ERYTHROCYTE [DISTWIDTH] IN BLOOD BY AUTOMATED COUNT: 12.6 % (ref 10–15)
GLUCOSE SERPL-MCNC: 94 MG/DL (ref 70–99)
GLUCOSE UR STRIP-MCNC: NEGATIVE MG/DL
HCT VFR BLD AUTO: 41.5 % (ref 35–47)
HGB BLD-MCNC: 13.5 G/DL (ref 11.7–15.7)
HGB UR QL STRIP: NEGATIVE
IMM GRANULOCYTES # BLD: 0 10E3/UL
IMM GRANULOCYTES NFR BLD: 0 %
KETONES UR STRIP-MCNC: NEGATIVE MG/DL
LEUKOCYTE ESTERASE UR QL STRIP: ABNORMAL
LYMPHOCYTES # BLD AUTO: 2.1 10E3/UL (ref 0.8–5.3)
LYMPHOCYTES NFR BLD AUTO: 27 %
MCH RBC QN AUTO: 29.3 PG (ref 26.5–33)
MCHC RBC AUTO-ENTMCNC: 32.5 G/DL (ref 31.5–36.5)
MCV RBC AUTO: 90 FL (ref 78–100)
MONOCYTES # BLD AUTO: 0.6 10E3/UL (ref 0–1.3)
MONOCYTES NFR BLD AUTO: 7 %
MUCOUS THREADS #/AREA URNS LPF: PRESENT /LPF
NEUTROPHILS # BLD AUTO: 4.8 10E3/UL (ref 1.6–8.3)
NEUTROPHILS NFR BLD AUTO: 62 %
NITRATE UR QL: NEGATIVE
NRBC # BLD AUTO: 0 10E3/UL
NRBC BLD AUTO-RTO: 0 /100
PH UR STRIP: 5.5 [PH] (ref 5–7)
PLATELET # BLD AUTO: 219 10E3/UL (ref 150–450)
POTASSIUM SERPL-SCNC: 3.7 MMOL/L (ref 3.4–5.3)
PROT SERPL-MCNC: 7.5 G/DL (ref 6.4–8.3)
RBC # BLD AUTO: 4.61 10E6/UL (ref 3.8–5.2)
RBC URINE: 4 /HPF
SODIUM SERPL-SCNC: 139 MMOL/L (ref 135–145)
SP GR UR STRIP: 1.02 (ref 1–1.03)
SQUAMOUS EPITHELIAL: 7 /HPF
TRANSITIONAL EPI: 1 /HPF
UROBILINOGEN UR STRIP-MCNC: NORMAL MG/DL
WBC # BLD AUTO: 7.7 10E3/UL (ref 4–11)
WBC URINE: 14 /HPF

## 2023-12-18 PROCEDURE — 80053 COMPREHEN METABOLIC PANEL: CPT | Performed by: EMERGENCY MEDICINE

## 2023-12-18 PROCEDURE — 81001 URINALYSIS AUTO W/SCOPE: CPT | Performed by: EMERGENCY MEDICINE

## 2023-12-18 PROCEDURE — 87086 URINE CULTURE/COLONY COUNT: CPT | Performed by: EMERGENCY MEDICINE

## 2023-12-18 PROCEDURE — 10061 I&D ABSCESS COMP/MULTIPLE: CPT | Performed by: EMERGENCY MEDICINE

## 2023-12-18 PROCEDURE — 250N000013 HC RX MED GY IP 250 OP 250 PS 637: Performed by: EMERGENCY MEDICINE

## 2023-12-18 PROCEDURE — 99283 EMERGENCY DEPT VISIT LOW MDM: CPT | Mod: 25 | Performed by: EMERGENCY MEDICINE

## 2023-12-18 PROCEDURE — 36415 COLL VENOUS BLD VENIPUNCTURE: CPT | Performed by: EMERGENCY MEDICINE

## 2023-12-18 PROCEDURE — 85004 AUTOMATED DIFF WBC COUNT: CPT | Performed by: EMERGENCY MEDICINE

## 2023-12-18 RX ORDER — NIFEDIPINE 30 MG/1
30 TABLET, EXTENDED RELEASE ORAL ONCE
Status: COMPLETED | OUTPATIENT
Start: 2023-12-18 | End: 2023-12-18

## 2023-12-18 RX ORDER — CEPHALEXIN 500 MG/1
500 CAPSULE ORAL 4 TIMES DAILY
Qty: 28 CAPSULE | Refills: 0 | Status: SHIPPED | OUTPATIENT
Start: 2023-12-18 | End: 2023-12-25

## 2023-12-18 RX ORDER — NIFEDIPINE 30 MG
30 TABLET, EXTENDED RELEASE ORAL DAILY
Qty: 7 TABLET | Refills: 0 | Status: SHIPPED | OUTPATIENT
Start: 2023-12-18 | End: 2023-12-25

## 2023-12-18 RX ORDER — LIDOCAINE HYDROCHLORIDE 10 MG/ML
10 INJECTION, SOLUTION INFILTRATION; PERINEURAL
Status: DISCONTINUED | OUTPATIENT
Start: 2023-12-18 | End: 2023-12-18 | Stop reason: HOSPADM

## 2023-12-18 RX ADMIN — NIFEDIPINE 30 MG: 30 TABLET, FILM COATED, EXTENDED RELEASE ORAL at 09:03

## 2023-12-18 ASSESSMENT — ACTIVITIES OF DAILY LIVING (ADL)
ADLS_ACUITY_SCORE: 37
ADLS_ACUITY_SCORE: 37

## 2023-12-18 NOTE — DISCHARGE INSTRUCTIONS
Please make an appointment to follow up with Your Primary Care Provider in 2-3 days to have packing removed from your arm and follow-up on your blood pressure.    Take cephalexin as prescribed for underarm infection.  Take nifedipine as prescribed for high blood pressure.    If you have any worsening symptoms including increased pain, swelling, fevers, abdominal pain, shortness of breath, severe headache, feeling as if you are going to pass out or other concerns return to the emergency department for reevaluation.

## 2023-12-18 NOTE — ED TRIAGE NOTES
Pt presents to the ER complaining of an abscess in the\ right armpit . Pt has had the wound off and on for years, at present this wound has been growing the past four days. Has an appointment with dermatology in March but pain became unbearable.

## 2023-12-18 NOTE — ED PROVIDER NOTES
ED Provider Note  [facility]      History     Chief Complaint   Patient presents with    Wound Infection     Right armpit justyna Argueta is a 28 year old female who presents to the emergency department with a wound under her right axilla.  She reports this developed approximately 4 days ago.  She denies any trauma to the area.  She has not had any fevers.  She reports that the area is painful but she is able to fully range her arm.  She has had recurrent episodes of this in the past and referred to dermatology but is not able to follow-up until March.  She has not noted any drainage from the site.  She is currently breast-feeding.  She denies any abdominal pain, chest pain, shortness of breath, lower extremity swelling, headaches.  She reports she has not had any elevated blood pressures before or after her pregnancy except during her actual delivery.  She is not on any medications for blood pressure management.    Physical Exam   BP: (!) 131/102  Pulse: 86  Temp: 98.4  F (36.9  C)  Resp: 16  Weight: 64 kg (141 lb 1.6 oz)  SpO2: 100 %  Physical Exam  General: patient is alert and oriented and in no acute distress   Head: atraumatic and normocephalic   EENT: moist mucus membranes   Cardiovascular: regular rate and rhythm, extremities warm and well perfused, no lower extremity edema  Pulmonary: No respiratory distress   Abd: No abdominal tenderness to palpation  musculoskeletal: normal range of motion of the right upper extremity  Neurological: alert and oriented, moving all extremities symmetrically, gait normal   Skin: warm, dry , tender red swelling in the right axilla with fluctuance    ED Course, Procedures, & Data      [facility]    PROCEDURE: -Incision/Drainage    Date/Time: 12/18/2023 3:21 PM    Performed by: Milena Maldonado MD  Authorized by: Milena Maldonado MD    Emergent condition/consent  implied      LOCATION:      Type:  Abscess    Location:  Upper extremity    Upper extremity location: right axilla.    PRE-PROCEDURE DETAILS:     Skin preparation:  Chloraprep    PROCEDURE TYPE:     Complexity:  Complex    ANESTHESIA (see MAR for exact dosages):     Anesthesia method:  Local infiltration    Local anesthetic:  Lidocaine 1% w/o epi    PROCEDURE DETAILS:     Incision types:  Single straight    Scalpel blade:  11    Wound management:  Probed and deloculated and irrigated with saline    Drainage:  Purulent    Drainage amount:  Moderate    Wound treatment:  Wound left open    Packing materials:  1/4 in iodoform gauze    PROCEDURE    Patient Tolerance:  Patient tolerated the procedure well with no immediate complications                  No results found for any visits on 12/18/23.  Medications - No data to display  Labs Ordered and Resulted from Time of ED Arrival to Time of ED Departure - No data to display  No orders to display          Critical care was not performed.     Medical Decision Making  The patient's presentation was of moderate complexity (an undiagnosed new problem with uncertain diagnosis).    The patient's evaluation involved:  review of external note(s) from 1 sources (OB notes)  ordering and/or review of 3+ test(s) in this encounter (see separate area of note for details)  discussion of management or test interpretation with another health professional (OB)    The patient's management necessitated moderate risk (a decision regarding minor procedure (incision & drainage) with risk factors of none).    Assessment & Plan    Ms. Argueta is a 28 year old female who presents to the emergency department with a wound under her right axilla.  She is noted to be hypertensive.  No abdominal pain noted on exam.  Denies any headache, chest pain, shortness of breath, lower extremity swelling.  Laboratory studies show no elevation in LFTs, normal platelet count, normal hemoglobin.  UA is contaminated  but no protein is noted.  She denies any urinary symptoms.  Discussed with OB and recommended initiation on nifedipine.  She was given nifedipine in the ED.  I did discuss with OB blood pressure parameters prior to discharge and she is okay to discharge with her current blood pressures with plan to continue outpatient nifedipine.  In regards to the right axillary lesion this was incised and drained at bedside with purulent discharge.  Packing was placed.  She will plan to follow-up with her primary care provider in 2 to 3 days for removal of the packing, reevaluation of the wound and recheck of her blood pressure.  She does have surrounding erythema at the site and was also given Keflex for associated cellulitis.  She was given close return precautions for the emergency department and voiced understanding.       I have reviewed the nursing notes. I have reviewed the findings, diagnosis, plan and need for follow up with the patient.    Discharge Medication List as of 12/18/2023 11:04 AM        START taking these medications    Details   cephALEXin (KEFLEX) 500 MG capsule Take 1 capsule (500 mg) by mouth 4 times daily for 7 days, Disp-28 capsule, R-0, Local Print      NIFEdipine ER (ADALAT CC) 30 MG 24 hr tablet Take 1 tablet (30 mg) by mouth daily for 7 days, Disp-7 tablet, R-0, Local Print             Final diagnoses:   Postpartum hypertension   Cellulitis and abscess of trunk       Milena Galeano MD  [facility] EMERGENCY DEPARTMENT  12/18/2023     Milena Galeano MD  12/18/23 [id number]

## 2023-12-19 ENCOUNTER — TELEPHONE (OUTPATIENT)
Dept: FAMILY MEDICINE | Facility: CLINIC | Age: 28
End: 2023-12-19
Payer: COMMERCIAL

## 2023-12-19 LAB — BACTERIA UR CULT: NORMAL

## 2023-12-19 NOTE — TELEPHONE ENCOUNTER
Called Jaqueline regarding ED visit follow up (saw lab results).  Zander states she tried to call yesterday to make an apt.  Mikael'd for 12/21 @ 0840 w Dr. Worthy. Mikael'd to see Dr. Pinon at 10am anyways, so that works well for her.  Advised to call if any concerns arise. States she's going to get abx today-advised sooner the better.  Unsure if it was mastitis that lead to an abscess, per report.  When asked, reports infant continue to breastfeed w no concerns from Jaqueline-empties breasts well per report.  Advised to call if concerns arise.  Mayelin DARBY, CNM, OB CC

## 2023-12-19 NOTE — TELEPHONE ENCOUNTER
Called Jaqueline back to check on BP.  Rev'd warning s/s and to call back if present otherwise, we will check on BP Thursday.  Mayelin DARBY, EDGARDM, OB CC

## 2023-12-21 ENCOUNTER — OFFICE VISIT (OUTPATIENT)
Dept: FAMILY MEDICINE | Facility: CLINIC | Age: 28
End: 2023-12-21
Payer: COMMERCIAL

## 2023-12-21 VITALS
HEART RATE: 81 BPM | BODY MASS INDEX: 27.06 KG/M2 | HEIGHT: 61 IN | DIASTOLIC BLOOD PRESSURE: 75 MMHG | RESPIRATION RATE: 18 BRPM | WEIGHT: 143.3 LBS | OXYGEN SATURATION: 98 % | SYSTOLIC BLOOD PRESSURE: 108 MMHG

## 2023-12-21 DIAGNOSIS — L02.419 AXILLARY ABSCESS: Primary | ICD-10-CM

## 2023-12-21 PROCEDURE — 99214 OFFICE O/P EST MOD 30 MIN: CPT | Mod: GC | Performed by: STUDENT IN AN ORGANIZED HEALTH CARE EDUCATION/TRAINING PROGRAM

## 2023-12-21 RX ORDER — CHLORHEXIDINE GLUCONATE ORAL RINSE 1.2 MG/ML
15 SOLUTION DENTAL 2 TIMES DAILY
Qty: 473 ML | Refills: 3 | Status: SHIPPED | OUTPATIENT
Start: 2023-12-21 | End: 2023-12-21

## 2023-12-21 RX ORDER — CLINDAMYCIN PHOSPHATE 10 UG/ML
LOTION TOPICAL 2 TIMES DAILY
Qty: 120 ML | Refills: 3 | Status: SHIPPED | OUTPATIENT
Start: 2023-12-21

## 2023-12-21 NOTE — PROGRESS NOTES
"  Assessment & Plan     Axillary abscess  ED follow up for an axillary abscess, drained and packed in ED. Removed dressing and cleaned wound today - looks clean and non infected. She is finishing a course of Keflex from the ED. She has had these lesions for at least 2-3 years now, and was planned to see Derm coming up (tho they cancelled that appt and rescheduled her to later) - as there is concern for HS given the chronicty, locations, and c/f sinus tracts. We will prescribe topical clindamycin and peridex to use (ONCE THE WOUND HAS HEALED) also see if we can refer [facility] (outside derm) to get her in sooner. Discussed general wound cares now - a general cover/dressing should suffice to allow drainage and healing of the open lesion.     - clindamycin (CLEOCIN T) 1 % external lotion; Apply topically 2 times daily  - chlorhexidine (PERIDEX) 0.12 % solution; Swish and spit 15 mLs in mouth 2 times daily  - Adult Dermatology  Referral; Future           BMI:   Estimated body mass index is 27.08 kg/m  as calculated from the following:    Height as of this encounter: 1.549 m (5' 1\").    Weight as of this encounter: 65 kg (143 lb 4.8 oz).       Soy Worthy DO  [facility] DONTE Parsons is a 28 year old, presenting for the following health issues:  Follow Up (Ed check up and wound packing removal )    HPI     ED Follow Up  Axilla Wound  Was seen 12/18 in ED - at the time reported a wound that started 4 days prior (no trauma or skin break or anything inciting she noted) - InD was performed w/ purulent drainage + Kelfex; she was also started on Nifedipine for blood pressures   She has a history of these - saw us in August about these where we tried to send in a Clindamycin topical and get her into Derm - these have been going on for at least 2-3 years now  Only ever in the Axilla, never in other skin folds  Can sometimes open on their own?   Was supposed to have a derm " "appt soon, but they cancelled and rescheduled her into May     Review of Systems   Constitutional, HEENT, cardiovascular, pulmonary, gi and gu systems are negative, except as otherwise noted.      Objective    /75   Pulse 81   Resp 18   Ht 1.549 m (5' 1\")   Wt 65 kg (143 lb 4.8 oz)   SpO2 98%   BMI 27.08 kg/m    Body mass index is 27.08 kg/m .  Physical Exam   GENERAL: healthy, alert and no distress  SKIN:   Right Axilla  - [place] open cyst/abscess - not actively draining now and cannot express any drainage  - Induration - would saw the total lesion is around 3cm in length, 1-2cm in width  - No erythema around the axilla, just only immediately around the drainage site   - Can feel another more 1-2cm nodule under her skin in the axilla just north of this lesion    Left Axilla  - Can appreciate 2-3 nodules, each 1-2cm, in the axilla as well           "

## 2023-12-21 NOTE — PATIENT INSTRUCTIONS
[place] Office  [address], [address]  [place] [address]  Office: [phone]  Fax:     [phone]

## 2023-12-26 ENCOUNTER — OFFICE VISIT (OUTPATIENT)
Dept: FAMILY MEDICINE | Facility: CLINIC | Age: 28
End: 2023-12-26
Payer: COMMERCIAL

## 2023-12-26 VITALS
DIASTOLIC BLOOD PRESSURE: 81 MMHG | HEART RATE: 94 BPM | SYSTOLIC BLOOD PRESSURE: 113 MMHG | OXYGEN SATURATION: 98 % | BODY MASS INDEX: 26.45 KG/M2 | WEIGHT: 140 LBS

## 2023-12-26 DIAGNOSIS — Z30.013 ENCOUNTER FOR INITIAL PRESCRIPTION OF INJECTABLE CONTRACEPTIVE: ICD-10-CM

## 2023-12-26 DIAGNOSIS — Z30.09 ENCOUNTER FOR COUNSELING REGARDING CONTRACEPTION: Primary | ICD-10-CM

## 2023-12-26 DIAGNOSIS — Z30.9 ENCOUNTER FOR CONTRACEPTIVE MANAGEMENT, UNSPECIFIED TYPE: Primary | ICD-10-CM

## 2023-12-26 PROCEDURE — 99213 OFFICE O/P EST LOW 20 MIN: CPT | Mod: GC

## 2023-12-26 RX ORDER — MEDROXYPROGESTERONE ACETATE 150 MG/ML
150 INJECTION, SUSPENSION INTRAMUSCULAR
Qty: 1 ML | Refills: 3 | OUTPATIENT
Start: 2023-12-26

## 2023-12-26 NOTE — PROGRESS NOTES
[facility]  Type of Contact: Face to Face in Clinic  Sandy Hunter Social Work Care Coordinator    Care Coordination: provided care management services/referrals necessary to ensure patient and their identified supports have access to medical, behavioral health, pharmacology and recovery support services.  Ensured that patient's care is integrated across all settings and services.     [facility] SW met with patient today following provider visit.     Patient recently had her baby. She is adjusting to her new baby, but overall feels good.     She states she is interested in getting re-scheduled with therapist, Dr. Linares.   SW was able to assist with getting patient scheduled.    No other acute social work needs at this time. She states he will let team know once she has a better routine in place. She appreciates the outreach from [facility] team.     DENZEL Finley  [facility]- Social Work Care Coordinator

## 2023-12-26 NOTE — PROGRESS NOTES
Assessment & Plan     Encounter for counseling regarding contraception  Encounter for initial prescription of injectable contraceptive  A long conversation was had with the patient regarding her options for contraception, wherein we reviewed all currently available methods including LARC's (hormonal/copper IUD, Nexplanon implant), sterilization, injectable contraception, OCPs, NuvaRing, fertility awareness based methods, withdrawal, and condoms.  At this time, patient is not interested in pursuing a LARC as she did have some uncomfortable cramping with the last administration and preferred to proceed with an injectable method.  Provided patient with informational handouts and encouraged her to follow-up if she desired a change in method.  - medroxyPROGESTERone (DEPO-PROVERA) 150 MG/ML IM injection; Inject 1 mL (150 mg) into the muscle every 3 months  - medroxyPROGESTERone (DEPO-PROVERA) injection 150 mg    Return in about 3 months (around 3/26/2024) for next depo shot or sooner if interested in alternative contraception.    Hardy Le[facility] DONTE Parsons is a 28 year old, presenting for the following health issues:  Contraception    HPI   5.5 weeks s/p vaginal delivery, here for possible Mirena placement. Has had this before and noted cramping with and post the procedure. Full consent done but while prepping for the procedure, she asked not to proceed with the IUD.  Chose depoprovera to cover her in the meantime.     Review of Systems   Constitutional, HEENT, cardiovascular, pulmonary, gi and gu systems are negative, except as otherwise noted.      Objective    /81   Pulse 94   Wt 63.5 kg (140 lb)   SpO2 98%   Breastfeeding Yes   BMI 26.45 kg/m    Body mass index is 26.45 kg/m .  Physical Exam   Vitals reviewed.   Constitutional: awake, alert, cooperative, in NAD  Eyes: Sclera without jaundice or injection, EOM intact   HENT: NCAT without lesions  or rashes  Respiratory: Normal pulmonary effort without coughing or wheezing  Skin: No visible lesions, erythema, rashes, or ecchymosis on exposed skin  Neurologic: A&Ox4, without focal deficit, cranial nerves II-XII grossly intact  Psychiatric: Alert & calm with appropriate affect, mood, insight, and thought processes

## 2023-12-27 PROCEDURE — 96372 THER/PROPH/DIAG INJ SC/IM: CPT | Performed by: FAMILY MEDICINE

## 2023-12-27 RX ORDER — MEDROXYPROGESTERONE ACETATE 150 MG/ML
150 INJECTION, SUSPENSION INTRAMUSCULAR
Status: ACTIVE | OUTPATIENT
Start: 2023-12-27 | End: 2024-12-21

## 2023-12-27 RX ADMIN — MEDROXYPROGESTERONE ACETATE 150 MG: 150 INJECTION, SUSPENSION INTRAMUSCULAR at 08:54

## 2023-12-29 ENCOUNTER — OFFICE VISIT (OUTPATIENT)
Dept: FAMILY MEDICINE | Facility: CLINIC | Age: 28
End: 2023-12-29
Payer: COMMERCIAL

## 2023-12-29 VITALS
TEMPERATURE: 96.8 F | SYSTOLIC BLOOD PRESSURE: 117 MMHG | OXYGEN SATURATION: 98 % | HEIGHT: 61 IN | WEIGHT: 145.7 LBS | BODY MASS INDEX: 27.51 KG/M2 | DIASTOLIC BLOOD PRESSURE: 79 MMHG | HEART RATE: 98 BPM

## 2023-12-29 DIAGNOSIS — F32.A DEPRESSION, UNSPECIFIED DEPRESSION TYPE: ICD-10-CM

## 2023-12-29 DIAGNOSIS — L02.419 AXILLARY ABSCESS: ICD-10-CM

## 2023-12-29 PROCEDURE — 99207 PR POST PARTUM EXAM: CPT | Mod: GC

## 2023-12-29 ASSESSMENT — ANXIETY QUESTIONNAIRES
1. FEELING NERVOUS, ANXIOUS, OR ON EDGE: MORE THAN HALF THE DAYS
6. BECOMING EASILY ANNOYED OR IRRITABLE: MORE THAN HALF THE DAYS
GAD7 TOTAL SCORE: 11
5. BEING SO RESTLESS THAT IT IS HARD TO SIT STILL: NOT AT ALL
2. NOT BEING ABLE TO STOP OR CONTROL WORRYING: MORE THAN HALF THE DAYS
GAD7 TOTAL SCORE: 11
IF YOU CHECKED OFF ANY PROBLEMS ON THIS QUESTIONNAIRE, HOW DIFFICULT HAVE THESE PROBLEMS MADE IT FOR YOU TO DO YOUR WORK, TAKE CARE OF THINGS AT HOME, OR GET ALONG WITH OTHER PEOPLE: SOMEWHAT DIFFICULT
3. WORRYING TOO MUCH ABOUT DIFFERENT THINGS: MORE THAN HALF THE DAYS
7. FEELING AFRAID AS IF SOMETHING AWFUL MIGHT HAPPEN: SEVERAL DAYS

## 2023-12-29 ASSESSMENT — PATIENT HEALTH QUESTIONNAIRE - PHQ9
5. POOR APPETITE OR OVEREATING: MORE THAN HALF THE DAYS
SUM OF ALL RESPONSES TO PHQ QUESTIONS 1-9: 8

## 2023-12-29 NOTE — PROGRESS NOTES
Assessment and Plan     Routine postpartum follow-up   P2 parent. S/P  23. Mood concerns addressed as below. Otherwise doing well. Breastfeeding without difficulty. No significant pain. Bleeding resolved. On Depo for contraception. UTD on pap smear and TDAP.   - Return in 4 weeks for recheck     Depression, uncontrolled   Patient with longstanding history of depression. Reports slightly improved from previous. No SI/HI. Interested in continuing therapy and [facility]. Not interested at medications at this time. No acute concerns for safety at this time, but will continue close monitoring and support of patient as below.   - [facility] will call to help schedule with Dr. Linares   - Return in 4 weeks for recheck   - Reviewed at length return precautions for worsening mood, SI/HI; patient voiced understanding and agreement with plan     Postpartum HTN, resolved   Diastolic pressure 102 on ED visit  in setting of pain from axillary abscess. Completed 1-week of Nifedipine. BP normal today. Negative HELLP labs . No indication for continued therapy.   - Continue to monitor     Axillary abscess s/p drainage   Folliculitis vs HS   Long history of cysts to axilla. Required drainage in ED . Mainly superficial, but some appear to have sinus tracts that may be hidraadenitis. Has Dermatology referral for further evaluation, but booked out. Will continue management as able to in the meantime.   - Continue Clindamycin and warm compresses   - Return in 4 weeks for recheck; if not improving, consider more urgent Derm referral     Options for treatment and follow-up care were reviewed with the patient and/or guardian. Jaqueline Argueta and/or guardian engaged in the decision making process and verbalized understanding of the options discussed and agreed with the final plan.    Linda Ch MD         HPI-Post Partum Visit -         Jaqueline Argueta is a 28 year old  female  with a significant past medical  history of depression who presents for a postpartum visit.     Routine post-partum:      - S/P  at 38w4d on 23 of viable male infant. Pregnancy was complicated by hyperemesis gravidarum, depression (not on medications), early alcohol use (none current), and intermittent marijuana use. Delivery was complicated by precipitous status, mild range blood pressures, and inadvertent IV Methergine administration.    - Breast feeding: Breastfeeding going well, every 1-3 hrs, 8-12 times/24 hours   - Abdominal pain: No  - Bleeding since delivery: Stopped last week. HGB 13.5 in ED    - Contraception choice: Depo. Last 23   - Patient has had intercourse since delivery. No pain.   - Last PAP: NIL , not due.     Mood:  - Last visit was tearful and endorsed low mood. Involved in therapy. Declined medications.   - Missed [facility] appointment ; open to rescheduling   - No SI/HI   - Declines medications     Blood pressure:  - Evaluated in ED  for axillary abscess. Noted to be hypertensive with diastolic 102. HELLP labs negative. Started on Nifedipine.   - Current regimen: Nifedipine 30 mg daily x7 days   - Feels like BP was due to pain in the ED   - No CP, SOB, abdominal pain, swelling     Abscess:  - Feels better  - Using Clindamycin and warm compresses   - Derm appointment 2024     Questions for Caregiver to screen for Post Partum Depression:    During the past month, have you often been bothered by feeling down, depressed, or hopeless? Sometimes  During the past month, have you often been bothered by having little interest or pleasure in doing things? Sometimes     Pospartum Depression screen:    At least one item positive so mother completed PHQ-9. Result was PHQ9 8 and GAD7 11        2023    10:00 AM 2023     1:28 PM 2023     2:17 PM   PHQ   PHQ-9 Total Score 10 5 8   Q9: Thoughts of better off dead/self-harm past 2 weeks Not at all Not at all Not at all           2021     10:00 AM 2023     2:17 PM   SHERIDAN-7 SCORE   Total Score 15 11     Receiving dental care Yes   Calcium intake is Yes       Obstetric History:  OB History    Para Term  AB Living   3 2 2 0 1 2   SAB IAB Ectopic Multiple Live Births   0 1 0 0 2      # Outcome Date GA Lbr Paul/2nd Weight Sex Delivery Anes PTL Lv   3 Term 23 38w4d  3.06 kg (6 lb 11.9 oz) M Vag-Spont None N SONG      Name: Luis Cameron   2 Term 11 39w1d 02:05 / 00:11 2.977 kg (6 lb 9 oz) F Vag-Spont None N SONG      Name: LIZYZ VALADEZ      Apgar1: 9  Apgar5: 9   1 IAB      IAB        Patient Active Problem List   Diagnosis    Condyloma acuminata    Need for Tdap vaccination    Nausea/vomiting in pregnancy    Nausea and vomiting of pregnancy, antepartum    Rubella non-immune status, antepartum    Supervision of high risk pregnancy, antepartum    Depression complicating pregnancy, antepartum, third trimester    Hyperemesis gravidarum    Long term current use of cannabis    Pregnancy complicated by tobacco use in second trimester    Delivery normal       Current Outpatient Medications   Medication Sig Dispense Refill    chlorhexidine (HIBICLENS) 4 % liquid Wash armpits with this liquid with each shower. Let sit for 30-60 seconds before rinsing. 946 mL 3    clindamycin (CLEOCIN T) 1 % external lotion Apply topically 2 times daily 120 mL 3    medroxyPROGESTERone (DEPO-PROVERA) 150 MG/ML IM injection Inject 1 mL (150 mg) into the muscle every 3 months 1 mL 3    NIFEdipine ER (ADALAT CC) 30 MG 24 hr tablet Take 1 tablet (30 mg) by mouth daily for 7 days 7 tablet 0                       No Known Allergies         Review of Systems:     CONSTITUTIONAL: no fatigue, no unexpected change in weight  SKIN: no worrisome rashes or lesions  EYES: no acute vision problems or changes  ENT: no ear problems, no mouth problems, no throat problems  RESP: no significant cough, no shortness of breath  CV: no chest pain, no palpitations, no new  or worsening peripheral edema  GI: no nausea, no vomiting, no constipation, no diarrhea  : no frequency, no dysuria, no hematuria  NEURO: no weakness, no dizziness, no headaches  ENDOCRINE: no temperature intolerance, no skin/hair changes  PSYCHIATRIC: NEGATIVE for changes in mood or trouble with sleep            Physical Exam:     There were no vitals filed for this visit.    GENERAL: healthy, alert, well nourished, well hydrated, no distress  HENT: ear canals- normal; TMs- normal; Nose- normal; Mouth- no ulcers, no lesions  NECK: no tenderness, no adenopathy, no asymmetry, no masses, no stiffness; thyroid- normal to palpation  RESP: lungs clear to auscultation - no rales, no rhonchi, no wheezes  CV: regular rates and rhythm, normal S1 S2, no S3 or S4 and no murmur, no click or rub -  ABDOMEN: soft, no tenderness, no  hepatosplenomegaly, no masses, normal bowel sounds      Admission on 12/18/2023, Discharged on 12/18/2023   Component Date Value Ref Range Status    Sodium 12/18/2023 139  135 - 145 mmol/L Final    Reference intervals for this test were updated on 09/26/2023 to more accurately reflect our healthy population. There may be differences in the flagging of prior results with similar values performed with this method. Interpretation of those prior results can be made in the context of the updated reference intervals.     Potassium 12/18/2023 3.7  3.4 - 5.3 mmol/L Final    Carbon Dioxide (CO2) 12/18/2023 24  22 - 29 mmol/L Final    Anion Gap 12/18/2023 10  7 - 15 mmol/L Final    Urea Nitrogen 12/18/2023 9.0  6.0 - 20.0 mg/dL Final    Creatinine 12/18/2023 0.53  0.51 - 0.95 mg/dL Final    GFR Estimate 12/18/2023 >90  >60 mL/min/1.73m2 Final    Calcium 12/18/2023 9.4  8.6 - 10.0 mg/dL Final    Chloride 12/18/2023 105  98 - 107 mmol/L Final    Glucose 12/18/2023 94  70 - 99 mg/dL Final    Alkaline Phosphatase 12/18/2023 114  40 - 150 U/L Final    Reference intervals for this test were updated on 11/14/2023 to  more accurately reflect our healthy population. There may be differences in the flagging of prior results with similar values performed with this method. Interpretation of those prior results can be made in the context of the updated reference intervals.    AST 12/18/2023 30  0 - 45 U/L Final    Reference intervals for this test were updated on 6/12/2023 to more accurately reflect our healthy population. There may be differences in the flagging of prior results with similar values performed with this method. Interpretation of those prior results can be made in the context of the updated reference intervals.    ALT 12/18/2023 40  0 - 50 U/L Final    Reference intervals for this test were updated on 6/12/2023 to more accurately reflect our healthy population. There may be differences in the flagging of prior results with similar values performed with this method. Interpretation of those prior results can be made in the context of the updated reference intervals.      Protein Total 12/18/2023 7.5  6.4 - 8.3 g/dL Final    Albumin 12/18/2023 4.0  3.5 - 5.2 g/dL Final    Bilirubin Total 12/18/2023 0.2  <=1.2 mg/dL Final    Color Urine 12/18/2023 Light Yellow  Colorless, Straw, Light Yellow, Yellow Final    Appearance Urine 12/18/2023 Slightly Cloudy (A)  Clear Final    Glucose Urine 12/18/2023 Negative  Negative mg/dL Final    Bilirubin Urine 12/18/2023 Negative  Negative Final    Ketones Urine 12/18/2023 Negative  Negative mg/dL Final    Specific Gravity Urine 12/18/2023 1.022  1.003 - 1.035 Final    Blood Urine 12/18/2023 Negative  Negative Final    pH Urine 12/18/2023 5.5  5.0 - 7.0 Final    Protein Albumin Urine 12/18/2023 Negative  Negative mg/dL Final    Urobilinogen Urine 12/18/2023 Normal  Normal, 2.0 mg/dL Final    Nitrite Urine 12/18/2023 Negative  Negative Final    Leukocyte Esterase Urine 12/18/2023 Moderate (A)  Negative Final    Mucus Urine 12/18/2023 Present (A)  None Seen /LPF Final    RBC Urine 12/18/2023  4 (H)  <=2 /HPF Final    WBC Urine 12/18/2023 14 (H)  <=5 /HPF Final    Squamous Epithelials Urine 12/18/2023 7 (H)  <=1 /HPF Final    Transitional Epithelials Urine 12/18/2023 1  <=1 /HPF Final    WBC Count 12/18/2023 7.7  4.0 - 11.0 10e3/uL Final    RBC Count 12/18/2023 4.61  3.80 - 5.20 10e6/uL Final    Hemoglobin 12/18/2023 13.5  11.7 - 15.7 g/dL Final    Hematocrit 12/18/2023 41.5  35.0 - 47.0 % Final    MCV 12/18/2023 90  78 - 100 fL Final    MCH 12/18/2023 29.3  26.5 - 33.0 pg Final    MCHC 12/18/2023 32.5  31.5 - 36.5 g/dL Final    RDW 12/18/2023 12.6  10.0 - 15.0 % Final    Platelet Count 12/18/2023 219  150 - 450 10e3/uL Final    % Neutrophils 12/18/2023 62  % Final    % Lymphocytes 12/18/2023 27  % Final    % Monocytes 12/18/2023 7  % Final    % Eosinophils 12/18/2023 3  % Final    % Basophils 12/18/2023 1  % Final    % Immature Granulocytes 12/18/2023 0  % Final    NRBCs per 100 WBC 12/18/2023 0  <1 /100 Final    Absolute Neutrophils 12/18/2023 4.8  1.6 - 8.3 10e3/uL Final    Absolute Lymphocytes 12/18/2023 2.1  0.8 - 5.3 10e3/uL Final    Absolute Monocytes 12/18/2023 0.6  0.0 - 1.3 10e3/uL Final    Absolute Eosinophils 12/18/2023 0.2  0.0 - 0.7 10e3/uL Final    Absolute Basophils 12/18/2023 0.0  0.0 - 0.2 10e3/uL Final    Absolute Immature Granulocytes 12/18/2023 0.0  <=0.4 10e3/uL Final    Absolute NRBCs 12/18/2023 0.0  10e3/uL Final    Culture 12/18/2023 10,000-50,000 CFU/mL Mixture of Urogenital Emma   Final

## 2023-12-29 NOTE — Clinical Note
Apryl Huang :)  Please call and help get Jaqueline rescheduled with Dr. Linares for therapy. Thanks!  Alexandra

## 2023-12-29 NOTE — PATIENT INSTRUCTIONS
Patient Education   Here is the plan from today's visit      FOR MOOD:   EXPECT CALL TO SCHEDLE THERAPY  COME BACK IN 4 WEEKS     FOR ABSCESS:  CLINDAMYCIN ONCE DAILY   WARM COMPRESSES   COME BACK IN 4 WEEKS       Please call or return to clinic if your symptoms don't go away.    Follow up plan  No follow-ups on file.    Thank you for coming to [facility] today.  Lab Testing:  **If you had lab testing today and your results are reassuring or normal they will be mailed to you or sent through [employer] within 7 days.   **If the lab tests need quick action we will call you with the results.  **If you are having labs done on a different day, please call [phone] to schedule at [facility] or [phone] for other [facility]patient [facility] locations. Labs do not offer walk-in appointments.  The phone number we will call with results is # [phone] (home) . If this is not the best number please call our clinic and change the number.  Medication Refills:  If you need any refills please call your pharmacy and they will contact us.   If you need to  your refill at a new pharmacy, please contact the new pharmacy directly. The new pharmacy will help you get your medications transferred faster.   Scheduling:  If you have any concerns about today's visit or wish to schedule another appointment please call our office during normal business hours [phone] (8-5:00 M-F). If you can no longer make a scheduled visit, please cancel via [employer] or call us to cancel.   If a referral was made to an [facility] specialty provider and you do not get a call from central scheduling, please refer to directions on your visit summary or call our office during normal business hours for assistance.   If a Mammogram was ordered for you at the [facility] call [phone] to schedule or change your appointment.  If you had an XRay/CT/Ultrasound/MRI ordered the number is [phone] to schedule or change your  radiology appointment.   [facility] has limited ultrasound appointments available on Wednesdays, if you would like your ultrasound at [facility], please call [phone] to schedule.   Medical Concerns:  If you have urgent medical concerns please call [phone] at any time of the day.    Linda Ch MD

## 2024-01-02 ENCOUNTER — TELEPHONE (OUTPATIENT)
Dept: PSYCHOLOGY | Facility: CLINIC | Age: 29
End: 2024-01-02
Payer: COMMERCIAL

## 2024-01-02 NOTE — TELEPHONE ENCOUNTER
[facility]  Type of Contact: Phone call (patient / identified key support person reached)  Sandy Hunter Social Work Care Coordinator    Care Coordination: provided care management services/referrals necessary to ensure patient and their identified supports have access to medical, behavioral health, pharmacology and recovery support services.  Ensured that patient's care is integrated across all settings and services.     Telephone outreach to patient today. She had a recent visit with PCP last week and is interested in getting re-scheduled with Dr. Linares for therapy.     SW was able to connect with patient. She states she is doing well, she states her son is moving around a lot more and they are doing good.     SW assisted patient with getting scheduled with Dr. Linares 1/8 at 10:00AM. Patient is appreciative. Will provide reminder message as well towards the end of this week.     DENZEL Finley  [facility]- Social Work Care Coordinator

## 2024-01-15 ENCOUNTER — TELEPHONE (OUTPATIENT)
Dept: PSYCHOLOGY | Facility: CLINIC | Age: 29
End: 2024-01-15
Payer: COMMERCIAL

## 2024-01-15 NOTE — TELEPHONE ENCOUNTER
SW attempted to call patient today via phone. No answer, therefore left a voicemail and provided direct call back number. SW to continue to reach out to make contact and provide support as needed.     Sandy Martel, [facility]  Behavioral [facility] Home- Social Work Care Coordinator

## 2024-01-25 NOTE — PROGRESS NOTES
Assessment & Plan     Axillary hidradenitis suppurativa  Patient with long history of intermittent abscess to bilateral axilla. Evaluated in ED 12/2023 for abscess drainage. Presents today with persistent abscess formation to bilateral axilla despite treatment with topical antibiotic and warm compresses. Afebrile with reassuring vitals. Exam with areas of redness and induration to axilla, R>L. Given firmness, do not feel that drainage would be successful at this time. Concern for HS given history and frequency. Will start oral antibiotic- patient not lactating and does not plan to. Will also place more urgent Derm referral for definitive management.   - Adult Dermatology  Referral; Future  - doxycycline hyclate (VIBRAMYCIN) 100 MG capsule; Take 1 capsule (100 mg) by mouth 2 times daily  - Instructed to continue warm compresses to help with self-drainage  - Return in 4 weeks for recheck; consider adding anti-androgen if not improving     Depression  Patient with longstanding history of depression. Reports stable mood, but continues to feel depressed majority of time. No SI/HI. Interested in continuing therapy and [facility]. Considering medications. No acute concerns for safety at this time, but will continue close monitoring and support of patient as below.   - [facility] will call to help schedule with Dr. Linares   - Return in 4 weeks for recheck   - Reviewed at length return precautions for worsening mood, SI/HI; patient voiced understanding and agreement with plan     Subjective   Jaqueline is a 28 year old, presenting for the following health issues:    HPI     Abscess to axilla:  - Has frequent abscess to axilla   - Had them occasionally before, but now more frequent/severe  - Evaluated in ED for abscess drainage 12/18/23   - Has been using topical clindamycin and warm compresses without relief   - Derm appointment 5/2024   - Not breastfeeding and does not want to     Mood:  - Last evaluated 12/29/23.  "Longstanding depression. Follows with [facility] and therapy. No medications.   - Mood stable  - Reports feels down and stressed most of the time. Has always felt this way   - No SI/HI   - Does not want to meet with [facility] today   - Does not want medications; worried about depression worsening, has not tried in the past and does not know anyone who has had this effect   - Wants to think about medications at this time  - OK with frequent check ins         12/4/2023     1:28 PM 12/29/2023     2:17 PM 1/26/2024    11:15 AM   PHQ   PHQ-9 Total Score 5 8 9   Q9: Thoughts of better off dead/self-harm past 2 weeks Not at all Not at all Not at all         11/24/2021    10:00 AM 12/29/2023     2:17 PM 1/26/2024    11:16 AM   SHERIDAN-7 SCORE   Total Score   11 (moderate anxiety)   Total Score 15 11 11           Review of Systems   Skin:  Positive for rash.   All other systems reviewed and are negative.          Objective    /72 (BP Location: Left arm, Patient Position: Sitting, Cuff Size: Adult Large)   Pulse 100   Temp 99  F (37.2  C) (Oral)   Resp 18   Ht 1.549 m (5' 1\")   Wt 65.1 kg (143 lb 9.6 oz)   SpO2 96%   BMI 27.13 kg/m    Body mass index is 27.13 kg/m .  Physical Exam  Vitals reviewed.   Constitutional:       General: She is not in acute distress.     Appearance: Normal appearance.   HENT:      Head: Normocephalic and atraumatic.   Cardiovascular:      Rate and Rhythm: Normal rate and regular rhythm.      Heart sounds: No murmur heard.  Pulmonary:      Effort: Pulmonary effort is normal. No respiratory distress.      Breath sounds: No wheezing.   Musculoskeletal:      Right lower leg: No edema.      Left lower leg: No edema.   Skin:     General: Skin is warm and dry.      Capillary Refill: Capillary refill takes less than 2 seconds.      Comments: Bilateral axilla with multiple areas of erythema with underlying induration/firmness and purulence, not able to be drainge, R>L; see image below.    Neurological:      " General: No focal deficit present.      Mental Status: She is alert.   Psychiatric:         Mood and Affect: Mood normal.         Behavior: Behavior normal.                    Signed Electronically by: Linda Ch MD    Answers submitted by the patient for this visit:  Patient Health Questionnaire (Submitted on 1/26/2024)  If you checked off any problems, how difficult have these problems made it for you to do your work, take care of things at home, or get along with other people?: Somewhat difficult  PHQ9 TOTAL SCORE: 9  SHERIDAN-7 (Submitted on 1/26/2024)  SHERIDAN 7 TOTAL SCORE: 11

## 2024-01-26 ENCOUNTER — TELEPHONE (OUTPATIENT)
Dept: DERMATOLOGY | Facility: CLINIC | Age: 29
End: 2024-01-26

## 2024-01-26 ENCOUNTER — OFFICE VISIT (OUTPATIENT)
Dept: FAMILY MEDICINE | Facility: CLINIC | Age: 29
End: 2024-01-26
Payer: COMMERCIAL

## 2024-01-26 VITALS
HEIGHT: 61 IN | HEART RATE: 100 BPM | BODY MASS INDEX: 27.11 KG/M2 | SYSTOLIC BLOOD PRESSURE: 116 MMHG | DIASTOLIC BLOOD PRESSURE: 72 MMHG | WEIGHT: 143.6 LBS | RESPIRATION RATE: 18 BRPM | OXYGEN SATURATION: 96 % | TEMPERATURE: 99 F

## 2024-01-26 DIAGNOSIS — L73.2 AXILLARY HIDRADENITIS SUPPURATIVA: Primary | ICD-10-CM

## 2024-01-26 DIAGNOSIS — F32.A DEPRESSION, UNSPECIFIED DEPRESSION TYPE: ICD-10-CM

## 2024-01-26 PROCEDURE — 99214 OFFICE O/P EST MOD 30 MIN: CPT | Mod: GC

## 2024-01-26 RX ORDER — DOXYCYCLINE 100 MG/1
100 CAPSULE ORAL 2 TIMES DAILY
Qty: 30 CAPSULE | Refills: 3 | Status: SHIPPED | OUTPATIENT
Start: 2024-01-26 | End: 2024-01-29

## 2024-01-26 ASSESSMENT — ANXIETY QUESTIONNAIRES
7. FEELING AFRAID AS IF SOMETHING AWFUL MIGHT HAPPEN: SEVERAL DAYS
4. TROUBLE RELAXING: MORE THAN HALF THE DAYS
7. FEELING AFRAID AS IF SOMETHING AWFUL MIGHT HAPPEN: SEVERAL DAYS
GAD7 TOTAL SCORE: 11
GAD7 TOTAL SCORE: 11
3. WORRYING TOO MUCH ABOUT DIFFERENT THINGS: MORE THAN HALF THE DAYS
5. BEING SO RESTLESS THAT IT IS HARD TO SIT STILL: NOT AT ALL
2. NOT BEING ABLE TO STOP OR CONTROL WORRYING: MORE THAN HALF THE DAYS
IF YOU CHECKED OFF ANY PROBLEMS ON THIS QUESTIONNAIRE, HOW DIFFICULT HAVE THESE PROBLEMS MADE IT FOR YOU TO DO YOUR WORK, TAKE CARE OF THINGS AT HOME, OR GET ALONG WITH OTHER PEOPLE: SOMEWHAT DIFFICULT
1. FEELING NERVOUS, ANXIOUS, OR ON EDGE: NEARLY EVERY DAY
6. BECOMING EASILY ANNOYED OR IRRITABLE: SEVERAL DAYS
8. IF YOU CHECKED OFF ANY PROBLEMS, HOW DIFFICULT HAVE THESE MADE IT FOR YOU TO DO YOUR WORK, TAKE CARE OF THINGS AT HOME, OR GET ALONG WITH OTHER PEOPLE?: SOMEWHAT DIFFICULT
GAD7 TOTAL SCORE: 11

## 2024-01-26 ASSESSMENT — PATIENT HEALTH QUESTIONNAIRE - PHQ9
SUM OF ALL RESPONSES TO PHQ QUESTIONS 1-9: 9
SUM OF ALL RESPONSES TO PHQ QUESTIONS 1-9: 9
10. IF YOU CHECKED OFF ANY PROBLEMS, HOW DIFFICULT HAVE THESE PROBLEMS MADE IT FOR YOU TO DO YOUR WORK, TAKE CARE OF THINGS AT HOME, OR GET ALONG WITH OTHER PEOPLE: SOMEWHAT DIFFICULT

## 2024-01-26 NOTE — PROGRESS NOTES
Preceptor Attestation:    I discussed the patient with the resident and evaluated the patient in person. I have verified the content of the note, which accurately reflects my assessment of the patient and the plan of care.   Supervising Physician:  Juanita Alvarez MD.

## 2024-01-26 NOTE — TELEPHONE ENCOUNTER
This encounter is being sent to inform the clinic that this patient has a referral from Juanita Alvarez MD in [facility] for the diagnoses of Axillary hidradenitis suppurativa; Hidradenitis Suppurativa and has requested that this patient be seen within Priority: 1-2 Weeks and/or with Priority: 1-2 Weeks.  Based on the availability of our provider(s), we are unable to accommodate this request.    Were all sites offered this patient?  Yes  1st choice [facility] in [place]  2nd choice [facility] in [place]  3rd choice [facility] in [place]  4th choice [facility] in [place]    Does scheduling algorithm request to schedule next available?  Patient has been scheduled for the first available opening with Dr Eze Bobo in [facility] on 05/13/2024.  We have informed the patient that the clinic will review their referral and reach out if a sooner appointment is medically necessary.     Pt was previously scheduled - I could not find anything sooner    Please review and call Pt if you can move her up to anything sooner at any of the above four listed locations - Thank you very much!

## 2024-01-26 NOTE — TELEPHONE ENCOUNTER
Spoke with pt and scheduled sooner appt.    Thank you,  Laura WHITE RN  Dermatology   [phone]

## 2024-01-29 ENCOUNTER — OFFICE VISIT (OUTPATIENT)
Dept: DERMATOLOGY | Facility: CLINIC | Age: 29
End: 2024-01-29
Attending: FAMILY MEDICINE
Payer: COMMERCIAL

## 2024-01-29 DIAGNOSIS — L73.9 FOLLICULITIS: ICD-10-CM

## 2024-01-29 DIAGNOSIS — L73.2 AXILLARY HIDRADENITIS SUPPURATIVA: ICD-10-CM

## 2024-01-29 PROCEDURE — 99204 OFFICE O/P NEW MOD 45 MIN: CPT | Mod: 25 | Performed by: STUDENT IN AN ORGANIZED HEALTH CARE EDUCATION/TRAINING PROGRAM

## 2024-01-29 PROCEDURE — 11901 INJECT SKIN LESIONS >7: CPT | Performed by: STUDENT IN AN ORGANIZED HEALTH CARE EDUCATION/TRAINING PROGRAM

## 2024-01-29 RX ORDER — DOXYCYCLINE 100 MG/1
100 CAPSULE ORAL 2 TIMES DAILY
Qty: 30 CAPSULE | Refills: 11 | Status: SHIPPED | OUTPATIENT
Start: 2024-01-29

## 2024-01-29 RX ORDER — CLINDAMYCIN AND BENZOYL PEROXIDE 10; 50 MG/G; MG/G
GEL TOPICAL 2 TIMES DAILY
Qty: 50 G | Refills: 4 | Status: SHIPPED | OUTPATIENT
Start: 2024-01-29

## 2024-01-29 NOTE — PATIENT INSTRUCTIONS
Apply benzaclin twice daily    Take doxycycline 100mg twice daily  - avoid sun, or wear sunscreen and sun protective clothing when outside, this medicine will make you more likely to get a sunburn while taking  - take with food to avoid nausea  - take with a glass of water and remain upright for 30 minutes after to avoid esophageal irritation

## 2024-01-29 NOTE — PROGRESS NOTES
[facility] Dermatology Note    Encounter Date: Jan 29, 2024    Dermatology Problem List:    ______________________________________    Impression/Plan:  Jaqueline was seen today for derm problem.    Diagnoses and all orders for this visit:    Folliculitis  Axillary hidradenitis suppurativa  -     Adult Dermatology  Referral  -     clindamycin-benzoyl peroxide (BENZACLIN) 1-5 % external gel; Apply topically 2 times daily  -     doxycycline hyclate (VIBRAMYCIN) 100 MG capsule; Take 1 capsule (100 mg) by mouth 2 times daily  -     INJECTION INTO SKIN LESIONS >7  -     triamcinolone acetonide (KENALOG-10) injection 5 mg  --Carbajal stage II at bedtime right greater than left axilla  - Failed topical antibiotics with clindamycin and Hibiclens, recently prescribed doxycycline but has not started taking it  - Has a 2-month-old, is not breast-feeding  - Discussed treatment pyramid  - Start BenzaClin twice daily doxycycline 100 mg twice daily IL K to several inflamed lesions today      After positioning and cleansing with isopropyl alcohol, 0.5 total cc of Kenalog 10 mg/cc was injected into 8  Lesion(s) on the R axilla .  The patient tolerated the procedure well and left the Dermatology clinic in good condition.    Follow-up in 3 mo.       Staff Involved:  Staff Only    Eze Bobo MD   of Dermatology  Department of Dermatology  [facility]      CC:   Chief Complaint   Patient presents with    Derm Problem     Lesions under the armpits, started 3-4 yrs ago, started small notice a size change, discomforting and sensitive        History of Present Illness:  Ms. Jaqueline Argueta is a 28 year old female who presents as a new patient.    Gets painful bumps in bilateral armpits ongoing for 3-4 years. Tried topical clindamycin twice daily but didn't help. Tried chlorhexidine, didn't help either. Was rx'd doxy but didn't get it yet. No lesions underneath  breast or groin.         Labs:      Physical exam:  Vitals: Breastfeeding No   GEN: well developed, well-nourished, in no acute distress, in a pleasant mood.     SKIN: Alvarez phototype 1  - Focused examination of the b/l axilla was performed.  - active draining nodules R axilla  - scarring L axilla  - No other lesions of concern on areas examined.     Past Medical History:   Past Medical History:   Diagnosis Date    Anemia due to blood loss, acute 12/27/2011    Depressive disorder      Past Surgical History:   Procedure Laterality Date    NO HISTORY OF SURGERY         Social History:   reports that she has quit smoking. She has never used smokeless tobacco. She reports current alcohol use. She reports current drug use. Drug: Marijuana.    Family History:  Family History   Problem Relation Age of Onset    Substance Abuse Mother     Depression Mother     Substance Abuse Father     Substance Abuse Brother     Substance Abuse Sister     Bipolar Disorder Sister     Depression Sister     Substance Abuse Sister     Depression Sister     Family History Negative No family hx of        Medications:  Current Outpatient Medications   Medication Sig Dispense Refill    chlorhexidine (HIBICLENS) 4 % liquid Wash armpits with this liquid with each shower. Let sit for 30-60 seconds before rinsing. 946 mL 3    clindamycin (CLEOCIN T) 1 % external lotion Apply topically 2 times daily 120 mL 3    clindamycin-benzoyl peroxide (BENZACLIN) 1-5 % external gel Apply topically 2 times daily 50 g 4    doxycycline hyclate (VIBRAMYCIN) 100 MG capsule Take 1 capsule (100 mg) by mouth 2 times daily 30 capsule 11    medroxyPROGESTERone (DEPO-PROVERA) 150 MG/ML IM injection Inject 1 mL (150 mg) into the muscle every 3 months 1 mL 3    NIFEdipine ER (ADALAT CC) 30 MG 24 hr tablet Take 1 tablet (30 mg) by mouth daily for 7 days 7 tablet 0     No Known Allergies

## 2024-01-29 NOTE — LETTER
1/29/2024         RE: Jaqueline Argueta  [address]  [place] [address]        Dear Colleague,    Thank you for referring your patient, Jaqueline Argueta, to the [facility]. Please see a copy of my visit note below.    [facility] Dermatology Note    Encounter Date: Jan 29, 2024    Dermatology Problem List:    ______________________________________    Impression/Plan:  Jaqueline was seen today for derm problem.    Diagnoses and all orders for this visit:    Folliculitis  Axillary hidradenitis suppurativa  -     Adult Dermatology  Referral  -     clindamycin-benzoyl peroxide (BENZACLIN) 1-5 % external gel; Apply topically 2 times daily  -     doxycycline hyclate (VIBRAMYCIN) 100 MG capsule; Take 1 capsule (100 mg) by mouth 2 times daily  -     INJECTION INTO SKIN LESIONS >7  -     triamcinolone acetonide (KENALOG-10) injection 5 mg  --Carbajal stage II at bedtime right greater than left axilla  - Failed topical antibiotics with clindamycin and Hibiclens, recently prescribed doxycycline but has not started taking it  - Has a 2-month-old, is not breast-feeding  - Discussed treatment pyramid  - Start BenzaClin twice daily doxycycline 100 mg twice daily IL K to several inflamed lesions today      After positioning and cleansing with isopropyl alcohol, 0.5 total cc of Kenalog 10 mg/cc was injected into 8  Lesion(s) on the R axilla .  The patient tolerated the procedure well and left the Dermatology clinic in good condition.    Follow-up in 3 mo.       Staff Involved:  Staff Only    Eze Bobo MD   of Dermatology  Department of Dermatology  [facility]      CC:   Chief Complaint   Patient presents with     Derm Problem     Lesions under the armpits, started 3-4 yrs ago, started small notice a size change, discomforting and sensitive        History of Present Illness:  Ms. Jaqueline Argueta is a 28  year old female who presents as a new patient.    Gets painful bumps in bilateral armpits ongoing for 3-4 years. Tried topical clindamycin twice daily but didn't help. Tried chlorhexidine, didn't help either. Was rx'd doxy but didn't get it yet. No lesions underneath breast or groin.         Labs:      Physical exam:  Vitals: Breastfeeding No   GEN: well developed, well-nourished, in no acute distress, in a pleasant mood.     SKIN: Alvarez phototype 1  - Focused examination of the b/l axilla was performed.  - active draining nodules R axilla  - scarring L axilla  - No other lesions of concern on areas examined.     Past Medical History:   Past Medical History:   Diagnosis Date     Anemia due to blood loss, acute 12/27/2011     Depressive disorder      Past Surgical History:   Procedure Laterality Date     NO HISTORY OF SURGERY         Social History:   reports that she has quit smoking. She has never used smokeless tobacco. She reports current alcohol use. She reports current drug use. Drug: Marijuana.    Family History:  Family History   Problem Relation Age of Onset     Substance Abuse Mother      Depression Mother      Substance Abuse Father      Substance Abuse Brother      Substance Abuse Sister      Bipolar Disorder Sister      Depression Sister      Substance Abuse Sister      Depression Sister      Family History Negative No family hx of        Medications:  Current Outpatient Medications   Medication Sig Dispense Refill     chlorhexidine (HIBICLENS) 4 % liquid Wash armpits with this liquid with each shower. Let sit for 30-60 seconds before rinsing. 946 mL 3     clindamycin (CLEOCIN T) 1 % external lotion Apply topically 2 times daily 120 mL 3     clindamycin-benzoyl peroxide (BENZACLIN) 1-5 % external gel Apply topically 2 times daily 50 g 4     doxycycline hyclate (VIBRAMYCIN) 100 MG capsule Take 1 capsule (100 mg) by mouth 2 times daily 30 capsule 11     medroxyPROGESTERone (DEPO-PROVERA) 150 MG/ML IM  injection Inject 1 mL (150 mg) into the muscle every 3 months 1 mL 3     NIFEdipine ER (ADALAT CC) 30 MG 24 hr tablet Take 1 tablet (30 mg) by mouth daily for 7 days 7 tablet 0     No Known Allergies              Again, thank you for allowing me to participate in the care of your patient.        Sincerely,        Eze Bobo MD

## 2024-02-12 ENCOUNTER — TELEPHONE (OUTPATIENT)
Dept: PSYCHOLOGY | Facility: CLINIC | Age: 29
End: 2024-02-12
Payer: COMMERCIAL

## 2024-02-12 NOTE — TELEPHONE ENCOUNTER
[facility]  Type of Contact: Phone call (not reached/unavailable)  Chante Aly, Community Health Worker    CHW check in phone call to Jaqueline. No answer, left message.     BRADLEY Balbuena

## 2024-02-29 ENCOUNTER — TELEPHONE (OUTPATIENT)
Dept: PSYCHOLOGY | Facility: CLINIC | Age: 29
End: 2024-02-29
Payer: COMMERCIAL

## 2024-02-29 NOTE — TELEPHONE ENCOUNTER
[facility]  Type of Contact: Phone call (not reached/unavailable)  Chante Aly, Community Health Worker    CHW telephone outreach to patient. No answer, left voicemail.     BRADLEY Balbuena

## 2024-03-01 ENCOUNTER — TELEPHONE (OUTPATIENT)
Dept: PSYCHOLOGY | Facility: CLINIC | Age: 29
End: 2024-03-01
Payer: COMMERCIAL

## 2024-03-01 NOTE — TELEPHONE ENCOUNTER
[facility]  Type of Contact: Phone call (patient / identified key support person reached)  Chante Aly Community Health Worker    Care Coordination: provided care management services/referrals necessary to ensure patient and their identified supports have access to medical, behavioral health, pharmacology and recovery support services.  Ensured that patient's care is integrated across all settings and services.     Received call back from Jaqueline. CHW was able to help get her infant scheduled for a 4 month C along with emailing a copy of his immunization record for .     Jaqueline was requesting food pantries that have no or low cost cleaning supplies. CHW found a few local websites and emailed those to Jaqueline.     [facility] team to continue to support.     BRADLEY Balbuena

## 2024-03-13 ENCOUNTER — OFFICE VISIT (OUTPATIENT)
Dept: DERMATOLOGY | Facility: CLINIC | Age: 29
End: 2024-03-13
Payer: COMMERCIAL

## 2024-03-13 DIAGNOSIS — L73.2 HIDRADENITIS SUPPURATIVA: ICD-10-CM

## 2024-03-13 DIAGNOSIS — I80.8 SUPERFICIAL THROMBOPHLEBITIS OF RIGHT UPPER EXTREMITY: Primary | ICD-10-CM

## 2024-03-13 PROCEDURE — 99214 OFFICE O/P EST MOD 30 MIN: CPT | Performed by: STUDENT IN AN ORGANIZED HEALTH CARE EDUCATION/TRAINING PROGRAM

## 2024-03-13 RX ORDER — ASPIRIN 81 MG/1
81 TABLET ORAL DAILY
Qty: 30 TABLET | Refills: 0 | Status: SHIPPED | OUTPATIENT
Start: 2024-03-13

## 2024-03-13 NOTE — PROGRESS NOTES
[facility] Dermatology Note    Encounter Date: Mar 13, 2024    Dermatology Problem List:     ______________________________________    Impression/Plan:  Jaqueline was seen today for derm problem.    Diagnoses and all orders for this visit:    Superficial thrombophlebitis of right upper extremity  -     aspirin 81 MG EC tablet; Take 1 tablet (81 mg) by mouth daily  -     US Upper Extremity Venous Duplex Right; Future  -2 weeks after last visit noticed redness pain and swelling with a palpable cord in her right axilla suspicious for possible superficial thrombophlebitis  - No history of GI bleeding or intracranial bleeding, can start aspirin 81 mg daily  - Upper extremity venous duplex to evaluate palpable cord in the axilla for possible superficial thrombophlebitis    Hidradenitis suppurativa  -Responded well to doxycycline twice daily and BenzaClin twice daily, after improvement she discontinued the doxycycline at which time she experienced a flare  - Recommend restarting both and continuing until I see her back        Follow-up in 3 mo .       Staff Involved:  Staff Only    Eze Bobo MD   of Dermatology  Department of Dermatology  [facility]      CC:   Chief Complaint   Patient presents with    Derm Problem     HS       History of Present Illness: Ms. Jaqueline Argueta is a 28 year old female who presents as a return patient.    Patient was last seen January 29 for axillary hidradenitis suppurativa at that time 8 lesions in the right axilla were injected with I will K-10, she had previously failed topical antibiotics with clindamycin and Hibiclens, was just starting doxycycline, advised her to continue that and start combination topical benzyl peroxide and clindamycin    She states that things initially improved significantly using the BenzaClin twice daily and that doxycycline twice daily.  After few weeks she discontinued the doxycycline  due to becoming more busy and improvement in her symptoms.  Around that time she noticed a flare of the lesions in her axilla, in addition a new type of lesion appeared which manifested as redness pain and swelling with a palpable cord in her right axilla.  This started about 3 weeks ago and has improved somewhat.        Labs:      Physical exam:  Vitals: There were no vitals taken for this visit.  GEN: well developed, well-nourished, in no acute distress, in a pleasant mood.     SKIN: Alvarez phototype 1  - Focused examination of the b/l axilla was performed.  -Mild scarring bilateral axillae with active pustule right axilla  - Linear palpable cord right axilla  - No other lesions of concern on areas examined.     Past Medical History:   Past Medical History:   Diagnosis Date    Anemia due to blood loss, acute 12/27/2011    Depressive disorder      Past Surgical History:   Procedure Laterality Date    NO HISTORY OF SURGERY         Social History:   reports that she has quit smoking. She has never used smokeless tobacco. She reports current alcohol use. She reports current drug use. Drug: Marijuana.    Family History:  Family History   Problem Relation Age of Onset    Substance Abuse Mother     Depression Mother     Substance Abuse Father     Substance Abuse Brother     Substance Abuse Sister     Bipolar Disorder Sister     Depression Sister     Substance Abuse Sister     Depression Sister     Family History Negative No family hx of        Medications:  Current Outpatient Medications   Medication Sig Dispense Refill    aspirin 81 MG EC tablet Take 1 tablet (81 mg) by mouth daily 30 tablet 0    chlorhexidine (HIBICLENS) 4 % liquid Wash armpits with this liquid with each shower. Let sit for 30-60 seconds before rinsing. 946 mL 3    clindamycin (CLEOCIN T) 1 % external lotion Apply topically 2 times daily 120 mL 3    clindamycin-benzoyl peroxide (BENZACLIN) 1-5 % external gel Apply topically 2 times daily 50 g 4     doxycycline hyclate (VIBRAMYCIN) 100 MG capsule Take 1 capsule (100 mg) by mouth 2 times daily 30 capsule 11    medroxyPROGESTERone (DEPO-PROVERA) 150 MG/ML IM injection Inject 1 mL (150 mg) into the muscle every 3 months 1 mL 3    NIFEdipine ER (ADALAT CC) 30 MG 24 hr tablet Take 1 tablet (30 mg) by mouth daily for 7 days 7 tablet 0     No Known Allergies

## 2024-03-13 NOTE — LETTER
3/13/2024         RE: Jaqueline Argueta  [address]  [place] [address]        Dear Colleague,    Thank you for referring your patient, Jaqueline Argueta, to the [facility]. Please see a copy of my visit note below.    [facility] Dermatology Note    Encounter Date: Mar 13, 2024    Dermatology Problem List:     ______________________________________    Impression/Plan:  Jaqueline was seen today for derm problem.    Diagnoses and all orders for this visit:    Superficial thrombophlebitis of right upper extremity  -     aspirin 81 MG EC tablet; Take 1 tablet (81 mg) by mouth daily  -     US Upper Extremity Venous Duplex Right; Future  -2 weeks after last visit noticed redness pain and swelling with a palpable cord in her right axilla suspicious for possible superficial thrombophlebitis  - No history of GI bleeding or intracranial bleeding, can start aspirin 81 mg daily  - Upper extremity venous duplex to evaluate palpable cord in the axilla for possible superficial thrombophlebitis    Hidradenitis suppurativa  -Responded well to doxycycline twice daily and BenzaClin twice daily, after improvement she discontinued the doxycycline at which time she experienced a flare  - Recommend restarting both and continuing until I see her back        Follow-up in 3 mo .       Staff Involved:  Staff Only    Eze Bobo MD   of Dermatology  Department of Dermatology  [facility]      CC:   Chief Complaint   Patient presents with     Derm Problem     HS       History of Present Illness: Ms. Jaqueline Argueta is a 28 year old female who presents as a return patient.    Patient was last seen January 29 for axillary hidradenitis suppurativa at that time 8 lesions in the right axilla were injected with I will K-10, she had previously failed topical antibiotics with clindamycin and Hibiclens, was just starting doxycycline,  advised her to continue that and start combination topical benzyl peroxide and clindamycin    She states that things initially improved significantly using the BenzaClin twice daily and that doxycycline twice daily.  After few weeks she discontinued the doxycycline due to becoming more busy and improvement in her symptoms.  Around that time she noticed a flare of the lesions in her axilla, in addition a new type of lesion appeared which manifested as redness pain and swelling with a palpable cord in her right axilla.  This started about 3 weeks ago and has improved somewhat.        Labs:      Physical exam:  Vitals: There were no vitals taken for this visit.  GEN: well developed, well-nourished, in no acute distress, in a pleasant mood.     SKIN: Alvarez phototype 1  - Focused examination of the b/l axilla was performed.  -Mild scarring bilateral axillae with active pustule right axilla  - Linear palpable cord right axilla  - No other lesions of concern on areas examined.     Past Medical History:   Past Medical History:   Diagnosis Date     Anemia due to blood loss, acute 12/27/2011     Depressive disorder      Past Surgical History:   Procedure Laterality Date     NO HISTORY OF SURGERY         Social History:   reports that she has quit smoking. She has never used smokeless tobacco. She reports current alcohol use. She reports current drug use. Drug: Marijuana.    Family History:  Family History   Problem Relation Age of Onset     Substance Abuse Mother      Depression Mother      Substance Abuse Father      Substance Abuse Brother      Substance Abuse Sister      Bipolar Disorder Sister      Depression Sister      Substance Abuse Sister      Depression Sister      Family History Negative No family hx of        Medications:  Current Outpatient Medications   Medication Sig Dispense Refill     aspirin 81 MG EC tablet Take 1 tablet (81 mg) by mouth daily 30 tablet 0     chlorhexidine (HIBICLENS) 4 % liquid Wash  armpits with this liquid with each shower. Let sit for 30-60 seconds before rinsing. 946 mL 3     clindamycin (CLEOCIN T) 1 % external lotion Apply topically 2 times daily 120 mL 3     clindamycin-benzoyl peroxide (BENZACLIN) 1-5 % external gel Apply topically 2 times daily 50 g 4     doxycycline hyclate (VIBRAMYCIN) 100 MG capsule Take 1 capsule (100 mg) by mouth 2 times daily 30 capsule 11     medroxyPROGESTERone (DEPO-PROVERA) 150 MG/ML IM injection Inject 1 mL (150 mg) into the muscle every 3 months 1 mL 3     NIFEdipine ER (ADALAT CC) 30 MG 24 hr tablet Take 1 tablet (30 mg) by mouth daily for 7 days 7 tablet 0     No Known Allergies              Again, thank you for allowing me to participate in the care of your patient.        Sincerely,        Eze Bobo MD

## 2024-03-14 ENCOUNTER — ALLIED HEALTH/NURSE VISIT (OUTPATIENT)
Dept: FAMILY MEDICINE | Facility: CLINIC | Age: 29
End: 2024-03-14
Payer: COMMERCIAL

## 2024-03-14 ENCOUNTER — TELEPHONE (OUTPATIENT)
Dept: PSYCHOLOGY | Facility: CLINIC | Age: 29
End: 2024-03-14
Payer: COMMERCIAL

## 2024-03-14 ENCOUNTER — TELEPHONE (OUTPATIENT)
Dept: FAMILY MEDICINE | Facility: CLINIC | Age: 29
End: 2024-03-14
Payer: COMMERCIAL

## 2024-03-14 DIAGNOSIS — Z23 ENCOUNTER FOR IMMUNIZATION: ICD-10-CM

## 2024-03-14 DIAGNOSIS — Z23 NEED FOR VACCINATION: Primary | ICD-10-CM

## 2024-03-14 PROCEDURE — 96372 THER/PROPH/DIAG INJ SC/IM: CPT | Performed by: FAMILY MEDICINE

## 2024-03-14 PROCEDURE — 99207 PR NO CHARGE NURSE ONLY: CPT

## 2024-03-14 RX ADMIN — MEDROXYPROGESTERONE ACETATE 150 MG: 150 INJECTION, SUSPENSION INTRAMUSCULAR at 07:53

## 2024-03-14 NOTE — TELEPHONE ENCOUNTER
Jaqueline returned call-mikael'd for Depo injection today @ 3pm.  States infant always sleeps w head tilted to R, but he typically resettles back to R side. R side is getting flat.  Mikael'd 3/18 w Dr. Worthy.  Jaqueline has an apt 3/25; however, she'd like baby seen sooner if possible and doesn't work Mondays.  Mayelin DARBY CNM, OB/Reproductive Health [facility]

## 2024-03-14 NOTE — PROGRESS NOTES
Clinic Administered Medication Documentation      Depo Provera Documentation    Depo-Provera Standing Order inclusion/exclusion criteria reviewed.     Is this the initial or subsequent dose of Depo Provera? Subsequent dose - patient is within the acceptable window of time (11-15 weeks) for subsequent injection. Pregnancy test not indicated.    Patient meets: inclusion criteria     Is there an active order (written within the past 365 days, with administrations remaining, not ) in the chart? Yes.     Prior to injection, verified patient identity using patient's name and date of birth. Medication was administered. Please see MAR and medication order for additional information.     Vial/Syringe: Single dose vial. Was entire vial of medication used? Yes    Patient instructed to remain in clinic for 15 minutes, report any adverse reaction to staff immediately, and remain in clinic for 15 minutes and report any adverse reaction to staff immediately but patient declined.  NEXT INJECTION DUE: 24 - 24    Patient has no refills remaining. Routed to the provider

## 2024-03-14 NOTE — TELEPHONE ENCOUNTER
[facility]  Type of Contact: Phone call (patient / identified key support person reached)  Chante Aly Community Health Worker    Care Coordination: provided care management services/referrals necessary to ensure patient and their identified supports have access to medical, behavioral health, pharmacology and recovery support services.  Ensured that patient's care is integrated across all settings and services.     CHW telephone outreach to patient. Jaqueline was wanting to get scheduled for her next depo injection and also had questions in regards to her baby.     Message was sent to RN OB care coordinator to follow up on both as CHW unable to triage/schedule depo.    BRADLEY Balbuena

## 2024-03-14 NOTE — TELEPHONE ENCOUNTER
LVM for Jaqueline asking her to call back regarding Depo and baby's apts.  Mayelin DARBY, EDGARDM, OB/Reproductive Health [facility]

## 2024-03-15 ENCOUNTER — ANCILLARY PROCEDURE (OUTPATIENT)
Dept: ULTRASOUND IMAGING | Facility: CLINIC | Age: 29
End: 2024-03-15
Attending: STUDENT IN AN ORGANIZED HEALTH CARE EDUCATION/TRAINING PROGRAM
Payer: COMMERCIAL

## 2024-03-15 DIAGNOSIS — I80.8 SUPERFICIAL THROMBOPHLEBITIS OF RIGHT UPPER EXTREMITY: ICD-10-CM

## 2024-03-15 PROCEDURE — 93971 EXTREMITY STUDY: CPT | Mod: RT

## 2024-03-24 ENCOUNTER — APPOINTMENT (OUTPATIENT)
Dept: CT IMAGING | Facility: CLINIC | Age: 29
End: 2024-03-24
Attending: EMERGENCY MEDICINE
Payer: COMMERCIAL

## 2024-03-24 ENCOUNTER — HOSPITAL ENCOUNTER (EMERGENCY)
Facility: CLINIC | Age: 29
Discharge: HOME | End: 2024-03-24
Attending: EMERGENCY MEDICINE | Admitting: EMERGENCY MEDICINE
Payer: COMMERCIAL

## 2024-03-24 VITALS
TEMPERATURE: 98.7 F | OXYGEN SATURATION: 99 % | BODY MASS INDEX: 28.15 KG/M2 | RESPIRATION RATE: 16 BRPM | DIASTOLIC BLOOD PRESSURE: 88 MMHG | HEART RATE: 100 BPM | WEIGHT: 149.1 LBS | HEIGHT: 61 IN | SYSTOLIC BLOOD PRESSURE: 130 MMHG

## 2024-03-24 DIAGNOSIS — S06.9X0A TRAUMATIC BRAIN INJURY, WITHOUT LOSS OF CONSCIOUSNESS, INITIAL ENCOUNTER (H): ICD-10-CM

## 2024-03-24 DIAGNOSIS — S09.90XA INJURY OF HEAD, INITIAL ENCOUNTER: ICD-10-CM

## 2024-03-24 PROCEDURE — 70486 CT MAXILLOFACIAL W/O DYE: CPT

## 2024-03-24 PROCEDURE — 250N000011 HC RX IP 250 OP 636: Performed by: EMERGENCY MEDICINE

## 2024-03-24 PROCEDURE — 70450 CT HEAD/BRAIN W/O DYE: CPT

## 2024-03-24 PROCEDURE — 99284 EMERGENCY DEPT VISIT MOD MDM: CPT | Mod: 25 | Performed by: EMERGENCY MEDICINE

## 2024-03-24 PROCEDURE — 99284 EMERGENCY DEPT VISIT MOD MDM: CPT | Performed by: EMERGENCY MEDICINE

## 2024-03-24 PROCEDURE — 250N000013 HC RX MED GY IP 250 OP 250 PS 637: Performed by: EMERGENCY MEDICINE

## 2024-03-24 RX ORDER — ACETAMINOPHEN 500 MG
1000 TABLET ORAL ONCE
Status: COMPLETED | OUTPATIENT
Start: 2024-03-24 | End: 2024-03-24

## 2024-03-24 RX ORDER — ONDANSETRON 4 MG/1
4 TABLET, ORALLY DISINTEGRATING ORAL ONCE
Status: COMPLETED | OUTPATIENT
Start: 2024-03-24 | End: 2024-03-24

## 2024-03-24 RX ADMIN — ACETAMINOPHEN 1000 MG: 500 TABLET ORAL at 04:16

## 2024-03-24 RX ADMIN — ONDANSETRON 4 MG: 4 TABLET, ORALLY DISINTEGRATING ORAL at 04:18

## 2024-03-24 ASSESSMENT — ACTIVITIES OF DAILY LIVING (ADL): ADLS_ACUITY_SCORE: 35

## 2024-03-24 ASSESSMENT — COLUMBIA-SUICIDE SEVERITY RATING SCALE - C-SSRS
6. HAVE YOU EVER DONE ANYTHING, STARTED TO DO ANYTHING, OR PREPARED TO DO ANYTHING TO END YOUR LIFE?: NO
2. HAVE YOU ACTUALLY HAD ANY THOUGHTS OF KILLING YOURSELF IN THE PAST MONTH?: NO
1. IN THE PAST MONTH, HAVE YOU WISHED YOU WERE DEAD OR WISHED YOU COULD GO TO SLEEP AND NOT WAKE UP?: NO
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
## 2024-03-24 NOTE — ED TRIAGE NOTES
Triage Assessment (Adult)       Row Name 03/24/24 0354          Triage Assessment    Airway WDL WDL        Respiratory WDL    Respiratory WDL WDL        Peripheral/Neurovascular WDL    Peripheral Neurovascular WDL WDL        Cognitive/Neuro/Behavioral WDL    Cognitive/Neuro/Behavioral WDL WDL
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
## 2024-03-24 NOTE — DISCHARGE INSTRUCTIONS
Please follow-up with your primary care provider to discuss your concussion symptoms    Return to the emergency department if you develop worsening pain, vomiting, weakness numbness or tingling or if you have any further concerns    If Jaqueline has discomfort from fever or other pain, she can have:  Acetaminophen (Tylenol) every 6 hours as needed. Her dose is:    2 regular strength tabs (650 mg)                                     (43.2+ kg/96+ lb)    NOTE: If your acetaminophen (Tylenol) came with a dropper marked with 0.4 and 0.8 ml, call us ([phone]) or check with your doctor about the dose before using it.

## 2024-03-25 ENCOUNTER — OFFICE VISIT (OUTPATIENT)
Dept: FAMILY MEDICINE | Facility: CLINIC | Age: 29
End: 2024-03-25
Payer: COMMERCIAL

## 2024-03-25 VITALS
RESPIRATION RATE: 17 BRPM | OXYGEN SATURATION: 98 % | HEART RATE: 85 BPM | WEIGHT: 150 LBS | HEIGHT: 61 IN | BODY MASS INDEX: 28.32 KG/M2 | DIASTOLIC BLOOD PRESSURE: 76 MMHG | TEMPERATURE: 98.2 F | SYSTOLIC BLOOD PRESSURE: 120 MMHG

## 2024-03-25 DIAGNOSIS — F41.1 GAD (GENERALIZED ANXIETY DISORDER): ICD-10-CM

## 2024-03-25 DIAGNOSIS — T74.91XS DOMESTIC VIOLENCE OF ADULT, SEQUELA: Primary | ICD-10-CM

## 2024-03-25 DIAGNOSIS — S06.0XAS CONCUSSION WITH UNKNOWN LOSS OF CONSCIOUSNESS STATUS, SEQUELA (H): ICD-10-CM

## 2024-03-25 DIAGNOSIS — F32.A DEPRESSION, UNSPECIFIED DEPRESSION TYPE: ICD-10-CM

## 2024-03-25 PROCEDURE — 99214 OFFICE O/P EST MOD 30 MIN: CPT | Mod: GC

## 2024-03-25 RX ORDER — IBUPROFEN 600 MG/1
600 TABLET, FILM COATED ORAL EVERY 8 HOURS
Qty: 30 TABLET | Refills: 0 | Status: SHIPPED | OUTPATIENT
Start: 2024-03-25

## 2024-03-25 RX ORDER — HYDROXYZINE HYDROCHLORIDE 10 MG/1
10 TABLET, FILM COATED ORAL EVERY 6 HOURS PRN
Qty: 30 TABLET | Refills: 0 | Status: SHIPPED | OUTPATIENT
Start: 2024-03-25 | End: 2024-06-11

## 2024-03-25 RX ORDER — ESCITALOPRAM OXALATE 5 MG/1
5 TABLET ORAL DAILY
Qty: 30 TABLET | Refills: 0 | Status: SHIPPED | OUTPATIENT
Start: 2024-03-25

## 2024-03-25 RX ORDER — ACETAMINOPHEN 500 MG
500-1000 TABLET ORAL EVERY 8 HOURS
Qty: 100 TABLET | Refills: 0 | Status: SHIPPED | OUTPATIENT
Start: 2024-03-25

## 2024-03-25 ASSESSMENT — ANXIETY QUESTIONNAIRES
7. FEELING AFRAID AS IF SOMETHING AWFUL MIGHT HAPPEN: MORE THAN HALF THE DAYS
7. FEELING AFRAID AS IF SOMETHING AWFUL MIGHT HAPPEN: MORE THAN HALF THE DAYS
GAD7 TOTAL SCORE: 12
8. IF YOU CHECKED OFF ANY PROBLEMS, HOW DIFFICULT HAVE THESE MADE IT FOR YOU TO DO YOUR WORK, TAKE CARE OF THINGS AT HOME, OR GET ALONG WITH OTHER PEOPLE?: SOMEWHAT DIFFICULT
1. FEELING NERVOUS, ANXIOUS, OR ON EDGE: NEARLY EVERY DAY
4. TROUBLE RELAXING: MORE THAN HALF THE DAYS
6. BECOMING EASILY ANNOYED OR IRRITABLE: SEVERAL DAYS
IF YOU CHECKED OFF ANY PROBLEMS ON THIS QUESTIONNAIRE, HOW DIFFICULT HAVE THESE PROBLEMS MADE IT FOR YOU TO DO YOUR WORK, TAKE CARE OF THINGS AT HOME, OR GET ALONG WITH OTHER PEOPLE: SOMEWHAT DIFFICULT
GAD7 TOTAL SCORE: 12
3. WORRYING TOO MUCH ABOUT DIFFERENT THINGS: NEARLY EVERY DAY
5. BEING SO RESTLESS THAT IT IS HARD TO SIT STILL: NOT AT ALL
GAD7 TOTAL SCORE: 12
2. NOT BEING ABLE TO STOP OR CONTROL WORRYING: SEVERAL DAYS

## 2024-03-25 ASSESSMENT — ENCOUNTER SYMPTOMS
DIZZINESS: 1
SENSORY CHANGE: 0
LOSS OF CONSCIOUSNESS: 0
FEVER: 0
HEADACHES: 1
SPEECH CHANGE: 0
FOCAL WEAKNESS: 0
WEAKNESS: 0

## 2024-03-25 ASSESSMENT — PATIENT HEALTH QUESTIONNAIRE - PHQ9
SUM OF ALL RESPONSES TO PHQ QUESTIONS 1-9: 15
SUM OF ALL RESPONSES TO PHQ QUESTIONS 1-9: 15
10. IF YOU CHECKED OFF ANY PROBLEMS, HOW DIFFICULT HAVE THESE PROBLEMS MADE IT FOR YOU TO DO YOUR WORK, TAKE CARE OF THINGS AT HOME, OR GET ALONG WITH OTHER PEOPLE: SOMEWHAT DIFFICULT

## 2024-03-25 NOTE — PROGRESS NOTES
Preceptor Attestation:    I discussed the patient with the resident and evaluated the patient in person. I have verified the content of the note, which accurately reflects my assessment of the patient and the plan of care.   Supervising Physician:  Blake William MD.

## 2024-03-25 NOTE — PATIENT INSTRUCTIONS
Patient Education   Here is the plan from today's visit    FOR PAIN:  - TAKE TYLENOL 975 MG THREE TIMES DAY  - TAKE IBUPROFEN 600 MG THREE TIMES DAY   - SLEEP AND REST as MUCH as POSSIBLE   - AVOID SCREENS AND BRIGHT LIGHTS/SOUND YOU CAN  - COME BACK IN 1 WEEK; WE WILL TALK ABOUT CONCUSSION CLINIC IF NEEDED     FOR ANXIETY:  - START LEXAPRO ONE TABLET DAILY (WATCH FOR GI OR SEXUAL SIDE EFFECTS)   - USE HYDROXYZINE as NEEDED FR ANXIETY (WATCH FOR SLEEPINESS)    If having a mental health crisis or suicidal thoughts, please go to closest ER or call appropriate crisis line below.    [facility] Mental Health Crisis Lines  [facility] [phone]  [facility] [phone]  [facility] [phone]  [facility], Adults [phone]  [facility], Children [phone]  [facility], Adults [phone]  [facility], Children [phone]  [facility] [phone]  National Suicide Prevention Lifeline provides 24/7, offers free and confidential support for people in distress, prevention and crisis resources. (1-762.483.7883). https://suicidepreventionlifeline.org/     Crisis Text Line is offering Text-based suicide prevention services across [place]. People who text [place] to 438660 will be connected with a counselor who will help defuse the crisis and connect the texter to local resources. Crisis Text Line is available 24 hours a day, seven days a week. https://www.crisistextline.org/       Please call or return to clinic if your symptoms don't go away.    Follow up plan  No follow-ups on file.    Thank you for coming to [facility] today.  Lab Testing:  **If you had lab testing today and your results are reassuring or normal they will be mailed to you or sent through [employer] within 7 days.   **If the lab tests need quick action we will call you with the results.  **If you are having labs done on a different day, please call [phone] to schedule at [facility] or [phone] for other  [facility] Outpatient Lab locations. Labs do not offer walk-in appointments.  The phone number we will call with results is # [phone] (home) . If this is not the best number please call our clinic and change the number.  Medication Refills:  If you need any refills please call your pharmacy and they will contact us.   If you need to  your refill at a new pharmacy, please contact the new pharmacy directly. The new pharmacy will help you get your medications transferred faster.   Scheduling:  If you have any concerns about today's visit or wish to schedule another appointment please call our office during normal business hours [phone] (8-5:00 M-F). If you can no longer make a scheduled visit, please cancel via [employer] or call us to cancel.   If a referral was made to an [facility] specialty provider and you do not get a call from central scheduling, please refer to directions on your visit summary or call our office during normal business hours for assistance.   If a Mammogram was ordered for you at the [facility] call [phone] to schedule or change your appointment.  If you had an XRay/CT/Ultrasound/MRI ordered the number is [phone] to schedule or change your radiology appointment.   [facility] has limited ultrasound appointments available on Wednesdays, if you would like your ultrasound at [facility], please call [phone] to schedule.   Medical Concerns:  If you have urgent medical concerns please call [phone] at any time of the day.    Linda Ch MD
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
## 2024-04-01 ENCOUNTER — TELEPHONE (OUTPATIENT)
Dept: PSYCHOLOGY | Facility: CLINIC | Age: 29
End: 2024-04-01

## 2024-04-01 NOTE — TELEPHONE ENCOUNTER
[facility]  Type of Contact: Phone call (not reached/unavailable)  Chante Aly, Community Health Worker    Attempted CHW telephone outreach to patient. She missed her appointment this morning. Calling to get her rescheduled. Left message.    BRADLEY Balbuena

## 2024-04-24 ENCOUNTER — CARE COORDINATION (OUTPATIENT)
Dept: PSYCHOLOGY | Facility: CLINIC | Age: 29
End: 2024-04-24
Payer: COMMERCIAL

## 2024-04-24 NOTE — PROGRESS NOTES
[facility]  Type of Contact: Phone call (not reached/unavailable)  Sandy Hunter, Social Work Care Coordinator    SW attempted to call patient today via phone. No answer, therefore left a voicemail and provided direct call back number. SW to continue to reach out to make contact and provide support as needed.     DENZEL Finley  [facility]- Social Work Care Coordinator

## 2024-05-07 NOTE — PATIENT INSTRUCTIONS
consulted, 5/1  [facility] PTD  4/18 UA and Urine Culture obtained (clean catch) due to hyperthermia overnight--> Negative                   Patient Education   Here is the plan from today's visit    PLAN FOR ABSCESSES:  - CONTINUE WARM COMPRESSES   - START DOXYCYLINE 1 TABLET DAILY   - PRIORITY DERM REFERRAL; THEY SHOULD CALL TO SCHEDULE   - LET ME KNOW IF YOU START BREASTFEEDING     PLAN FOR MOOD:   - LET ME KNOW IF THINGS CHANGE   - SEND [employer] IF YOU ARE INTERESTED IN MEDICATION   - SUGEY WILL REACH OUT AT SOME POINT     Please call or return to clinic if your symptoms don't go away.    Follow up plan  No follow-ups on file.    Thank you for coming to [facility] today.  Lab Testing:  **If you had lab testing today and your results are reassuring or normal they will be mailed to you or sent through [employer] within 7 days.   **If the lab tests need quick action we will call you with the results.  **If you are having labs done on a different day, please call [phone] to schedule at [facility] or [phone] for other [facility] Outpatient Lab locations. Labs do not offer walk-in appointments.  The phone number we will call with results is # [phone] (home) . If this is not the best number please call our clinic and change the number.  Medication Refills:  If you need any refills please call your pharmacy and they will contact us.   If you need to  your refill at a new pharmacy, please contact the new pharmacy directly. The new pharmacy will help you get your medications transferred faster.   Scheduling:  If you have any concerns about today's visit or wish to schedule another appointment please call our office during normal business hours [phone] (8-5:00 M-F). If you can no longer make a scheduled visit, please cancel via [employer] or call us to cancel.   If a referral was made to an [facility] specialty provider and you do not get a call from central scheduling, please refer to directions on your visit summary or call our office during normal business hours for assistance.   If a Mammogram was ordered for you at the  [facility] call [phone] to schedule or change your appointment.  If you had an XRay/CT/Ultrasound/MRI ordered the number is [phone] to schedule or change your radiology appointment.   [facility] has limited ultrasound appointments available on Wednesdays, if you would like your ultrasound at [facility], please call [phone] to schedule.   Medical Concerns:  If you have urgent medical concerns please call [phone] at any time of the day.    Linda Ch MD

## 2024-05-08 ENCOUNTER — CARE COORDINATION (OUTPATIENT)
Dept: PSYCHOLOGY | Facility: CLINIC | Age: 29
End: 2024-05-08
Payer: COMMERCIAL

## 2024-05-08 NOTE — PROGRESS NOTES
[facility]al Health Home Services  [facility]: [place]    Social Work Care Navigator Note    Patient: Jaqueline Argueta  Date: May 8, 2024  Preferred Name: Jaqueline      Preferred Contact:  Need for : No  Preferred Contact: Cell    Type of Contact Today: Phone call (patient / identified key support person reached)    Service Modality: Phone Visit:      Provider verified identity through the following two step process.  Patient provided:  Patient is known previously to provider    Telephone Visit: The patient's condition can be safely assessed and treated via synchronous audio telemedicine encounter.      Reason for Audio Telemedicine Visit: Patient convenience (e.g. access to timely appointments / distance to available provider)    Originating Site (Patient Location): Patient's home    Distant Site (Provider Location): [facility]    Data: (subjective / Objective):  Recent ED/IP Admission or Discharge?   None    Patient Goals:  Goal Areas: Health; Mental Health; Financial and Social Service Benefits  Patient stated goals: Health:   -Continue to arrive to scheduled appointments with PCP and infusion clinic  -focus on weight gain / maintain during pregnancy    Mental health:   -arrive for scheduled therapy appointments with Dr. Linares until appt in November    Housing:   -connect with [facility] resources  -be in contact with [facility] for family support (referral made to family home visiting)   -talk with [facility] about renewing lease and plan moving forward for housing        [facility] Core Service Provided:  Individual and Family Support: aimed to help clients reduce barriers to achieving goals, increase health literacy and knowledge about chronic condition(s), increase self-efficacy skills, and improve health outcomes    Current Stressors / Issues / Care Plan Objective Addressed Today:  -overall she feels she is doing okay right now.   -Is still interested in being enrolled in [facility]  "and getting support from team  -Needs updated [facility], scheduled this for June 11 after provider visit      Intervention:  Motivational Interviewing: Open-ended questions       Assessment: (Progress on Goals / Homework):  -will arrive for scheduled appt with PCP and [facility] team at [facility]  -Will reach out to care team and [facility] if needing additional services/support    Plan: (Homework, other):  Patient was encouraged to continue to seek condition-related information and education.      Scheduled a Clinic follow up appointment with  CC and PCP in 4 weeks     Patient has set self-identified goals and will monitor progress until the next appointment on: 06/11/2024.     Sandy Hunter, Social Work Care Coordinator     Previous PHQ-9:       12/29/2023     2:17 PM 1/26/2024    11:15 AM 3/25/2024     9:28 AM   PHQ-9 SCORE   PHQ-9 Total Score MyChart  9 (Mild depression) 15 (Moderately severe depression)   PHQ-9 Total Score 8 9 15     Previous SHERIDAN-7:       12/29/2023     2:17 PM 1/26/2024    11:16 AM 3/25/2024     9:29 AM   SHERIDAN-7 SCORE   Total Score  11 (moderate anxiety) 12 (moderate anxiety)   Total Score 11 11 12     BREANNE LEVEL:       No data to display              Consent:  The patient/guardian has verbally consented to:     1. The potential risks and benefits of telemedicine (telephone visit) versus in person care;    The patient has been notified of the following:      \"We have found that certain health care needs can be provided without the need for a face to face visit.  This service lets us provide the care you need with a phone conversation.       I will have full access to your [facility] medical record during this entire phone call.   I will be taking notes for your medical record.      Since this is like an office visit, we will bill your insurance company for this service.       There are potential benefits and risks of telephone visits (e.g. limits to patient confidentiality) that differ from in-person " "visits.?Confidentiality still applies for telephone services, and nobody will record the visit.  It is important to be in a quiet, private space that is free of distractions (including cell phone or other devices) during the visit.??      If during the course of the call I believe a telephone visit is not appropriate, you will not be charged for this service\"     Consent has been obtained for this service by care team member: Yes             "

## 2024-05-20 ENCOUNTER — OFFICE VISIT (OUTPATIENT)
Dept: DERMATOLOGY | Facility: CLINIC | Age: 29
End: 2024-05-20
Payer: COMMERCIAL

## 2024-05-20 DIAGNOSIS — R52 PAIN: ICD-10-CM

## 2024-05-20 DIAGNOSIS — L73.2 HIDRADENITIS SUPPURATIVA: Primary | ICD-10-CM

## 2024-05-20 PROCEDURE — 99214 OFFICE O/P EST MOD 30 MIN: CPT | Performed by: STUDENT IN AN ORGANIZED HEALTH CARE EDUCATION/TRAINING PROGRAM

## 2024-05-20 PROCEDURE — G2211 COMPLEX E/M VISIT ADD ON: HCPCS | Performed by: STUDENT IN AN ORGANIZED HEALTH CARE EDUCATION/TRAINING PROGRAM

## 2024-05-20 NOTE — LETTER
5/20/2024         RE: Jaqueline Argueta  [address]  [place] [facility] [place] [facility] [address]        Dear Colleague,    Thank you for referring your patient, Jaqueline Argueta, to the [facility]. Please see a copy of my visit note below.    [facility] Dermatology Note    Encounter Date: May 20, 2024    Dermatology Problem List:    -   ______________________________________    Impression/Plan:  Jaqueline was seen today for derm problem.    Diagnoses and all orders for this visit:    Hidradenitis suppurativa (Continuing focal point for medical care services related to serious/complex condition)  Pain  -Intensity and frequency of flares reduced significantly with initiation of regular doxycycline BenzaClin, occasionally some irritation underneath her armpit likely due to the BenzaClin  - At this time she does have some persistent noninflamed papules and nodules in her left axilla which she like to have excised  - Discussed the risks and benefits of excision and she wishes to proceed  Discussed the need for daily wound care while excision sites are granulating      Follow-up for excision of axilla .       Staff Involved:  Staff Only    Eze Bobo MD   of Dermatology  Department of Dermatology  [facility] [employer]      CC:   Chief Complaint   Patient presents with     Derm Problem     Follow up HS - notice a size change        History of Present Illness:  Ms. Jaqueline Argueta is a 28 year old female who presents as a return patient.    Last seen in March at that time she was experiencing a flare after discontinuing doxycycline and BenzaClin which had previously controlled her symptoms.  Recommended that she restart that.  There was a linear palpable cord in her upper inner arm concerning for possible superficial thrombophlebitis.  Recommended aspirin 81 mg and an ultrasound was ordered ultrasound was performed March 15 which did  not show any superficial thrombophlebitis.  There was a 3 x 0.5 x 1.5 fluid collection within the right axilla likely corresponding to active at bedtime    Labs:      Physical exam:  Vitals: LMP 03/04/2024 (Approximate)   GEN: well developed, well-nourished, in no acute distress, in a pleasant mood.     SKIN: Alvarez phototype 1  - Focused examination of the axillae was performed.  -Scars in bilateral axillae with persistent  - No other lesions of concern on areas examined.     Past Medical History:   Past Medical History:   Diagnosis Date     Anemia due to blood loss, acute 12/27/2011     Depressive disorder      Past Surgical History:   Procedure Laterality Date     NO HISTORY OF SURGERY         Social History:   reports that she has quit smoking. She has never used smokeless tobacco. She reports current alcohol use. She reports current drug use. Drug: Marijuana.    Family History:  Family History   Problem Relation Age of Onset     Substance Abuse Mother      Depression Mother      Substance Abuse Father      Substance Abuse Brother      Substance Abuse Sister      Bipolar Disorder Sister      Depression Sister      Substance Abuse Sister      Depression Sister      Family History Negative No family hx of        Medications:  Current Outpatient Medications   Medication Sig Dispense Refill     acetaminophen (TYLENOL) 500 MG tablet Take 1-2 tablets (500-1,000 mg) by mouth every 8 hours 100 tablet 0     aspirin 81 MG EC tablet Take 1 tablet (81 mg) by mouth daily 30 tablet 0     chlorhexidine (HIBICLENS) 4 % liquid Wash armpits with this liquid with each shower. Let sit for 30-60 seconds before rinsing. 946 mL 3     clindamycin-benzoyl peroxide (BENZACLIN) 1-5 % external gel Apply topically 2 times daily 50 g 4     doxycycline hyclate (VIBRAMYCIN) 100 MG capsule Take 1 capsule (100 mg) by mouth 2 times daily 30 capsule 11     escitalopram (LEXAPRO) 5 MG tablet Take 1 tablet (5 mg) by mouth daily 30 tablet 0      hydrOXYzine HCl (ATARAX) 10 MG tablet Take 1 tablet (10 mg) by mouth every 6 hours as needed for anxiety 30 tablet 0     ibuprofen (ADVIL/MOTRIN) 600 MG tablet Take 1 tablet (600 mg) by mouth every 8 hours 30 tablet 0     medroxyPROGESTERone (DEPO-PROVERA) 150 MG/ML IM injection Inject 1 mL (150 mg) into the muscle every 3 months 1 mL 3     clindamycin (CLEOCIN T) 1 % external lotion Apply topically 2 times daily (Patient not taking: Reported on 3/25/2024) 120 mL 3     NIFEdipine ER (ADALAT CC) 30 MG 24 hr tablet Take 1 tablet (30 mg) by mouth daily for 7 days 7 tablet 0     No Known Allergies              Again, thank you for allowing me to participate in the care of your patient.        Sincerely,        Eze Bobo MD

## 2024-05-20 NOTE — PROGRESS NOTES
[facility] Dermatology Note    Encounter Date: May 20, 2024    Dermatology Problem List:    -   ______________________________________    Impression/Plan:  Jaqueline was seen today for derm problem.    Diagnoses and all orders for this visit:    Hidradenitis suppurativa (Continuing focal point for medical care services related to serious/complex condition)  Pain  -Intensity and frequency of flares reduced significantly with initiation of regular doxycycline BenzaClin, occasionally some irritation underneath her armpit likely due to the BenzaClin  - At this time she does have some persistent noninflamed papules and nodules in her left axilla which she like to have excised  - Discussed the risks and benefits of excision and she wishes to proceed  Discussed the need for daily wound care while excision sites are granulating      Follow-up for excision of axilla .       Staff Involved:  Staff Only    Eze Bobo MD   of Dermatology  Department of Dermatology  [facility]      CC:   Chief Complaint   Patient presents with    Derm Problem     Follow up HS - notice a size change        History of Present Illness:  Ms. Jaqueline Argueta is a 28 year old female who presents as a return patient.    Last seen in March at that time she was experiencing a flare after discontinuing doxycycline and BenzaClin which had previously controlled her symptoms.  Recommended that she restart that.  There was a linear palpable cord in her upper inner arm concerning for possible superficial thrombophlebitis.  Recommended aspirin 81 mg and an ultrasound was ordered ultrasound was performed March 15 which did not show any superficial thrombophlebitis.  There was a 3 x 0.5 x 1.5 fluid collection within the right axilla likely corresponding to active at bedtime    Labs:      Physical exam:  Vitals: LMP 03/04/2024 (Approximate)   GEN: well developed, well-nourished, in no  acute distress, in a pleasant mood.     SKIN: Alvarez phototype 1  - Focused examination of the axillae was performed.  -Scars in bilateral axillae with persistent  - No other lesions of concern on areas examined.     Past Medical History:   Past Medical History:   Diagnosis Date    Anemia due to blood loss, acute 12/27/2011    Depressive disorder      Past Surgical History:   Procedure Laterality Date    NO HISTORY OF SURGERY         Social History:   reports that she has quit smoking. She has never used smokeless tobacco. She reports current alcohol use. She reports current drug use. Drug: Marijuana.    Family History:  Family History   Problem Relation Age of Onset    Substance Abuse Mother     Depression Mother     Substance Abuse Father     Substance Abuse Brother     Substance Abuse Sister     Bipolar Disorder Sister     Depression Sister     Substance Abuse Sister     Depression Sister     Family History Negative No family hx of        Medications:  Current Outpatient Medications   Medication Sig Dispense Refill    acetaminophen (TYLENOL) 500 MG tablet Take 1-2 tablets (500-1,000 mg) by mouth every 8 hours 100 tablet 0    aspirin 81 MG EC tablet Take 1 tablet (81 mg) by mouth daily 30 tablet 0    chlorhexidine (HIBICLENS) 4 % liquid Wash armpits with this liquid with each shower. Let sit for 30-60 seconds before rinsing. 946 mL 3    clindamycin-benzoyl peroxide (BENZACLIN) 1-5 % external gel Apply topically 2 times daily 50 g 4    doxycycline hyclate (VIBRAMYCIN) 100 MG capsule Take 1 capsule (100 mg) by mouth 2 times daily 30 capsule 11    escitalopram (LEXAPRO) 5 MG tablet Take 1 tablet (5 mg) by mouth daily 30 tablet 0    hydrOXYzine HCl (ATARAX) 10 MG tablet Take 1 tablet (10 mg) by mouth every 6 hours as needed for anxiety 30 tablet 0    ibuprofen (ADVIL/MOTRIN) 600 MG tablet Take 1 tablet (600 mg) by mouth every 8 hours 30 tablet 0    medroxyPROGESTERone (DEPO-PROVERA) 150 MG/ML IM injection Inject  1 mL (150 mg) into the muscle every 3 months 1 mL 3    clindamycin (CLEOCIN T) 1 % external lotion Apply topically 2 times daily (Patient not taking: Reported on 3/25/2024) 120 mL 3    NIFEdipine ER (ADALAT CC) 30 MG 24 hr tablet Take 1 tablet (30 mg) by mouth daily for 7 days 7 tablet 0     No Known Allergies

## 2024-06-03 ENCOUNTER — CARE COORDINATION (OUTPATIENT)
Dept: PSYCHOLOGY | Facility: CLINIC | Age: 29
End: 2024-06-03
Payer: COMMERCIAL

## 2024-06-03 NOTE — PROGRESS NOTES
Behavioral Health Home Services  [facility]: [place]    Community Health Worker Note    Patient: Jaqueline Argueta  Date: Em 3, 2024  Preferred Name: Jaqueline    Previous PHQ-9:       12/29/2023     2:17 PM 1/26/2024    11:15 AM 3/25/2024     9:28 AM   PHQ-9 SCORE   PHQ-9 Total Score MyChart  9 (Mild depression) 15 (Moderately severe depression)   PHQ-9 Total Score 8 9 15     Previous SHERIDAN-7:       12/29/2023     2:17 PM 1/26/2024    11:16 AM 3/25/2024     9:29 AM   SHERIDAN-7 SCORE   Total Score  11 (moderate anxiety) 12 (moderate anxiety)   Total Score 11 11 12     BREANNE LEVEL:       No data to display                Preferred Contact:  Need for : No  Preferred Contact: Cell      Type of Contact Today: Phone call (not reached/unavailable)    Service Modality: Phone Visit:        Data: (Subjective / Objective):  Attempted to reach patient, but was unsuccessful.  Plan to attempt again.  Chante Aly    Community Health Worker has reviewed upcoming appointments with patient. Encouraged attendance.      Future Appointments 6/3/2024 - 11/30/2024        Date Visit Type Length Department Provider     6/11/2024  2:40 PM [facility] RETURN 20 min [facility] Linda Sawyer MD    Location Instructions:     [facility] Selene fiore is in [address] at [address] in [place]. This is at the [place]&nbsp; corner of the intersection of [address] and along the [facility], one mile south of [place] [address]. Free lot parking is available.               6/11/2024  3:00 PM [facility] RETURN CARE COORDINATOR 30 min [facility] Coordinator, [facility] Care    Location Instructions:     Located on the corner of [address] and [address] in [place]              6/13/2024  3:00 PM [facility] PATIENT CARE STAFF [facility]IENT CARE STAFF    Location Instructions:     [facility] Selene fiore is in [address] at [address] in [place]. This is  at the [place]&nbsp; corner of the intersection of [facility] and along the [address], one mile south of [place] [address]. Free lot parking is available.               7/1/2024  3:30 PM PROCEDURE 30 min [facility] Eze Meza MD    Location Instructions:     [address] [address]                       Chante Aly  Community Health Worker  Em 3, 2024

## 2024-06-11 ENCOUNTER — OFFICE VISIT (OUTPATIENT)
Dept: FAMILY MEDICINE | Facility: CLINIC | Age: 29
End: 2024-06-11
Payer: COMMERCIAL

## 2024-06-11 VITALS
HEIGHT: 61 IN | OXYGEN SATURATION: 95 % | DIASTOLIC BLOOD PRESSURE: 81 MMHG | BODY MASS INDEX: 28.34 KG/M2 | SYSTOLIC BLOOD PRESSURE: 115 MMHG | TEMPERATURE: 97.3 F | RESPIRATION RATE: 16 BRPM | HEART RATE: 109 BPM

## 2024-06-11 DIAGNOSIS — F43.10 PTSD (POST-TRAUMATIC STRESS DISORDER): ICD-10-CM

## 2024-06-11 DIAGNOSIS — F32.A DEPRESSION, UNSPECIFIED DEPRESSION TYPE: Primary | ICD-10-CM

## 2024-06-11 DIAGNOSIS — T74.91XS DOMESTIC VIOLENCE OF ADULT, SEQUELA: ICD-10-CM

## 2024-06-11 PROCEDURE — 99214 OFFICE O/P EST MOD 30 MIN: CPT | Mod: GC

## 2024-06-11 RX ORDER — LANOLIN ALCOHOL/MO/W.PET/CERES
3 CREAM (GRAM) TOPICAL
Qty: 30 TABLET | Refills: 0 | Status: SHIPPED | OUTPATIENT
Start: 2024-06-11

## 2024-06-11 RX ORDER — PRAZOSIN HYDROCHLORIDE 1 MG/1
1 CAPSULE ORAL AT BEDTIME
Qty: 30 CAPSULE | Refills: 0 | Status: SHIPPED | OUTPATIENT
Start: 2024-06-11

## 2024-06-11 RX ORDER — HYDROXYZINE HYDROCHLORIDE 10 MG/1
10 TABLET, FILM COATED ORAL EVERY 8 HOURS PRN
Qty: 100 TABLET | Refills: 3 | Status: SHIPPED | OUTPATIENT
Start: 2024-06-11 | End: 2024-10-07

## 2024-06-11 NOTE — Clinical Note
Jason Huang :)   Jaqueline missed her [facility] therapy appointment that she waited months for, unfortunately. She requested help in getting this rescheduled. She does not want bridge therapy through [facility] in the meantime- prefers to get established with a long term therapist. Let me know if you need a new referral.   Thanks,  Alexandra
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
## 2024-06-11 NOTE — PATIENT INSTRUCTIONS
Patient Education   Here is the plan from today's visit    FOR MOOD:  - KEEP DOING HYDROXYZINE as NEEDED   - THINK ABOUT LEXAPRO   - START MELATONIN 3 MG AT NIGHT   - START PRAZOSIN 1 MG AT NIGHT   - I WILL MESSAGE CARE COORDINATOR ABOUT THERAPY   - COME BACK FOR RECHECK AND MEDICAL MARIJUANA    Please call or return to clinic if your symptoms don't go away.    Follow up plan  Return in about 4 weeks (around 7/9/2024).    Thank you for coming to [facility] today.  Lab Testing:  **If you had lab testing today and your results are reassuring or normal they will be mailed to you or sent through [employer] within 7 days.   **If the lab tests need quick action we will call you with the results.  **If you are having labs done on a different day, please call [phone] to schedule at [facility] or [phone] for other [facility] Outpatient Lab locations. Labs do not offer walk-in appointments.  The phone number we will call with results is # [phone] (home) . If this is not the best number please call our clinic and change the number.  Medication Refills:  If you need any refills please call your pharmacy and they will contact us.   If you need to  your refill at a new pharmacy, please contact the new pharmacy directly. The new pharmacy will help you get your medications transferred faster.   Scheduling:  If you have any concerns about today's visit or wish to schedule another appointment please call our office during normal business hours [phone] (8-5:00 M-F). If you can no longer make a scheduled visit, please cancel via [employer] or call us to cancel.   If a referral was made to an [facility] specialty provider and you do not get a call from central scheduling, please refer to directions on your visit summary or call our office during normal business hours for assistance.   If a Mammogram was ordered for you at the [facility] call [phone] to schedule or change your  appointment.  If you had an XRay/CT/Ultrasound/MRI ordered the number is [phone] to schedule or change your radiology appointment.   [facility] has limited ultrasound appointments available on Wednesdays, if you would like your ultrasound at [facility], please call [phone] to schedule.   Medical Concerns:  If you have urgent medical concerns please call [phone] at any time of the day.    Linda Ch MD

## 2024-06-13 ENCOUNTER — ALLIED HEALTH/NURSE VISIT (OUTPATIENT)
Dept: FAMILY MEDICINE | Facility: CLINIC | Age: 29
End: 2024-06-13
Payer: COMMERCIAL

## 2024-06-13 DIAGNOSIS — Z23 ENCOUNTER FOR IMMUNIZATION: Primary | ICD-10-CM

## 2024-06-13 PROCEDURE — 96372 THER/PROPH/DIAG INJ SC/IM: CPT | Performed by: FAMILY MEDICINE

## 2024-06-13 PROCEDURE — 99207 PR NO CHARGE NURSE ONLY: CPT

## 2024-06-13 RX ADMIN — MEDROXYPROGESTERONE ACETATE 150 MG: 150 INJECTION, SUSPENSION INTRAMUSCULAR at 09:22

## 2024-06-13 NOTE — PROGRESS NOTES
Clinic Administered Medication Documentation        Patient was given Depo Provera. Prior to medication administration, verified patient's identity using patient s name and date of birth. Please see MAR and medication order for additional information. Patient instructed to remain in clinic for 15 minutes, report any adverse reaction to staff immediately, and remain in clinic for 15 minutes and report any adverse reaction to staff immediately but patient declined.    Vial/Syringe: Single dose vial. Was entire vial of medication used? Yes    NEXT INJECTION DUE: 8/29/24 - 9/26/24

## 2024-07-19 ENCOUNTER — CARE COORDINATION (OUTPATIENT)
Dept: PSYCHOLOGY | Facility: CLINIC | Age: 29
End: 2024-07-19
Payer: COMMERCIAL

## 2024-07-19 NOTE — PROGRESS NOTES
Behavioral Health Home Services  [facility]: [facility]    Community Health Worker Note    Patient: Jaqueline Argueta  Date: July 19, 2024  Preferred Name: Jaqueline    Previous PHQ-9:       12/29/2023     2:17 PM 1/26/2024    11:15 AM 3/25/2024     9:28 AM   PHQ-9 SCORE   PHQ-9 Total Score MyChart  9 (Mild depression) 15 (Moderately severe depression)   PHQ-9 Total Score 8 9 15     Previous SHERIDAN-7:       12/29/2023     2:17 PM 1/26/2024    11:16 AM 3/25/2024     9:29 AM   SHERIDAN-7 SCORE   Total Score  11 (moderate anxiety) 12 (moderate anxiety)   Total Score 11 11 12     BREANNE LEVEL:       No data to display                Preferred Contact:  Need for : No  Preferred Contact: Cell      Type of Contact Today: Phone call (not reached/unavailable)  Left message asking patient to call back. Jaqueline missed her appointment with her PCP yesterday, calling to get her reschedule.    Service Modality: Phone Visit:        Data: (Subjective / Objective):  Attempted to reach patient, but was unsuccessful.  Plan to attempt again.  Chante Aly    Community Health Worker has reviewed upcoming appointments with patient. Encouraged attendance.      Future Appointments 7/19/2024 - 1/15/2025        Date Visit Type Length Department Provider     7/31/2024 11:00 AM [facility] RETURN EXTENDED 40 min [facility] FAMILY MEDICINE Dale Dent MD    Location Instructions:     [facility] - [place] fiore is in [address] at [address] in [place]. This is at the [place]&nbsp; corner of the intersection of [facility] and along the [facility], one mile south of [place] [address]. Free lot parking is available.                        Chante Aly  Community Health Worker  July 19, 2024

## 2024-07-31 ENCOUNTER — OFFICE VISIT (OUTPATIENT)
Dept: FAMILY MEDICINE | Facility: CLINIC | Age: 29
End: 2024-07-31
Payer: COMMERCIAL

## 2024-07-31 VITALS
WEIGHT: 164.6 LBS | BODY MASS INDEX: 31.08 KG/M2 | HEART RATE: 94 BPM | RESPIRATION RATE: 16 BRPM | SYSTOLIC BLOOD PRESSURE: 116 MMHG | TEMPERATURE: 98.3 F | OXYGEN SATURATION: 98 % | HEIGHT: 61 IN | DIASTOLIC BLOOD PRESSURE: 79 MMHG

## 2024-07-31 DIAGNOSIS — F43.10 PTSD (POST-TRAUMATIC STRESS DISORDER): ICD-10-CM

## 2024-07-31 DIAGNOSIS — Z13.9 ENCOUNTER FOR SCREENING INVOLVING SOCIAL DETERMINANTS OF HEALTH (SDOH): Primary | ICD-10-CM

## 2024-07-31 PROCEDURE — 99214 OFFICE O/P EST MOD 30 MIN: CPT | Performed by: FAMILY MEDICINE

## 2024-07-31 ASSESSMENT — PATIENT HEALTH QUESTIONNAIRE - PHQ9
SUM OF ALL RESPONSES TO PHQ QUESTIONS 1-9: 21
10. IF YOU CHECKED OFF ANY PROBLEMS, HOW DIFFICULT HAVE THESE PROBLEMS MADE IT FOR YOU TO DO YOUR WORK, TAKE CARE OF THINGS AT HOME, OR GET ALONG WITH OTHER PEOPLE: VERY DIFFICULT
SUM OF ALL RESPONSES TO PHQ QUESTIONS 1-9: 21

## 2024-07-31 NOTE — PATIENT INSTRUCTIONS
Here is the plan from today's visit    1. PTSD (post-traumatic stress disorder)  Please check your email the neck steps will be worked out with you by email    2. Encounter for screening involving social determinants of health (SDoH)        Please call or return to clinic if your symptoms don't go away.    Follow up plan  Return in about 1 year (around 7/31/2025) for For recertification as needed.     Thank you for coming to [facility] today.  Lab Testing:  **If you had lab testing today and your results are reassuring or normal they will be mailed to you or sent through [employer] within 7 days.   **If the lab tests need quick action we will call you with the results.  The phone number we will call with results is # [phone] (home) . If this is not the best number please call our clinic and change the number.  Medication Refills:  If you need any refills please call your pharmacy and they will contact us.   If you need to  your refill at a new pharmacy, please contact the new pharmacy directly. The new pharmacy will help you get your medications transferred faster.   Scheduling:  If you have any concerns about today's visit or wish to schedule another appointment please call our office during normal business hours [phone] (8-5:00 M-F)  Information about [place] Medical Cannabis Program  You have been certified with the following diagnosis  Post traumatic stress disorder    I will complete your registration at the [place] Medical Cannabis Registration the next step is up to the State (This can take up to 30 days)  Registration involves the following steps: You need to act on the certification before 90 days!  Step 1: The patient visits his/her health care practitioner. (Done)  Step 2: The patient's health care practitioner enrolls in the Medical Cannabis Registry and certifies that the patient has a qualifying medical condition.( This has been done shortly using the e-mail address  [email or web address] and Phone number [phone] )  Step 3: The patient gets an email with a link to the enrollment application. If the patient has a caregiver, the caregiver will need to complete an application and pass a background check.  Step 4: The patient (and caregiver, if applicable) will be notified by the Office of Medical Cannabis once the application is approved.  Step 5 : After you are approved you will be given information about Cannabis pharmacies that you can go to buy medical cannabis. The pharmacist at this location will work with you to determine the best products.    Medical Cannabis is usually not the best choice nor the best studied choice for the above conditions.  Annual program fee is $200, ($50 for some Medical Assistance Programs/Social security Disability.)    All cost for the medication is out-of-pocket and can average between $100-$300/month      You will need to be recertified yearly.      Dale Dent MD
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
## 2024-08-08 ENCOUNTER — OFFICE VISIT (OUTPATIENT)
Dept: URGENT CARE | Facility: URGENT CARE | Age: 29
End: 2024-08-08
Payer: COMMERCIAL

## 2024-08-08 VITALS
HEART RATE: 101 BPM | SYSTOLIC BLOOD PRESSURE: 115 MMHG | WEIGHT: 163 LBS | TEMPERATURE: 98.6 F | OXYGEN SATURATION: 100 % | BODY MASS INDEX: 30.8 KG/M2 | DIASTOLIC BLOOD PRESSURE: 78 MMHG

## 2024-08-08 DIAGNOSIS — J06.9 VIRAL UPPER RESPIRATORY TRACT INFECTION WITH COUGH: Primary | ICD-10-CM

## 2024-08-08 LAB — DEPRECATED S PYO AG THROAT QL EIA: NEGATIVE

## 2024-08-08 PROCEDURE — 99213 OFFICE O/P EST LOW 20 MIN: CPT | Performed by: PHYSICIAN ASSISTANT

## 2024-08-08 PROCEDURE — 87651 STREP A DNA AMP PROBE: CPT | Performed by: PHYSICIAN ASSISTANT

## 2024-08-08 PROCEDURE — 87635 SARS-COV-2 COVID-19 AMP PRB: CPT | Performed by: PHYSICIAN ASSISTANT

## 2024-08-09 LAB
GROUP A STREP BY PCR: NOT DETECTED
SARS-COV-2 RNA RESP QL NAA+PROBE: NEGATIVE

## 2024-08-09 NOTE — PATIENT INSTRUCTIONS
Patient was educated on the natural course of viral throat infection. Strep and COVID PCR are pending. Conservative measures discussed including warm fluids, warm steamy showers, salt water gargles, Lozenges (Cepacol), and over-the-counter analgesics (Tylenol or Ibuprofen). See your primary care provider if symptoms worsen or do not improve in 7 days. Seek emergency care if you develop severe throat pain, or difficulty swallowing.

## 2024-08-09 NOTE — PROGRESS NOTES
URGENT CARE VISIT:    SUBJECTIVE:   Jaqueline Argueta is a 28 year old female presenting with a chief complaint of cough - productive and sore throat.  Onset was 1 week(s) ago.   She denies the following symptoms: fever, stuffy nose, and shortness of breath  Course of illness is same.    Treatment measures tried include None tried with no relief of symptoms.  Predisposing factors include ill contact: son and family.    PMH:   Past Medical History:   Diagnosis Date    Anemia due to blood loss, acute 12/27/2011    Depressive disorder      Allergies: Patient has no known allergies.   Medications:   Current Outpatient Medications   Medication Sig Dispense Refill    acetaminophen (TYLENOL) 500 MG tablet Take 1-2 tablets (500-1,000 mg) by mouth every 8 hours (Patient not taking: Reported on 8/8/2024) 100 tablet 0    aspirin 81 MG EC tablet Take 1 tablet (81 mg) by mouth daily (Patient not taking: Reported on 8/8/2024) 30 tablet 0    chlorhexidine (HIBICLENS) 4 % liquid Wash armpits with this liquid with each shower. Let sit for 30-60 seconds before rinsing. (Patient not taking: Reported on 8/8/2024) 946 mL 3    clindamycin (CLEOCIN T) 1 % external lotion Apply topically 2 times daily (Patient not taking: Reported on 7/31/2024) 120 mL 3    clindamycin-benzoyl peroxide (BENZACLIN) 1-5 % external gel Apply topically 2 times daily (Patient not taking: Reported on 8/8/2024) 50 g 4    doxycycline hyclate (VIBRAMYCIN) 100 MG capsule Take 1 capsule (100 mg) by mouth 2 times daily (Patient not taking: Reported on 8/8/2024) 30 capsule 11    escitalopram (LEXAPRO) 5 MG tablet Take 1 tablet (5 mg) by mouth daily (Patient not taking: Reported on 8/8/2024) 30 tablet 0    hydrOXYzine HCl (ATARAX) 10 MG tablet Take 1 tablet (10 mg) by mouth every 8 hours as needed for anxiety (Patient not taking: Reported on 8/8/2024) 100 tablet 3    ibuprofen (ADVIL/MOTRIN) 600 MG tablet Take 1 tablet (600 mg) by mouth every 8 hours (Patient not taking:  Reported on 8/8/2024) 30 tablet 0    medroxyPROGESTERone (DEPO-PROVERA) 150 MG/ML IM injection Inject 1 mL (150 mg) into the muscle every 3 months (Patient not taking: Reported on 8/8/2024) 1 mL 3    melatonin 3 MG tablet Take 1 tablet (3 mg) by mouth nightly as needed for sleep (Patient not taking: Reported on 8/8/2024) 30 tablet 0    NIFEdipine ER (ADALAT CC) 30 MG 24 hr tablet Take 1 tablet (30 mg) by mouth daily for 7 days 7 tablet 0    prazosin (MINIPRESS) 1 MG capsule Take 1 capsule (1 mg) by mouth at bedtime (Patient not taking: Reported on 8/8/2024) 30 capsule 0     Social History:   Social History     Tobacco Use    Smoking status: Former    Smokeless tobacco: Never    Tobacco comments:     smoke marijuana once or twice a wk   Substance Use Topics    Alcohol use: Yes     Comment:         ROS:  Review of systems negative except as stated above.    OBJECTIVE:  /78   Pulse 101   Temp 98.6  F (37  C) (Tympanic)   Wt 73.9 kg (163 lb)   LMP  (LMP Unknown)   SpO2 100%   BMI 30.80 kg/m    GENERAL APPEARANCE: healthy, alert and no distress  EYES: EOMI,  PERRL, conjunctiva clear  HENT: ear canals and TM's normal.  Mildly erythematous oropharynx  NECK: supple, nontender, no lymphadenopathy  RESP: lungs clear to auscultation - no rales, rhonchi or wheezes  CV: regular rates and rhythm, normal S1 S2, no murmur noted  SKIN: no suspicious lesions or rashes    Labs:    Results for orders placed or performed in visit on 08/08/24   Streptococcus A Rapid Screen w/Reflex to PCR - Clinic Collect     Status: Normal    Specimen: Throat; Swab   Result Value Ref Range    Group A Strep antigen Negative Negative       ASSESSMENT:    ICD-10-CM    1. Viral upper respiratory tract infection with cough  J06.9 Streptococcus A Rapid Screen w/Reflex to PCR - Clinic Collect     Group A Streptococcus PCR Throat Swab     Symptomatic COVID-19 Virus (Coronavirus) by PCR     Symptomatic COVID-19 Virus (Coronavirus) by PCR Nose           PLAN:  Patient Instructions   Patient was educated on the natural course of viral throat infection. Strep and COVID PCR are pending. Conservative measures discussed including warm fluids, warm steamy showers, salt water gargles, Lozenges (Cepacol), and over-the-counter analgesics (Tylenol or Ibuprofen). See your primary care provider if symptoms worsen or do not improve in 7 days. Seek emergency care if you develop severe throat pain, or difficulty swallowing.     Patient verbalized understanding and is agreeable to plan. The patient was discharged ambulatory and in stable condition.    Monse Esqueda PA-C ....................  8/8/2024   7:40 PM

## 2024-08-11 ENCOUNTER — HEALTH MAINTENANCE LETTER (OUTPATIENT)
Age: 29
End: 2024-08-11

## 2024-08-27 ENCOUNTER — DOCUMENTATION ONLY (OUTPATIENT)
Dept: PSYCHOLOGY | Facility: CLINIC | Age: 29
End: 2024-08-27
Payer: COMMERCIAL

## 2024-08-27 NOTE — PROGRESS NOTES
Patient has been discharged from [facility] at this time due to no contact with multiple attempts. This does not change her ability to continue receiving care at [facility].     If patient is still interested in staying enrolled in [facility] this can be discussed with PCP.     NICOLE CochranW  [facility]  Social Work Care Coordinator

## 2024-09-09 ENCOUNTER — HOSPITAL ENCOUNTER (EMERGENCY)
Facility: CLINIC | Age: 29
Discharge: HOME | End: 2024-09-10
Attending: EMERGENCY MEDICINE | Admitting: EMERGENCY MEDICINE
Payer: COMMERCIAL

## 2024-09-09 ENCOUNTER — OFFICE VISIT (OUTPATIENT)
Dept: FAMILY MEDICINE | Facility: CLINIC | Age: 29
End: 2024-09-09
Payer: COMMERCIAL

## 2024-09-09 VITALS
HEIGHT: 61 IN | WEIGHT: 171.9 LBS | BODY MASS INDEX: 32.45 KG/M2 | OXYGEN SATURATION: 98 % | RESPIRATION RATE: 12 BRPM | TEMPERATURE: 98.7 F | SYSTOLIC BLOOD PRESSURE: 127 MMHG | DIASTOLIC BLOOD PRESSURE: 88 MMHG | HEART RATE: 114 BPM

## 2024-09-09 DIAGNOSIS — Z32.00 ENCOUNTER FOR PREGNANCY TEST, RESULT UNKNOWN: ICD-10-CM

## 2024-09-09 DIAGNOSIS — H60.392 INFECTIVE OTITIS EXTERNA, LEFT: ICD-10-CM

## 2024-09-09 DIAGNOSIS — J06.9 UPPER RESPIRATORY TRACT INFECTION, UNSPECIFIED TYPE: ICD-10-CM

## 2024-09-09 DIAGNOSIS — J02.9 SORE THROAT: ICD-10-CM

## 2024-09-09 DIAGNOSIS — H93.11 TINNITUS, RIGHT: Primary | ICD-10-CM

## 2024-09-09 DIAGNOSIS — Z30.9 ENCOUNTER FOR CONTRACEPTIVE MANAGEMENT, UNSPECIFIED TYPE: ICD-10-CM

## 2024-09-09 DIAGNOSIS — H60.391 INFECTIVE OTITIS EXTERNA, RIGHT: ICD-10-CM

## 2024-09-09 LAB
ALBUMIN SERPL BCG-MCNC: 4 G/DL (ref 3.5–5.2)
ALP SERPL-CCNC: 115 U/L (ref 40–150)
ALT SERPL W P-5'-P-CCNC: 73 U/L (ref 0–50)
ANION GAP SERPL CALCULATED.3IONS-SCNC: 9 MMOL/L (ref 7–15)
AST SERPL W P-5'-P-CCNC: 36 U/L (ref 0–45)
BASOPHILS # BLD AUTO: 0 10E3/UL (ref 0–0.2)
BASOPHILS NFR BLD AUTO: 0 %
BILIRUB SERPL-MCNC: 0.3 MG/DL
BUN SERPL-MCNC: 7.7 MG/DL (ref 6–20)
CALCIUM SERPL-MCNC: 8.9 MG/DL (ref 8.8–10.4)
CHLORIDE SERPL-SCNC: 103 MMOL/L (ref 98–107)
CREAT SERPL-MCNC: 0.61 MG/DL (ref 0.51–0.95)
DEPRECATED S PYO AG THROAT QL EIA: NEGATIVE
EGFRCR SERPLBLD CKD-EPI 2021: >90 ML/MIN/1.73M2
EOSINOPHIL # BLD AUTO: 0.2 10E3/UL (ref 0–0.7)
EOSINOPHIL NFR BLD AUTO: 2 %
ERYTHROCYTE [DISTWIDTH] IN BLOOD BY AUTOMATED COUNT: 13 % (ref 10–15)
FLUAV RNA SPEC QL NAA+PROBE: NEGATIVE
FLUBV RNA RESP QL NAA+PROBE: NEGATIVE
GLUCOSE SERPL-MCNC: 100 MG/DL (ref 70–99)
GROUP A STREP BY PCR: NOT DETECTED
HCG UR QL: NEGATIVE
HCO3 SERPL-SCNC: 23 MMOL/L (ref 22–29)
HCT VFR BLD AUTO: 40.2 % (ref 35–47)
HGB BLD-MCNC: 13.9 G/DL (ref 11.7–15.7)
IMM GRANULOCYTES # BLD: 0 10E3/UL
IMM GRANULOCYTES NFR BLD: 0 %
LYMPHOCYTES # BLD AUTO: 2.7 10E3/UL (ref 0.8–5.3)
LYMPHOCYTES NFR BLD AUTO: 24 %
MCH RBC QN AUTO: 29.6 PG (ref 26.5–33)
MCHC RBC AUTO-ENTMCNC: 34.6 G/DL (ref 31.5–36.5)
MCV RBC AUTO: 86 FL (ref 78–100)
MONOCYTES # BLD AUTO: 0.7 10E3/UL (ref 0–1.3)
MONOCYTES NFR BLD AUTO: 6 %
NEUTROPHILS # BLD AUTO: 7.5 10E3/UL (ref 1.6–8.3)
NEUTROPHILS NFR BLD AUTO: 68 %
NRBC # BLD AUTO: 0 10E3/UL
NRBC BLD AUTO-RTO: 0 /100
PLATELET # BLD AUTO: 257 10E3/UL (ref 150–450)
POTASSIUM SERPL-SCNC: 3.7 MMOL/L (ref 3.4–5.3)
PROT SERPL-MCNC: 7.8 G/DL (ref 6.4–8.3)
RBC # BLD AUTO: 4.7 10E6/UL (ref 3.8–5.2)
RSV RNA SPEC NAA+PROBE: NEGATIVE
SARS-COV-2 RNA RESP QL NAA+PROBE: NEGATIVE
SODIUM SERPL-SCNC: 135 MMOL/L (ref 135–145)
WBC # BLD AUTO: 11.1 10E3/UL (ref 4–11)

## 2024-09-09 PROCEDURE — 99213 OFFICE O/P EST LOW 20 MIN: CPT | Mod: 25

## 2024-09-09 PROCEDURE — 81025 URINE PREGNANCY TEST: CPT

## 2024-09-09 PROCEDURE — 99283 EMERGENCY DEPT VISIT LOW MDM: CPT | Performed by: EMERGENCY MEDICINE

## 2024-09-09 PROCEDURE — 36415 COLL VENOUS BLD VENIPUNCTURE: CPT | Performed by: EMERGENCY MEDICINE

## 2024-09-09 PROCEDURE — 250N000013 HC RX MED GY IP 250 OP 250 PS 637: Performed by: EMERGENCY MEDICINE

## 2024-09-09 PROCEDURE — 96372 THER/PROPH/DIAG INJ SC/IM: CPT

## 2024-09-09 PROCEDURE — 87637 SARSCOV2&INF A&B&RSV AMP PRB: CPT

## 2024-09-09 PROCEDURE — 87651 STREP A DNA AMP PROBE: CPT

## 2024-09-09 PROCEDURE — 85025 COMPLETE CBC W/AUTO DIFF WBC: CPT | Performed by: EMERGENCY MEDICINE

## 2024-09-09 PROCEDURE — 80053 COMPREHEN METABOLIC PANEL: CPT | Performed by: EMERGENCY MEDICINE

## 2024-09-09 PROCEDURE — 99284 EMERGENCY DEPT VISIT MOD MDM: CPT | Performed by: EMERGENCY MEDICINE

## 2024-09-09 RX ORDER — CETIRIZINE HYDROCHLORIDE 5 MG/1
5 TABLET ORAL ONCE
Status: COMPLETED | OUTPATIENT
Start: 2024-09-09 | End: 2024-09-09

## 2024-09-09 RX ORDER — CIPROFLOXACIN AND DEXAMETHASONE 3; 1 MG/ML; MG/ML
4 SUSPENSION/ DROPS AURICULAR (OTIC) ONCE
Status: COMPLETED | OUTPATIENT
Start: 2024-09-09 | End: 2024-09-09

## 2024-09-09 RX ORDER — FLUTICASONE PROPIONATE 50 MCG
1 SPRAY, SUSPENSION (ML) NASAL DAILY
Qty: 9.9 ML | Refills: 0 | Status: SHIPPED | OUTPATIENT
Start: 2024-09-09

## 2024-09-09 RX ORDER — IBUPROFEN 600 MG/1
600 TABLET, FILM COATED ORAL ONCE
Status: COMPLETED | OUTPATIENT
Start: 2024-09-09 | End: 2024-09-09

## 2024-09-09 RX ORDER — CIPROFLOXACIN AND DEXAMETHASONE 3; 1 MG/ML; MG/ML
4 SUSPENSION/ DROPS AURICULAR (OTIC) 2 TIMES DAILY
Qty: 7.5 ML | Refills: 0 | Status: SHIPPED | OUTPATIENT
Start: 2024-09-09 | End: 2024-09-16

## 2024-09-09 RX ORDER — FLUTICASONE PROPIONATE 50 MCG
1 SPRAY, SUSPENSION (ML) NASAL DAILY
Status: DISCONTINUED | OUTPATIENT
Start: 2024-09-10 | End: 2024-09-10 | Stop reason: HOSPADM

## 2024-09-09 RX ORDER — ACETAMINOPHEN 500 MG
1000 TABLET ORAL ONCE
Status: COMPLETED | OUTPATIENT
Start: 2024-09-09 | End: 2024-09-09

## 2024-09-09 RX ORDER — MEDROXYPROGESTERONE ACETATE 150 MG/ML
150 INJECTION, SUSPENSION INTRAMUSCULAR
Status: ACTIVE | OUTPATIENT
Start: 2024-09-09

## 2024-09-09 RX ADMIN — CETIRIZINE HYDROCHLORIDE 5 MG: 5 TABLET ORAL at 22:34

## 2024-09-09 RX ADMIN — MEDROXYPROGESTERONE ACETATE 150 MG: 150 INJECTION, SUSPENSION INTRAMUSCULAR at 16:24

## 2024-09-09 RX ADMIN — CIPROFLOXACIN AND DEXAMETHASONE 4 DROP: 3; 1 SUSPENSION/ DROPS AURICULAR (OTIC) at 22:34

## 2024-09-09 RX ADMIN — ACETAMINOPHEN 1000 MG: 500 TABLET ORAL at 22:21

## 2024-09-09 RX ADMIN — IBUPROFEN 600 MG: 600 TABLET, FILM COATED ORAL at 22:21

## 2024-09-09 ASSESSMENT — PATIENT HEALTH QUESTIONNAIRE - PHQ9
10. IF YOU CHECKED OFF ANY PROBLEMS, HOW DIFFICULT HAVE THESE PROBLEMS MADE IT FOR YOU TO DO YOUR WORK, TAKE CARE OF THINGS AT HOME, OR GET ALONG WITH OTHER PEOPLE: SOMEWHAT DIFFICULT
SUM OF ALL RESPONSES TO PHQ QUESTIONS 1-9: 13
SUM OF ALL RESPONSES TO PHQ QUESTIONS 1-9: 13

## 2024-09-09 ASSESSMENT — ACTIVITIES OF DAILY LIVING (ADL)
ADLS_ACUITY_SCORE: 37

## 2024-09-09 ASSESSMENT — COLUMBIA-SUICIDE SEVERITY RATING SCALE - C-SSRS
6. HAVE YOU EVER DONE ANYTHING, STARTED TO DO ANYTHING, OR PREPARED TO DO ANYTHING TO END YOUR LIFE?: NO
1. IN THE PAST MONTH, HAVE YOU WISHED YOU WERE DEAD OR WISHED YOU COULD GO TO SLEEP AND NOT WAKE UP?: NO
2. HAVE YOU ACTUALLY HAD ANY THOUGHTS OF KILLING YOURSELF IN THE PAST MONTH?: NO

## 2024-09-09 NOTE — PATIENT INSTRUCTIONS
Patient Education   Here is the plan from today's visit    1. Tinnitus, right  - ciprofloxacin-dexAMETHasone (CIPRODEX) 0.3-0.1 % otic suspension; Place 4 drops into the right ear 2 times daily for 7 days.  Dispense: 7.5 mL; Refill: 0  - fluticasone (FLONASE) 50 MCG/ACT nasal spray; Spray 1 spray into both nostrils daily.  Dispense: 9.9 mL; Refill: 0  - Adult ENT  Referral; Future    2. Sore throat  - Streptococcus A Rapid Screen w/Reflex to PCR - Clinic Collect  - Symptomatic Influenza A/B, RSV, & SARS-CoV2 PCR (COVID-19) Nose  - Group A Streptococcus PCR Throat Swab    3. Encounter for pregnancy test, result unknown  - HCG qualitative urine; Future  - HCG qualitative urine    4. Encounter for contraceptive management, unspecified type  - medroxyPROGESTERone (DEPO-PROVERA) injection 150 mg    5. Infective otitis externa, left    Please call or return to clinic if your symptoms don't go away.    Follow up plan  Return if symptoms worsen or fail to improve.    Thank you for coming to [facility] today.  Lab Testing:  **If you had lab testing today and your results are reassuring or normal they will be mailed to you or sent through [facility] within 7 days.   **If the lab tests need quick action we will call you with the results.  **If you are having labs done on a different day, please call [phone] to schedule at [facility] or [phone] for other [facility] Outpatient Lab locations. Labs do not offer walk-in appointments.  The phone number we will call with results is # [phone] (home) . If this is not the best number please call our clinic and change the number.  Medication Refills:  If you need any refills please call your pharmacy and they will contact us.   If you need to  your refill at a new pharmacy, please contact the new pharmacy directly. The new pharmacy will help you get your medications transferred faster.   Scheduling:  If you have any concerns about today's visit or  wish to schedule another appointment please call our office during normal business hours [phone] (8-5:00 M-F). If you can no longer make a scheduled visit, please cancel via [email or web address] or call us to cancel.   If a referral was made to an [facility] specialty provider and you do not get a call from central scheduling, please refer to directions on your visit summary or call our office during normal business hours for assistance.   If a Mammogram was ordered for you at the [facility] call [phone] to schedule or change your appointment.  If you had an XRay/CT/Ultrasound/MRI ordered the number is [phone] to schedule or change your radiology appointment.   [facility] has limited ultrasound appointments available on Wednesdays, if you would like your ultrasound at [facility], please call [phone] to schedule.   Medical Concerns:  If you have urgent medical concerns please call [phone] at any time of the day.    Grace Michael MD

## 2024-09-09 NOTE — PROGRESS NOTES
Assessment & Plan     Tinnitus, right  Otitis externa  Presenting with two months of worsening right ear pain and nausea tinnitus, now constant (previously intermittent). Exam reveals white lesion on TM c/f cholesteatoma as well as erythema and mild amount of swelling in canal. Will treat for an otitis externa, refer to ENT for TM/cholesteatoma workup.   - ciprofloxacin-dexAMETHasone (CIPRODEX) 0.3-0.1 % otic suspension; Place 4 drops into the right ear 2 times daily for 7 days.  - fluticasone (FLONASE) 50 MCG/ACT nasal spray; Spray 1 spray into both nostrils daily.  - Adult ENT  Referral; Future    Sore throat  Presenting with 1-2 weeks of URI symptoms--cough, congestion, scratchy throat. Offered covid/flu/rsv swab which patient accepted. Low c/f bacterial infection as patient well appearing, afebrile in clinic today however give throat sxs will swab for strep.  - Streptococcus A Rapid Screen w/Reflex to PCR - Clinic Collect  - Symptomatic Influenza A/B, RSV, & SARS-CoV2 PCR (COVID-19) Nose  - Group A Streptococcus PCR Throat Swab    Encounter for pregnancy test, result unknown  Encounter for contraceptive management, unspecified type  Due for depo 9/12/24. Desired pregnancy test due to nausea (experienced nausea with prior pregnancies).  - HCG qualitative urine; Future  - medroxyPROGESTERone (DEPO-PROVERA) injection 150 mg    Follow up with ENT for ear evaluation.    Magalys Parsons is a 28 year old, presenting for the following health issues:  Ear Problem (Ear aches been on and off for two months with ringing and pain, symptoms has been getting worse, have sore throat)    HPI   Patient presenting after a cold for 2 weeks--mild cough, runny nose, sore throat and irritation  Has progressed to scratchy throat  Has been dizzy and nauseas  Laying down improves symptoms, looking up makes her dizzy/nauseas  Some of the nausea          Objective    /88   Pulse 114   Temp 98.7  F (37.1  C) (Oral)  "  Resp 12   Ht 1.549 m (5' 1\")   Wt 78 kg (171 lb 14.4 oz)   LMP 09/06/2024 (Approximate)   SpO2 98%   BMI 32.48 kg/m    Body mass index is 32.48 kg/m .  Physical Exam  Constitutional:       General: She is not in acute distress.     Appearance: Normal appearance. She is normal weight.   HENT:      Head: Normocephalic and atraumatic.      Right Ear: Swelling and tenderness present.      Left Ear: Tympanic membrane and ear canal normal.      Ears:        Comments: White pearly lesion along R TM     Mouth/Throat:      Mouth: Mucous membranes are moist.      Pharynx: Oropharynx is clear. Posterior oropharyngeal erythema present.      Comments: Swollen tonsils  Eyes:      Extraocular Movements: Extraocular movements intact.      Pupils: Pupils are equal, round, and reactive to light.   Cardiovascular:      Rate and Rhythm: Normal rate and regular rhythm.      Pulses: Normal pulses.      Heart sounds: Normal heart sounds.   Pulmonary:      Effort: Pulmonary effort is normal.      Breath sounds: Normal breath sounds.   Abdominal:      General: Abdomen is flat. There is no distension.      Palpations: Abdomen is soft.      Tenderness: There is no abdominal tenderness.   Musculoskeletal:         General: No swelling. Normal range of motion.      Cervical back: Normal range of motion and neck supple. No rigidity or tenderness.   Lymphadenopathy:      Cervical: No cervical adenopathy.   Skin:     General: Skin is warm and dry.   Neurological:      General: No focal deficit present.      Mental Status: She is alert and oriented to person, place, and time.   Psychiatric:         Mood and Affect: Mood normal.         Thought Content: Thought content normal.              Signed Electronically by: Grace Michael MD    Answers submitted by the patient for this visit:  Patient Health Questionnaire (Submitted on 9/9/2024)  If you checked off any problems, how difficult have these problems made it for you to do your work, take " care of things at home, or get along with other people?: Somewhat difficult

## 2024-09-10 ENCOUNTER — TELEPHONE (OUTPATIENT)
Dept: FAMILY MEDICINE | Facility: CLINIC | Age: 29
End: 2024-09-10
Payer: COMMERCIAL

## 2024-09-10 VITALS
SYSTOLIC BLOOD PRESSURE: 118 MMHG | DIASTOLIC BLOOD PRESSURE: 82 MMHG | HEART RATE: 108 BPM | RESPIRATION RATE: 16 BRPM | TEMPERATURE: 98.9 F | OXYGEN SATURATION: 100 %

## 2024-09-10 NOTE — DISCHARGE INSTRUCTIONS
He received medications in the emergency department.  Take the Ciprodex eardrops in the right ear 2 times daily for 7 days.  4 drops into the right ear each time.  You can use a cottonball to keep the fluid and if it keeps on dripping out.  Do not put anything in your ear that is smaller than the elbow, such as a Q-tip.  Continue taking the Flonase nasal spray into each nostril 1 spray daily.  For persistent congestion and ear pressure, you can take cetirizine, Zyrtec which she can purchase over-the-counter.  You can take this once daily.    Take Tylenol and Motrin as directed for discomfort.  Return to the emergency department for any new or worsening symptoms.  Call your primary care doctor with any other additional concerns or follow-up.        - ciprofloxacin-dexAMETHasone (CIPRODEX) 0.3-0.1 % otic suspension; Place 4 drops into the right ear 2 times daily for 7 days.  - fluticasone (FLONASE) 50 MCG/ACT nasal spray; Spray 1 spray into both nostrils daily.  - Adult ENT  Referral; Future

## 2024-09-10 NOTE — TELEPHONE ENCOUNTER
BREANNA CC placed telephone outreach back to patient. Let her know I am available 10/09 when she is in to see PCP. SW to see patient then as well.     ELIANE FinleyW  [facility]- Social Work Care Coordinator

## 2024-09-10 NOTE — ED PROVIDER NOTES
ED Provider Note  [facility]      History     Chief Complaint   Patient presents with    Otalgia     Patient is having an earache and pain in both ears that is causing her to feel dizzy, nausea and a headache. Patient went to the clinic today and prescribed an ear drop and nasal spray and also gave a referral to HEENT. Pt unable to get medications due to insurance.      JESSICA Argueta is a 28 year old female with a past medical history of depression who presents to the emergency department seeking evaluation of bilateral otalgia as well as some cold and flu symptoms. She reports that her right ear hurts worse than her left.  Her symptoms began around 2 months ago with some mild internal itching of both ears. This persisted for awhile, then worsened significantly last week when the patient came down with a cold. Since then, she has also been experiencing throat pain, tinnitus.    She denies any fevers, chills, or coughing, dyspnea.  States that she got a cold from her son.  She also reports experiencing lightheadedness as well as 2 days of vomiting 2 days ago.  However these have all resolved.  She denies any visual changes. The patient reports having taken ibuprofen yesterday to no avail. Otherwise, she has not taken any medications today. She denies any history of ear infections, surgeries, or trauma. The patient does report using Q tips. She also notes some sensitivity to bright lights.  No hearing issues.    The patient presented to [facility] today prior to presenting to the emergency department. While there, she was given prescriptions for Ciprodex and Flonase, but was unable to fill these due to insurance issues. She was also given a referral to ENT. The following information was obtained from her visit.    Strep Group A - Negative  HCG - Negative    Influenza A PCR  Negative Negative    Influenza B PCR  Negative Negative    RSV PCR  Negative Negative    SARS CoV2  "PCR  Negative Negative      \"Exam reveals white lesion on TM c/f cholesteatoma as well as erythema and mild amount of swelling in canal.\"    Past Medical History  Past Medical History:   Diagnosis Date    Anemia due to blood loss, acute 12/27/2011    Depressive disorder      Past Surgical History:   Procedure Laterality Date    NO HISTORY OF SURGERY       hydrOXYzine HCl (ATARAX) 10 MG tablet  acetaminophen (TYLENOL) 500 MG tablet  aspirin 81 MG EC tablet  chlorhexidine (HIBICLENS) 4 % liquid  ciprofloxacin-dexAMETHasone (CIPRODEX) 0.3-0.1 % otic suspension  clindamycin (CLEOCIN T) 1 % external lotion  clindamycin-benzoyl peroxide (BENZACLIN) 1-5 % external gel  doxycycline hyclate (VIBRAMYCIN) 100 MG capsule  escitalopram (LEXAPRO) 5 MG tablet  fluticasone (FLONASE) 50 MCG/ACT nasal spray  ibuprofen (ADVIL/MOTRIN) 600 MG tablet  medroxyPROGESTERone (DEPO-PROVERA) 150 MG/ML IM injection  melatonin 3 MG tablet  NIFEdipine ER (ADALAT CC) 30 MG 24 hr tablet  prazosin (MINIPRESS) 1 MG capsule      No Known Allergies  Family History  Family History   Problem Relation Age of Onset    Substance Abuse Mother     Depression Mother     Substance Abuse Father     Substance Abuse Brother     Substance Abuse Sister     Bipolar Disorder Sister     Depression Sister     Substance Abuse Sister     Depression Sister     Family History Negative No family hx of      Social History   Social History     Tobacco Use    Smoking status: Some Days     Types: Other    Smokeless tobacco: Never    Tobacco comments:     smoke marijuana once or twice a wk   Vaping Use    Vaping status: Never Used   Substance Use Topics    Alcohol use: Yes     Comment:      Drug use: Yes     Types: Marijuana     Comment: marijuana once or twice a wk      Past medical history, past surgical history, medications, allergies, family history, and social history were reviewed with the patient. No additional pertinent items.   A medically appropriate review of systems " was performed with pertinent positives and negatives noted in the HPI, and all other systems negative.    Physical Exam   BP: 134/82  Pulse: 112  Temp: 98.9  F (37.2  C)  Resp: 18  SpO2: 97 %  Physical Exam  General: no acute distress.    HENT: Normocephalic and atraumatic. Trachea midline. Normal voice.  No sinus tenderness.  No mastoid or external ear tenderness.  Left ear with normal TM.  Right ear with tenderness to the external auditory canal on exam, minimal edema.  Right TM with effusion.  No bulging or erythema.  Posterior oropharynx with bilateral tonsillar edema, grade 2.  No stridor, drooling, sniffing position or hot potato voice.  Eyes: EOMI, conjunctivae normal.    Cardiovascular:  Normal rate and regular rhythm.   No murmur heard.  Radial pulses 2+ bilaterally.  Pulmonary:  No respiratory distress. Normal breath sounds bilaterally.  Abdominal: no distension.  Abdomen is soft. There is no mass. There is no abdominal tenderness.  Musculoskeletal:    Moving all extremities spontaneously.  No cyanosis, clubbing, or edema.  Skin: Warm, dry, and well perfused. Good turgor.  Neurological:  No focal deficit present.    Psychiatric:   The patient is awake, alert.  Appropriate mood and affect.    ED Course, Procedures, & Data      Procedures                Results for orders placed or performed in visit on 09/09/24   Symptomatic Influenza A/B, RSV, & SARS-CoV2 PCR (COVID-19) Nose     Status: Normal    Specimen: Nose; Swab   Result Value Ref Range    Influenza A PCR Negative Negative    Influenza B PCR Negative Negative    RSV PCR Negative Negative    SARS CoV2 PCR Negative Negative    Narrative    Testing was performed using the Xpert Xpress CoV2/Flu/RSV Assay on the [employer] GeneXpert Instrument. This test should be ordered for the detection of SARS-CoV2, influenza, and RSV viruses in individuals with signs and symptoms of respiratory tract infection. This test is for in vitro diagnostic use under the [place] FDA  for laboratories certified under CLIA to perform high or moderate complexity testing. This test has been [place] FDA cleared. A negative result does not rule out the presence of PCR inhibitors in the specimen or target RNA in concentration below the limit of detection for the assay. If only one viral target is positive but coinfection with multiple targets is suspected, the sample should be re-tested with another FDA cleared, approved, or authorized test, if coninfection would change clinical management. This test was validated by the [facility]. These laboratories are certified under the Clinical Laboratory Improvement Amendments of 1988 (CLIA-88) as qualified to perfom high complexity laboratory testing.   HCG qualitative urine     Status: Normal   Result Value Ref Range    hCG Urine Qualitative Negative Negative   Streptococcus A Rapid Screen w/Reflex to PCR - Clinic Collect     Status: Normal    Specimen: Throat; Swab   Result Value Ref Range    Group A Strep antigen Negative Negative   Group A Streptococcus PCR Throat Swab     Status: Normal    Specimen: Throat; Swab   Result Value Ref Range    Group A strep by PCR Not Detected Not Detected    Narrative    The Xpert Xpress Strep A test, performed on the [employer], is a rapid, qualitative in vitro diagnostic test for the detection of Streptococcus pyogenes (Group A ß-hemolytic Streptococcus, Strep A) in throat swab specimens from patients with signs and symptoms of pharyngitis. The Xpert Xpress Strep A test can be used as an aid in the diagnosis of Group A Streptococcal pharyngitis. The assay is not intended to monitor treatment for Group A Streptococcus infections. The Xpert Xpress Strep A test utilizes an automated real-time polymerase chain reaction (PCR) to detect Streptococcus pyogenes DNA.     Medications - No data to display  Labs Ordered and Resulted from Time of ED Arrival to Time of ED Departure - No data to  display  No orders to display          Critical care was not performed.     Medical Decision Making  The patient's presentation was of moderate complexity (an acute illness with systemic symptoms).  The patient's evaluation involved:  review of external note(s) from 3+ sources (see separate area of note for details)  review of 3+ test result(s) ordered prior to this encounter (see separate area of note for details)    The patient's management necessitated only low risk treatment.    Assessment & Plan    Patient presented to the emergency department due to difficulty obtaining prescriptions due to insurance issues.  Was seen at family medicine earlier today, prescribed Ciprodex drops for right sided otitis externa, Flonase.  On arrival, patient afebrile and in no acute distress.  Previous family medicine visit and workup reviewed.    Since patient was not able to obtain her medications via the pharmacy today, she was given first dose of Ciprodex and Flonase in the emergency department and allowed to keep these medications.  Also provided her cetirizine, Tylenol and ibuprofen.  Blood work with mild leukocytosis 11.1.    On reevaluation she felt improved and comfortable going home.  She was discharged with return precautions, and discussed following up with her ENT referral.    I have reviewed the nursing notes. I have reviewed the findings, diagnosis, plan and need for follow up with the patient.    New Prescriptions    No medications on file       Final diagnoses:   None   IRicardo, am serving as a trained medical scribe to document services personally performed by Gloria Juan DO, based on the provider's statements to me.     Gloria RAHMAN DO, was physically present and have reviewed and verified the accuracy of this note documented by Ricardo Latham.      Gloria Juan DO  [facility] EMERGENCY DEPARTMENT  9/9/2024     Gloria Juan DO  09/10/24 0225

## 2024-09-10 NOTE — TELEPHONE ENCOUNTER
[facility] phone call message- general phone call:    Reason for call: Patient wants to know if you are available on 10/09 as she is coming in for an appointment and would like to see you.    Return call needed: Yes    OK to leave a message on voice mail? Yes    Primary language: English      needed? No    Call taken on September 10, 2024 at 10:22 AM by Anna Youssef

## 2024-09-10 NOTE — ED TRIAGE NOTES
Triage Assessment (Adult)       Row Name 09/09/24 1946          Triage Assessment    Airway WDL WDL        Respiratory WDL    Respiratory WDL WDL        Skin Circulation/Temperature WDL    Skin Circulation/Temperature WDL WDL        Cardiac WDL    Cardiac WDL WDL        Peripheral/Neurovascular WDL    Peripheral Neurovascular WDL WDL        Cognitive/Neuro/Behavioral WDL    Cognitive/Neuro/Behavioral WDL WDL

## 2024-10-02 ENCOUNTER — MYC MEDICAL ADVICE (OUTPATIENT)
Dept: FAMILY MEDICINE | Facility: CLINIC | Age: 29
End: 2024-10-02
Payer: COMMERCIAL

## 2024-10-02 NOTE — TELEPHONE ENCOUNTER
I checked on the [place] Cann[place] registry and you are certified. Below is the info.  This link has more info   [place]://[place].gov[place]ocm/dmc/patients/the-basics/[email or web address]

## 2024-10-07 ENCOUNTER — OFFICE VISIT (OUTPATIENT)
Dept: FAMILY MEDICINE | Facility: CLINIC | Age: 29
End: 2024-10-07
Payer: COMMERCIAL

## 2024-10-07 VITALS
OXYGEN SATURATION: 99 % | DIASTOLIC BLOOD PRESSURE: 81 MMHG | HEIGHT: 61 IN | HEART RATE: 101 BPM | TEMPERATURE: 98.9 F | SYSTOLIC BLOOD PRESSURE: 114 MMHG | BODY MASS INDEX: 32.48 KG/M2

## 2024-10-07 DIAGNOSIS — L50.9 HIVES: ICD-10-CM

## 2024-10-07 DIAGNOSIS — N89.8 VAGINAL ITCHING: ICD-10-CM

## 2024-10-07 DIAGNOSIS — R35.0 URINARY FREQUENCY: ICD-10-CM

## 2024-10-07 DIAGNOSIS — F32.A DEPRESSION, UNSPECIFIED DEPRESSION TYPE: ICD-10-CM

## 2024-10-07 DIAGNOSIS — N76.0 BACTERIAL VAGINOSIS: Primary | ICD-10-CM

## 2024-10-07 DIAGNOSIS — B96.89 BACTERIAL VAGINOSIS: Primary | ICD-10-CM

## 2024-10-07 LAB
ALBUMIN UR-MCNC: NEGATIVE MG/DL
APPEARANCE UR: CLEAR
BILIRUB UR QL STRIP: NEGATIVE
CLUE CELLS: PRESENT
COLOR UR AUTO: YELLOW
GLUCOSE UR STRIP-MCNC: NEGATIVE MG/DL
HGB UR QL STRIP: NEGATIVE
KETONES UR STRIP-MCNC: NEGATIVE MG/DL
LEUKOCYTE ESTERASE UR QL STRIP: NEGATIVE
NITRATE UR QL: NEGATIVE
PH UR STRIP: 5.5 [PH] (ref 5–8)
SP GR UR STRIP: >=1.03 (ref 1–1.03)
TRICHOMONAS, WET PREP: ABNORMAL
UROBILINOGEN UR STRIP-ACNC: 0.2 E.U./DL
WBC'S/HIGH POWER FIELD, WET PREP: ABNORMAL
YEAST, WET PREP: ABNORMAL

## 2024-10-07 PROCEDURE — 81003 URINALYSIS AUTO W/O SCOPE: CPT

## 2024-10-07 PROCEDURE — 99214 OFFICE O/P EST MOD 30 MIN: CPT | Mod: GC

## 2024-10-07 PROCEDURE — 87210 SMEAR WET MOUNT SALINE/INK: CPT

## 2024-10-07 RX ORDER — CETIRIZINE HYDROCHLORIDE 10 MG/1
10 TABLET ORAL DAILY
Qty: 30 TABLET | Refills: 3 | Status: SHIPPED | OUTPATIENT
Start: 2024-10-07

## 2024-10-07 RX ORDER — HYDROXYZINE HYDROCHLORIDE 25 MG/1
25-50 TABLET, FILM COATED ORAL 3 TIMES DAILY PRN
Qty: 60 TABLET | Refills: 0 | Status: SHIPPED | OUTPATIENT
Start: 2024-10-07

## 2024-10-07 RX ORDER — METRONIDAZOLE 500 MG/1
500 TABLET ORAL 2 TIMES DAILY
Qty: 14 TABLET | Refills: 0 | Status: SHIPPED | OUTPATIENT
Start: 2024-10-07 | End: 2024-10-14

## 2024-10-07 NOTE — PATIENT INSTRUCTIONS
Patient Education   Here is the plan from today's visit    FOR VAGINAL SYMPTOMS:   - PELVIC EXAM WAS REASSURING   - SWAB   - URINE TESTING     FOR ANXIETY:  - ROUTE TO CC TO SCHEDULE WITH THERAPY   - KEEP USING HYDROXYZINE   - START TAKING LEXAPRO   - FOLLOW UP IN 4 WEEKS     FOR ALLERGY:  - START ZYRTEC   - KEEP TRIGGER   - ALLERGY REFERRAL         Please call or return to clinic if your symptoms don't go away.    Follow up plan  No follow-ups on file.    Thank you for coming to [facility] today.  Lab Testing:  **If you had lab testing today and your results are reassuring or normal they will be mailed to you or sent through [employer] within 7 days.   **If the lab tests need quick action we will call you with the results.  **If you are having labs done on a different day, please call [phone] to schedule at [facility] or [phone] for other [facility] locations. Labs do not offer walk-in appointments.  The phone number we will call with results is # [phone] (home) . If this is not the best number please call our clinic and change the number.  Medication Refills:  If you need any refills please call your pharmacy and they will contact us.   If you need to  your refill at a new pharmacy, please contact the new pharmacy directly. The new pharmacy will help you get your medications transferred faster.   Scheduling:  If you have any concerns about today's visit or wish to schedule another appointment please call our office during normal business hours [phone] (8-5:00 M-F). If you can no longer make a scheduled visit, please cancel via [employer] or call us to cancel.   If a referral was made to an [facility] specialty provider and you do not get a call from central scheduling, please refer to directions on your visit summary or call our office during normal business hours for assistance.   If a Mammogram was ordered for you at the [facility] call [phone] to  schedule or change your appointment.  If you had an XRay/CT/Ultrasound/MRI ordered the number is [phone] to schedule or change your radiology appointment.   [facility] has limited ultrasound appointments available on Wednesdays, if you would like your ultrasound at [facility], please call [phone] to schedule.   Medical Concerns:  If you have urgent medical concerns please call [phone] at any time of the day.    Linda Ch MD
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
## 2024-10-16 ENCOUNTER — OFFICE VISIT (OUTPATIENT)
Dept: FAMILY MEDICINE | Facility: CLINIC | Age: 29
End: 2024-10-16
Payer: COMMERCIAL

## 2024-10-16 VITALS
HEIGHT: 61 IN | HEART RATE: 86 BPM | TEMPERATURE: 99 F | SYSTOLIC BLOOD PRESSURE: 120 MMHG | BODY MASS INDEX: 32.38 KG/M2 | DIASTOLIC BLOOD PRESSURE: 85 MMHG | RESPIRATION RATE: 14 BRPM | OXYGEN SATURATION: 98 % | WEIGHT: 171.5 LBS

## 2024-10-16 DIAGNOSIS — N76.0 VAGINITIS AND VULVOVAGINITIS: Primary | ICD-10-CM

## 2024-10-16 PROCEDURE — 99213 OFFICE O/P EST LOW 20 MIN: CPT | Mod: GC

## 2024-10-16 RX ORDER — MICONAZOLE NITRATE
1 KIT DAILY
Qty: 3 EACH | Refills: 0 | Status: SHIPPED | OUTPATIENT
Start: 2024-10-16 | End: 2024-10-19

## 2024-10-16 NOTE — PATIENT INSTRUCTIONS
After your 2 courses of metronidazole, you still have some irritation.     We are going to treat for yeast to see if that helps.     3 days of miconazole suppositories (inserting cream into your vagina.)    Wait 1 week after finishing that.     If things aren't better, send a message to me, and I will prescribe topical clindamycin for BV.

## 2024-10-16 NOTE — PROGRESS NOTES
"  Assessment & Plan     Vaginitis and vulvovaginitis  With improvement but some residual symptoms after PO metronidazole x2, Wet prep with clue cells at both previous visit. Irritation/itch, no discharge pt is noticing, no odor. Normal exam today, mucosa appears healthy, scant creamy white discharge at cervix. Low utility in repeating wet prep today. Will proceed with empiric treatment for yeast given potential for yeast overgrowth with 2 courses of metronidazole. Instructed pt to reach out via mychart if symptoms have not improved 1 week after completing miconazole. If this is the case, I will prescribe topical clindamycin for BV empirically, no return visit needed.   - miconazole (EQ MICONAZOLE 3-DAY COMBO) 200 & 2 MG-% (9GM) KIT; Place 1 each vaginally daily for 3 days.    Pt desires to speak with [facility] team - BREANNA Sal will call her later today.     Return if symptoms worsen or fail to improve.    Magalys Parsons is a 29 year old, presenting for the following health issues:  Follow Up        10/16/2024     9:29 AM   Additional Questions   Roomed by Marcelino         10/16/2024    Information    services provided? No        HPI     Late September went to [facility] for burning with urination, burning, itchiness. Tested + for BV. Had 7 days of oral metronidazole. Got a little better.     Was here 10/7, wet prep + for clue cells. Took 7 days of metronidazole, symptoms weren't as bad (some itch, irritation.)     At no point had a foul odor.     Now, some itching by vaginal opening, irritation after wiping. No bleeding. Not really any discharge.     Not pregnant.     Got STI testing at [facility], all negative. No new partners since then. Hasn't been sexually active since August.     Had a history of chronic BV and yeast, years ago.         Objective    /85   Pulse 86   Temp 99  F (37.2  C) (Oral)   Resp 14   Ht 1.549 m (5' 1\")   Wt 77.8 kg (171 lb 8 oz)   LMP 09/06/2024 (Approximate)   SpO2 98%   " BMI 32.40 kg/m    Body mass index is 32.4 kg/m .  Physical Exam  Constitutional:       General: She is not in acute distress.     Appearance: Normal appearance. She is not toxic-appearing.      Comments: Chaperone declined   Cardiovascular:      Rate and Rhythm: Normal rate.   Pulmonary:      Effort: Pulmonary effort is normal. No respiratory distress.   Genitourinary:     General: Normal vulva.      Exam position: Supine.      Pubic Area: No rash or pubic lice.       Labia:         Right: No rash or lesion.         Left: No rash or lesion.       Vagina: Normal.      Cervix: Discharge present.      Comments: Scant creamy white discharge at/near cervical os  Neurological:      Mental Status: She is alert.   Psychiatric:         Mood and Affect: Mood normal.         Behavior: Behavior normal.            Signed Electronically by: Mery Rivero MD

## 2024-10-18 ENCOUNTER — OFFICE VISIT (OUTPATIENT)
Dept: URGENT CARE | Facility: URGENT CARE | Age: 29
End: 2024-10-18
Payer: COMMERCIAL

## 2024-10-18 VITALS
WEIGHT: 170 LBS | TEMPERATURE: 98.9 F | HEART RATE: 110 BPM | OXYGEN SATURATION: 97 % | BODY MASS INDEX: 32.1 KG/M2 | SYSTOLIC BLOOD PRESSURE: 133 MMHG | RESPIRATION RATE: 16 BRPM | DIASTOLIC BLOOD PRESSURE: 76 MMHG | HEIGHT: 61 IN

## 2024-10-18 DIAGNOSIS — N89.8 VAGINAL ITCHING: Primary | ICD-10-CM

## 2024-10-18 LAB
CLUE CELLS: ABNORMAL
TRICHOMONAS, WET PREP: ABNORMAL
WBC'S/HIGH POWER FIELD, WET PREP: ABNORMAL
YEAST, WET PREP: PRESENT

## 2024-10-18 PROCEDURE — 87210 SMEAR WET MOUNT SALINE/INK: CPT | Performed by: FAMILY MEDICINE

## 2024-10-18 PROCEDURE — 99213 OFFICE O/P EST LOW 20 MIN: CPT | Performed by: FAMILY MEDICINE

## 2024-10-18 NOTE — PATIENT INSTRUCTIONS
the miconazole and use as prescribed to treat for yeast infection      If you have new discharge, odor or vaginal discomfort please return to be evaluated

## 2024-10-18 NOTE — PROGRESS NOTES
Assessment & Plan     Vaginal itching    - Wet prep - Clinic Collect  - Chlamydia trachomatis/Neisseria gonorrhoeae by PCR - Clinic Collect  - UA Macroscopic with reflex to Microscopic and Culture - Clinic Collect     Patient had questions about trichomonas testing we discussed that wet prep screens for this and she has tested negative, she feels reassured.    Denies any urinary symptoms therefore the UA test was canceled along with the gonorrhea chlamydia testing as she had this done with [facility] and was negative with no new partners since testing.  No reported symptoms consistent with PID.     Patient has not yet picked up her miconazole medication prescribed yesterday advised that she start this medication to treat yeast infection which was confirmed on wet prep today and as suspected by physician yesterday.       Omega Mejia MD   [facility]    Magalys Parsons is a 29 year old female who presents to clinic today for the following health issues:  Chief Complaint   Patient presents with    Urgent Care    Vaginal Problem     Yesterday tested positive for BV, third positive BV test in a month, was not given medication for positive test yesterday told to come in for screening   Irritation, itching    STD     Wants full panel of STD testing      HPI    Patient evaluated today found to have clue cells given prior treatment metronidazole on October 7 decision was made to proceed with treatment for overgrowth of possible yeast with a backup plan to use clindamycin if symptoms do not resolve    NO new sex partners  Per chart review yesterday was patient had informed provider that she had STD testing done at [facility] all the test came back negative since that time.  She has not had any partners and reports no sexual activity in approximately 2 months.      Patient Active Problem List    Diagnosis Date Noted    Major depressive disorder, recurrent episode, moderate (H)       Priority: Medium    Alcohol use disorder in remission      Priority: Medium    SHERIDAN (generalized anxiety disorder) 2024     Priority: Medium    Delivery normal 2023     Priority: Medium    Long term current use of cannabis 07/15/2023     Priority: Medium    Pregnancy complicated by tobacco use in second trimester 07/15/2023     Priority: Medium    Hyperemesis gravidarum 2023     Priority: Medium    Depression complicating pregnancy, antepartum, third trimester 05/10/2023     Priority: Medium    Supervision of high risk pregnancy, antepartum 2023     Priority: Medium     HIGH RISK  with PRINCESS  23  MATERNAL HISTORY/DIAGNOSES   1. Tobacco Use  2. Marijuana Use in Preg  3. Depression  OB PROVIDERS   1. [facility] ([phone])  2. [facility] ([phone])  PRENATAL CARE PLAN   Consults:   Ultrasounds:   DELIVERY PLAN   Planned mode/timing of delivery:   Notifications during labor: [facility] providers above  Post Partum Contraception:   GBS:          Rubella non-immune status, antepartum 2018     Priority: Medium     Rubella non immune, MMR postpartum.      Nausea/vomiting in pregnancy 2018     Priority: Medium    Nausea and vomiting of pregnancy, antepartum 2018     Priority: Medium    Need for Tdap vaccination 2018     Priority: Medium    Condyloma acuminata 2016     Priority: Medium       Current Outpatient Medications   Medication Sig Dispense Refill    cetirizine (ZYRTEC) 10 MG tablet Take 1 tablet (10 mg) by mouth daily. 30 tablet 3    chlorhexidine (HIBICLENS) 4 % liquid Wash armpits with this liquid with each shower. Let sit for 30-60 seconds before rinsing. 946 mL 3    clindamycin (CLEOCIN T) 1 % external lotion Apply topically 2 times daily 120 mL 3    clindamycin-benzoyl peroxide (BENZACLIN) 1-5 % external gel Apply topically 2 times daily 50 g 4    hydrOXYzine HCl (ATARAX) 25 MG tablet Take 1-2 tablets (25-50 mg) by mouth 3 times daily as needed for  "anxiety. 60 tablet 0    medroxyPROGESTERone (DEPO-PROVERA) 150 MG/ML IM injection Inject 1 mL (150 mg) into the muscle every 3 months 1 mL 3    melatonin 3 MG tablet Take 1 tablet (3 mg) by mouth nightly as needed for sleep 30 tablet 0    miconazole (EQ MICONAZOLE 3-DAY COMBO) 200 & 2 MG-% (9GM) KIT Place 1 each vaginally daily for 3 days. 3 each 0    prazosin (MINIPRESS) 1 MG capsule Take 1 capsule (1 mg) by mouth at bedtime 30 capsule 0    NIFEdipine ER (ADALAT CC) 30 MG 24 hr tablet Take 1 tablet (30 mg) by mouth daily for 7 days 7 tablet 0     Current Facility-Administered Medications   Medication Dose Route Frequency Provider Last Rate Last Admin    medroxyPROGESTERone (DEPO-PROVERA) injection 150 mg  150 mg Intramuscular Q90 Days    150 mg at 09/09/24 1624    medroxyPROGESTERone (DEPO-PROVERA) injection 150 mg  150 mg Intramuscular Q90 Days Miriam Anderson MD   150 mg at 06/13/24 0922           Objective    /76   Pulse 110   Temp 98.9  F (37.2  C) (Temporal)   Resp 16   Ht 1.549 m (5' 1\")   Wt 77.1 kg (170 lb)   LMP 09/06/2024 (Approximate)   SpO2 97%   BMI 32.12 kg/m    Physical Exam       GEN: NAD  Results for orders placed or performed in visit on 10/18/24   Wet prep - Clinic Collect     Status: Abnormal    Specimen: Vagina; Swab   Result Value Ref Range    Trichomonas Absent Absent    Yeast Present (A) Absent    Clue Cells Absent Absent    WBCs/high power field None None                     The use of Dragon/[employer] dictation services may have been used to construct the content in this note; any grammatical or spelling errors are non-intentional. Please contact the author of this note directly if you are in need of any clarification.   "

## 2024-10-23 ENCOUNTER — CARE COORDINATION (OUTPATIENT)
Dept: PSYCHOLOGY | Facility: CLINIC | Age: 29
End: 2024-10-23
Payer: COMMERCIAL

## 2024-10-23 NOTE — PROGRESS NOTES
SW received phone call from patient today. She reports she was able to talk with a  regarding the notice she got from her Landlord. Unfortunately her doesn't have a permit to rent so patient will need to move out of her apartment by Dec. 1st. She was hoping to get a few resources to find a new apartment.     SW sent patient list of resources that may be beneficial for her housing search.   Patient is working and does have income.     Sandy Hunter, DENZEL  Social Work Care Coordinator

## 2024-10-30 NOTE — PROGRESS NOTES
Assessment & Plan     Bacterial vaginosis  Vaginal itching  Recently completed treatment for BV and yeast infection. Symptoms of vaginal itching improved, then recurred. No other urinary symptoms. Wet prep with clue cells concerning for BV. Unclear trigger. Patient reports using scented soap recently, but otherwise reassuring history. Pelvic exam 10/7/24 otherwise without signs of vaginal atrophy or lichen sclerosis. Recent negative STI screening without new partners since that time. Given recurrent BV, will trial Clindamycin instead of Metronidazole. May need longer course with maintenance if continues to have episodes.   - Clindamycin cream vaginally x7 days  - Discussed cotton underwear, no scented products, partner testing   - Return in 4 weeks for recheck     Housing instability   Has to locate new housing by 12/1/24. Previously provided resources via [employer]. Interested in potential [address], but requires . Will route to  for assistance.   - Route to  to call with resources     Depression   Anxiety with panic attacks  PTSD   Longstanding depression/anxiety/PTSD in the setting of psychosocial stressors, including young infant and housing instability. Denies SI/HI. Having panic attacks in setting of housing instability above. Prefers PRN medications versus daily medications. Will trial Buspar for PRN anxiety treatment and benzodiazepine for panic attacks as below. Working on reestablishing with therapy. Close follow up for recheck.   - Continue melatonin 3 mg nightly and Prazosin 1 mg nightly and Hydroxyzine 25-50 mg TID PRN   - START Buspar 5 mg daily   - START Ativan 0.5 mg q6hr PRN; #10 tablets prescribed   - Return in 4 weeks for recheck; could try Gabapentin      Tinnitus, right   Cholesteatoma, right   Previously evaluated for above 9/2024 and referred to ENT. Exam stable. Infection resolved after ear drops.   - Encouraged keeping appointment with ENT.     Magalys Parsons  "is a 29 year old, presenting for the following health issues:  RECHECK (Mental health. R ear ache)    HPI     Frequent BV:  - Had BV x2 treated with oral Metronidazole. Then treated for yeast infection.   - Still has itching. Wants reswab   - Not sexually active since 8/2024   - Wears cotton underwear, no doucheing   - Was using scented soap, which was new   - Denies urinary symptoms, fevers, vaginal discharge       Mood:  - Does not have electricity and has to move out of apartment   - Having ruminating thoughts   - Current regimen: Hydroxyzine PRN  and Lexapro (has not taken)   - Can hit of out nowhere   - Has medical cannabis   - Can feel nausea and lightheaded with anxiety   - Can have racing heart and palpitations   - High heart rate here   - Lately has been happening every other day     Right ear:  - Seen 9/9/24 and had right otitis externa. Completed ear drops and better.   - Sometimes has ringing and cannot hear, has ENT appointment 1/2025.     Housing instability:  - Met with SW and provided resources over [employer]           Objective    /81   Pulse 119   Temp 98.7  F (37.1  C) (Oral)   Resp 12   Ht 1.549 m (5' 1\")   Wt 77.1 kg (170 lb)   LMP 09/06/2024 (Approximate)   SpO2 98%   BMI 32.12 kg/m    Body mass index is 32.12 kg/m .  Physical Exam  Vitals reviewed.   Constitutional:       General: She is not in acute distress.     Appearance: Normal appearance.   HENT:      Head: Normocephalic and atraumatic.      Right Ear: Ear canal and external ear normal.      Left Ear: Tympanic membrane, ear canal and external ear normal.      Ears:      Comments: Pearly white lesion along posterior edge of TM- consistent with previous provider description.  Cardiovascular:      Rate and Rhythm: Normal rate and regular rhythm.      Heart sounds: No murmur heard.  Pulmonary:      Effort: Pulmonary effort is normal. No respiratory distress.      Breath sounds: No wheezing.   Musculoskeletal:      Right lower " leg: No edema.      Left lower leg: No edema.   Skin:     General: Skin is warm and dry.      Capillary Refill: Capillary refill takes less than 2 seconds.   Neurological:      General: No focal deficit present.      Mental Status: She is alert.   Psychiatric:         Mood and Affect: Mood normal.         Behavior: Behavior normal.                    Signed Electronically by: Linda Ch MD

## 2024-11-01 ENCOUNTER — OFFICE VISIT (OUTPATIENT)
Dept: FAMILY MEDICINE | Facility: CLINIC | Age: 29
End: 2024-11-01
Payer: COMMERCIAL

## 2024-11-01 VITALS
RESPIRATION RATE: 12 BRPM | TEMPERATURE: 98.7 F | SYSTOLIC BLOOD PRESSURE: 120 MMHG | HEART RATE: 119 BPM | HEIGHT: 61 IN | OXYGEN SATURATION: 98 % | WEIGHT: 170 LBS | BODY MASS INDEX: 32.1 KG/M2 | DIASTOLIC BLOOD PRESSURE: 81 MMHG

## 2024-11-01 DIAGNOSIS — H71.91 CHOLESTEATOMA, RIGHT: ICD-10-CM

## 2024-11-01 DIAGNOSIS — F32.A DEPRESSION, UNSPECIFIED DEPRESSION TYPE: ICD-10-CM

## 2024-11-01 DIAGNOSIS — F43.10 PTSD (POST-TRAUMATIC STRESS DISORDER): ICD-10-CM

## 2024-11-01 DIAGNOSIS — Z59.819 HOUSING INSTABILITY: ICD-10-CM

## 2024-11-01 DIAGNOSIS — N76.0 BACTERIAL VAGINOSIS: ICD-10-CM

## 2024-11-01 DIAGNOSIS — B96.89 BACTERIAL VAGINOSIS: ICD-10-CM

## 2024-11-01 DIAGNOSIS — N89.8 VAGINAL ITCHING: ICD-10-CM

## 2024-11-01 DIAGNOSIS — F41.0 PANIC ATTACK: Primary | ICD-10-CM

## 2024-11-01 LAB
CLUE CELLS: PRESENT
TRICHOMONAS, WET PREP: ABNORMAL
WBC'S/HIGH POWER FIELD, WET PREP: ABNORMAL
YEAST, WET PREP: ABNORMAL

## 2024-11-01 PROCEDURE — 87210 SMEAR WET MOUNT SALINE/INK: CPT

## 2024-11-01 PROCEDURE — 99214 OFFICE O/P EST MOD 30 MIN: CPT | Mod: GC

## 2024-11-01 RX ORDER — CLINDAMYCIN PHOSPHATE 20 MG/G
1 CREAM VAGINAL AT BEDTIME
Qty: 40 G | Refills: 0 | Status: SHIPPED | OUTPATIENT
Start: 2024-11-01 | End: 2024-11-08

## 2024-11-01 RX ORDER — BUSPIRONE HYDROCHLORIDE 5 MG/1
5 TABLET ORAL DAILY
Qty: 30 TABLET | Refills: 0 | Status: SHIPPED | OUTPATIENT
Start: 2024-11-01

## 2024-11-01 RX ORDER — LORAZEPAM 0.5 MG/1
0.5 TABLET ORAL EVERY 6 HOURS PRN
Qty: 10 TABLET | Refills: 0 | Status: SHIPPED | OUTPATIENT
Start: 2024-11-01

## 2024-11-01 SDOH — ECONOMIC STABILITY - HOUSING INSECURITY: HOUSING INSTABILITY UNSPECIFIED: Z59.819

## 2024-11-01 ASSESSMENT — PATIENT HEALTH QUESTIONNAIRE - PHQ9: SUM OF ALL RESPONSES TO PHQ QUESTIONS 1-9: 10

## 2024-11-01 NOTE — PATIENT INSTRUCTIONS
Patient Education   Here is the plan from today's visit    FOR HOUSING:  - LOU[employer] WILL CONTACT YOU ( AND [address])    FOR MOOD:  - YOU ARE GOING THROUGH SO MUCH   - STOP LEXAPRO   - TRY BUSPAR ONE TABLET DAILY   - FOR PANIC ATTACKS, USE ATIVAN; TRY FOR THE FIRST TIME WITHOUT RESPONSIBILITIES   - CAN USE HYDROXYZINE as WELL     FOR EAR:  - FOLLOW WITH ENT    FOR BV:   - WET PREP TODAY         Please call or return to clinic if your symptoms don't go away.    Follow up plan  No follow-ups on file.    Thank you for coming to [facility] today.  Lab Testing:  **If you had lab testing today and your results are reassuring or normal they will be mailed to you or sent through [employer] within 7 days.   **If the lab tests need quick action we will call you with the results.  **If you are having labs done on a different day, please call [phone] to schedule at [facility] or [phone] for other [facility]patient Lab locations. Labs do not offer walk-in appointments.  The phone number we will call with results is # [phone] (home) . If this is not the best number please call our clinic and change the number.  Medication Refills:  If you need any refills please call your pharmacy and they will contact us.   If you need to  your refill at a new pharmacy, please contact the new pharmacy directly. The new pharmacy will help you get your medications transferred faster.   Scheduling:  If you have any concerns about today's visit or wish to schedule another appointment please call our office during normal business hours [phone] (8-5:00 M-F). If you can no longer make a scheduled visit, please cancel via [employer] or call us to cancel.   If a referral was made to an [facility] specialty provider and you do not get a call from central scheduling, please refer to directions on your visit summary or call our office during normal business hours for assistance.   If a Mammogram was  ordered for you at the [facility] call [phone] to schedule or change your appointment.  If you had an XRay/CT/Ultrasound/MRI ordered the number is [phone] to schedule or change your radiology appointment.   [facility] has limited ultrasound appointments available on Wednesdays, if you would like your ultrasound at [facility], please call [phone] to schedule.   Medical Concerns:  If you have urgent medical concerns please call [phone] at any time of the day.    Linda Ch MD

## 2024-11-27 ENCOUNTER — ALLIED HEALTH/NURSE VISIT (OUTPATIENT)
Dept: FAMILY MEDICINE | Facility: CLINIC | Age: 29
End: 2024-11-27
Payer: COMMERCIAL

## 2024-11-27 DIAGNOSIS — Z30.42 DEPO-PROVERA CONTRACEPTIVE STATUS: Primary | ICD-10-CM

## 2024-11-27 RX ADMIN — MEDROXYPROGESTERONE ACETATE 150 MG: 150 INJECTION, SUSPENSION INTRAMUSCULAR at 10:36

## 2024-11-27 NOTE — PROGRESS NOTES
Clinic Administered Medication Documentation      Depo Provera Documentation    Depo-Provera Standing Order inclusion/exclusion criteria reviewed.     Is this the initial or subsequent dose of Depo Provera? Subsequent dose - patient is within the acceptable window of time (11-15 weeks) for subsequent injection. Pregnancy test not indicated.    Patient meets: inclusion criteria     Is there an active order (written within the past 365 days, with administrations remaining, not ) in the chart? Yes.     Prior to injection, verified patient identity using patient's name and date of birth. Medication was administered. Please see MAR and medication order for additional information.     Vial/Syringe: Single dose vial. Was entire vial of medication used? Yes    Patient instructed to remain in clinic for 15 minutes, report any adverse reaction to staff immediately, and remain in clinic for 15 minutes and report any adverse reaction to staff immediately but patient declined.  NEXT INJECTION DUE: 25 - 3/12/25    Patient has no refills remaining. Refill encounter opened, order pended and Routed to the provider

## 2024-11-28 ENCOUNTER — NURSE TRIAGE (OUTPATIENT)
Dept: NURSING | Facility: CLINIC | Age: 29
End: 2024-11-28
Payer: COMMERCIAL

## 2024-11-28 NOTE — TELEPHONE ENCOUNTER
I have had BV x 3 months, occasionally yeast x 3 mo. My next appointment is not until Dec. I have itching, yeast infection sx. I want to know if the RX can be prescribed over the phone ? No discharge or foul odor. The itching is mild. Worse today.   DepoProvera. I have had my period x 2 weeks, it is irregular, usually lasting 3-4 days. Bleeding is not heavy: more than spotting, but not filling a pad.     Triaged to a disposition of see PCP within 24 hrs: [facility] or clinic tomorrow. Advised to call [facility] and ask to speak to oncall provider about request for phone order RX--she will do this now.     Sharyn Dodson RN Triage Nurse Advisor 10:50 AM 11/28/2024  Reason for Disposition   Itching or rash of female genital area (e.g., labia, vagina, vulva)   MODERATE-SEVERE itching (i.e., interferes with school, work, or sleep)    Additional Information   Negative: Sounds like a life-threatening emergency to the triager   Negative: Followed a genital area injury (e.g., vagina, vulva)   Negative: Symptoms could be from sexual assault   Negative: Pain or burning with passing urine (urination) is main symptom   Negative: Vaginal discharge is main symptom   Negative: Pubic lice suspected   Negative: Pregnant   Negative: Patient sounds very sick or weak to the triager   Negative: [1] SEVERE pain AND [2] not improved 2 hours after pain medicine   Negative: [1] Genital area looks infected (e.g., draining sore, spreading redness) AND [2] fever    Protocols used: Vaginal Symptoms-A-AH, Vulvar Symptoms-A-AH

## 2024-12-03 ENCOUNTER — OFFICE VISIT (OUTPATIENT)
Dept: FAMILY MEDICINE | Facility: CLINIC | Age: 29
End: 2024-12-03
Payer: COMMERCIAL

## 2024-12-03 VITALS
HEART RATE: 107 BPM | RESPIRATION RATE: 16 BRPM | HEIGHT: 61 IN | DIASTOLIC BLOOD PRESSURE: 81 MMHG | WEIGHT: 172 LBS | TEMPERATURE: 98.7 F | BODY MASS INDEX: 32.47 KG/M2 | SYSTOLIC BLOOD PRESSURE: 118 MMHG | OXYGEN SATURATION: 97 %

## 2024-12-03 DIAGNOSIS — N76.0 VAGINITIS AND VULVOVAGINITIS: Primary | ICD-10-CM

## 2024-12-03 DIAGNOSIS — F41.1 GAD (GENERALIZED ANXIETY DISORDER): ICD-10-CM

## 2024-12-03 DIAGNOSIS — F33.1 MAJOR DEPRESSIVE DISORDER, RECURRENT EPISODE, MODERATE (H): ICD-10-CM

## 2024-12-03 PROCEDURE — 87210 SMEAR WET MOUNT SALINE/INK: CPT

## 2024-12-03 PROCEDURE — 99214 OFFICE O/P EST MOD 30 MIN: CPT | Mod: GC

## 2024-12-03 RX ORDER — BENZOCAINE/MENTHOL 6 MG-10 MG
LOZENGE MUCOUS MEMBRANE 2 TIMES DAILY
Qty: 28 G | Refills: 0 | Status: SHIPPED | OUTPATIENT
Start: 2024-12-03

## 2024-12-03 RX ORDER — FLUCONAZOLE 150 MG/1
150 TABLET ORAL ONCE
Qty: 2 TABLET | Refills: 0 | Status: SHIPPED | OUTPATIENT
Start: 2024-12-03 | End: 2024-12-03

## 2024-12-03 ASSESSMENT — PATIENT HEALTH QUESTIONNAIRE - PHQ9
SUM OF ALL RESPONSES TO PHQ QUESTIONS 1-9: 9
10. IF YOU CHECKED OFF ANY PROBLEMS, HOW DIFFICULT HAVE THESE PROBLEMS MADE IT FOR YOU TO DO YOUR WORK, TAKE CARE OF THINGS AT HOME, OR GET ALONG WITH OTHER PEOPLE: VERY DIFFICULT
SUM OF ALL RESPONSES TO PHQ QUESTIONS 1-9: 9

## 2024-12-03 NOTE — PROGRESS NOTES
Assessment & Plan       1. Vaginitis and vulvovaginitis (Primary)  Patient presenting with recurrent vulvovaginitis symptoms and has been seen by multiple providers in clinic and at [facility] since August. Has tested positive for both yeast and BV (alternating) and has tried multiple treatments. Symptoms will initially improve but recur almost monthly. Symptoms are predominately itching without noticeable changes in discharge or odor. On exam today external genitalia is unremarkable without fissures, redness, excoriations, or atrophy. There is moderate milky discharge with distinct white curd-like consistency. Exam today is most consistent with yeast. A wet prep was obtained that was positive for yeast and the patient was contacted with these results after the visit. Will treat with diflucan and monistat cream for symptoms. This will be the second proven vulvovaginal yeast infection within 12 months based on our records.  And she has also been seen and treated for recurrent BV at [facility], recommend obtaining a release from [facility] for their wet prep results as they may indicate the need for additional testing (I.e. NAAT, culture) and more extended therapy in the future if symptoms recur. If symptoms recur may consider testing for micoplasma/ureaplasma. Provided patient with information on these organisms.  - Wet preparation - Clinic Collect  - fluconazole (DIFLUCAN) 150 MG tablet; Take 1 tablet (150 mg) by mouth once for 1 dose. Take 1 tablet (150 mg) by mouth once for 1 dose. If no symptom improvement after 72 hours, may take a second tablet (150 mg) 72 hours after the 1st dose.  Dispense: 2 tablet; Refill: 0  - hydrocortisone (CORTAID) 1 % external cream; Apply topically 2 times daily. Apply topically 2 times daily as needed for vulvovaginal itching  Dispense: 28 g; Refill: 0      Follow up:  As scheduled with Dr. Ch on 12/9     Magalys Parsons is a 29 year old,  "presenting for the following health issues:  Vaginal Problem      12/3/2024     3:12 PM   Additional Questions   Roomed by Ohn   Accompanied by Self         12/3/2024    Information    services provided? No        HPI     Vaginal Symptoms  Onset/Duration: on and off since August   Description:  Vaginal Discharge: none   Itching (Pruritis): YES  Burning sensation:  YES  Odor: No  Accompanying Signs & Symptoms:  Urinary symptoms: YES- once   Abdominal pain: No  Fever: No  History:   Sexually active: YES  New Partner: No  Possibility of Pregnancy:  No  Recent antibiotic use: YES- clindamycin 11/1-11/8  Previous vaginitis issues: YES  Precipitating or alleviating factors: None  Therapies tried and outcome: Diflucan, Flagyl (metronidazole), miconazole, and clindamycin    Most recent depo-provera shot 11/27/24    Many years ago had BV that took multiple rounds of treatments to clear            Objective    /81   Pulse 107   Temp 98.7  F (37.1  C) (Oral)   Resp 16   Ht 1.549 m (5' 1\")   Wt 78 kg (172 lb)   SpO2 97%   BMI 32.50 kg/m    Body mass index is 32.5 kg/m .  Physical Exam   Vitals reviewed.   Constitutional: awake, alert, cooperative, in NAD  Eyes: Sclera without injection, EOM intact   Respiratory: Normal pulmonary effort without coughing or wheezing  Skin: No visible lesions, erythema, rashes, or ecchymosis   (examination performed with chaperone present): normal female external genitalia, normal urethral meatus , normal vaginal mucosa, and vaginal discharge - moderate, white, milky, frothy, and curd-like  Musculoskeletal: No obvious joint or extremity deformity, edema, or erythema, ROM grossly intact  Neurologic: A&Ox4, without focal deficit, cranial nerves II-XII grossly intact  Psychiatric: Alert & calm with appropriate affect, mood, insight, and thought processes        Diagnostic Test Results:   Results for orders placed or performed in visit on 12/03/24   Wet preparation " - Clinic Collect     Status: Abnormal    Specimen: Vagina; Swab   Result Value Ref Range    Trichomonas Absent Absent    Yeast Present (A) Absent    Clue Cells Absent Absent    WBCs/high power field None None    Narrative    Whiff=neg  Ph=4.5             Signed Electronically by: Perri Cr MD  {Email feedback regarding this note to [email or web address]   :[id number]}

## 2024-12-05 ENCOUNTER — TELEPHONE (OUTPATIENT)
Dept: FAMILY MEDICINE | Facility: CLINIC | Age: 29
End: 2024-12-05
Payer: COMMERCIAL

## 2024-12-05 NOTE — TELEPHONE ENCOUNTER
----- Message from Perri Cr sent at 12/3/2024  6:18 PM CST -----  Team-    Wet prep positive for yeast.  Sent a [employer] message to the patient to inform them of the results as well as prescriptions for fluconazole and Monistat cream that were sent in to the pharmacy.  Please call the patient to verify that they have received a message and are aware of the prescriptions that were sent in and please reassure them that this is not BV.  Please remind her to keep her appointment later this week with Dr. ROBERTSON.    Thank you,  Perri Cr MD  Pronouns: she/her  Resident Physician, PGY1  [facility]/ \[facility] [place] [facility]\"" [facility]    Department of Family Medicine and [facility]

## 2024-12-05 NOTE — TELEPHONE ENCOUNTER
Patient read [employer] message.     Seen by patient Jaqueline Argueta on 12/3/2024  7:04 PM     Kaylynn Parekh RN

## 2024-12-09 ENCOUNTER — OFFICE VISIT (OUTPATIENT)
Dept: FAMILY MEDICINE | Facility: CLINIC | Age: 29
End: 2024-12-09
Payer: COMMERCIAL

## 2024-12-09 ENCOUNTER — DOCUMENTATION ONLY (OUTPATIENT)
Dept: ALLERGY | Facility: CLINIC | Age: 29
End: 2024-12-09

## 2024-12-09 VITALS
TEMPERATURE: 98.2 F | DIASTOLIC BLOOD PRESSURE: 75 MMHG | RESPIRATION RATE: 19 BRPM | OXYGEN SATURATION: 97 % | SYSTOLIC BLOOD PRESSURE: 120 MMHG | HEART RATE: 110 BPM

## 2024-12-09 DIAGNOSIS — F41.0 PANIC ATTACK: ICD-10-CM

## 2024-12-09 DIAGNOSIS — R35.0 URINARY FREQUENCY: ICD-10-CM

## 2024-12-09 DIAGNOSIS — F32.A DEPRESSION, UNSPECIFIED DEPRESSION TYPE: ICD-10-CM

## 2024-12-09 DIAGNOSIS — N89.8 VAGINAL ITCHING: Primary | ICD-10-CM

## 2024-12-09 DIAGNOSIS — Z59.819 HOUSING INSTABILITY: ICD-10-CM

## 2024-12-09 DIAGNOSIS — F43.10 PTSD (POST-TRAUMATIC STRESS DISORDER): ICD-10-CM

## 2024-12-09 DIAGNOSIS — R00.0 TACHYCARDIA: ICD-10-CM

## 2024-12-09 LAB
ALBUMIN SERPL BCG-MCNC: 4.4 G/DL (ref 3.5–5.2)
ALBUMIN UR-MCNC: NEGATIVE MG/DL
ALP SERPL-CCNC: 107 U/L (ref 40–150)
ALT SERPL W P-5'-P-CCNC: 74 U/L (ref 0–50)
ANION GAP SERPL CALCULATED.3IONS-SCNC: 10 MMOL/L (ref 7–15)
APPEARANCE UR: CLEAR
AST SERPL W P-5'-P-CCNC: 36 U/L (ref 0–45)
BILIRUB SERPL-MCNC: 0.5 MG/DL
BILIRUB UR QL STRIP: NEGATIVE
BUN SERPL-MCNC: 11.7 MG/DL (ref 6–20)
CALCIUM SERPL-MCNC: 9.3 MG/DL (ref 8.8–10.4)
CHLORIDE SERPL-SCNC: 107 MMOL/L (ref 98–107)
CLUE CELLS: NORMAL
COLOR UR AUTO: YELLOW
CREAT SERPL-MCNC: 0.69 MG/DL (ref 0.51–0.95)
EGFRCR SERPLBLD CKD-EPI 2021: >90 ML/MIN/1.73M2
ERYTHROCYTE [DISTWIDTH] IN BLOOD BY AUTOMATED COUNT: 12.7 % (ref 10–15)
EST. AVERAGE GLUCOSE BLD GHB EST-MCNC: 108 MG/DL
GLUCOSE SERPL-MCNC: 95 MG/DL (ref 70–99)
GLUCOSE UR STRIP-MCNC: NEGATIVE MG/DL
HBA1C MFR BLD: 5.4 % (ref 0–5.6)
HCO3 SERPL-SCNC: 23 MMOL/L (ref 22–29)
HCT VFR BLD AUTO: 44.8 % (ref 35–47)
HGB BLD-MCNC: 14.9 G/DL (ref 11.7–15.7)
HGB UR QL STRIP: NEGATIVE
KETONES UR STRIP-MCNC: NEGATIVE MG/DL
LEUKOCYTE ESTERASE UR QL STRIP: NEGATIVE
MCH RBC QN AUTO: 29.9 PG (ref 26.5–33)
MCHC RBC AUTO-ENTMCNC: 33.3 G/DL (ref 31.5–36.5)
MCV RBC AUTO: 90 FL (ref 78–100)
NITRATE UR QL: NEGATIVE
PH UR STRIP: 5.5 [PH] (ref 5–8)
PLATELET # BLD AUTO: 272 10E3/UL (ref 150–450)
POTASSIUM SERPL-SCNC: 4.1 MMOL/L (ref 3.4–5.3)
PROT SERPL-MCNC: 8.1 G/DL (ref 6.4–8.3)
RBC # BLD AUTO: 4.98 10E6/UL (ref 3.8–5.2)
SODIUM SERPL-SCNC: 140 MMOL/L (ref 135–145)
SP GR UR STRIP: >=1.03 (ref 1–1.03)
TRICHOMONAS, WET PREP: NORMAL
TSH SERPL DL<=0.005 MIU/L-ACNC: 1.81 UIU/ML (ref 0.3–4.2)
UROBILINOGEN UR STRIP-ACNC: 0.2 E.U./DL
WBC # BLD AUTO: 6 10E3/UL (ref 4–11)
WBC'S/HIGH POWER FIELD, WET PREP: NORMAL
YEAST, WET PREP: NORMAL

## 2024-12-09 PROCEDURE — 85027 COMPLETE CBC AUTOMATED: CPT

## 2024-12-09 PROCEDURE — 87798 DETECT AGENT NOS DNA AMP: CPT | Mod: 90

## 2024-12-09 PROCEDURE — 87210 SMEAR WET MOUNT SALINE/INK: CPT

## 2024-12-09 PROCEDURE — 84443 ASSAY THYROID STIM HORMONE: CPT

## 2024-12-09 PROCEDURE — 80053 COMPREHEN METABOLIC PANEL: CPT

## 2024-12-09 PROCEDURE — 36415 COLL VENOUS BLD VENIPUNCTURE: CPT

## 2024-12-09 PROCEDURE — 81003 URINALYSIS AUTO W/O SCOPE: CPT

## 2024-12-09 PROCEDURE — 99214 OFFICE O/P EST MOD 30 MIN: CPT | Mod: GC

## 2024-12-09 PROCEDURE — 83036 HEMOGLOBIN GLYCOSYLATED A1C: CPT

## 2024-12-09 PROCEDURE — 87563 M. GENITALIUM AMP PROBE: CPT | Mod: 90

## 2024-12-09 SDOH — ECONOMIC STABILITY - HOUSING INSECURITY: HOUSING INSTABILITY UNSPECIFIED: Z59.819

## 2024-12-09 NOTE — PROGRESS NOTES
Assessment & Plan     Vaginal itching   Urinary frequency   History of recurrent BV and yeast infection  Has had repeated episodes of BV/yeast infection over past several months. Symptoms improve with treatment and then recur. History negative for clear trigger. Wet preps consistently obtained and show infection. Multiple pelvic exams reassuring against other causes including lichen disorders. Currently with recurrent vaginal itching and urinary frequency. Will complete testing for BV/yeast/UTI. Will also screen for diabetes as risk factor for yeast infection, as well as adding ureaplasma/mycoplasma testing. Low concern for pyelonephritis without systemic symptoms; STI without new partners since last testing. If consistent with BV, plan for longer course with maintenance treatment. If negative, plan for GYN referral given recurrent episodes.   - Wet prep, UA, ureaplasma/mycoplasma pending   - Discussed cotton underwear, no scented products, partner testing   - Treat based on results as above   - Consider if sex is a trigger and if probiotic could potentially help balance pH    Housing instability   Currently staying with friends and family. Previously provided resources via [employer]. Interested in potential [address] housing, but requires .   - SW met with patient today     Depression   Anxiety with panic attacks  PTSD   Longstanding depression/anxiety/PTSD in the setting of psychosocial stressors, including young infant and housing instability. Denies SI/HI. Having panic attacks in setting of housing instability above. Prefers PRN medications versus daily medications. Feels that medication regimen is adequate currently. Working on reestablishing with therapy.   - Continue Buspar 5 mg daily/PRN, Ativan 0.5 mg PRN for panic attacks (#10 tablets monthly; no refill today), melatonin 3 mg nightly and Prazosin 1 mg nightly and Hydroxyzine 25-50 mg TID PRN   - Return in 4 weeks for  recheck    Tachycardia  Noted on vitals. Reported feeling anxious at the time they were taken. However, patient also reports tachycardia when not emotionally stressed. Requested labs to rule out common causes, which is reasonable. Plan to screen for anemia, electrolyte abnormality, thyroid dysfunction as below. Regular rhythm on auscultation and no report of palpitations- do not feel ECG or Zio patch warranted at this time.   - CBC, CMP, TSH pending   - Consider ECG or Zio patch if not improving     Subjective   Jaqueline is a 29 year old, presenting for the following health issues:  RECHECK    HPI     Recurrent BV/yeast infections:  - Has had cycle of BV followed by yeast. Yeast infections likely related to antibiotics for BV. Multiple pelvic exams reassuring. Consider ureaplasma and micoplasma if not improving.   - Symptoms initially went away. Still feeling symptoms. Mainly itching and urinary frequency.   - No dysuria, hematuria, urgency.  - No new partners since last testing.   - No polyuria or polyphagia.     Mood:  - Long history of mood disorders. Trouble with daily medications. Initiated on Buspar for more effect with intermittent dosing vs selective serotonin reuptake inhibitor. Also uses benzodiazepine PRN for panic attacks.   - Current regimen: Buspar 5 mg daily, Ativan 0.5 mg PRN for panic attacks, Prazosin 1 mg nightly   - Anxiety goes up and down  - Still having panic attacks, more often now, several days out of week, still has Ativan at home   - No SI/HI     Housing instability:  - Has not been able to find housing     Tachycardia:  - 110 on vitals  - Feeling anxious   - Has been told           Objective    /75 (BP Location: Left arm, Patient Position: Sitting, Cuff Size: Adult Regular)   Pulse 110   Temp 98.2  F (36.8  C)   Resp 19   SpO2 97%   There is no height or weight on file to calculate BMI.  Physical Exam  Vitals reviewed.   Constitutional:       General: She is not in acute  distress.     Appearance: Normal appearance.   HENT:      Head: Normocephalic and atraumatic.   Cardiovascular:      Rate and Rhythm: Normal rate and regular rhythm.      Heart sounds: No murmur heard.  Pulmonary:      Effort: Pulmonary effort is normal. No respiratory distress.      Breath sounds: No wheezing.   Musculoskeletal:      Right lower leg: No edema.      Left lower leg: No edema.   Skin:     General: Skin is warm and dry.      Capillary Refill: Capillary refill takes less than 2 seconds.   Neurological:      General: No focal deficit present.      Mental Status: She is alert.   Psychiatric:         Mood and Affect: Mood normal.         Behavior: Behavior normal.                    Signed Electronically by: Linda Ch MD

## 2024-12-09 NOTE — PATIENT INSTRUCTIONS
Patient Education   Here is the plan from today's visit    FOR URINARY SYMPTOMS:  - TESTING TODAY; SWAB AND URINE   - IF BV AGAIN, PLAN FOR LONG TREATMENT   - IF NOTHING, REFER TO GYN     FOR MOOD:  - ADDRESS HOUSING INSTABILITY   - CONTINUE MEDICATIONS; LET ME KNOW IF NEED REFILLS    FOR FAST HEART RATE:  - LAB WORK TODAY     Please call or return to clinic if your symptoms don't go away.    Follow up plan  No follow-ups on file.    Thank you for coming to [facility] today.  Lab Testing:  **If you had lab testing today and your results are reassuring or normal they will be mailed to you or sent through [employer] within 7 days.   **If the lab tests need quick action we will call you with the results.  **If you are having labs done on a different day, please call [phone] to schedule at [facility] or [phone] for other [facility] Outpatient Lab locations. Labs do not offer walk-in appointments.  The phone number we will call with results is # [phone] (home) . If this is not the best number please call our clinic and change the number.  Medication Refills:  If you need any refills please call your pharmacy and they will contact us.   If you need to  your refill at a new pharmacy, please contact the new pharmacy directly. The new pharmacy will help you get your medications transferred faster.   Scheduling:  If you have any concerns about today's visit or wish to schedule another appointment please call our office during normal business hours [phone] (8-5:00 M-F). If you can no longer make a scheduled visit, please cancel via [employer] or call us to cancel.   If a referral was made to an [facility] specialty provider and you do not get a call from central scheduling, please refer to directions on your visit summary or call our office during normal business hours for assistance.   If a Mammogram was ordered for you at the [facility] call [phone] to schedule or change your  appointment.  If you had an XRay/CT/Ultrasound/MRI ordered the number is [phone] to schedule or change your radiology appointment.   [facility] has limited ultrasound appointments available on Wednesdays, if you would like your ultrasound at [facility], please call [phone] to schedule.   Medical Concerns:  If you have urgent medical concerns please call [phone] at any time of the day.    Linda Ch MD

## 2024-12-11 LAB
M GENITALIUM DNA SPEC QL NAA+PROBE: NOT DETECTED
M HOMINIS DNA SPEC QL NAA+PROBE: NOT DETECTED
U PARVUM DNA SPEC QL NAA+PROBE: NOT DETECTED
U UREALYTICUM DNA SPEC QL NAA+PROBE: NOT DETECTED

## 2024-12-16 DIAGNOSIS — N89.8 VAGINAL ITCHING: Primary | ICD-10-CM

## 2024-12-27 ENCOUNTER — OFFICE VISIT (OUTPATIENT)
Dept: FAMILY MEDICINE | Facility: CLINIC | Age: 29
End: 2024-12-27
Payer: COMMERCIAL

## 2024-12-27 VITALS
TEMPERATURE: 98.3 F | SYSTOLIC BLOOD PRESSURE: 116 MMHG | RESPIRATION RATE: 16 BRPM | OXYGEN SATURATION: 100 % | WEIGHT: 169 LBS | HEIGHT: 61 IN | DIASTOLIC BLOOD PRESSURE: 83 MMHG | BODY MASS INDEX: 31.91 KG/M2 | HEART RATE: 94 BPM

## 2024-12-27 DIAGNOSIS — L73.1 INGROWN HAIR: Primary | ICD-10-CM

## 2024-12-27 DIAGNOSIS — Z12.4 CERVICAL CANCER SCREENING: ICD-10-CM

## 2024-12-27 DIAGNOSIS — N90.89 LABIAL LESION: ICD-10-CM

## 2024-12-27 DIAGNOSIS — N89.8 VAGINAL DISCHARGE: ICD-10-CM

## 2024-12-27 RX ORDER — CLOTRIMAZOLE 1 %
1 CREAM WITH APPLICATOR VAGINAL AT BEDTIME
Qty: 1 G | Refills: 0 | Status: SHIPPED | OUTPATIENT
Start: 2024-12-27 | End: 2025-01-03

## 2024-12-27 NOTE — PATIENT INSTRUCTIONS
Patient Education   Here is the plan from today's visit    1. Ingrown hair (Primary)  How can you care for yourself at home?  To help with itching or pain, put a warm, moist cloth (like a clean washcloth) on the area for 5 to 10 minutes, 3 to 6 times a day.  Do not share your towel, washcloth, or razor.  If the condition is caused by shaving, try to avoid shaving the area for a few weeks. Or use shaving cream or gel and always shave in the direction that the hair grows. Try using an electric razor that doesn't shave so close.  Wear loose clothing, especially if it's hot or humid outside. Change your clothes if they get sweaty.    When should you call for help?   Call your doctor now or seek immediate medical care if:    You have signs that your infection is getting worse, such as:  Increased pain, swelling, warmth, or redness.  Red streaks leading from the area.  Pus draining from the area.  A fever.   Watch closely for changes in your health, and be sure to contact your doctor if:    You do not get better as expected.       2. Cervical cancer screening    - Pap Screen Reflex to HPV if ASCUS - Recommended Age 25 - 29 Years    3. Vaginal discharge  - Wet preparation - Clinic Collect  - clotrimazole (LOTRIMIN) 1 % vaginal cream; Place 1 Applicatorful vaginally at bedtime for 7 days.  Dispense: 1 g; Refill: 0          Please call or return to clinic if your symptoms don't go away.    Follow up plan  No follow-ups on file.    Thank you for coming to [facility] today.  Lab Testing:  **If you had lab testing today and your results are reassuring or normal they will be mailed to you or sent through [employer] within 7 days.   **If the lab tests need quick action we will call you with the results.  **If you are having labs done on a different day, please call [phone] to schedule at [facility] or [phone] for other [facility] Outpatient Lab locations. Labs do not offer walk-in appointments.  The phone  number we will call with results is # [phone] (home) . If this is not the best number please call our clinic and change the number.  Medication Refills:  If you need any refills please call your pharmacy and they will contact us.   If you need to  your refill at a new pharmacy, please contact the new pharmacy directly. The new pharmacy will help you get your medications transferred faster.   Scheduling:  If you have any concerns about today's visit or wish to schedule another appointment please call our office during normal business hours [phone] (8-5:00 M-F). If you can no longer make a scheduled visit, please cancel via [employer] or call us to cancel.   If a referral was made to an [facility] specialty provider and you do not get a call from central scheduling, please refer to directions on your visit summary or call our office during normal business hours for assistance.   If a Mammogram was ordered for you at the [facility] call [phone] to schedule or change your appointment.  If you had an XRay/CT/Ultrasound/MRI ordered the number is [phone] to schedule or change your radiology appointment.   [facility] has limited ultrasound appointments available on Wednesdays, if you would like your ultrasound at [facility], please call [phone] to schedule.   Medical Concerns:  If you have urgent medical concerns please call [phone] at any time of the day.    Roxie Pride MD

## 2024-12-27 NOTE — PROGRESS NOTES
"  Assessment & Plan     Ingrown hair  Vaginal lesion  patient findings are concerning for ingrown hair. Differential does include more vesciular appearing lesions such as  HSV (unlikely to present lesion, but given that antiviral medication may be recommended with HSV present did obtain HSV swab today.), herpes zoster (less likely vulvar or with single lesion), and bullous impetigo (less likely without bullae that break and form desquamating edges and honey-colored crusted lesions). Based on examination alone  - Herpes Simplex Virus 1&2 by PCR (vagina)  -patient was given recommendation for sitz baths warm compresses  -follow-up 1 to 2 weeks of failure to improve    Cervical cancer screening  USPSTF screening ordered as below   - Pap Screen Reflex to HPV if ASCUS - Recommended Age 25 - 29 Years    Vaginal discharge  patient is special discharge and itching that feels similar to previous episodes of yeast infection. Given that sensitivity of wet prep is only approximately 60%, did feel it was reasonable to empirically treat with clotrimazole. Next visit for vaginitis, recommend testing with Multiplex vaginitis swab which is now available.  Of note, patient has had multiple episodes of yeast infections in the past and would be worthwhile to consider if patient needs yeast swab with culture to rule out the possibility of non susceptible yeast.   - Wet preparation - Clinic Collect  - clotrimazole (LOTRIMIN) 1 % vaginal cream; Place 1 Applicatorful vaginally at bedtime for 7 days.  - Would recommend future evaluation to an 12 Multiplex vaginitis swab defer additional wet prep  -recommend follow-up with will be is currently scheduled in two weeks and then follow-up with primary care if symptoms still un resolving  -Recommend Multiplex vaginal panel by PCR ([id number] ) for next episode    BMI  Estimated body mass index is 31.93 kg/m  as calculated from the following:    Height as of this encounter: 1.549 m (5' 1\").    Weight " "as of this encounter: 76.7 kg (169 lb).         See Patient Instructions    Return for Follow up with OB in 2 weeks and then smiley's after if still not resolved.    Magalys Parsons is a 29 year old, presenting for the following health issues:  Vaginal Problem (Pt been having vaginal symptom for a few month)      12/27/2024     9:11 AM   Additional Questions   Roomed by Ohn   Accompanied by Self         12/27/2024    Information    services provided? No     HPI   Vaginal concern  - one area that has been itchy  - has had a little bump (first noticed it 2-3 days ago)  - does not shave  - had BV and yeast in the past  - BV was most recently 1.5 months ago  - Yeast 2-3 weeks ago  - Usually sees katalina Shannon  - OB appointment in ~2 weeks from now  - no home treatments  - does not douche  - right now has predominately ithcing  - no discharge  - monogamous >2 years  - oral and vaginal  - no condoms  - doesn't seem related to  - no increased  - no other antibiotics  - does wear cotton underwear  - has not been wearing  - no skin allergies  - has allergist appointment upcoming for intermittent hives                Objective    /83   Pulse 94   Temp 98.3  F (36.8  C) (Tympanic)   Resp 16   Ht 1.549 m (5' 1\")   Wt 76.7 kg (169 lb)   SpO2 100%   BMI 31.93 kg/m    Body mass index is 31.93 kg/m .  General: No acute distress  Head: No obvious trauma to head.  Ears, Nose, Throat:  External ears normal.  Nose normal.  N  Eyes:  Conjunctivae clear.    CV: Regular rate and rhythm.  No murmurs.      Respiratory: Effort normal and breath sounds normal.  No wheezing or crackles.   Gastrointestinal: Soft.  No distension. There is no tenderness.  There is no rigidity, no rebound and no guarding.   Musculoskeletal: Normal range of motion.  Non tender extremities to palpations.    Neuro: Alert. Moving all extremities appropriately.  Normal speech.    Skin: Skin is warm and dry.  No rash noted. "   Psych: Normal mood and affect. Behavior is normal.    Physical Exam  Genitourinary:           GENERAL: alert and no distress   (female) w/bimanual: normal female external genitalia, slight papule noted on labia majora without erythema, purulence or other lesion noted, normal urethral meatus, normal vaginal mucosa, and normal cervix/adnexa/uterus without masses or discharge   (female):             Signed Electronically by: Roxie Pride MD

## 2024-12-27 NOTE — PROGRESS NOTES
"  {PROVIDER CHARTING PREFERENCE:014200}    Magalys Parsons is a 29 year old, presenting for the following health issues:  Vaginal Problem (Pt been having vaginal symptom for a few month)      12/27/2024     9:11 AM   Additional Questions   Roomed by Ohn   Accompanied by Self         12/27/2024    Information    services provided? No     HPI   Vaginal concern  - one area that has been itchy  - has had a little bump (first noticed it 2-3 days ago)  - does not shave  - had BV and yeast in the past  - BV was most recently 1.5 months ago  - Yeast 2-3 weeks ago  - Usually sees katalina Shannon  - OB appointment in ~2 weeks from now  - no home treatments  - does not douche  - right now has predominately ithcing  - no discharge  - monogamous >2 years  - oral and vaginal  - no condoms  - doesn't seem related to  - no increased  - no other antibiotics  - does wear cotton underwear  - has not been wearing  - no skin allergies  - has allergist appointment upcoming for intermittent hives  {MA/LPN/RN Pre-Provider Visit Orders- hCG/UA/Strep (Optional):415911}  {SUPERLIST (Optional):013550}  {additonal problems for provider to add (Optional):945779}    {ROS Picklists (Optional):646372}      Objective    /83   Pulse 94   Temp 98.3  F (36.8  C) (Tympanic)   Resp 16   Ht 1.549 m (5' 1\")   Wt 76.7 kg (169 lb)   SpO2 100%   BMI 31.93 kg/m    Body mass index is 31.93 kg/m .  Physical Exam   {Exam List (Optional):754228}    {Diagnostic Test Results (Optional):970374}        Signed Electronically by: Roxei Pride MD  {Email feedback regarding this note to [email or web address]   :664988}  "

## 2024-12-28 LAB
HSV1 DNA SPEC QL NAA+PROBE: NOT DETECTED
HSV2 DNA SPEC QL NAA+PROBE: NOT DETECTED
SPECIMEN TYPE: NORMAL

## 2025-01-02 LAB
BKR LAB AP GYN ADEQUACY: NORMAL
BKR LAB AP GYN INTERPRETATION: NORMAL
BKR LAB AP HPV REFLEX: NORMAL
BKR LAB AP PREVIOUS ABNORMAL: NORMAL
PATH REPORT.COMMENTS IMP SPEC: NORMAL
PATH REPORT.COMMENTS IMP SPEC: NORMAL
PATH REPORT.RELEVANT HX SPEC: NORMAL

## 2025-01-14 ENCOUNTER — OFFICE VISIT (OUTPATIENT)
Dept: AUDIOLOGY | Facility: CLINIC | Age: 30
End: 2025-01-14
Payer: COMMERCIAL

## 2025-01-14 DIAGNOSIS — H93.11 TINNITUS OF RIGHT EAR: Primary | ICD-10-CM

## 2025-01-14 NOTE — PROGRESS NOTES
AUDIOLOGY REPORT    SUMMARY: Audiology visit completed. See audiogram for results.    RECOMMENDATIONS: Follow-up with ENT.    Jassi Ramos  Doctor of Audiology  [place] License # [id number]

## 2025-06-26 ENCOUNTER — OFFICE VISIT (OUTPATIENT)
Dept: FAMILY MEDICINE | Facility: CLINIC | Age: 30
End: 2025-06-26
Payer: COMMERCIAL

## 2025-06-26 VITALS
RESPIRATION RATE: 17 BRPM | HEART RATE: 98 BPM | TEMPERATURE: 98 F | OXYGEN SATURATION: 98 % | SYSTOLIC BLOOD PRESSURE: 121 MMHG | HEIGHT: 61 IN | BODY MASS INDEX: 31.93 KG/M2 | DIASTOLIC BLOOD PRESSURE: 80 MMHG

## 2025-06-26 DIAGNOSIS — Z32.00 PREGNANCY EXAMINATION OR TEST, PREGNANCY UNCONFIRMED: ICD-10-CM

## 2025-06-26 DIAGNOSIS — J06.9 VIRAL URI: Primary | ICD-10-CM

## 2025-06-26 LAB — HCG UR QL: NEGATIVE

## 2025-06-26 RX ORDER — OXYMETAZOLINE HYDROCHLORIDE 0.05 G/100ML
2 SPRAY NASAL 2 TIMES DAILY PRN
Qty: 6 ML | Refills: 0 | Status: SHIPPED | OUTPATIENT
Start: 2025-06-26 | End: 2025-06-29

## 2025-06-26 ASSESSMENT — PATIENT HEALTH QUESTIONNAIRE - PHQ9
SUM OF ALL RESPONSES TO PHQ QUESTIONS 1-9: 9
10. IF YOU CHECKED OFF ANY PROBLEMS, HOW DIFFICULT HAVE THESE PROBLEMS MADE IT FOR YOU TO DO YOUR WORK, TAKE CARE OF THINGS AT HOME, OR GET ALONG WITH OTHER PEOPLE: SOMEWHAT DIFFICULT
SUM OF ALL RESPONSES TO PHQ QUESTIONS 1-9: 9

## 2025-06-26 ASSESSMENT — PAIN SCALES - GENERAL: PAINLEVEL_OUTOF10: NO PAIN (0)

## 2025-06-26 NOTE — PROGRESS NOTES
Assessment & Plan     Viral URI  Uncomplicated URI without symptoms concerning for bacterial sinusitis or pneumonia. Tachycardic but moist mucus membranes and normal capillary refill; encouraged PO hydration. Recommended 3 days of Afrin nasal spray to help with congestion headache. Pt declined testing today due to the fact it would not , COVID home test negative  - oxymetazoline (AFRIN) 0.05 % nasal spray; Spray 0.2 mLs (2 sprays) into both nostrils 2 times daily as needed for congestion.  - Recommended OTC Tylenol    Pregnancy examination or test, pregnancy unconfirmed  Irregular periods, feels nauseous and tired, would like to rule out pregnancy.  If pregnancy test positive, would like to return for options counseling  - HCG qualitative urine; Future              Subjective   Jaqueline is a 29 year old, presenting for the following health issues:  Cough and Nasal Congestion      6/26/2025    11:25 AM   Additional Questions   Roomed by Win   Accompanied by Self         6/26/2025    Information    services provided? No     HPI        Headache bridge of the nose  Therapies tried  - allergy pills  - nasal spray ?kind    No tooth pain  No unilateral facial pain  No unpleasent taste or smell  Has lost sense of smell    Negative home covid test yesterday    Coughing was worse before, now just more of a dry post nasal cough      Acute Illness  Acute illness concerns: Sinus congestion, body aches, headache, fatigue  Onset/Duration: 1-1/2 weeks ago  Symptoms:  Fever: No  Chills/Sweats: No  Headache (location?): YES - frontal, bridge of nose  Sinus Pressure: YES both sides, pretty severe  Conjunctivitis:  No  Ear Pain: no  Rhinorrhea: YES  Congestion: YES  Sore Throat: Comes and goes  Cough: YES -was more productive in the beginning now is just dry  Wheeze: No  Decreased Appetite: No  Nausea: YES  Vomiting: No  Fatigue/Achiness: YES  Sick/Strep Exposure: No  Therapies tried and  "outcome: Allergy pills and nasal spray, not sure which kind            Objective    /80 (BP Location: Left arm, Patient Position: Sitting, Cuff Size: Adult Regular)   Pulse 98   Temp 98  F (36.7  C) (Temporal)   Resp 17   Ht 1.549 m (5' 1\")   LMP 06/03/2025 (Approximate)   SpO2 98%   BMI 31.93 kg/m    Body mass index is 31.93 kg/m .  Physical Exam  Constitutional:       Appearance: She is ill-appearing. She is not toxic-appearing.   HENT:      Right Ear: Tympanic membrane and ear canal normal.      Left Ear: Tympanic membrane and ear canal normal.      Nose: Congestion and rhinorrhea present.      Mouth/Throat:      Mouth: Mucous membranes are moist.      Pharynx: No oropharyngeal exudate or posterior oropharyngeal erythema.   Eyes:      Conjunctiva/sclera: Conjunctivae normal.   Cardiovascular:      Rate and Rhythm: Regular rhythm. Tachycardia present.   Pulmonary:      Effort: Pulmonary effort is normal.      Breath sounds: Normal breath sounds.   Musculoskeletal:      Cervical back: Neck supple.   Lymphadenopathy:      Cervical: No cervical adenopathy.   Skin:     Capillary Refill: Capillary refill takes less than 2 seconds.                    Signed Electronically by: Ange Mckeon MD    Answers submitted by the patient for this visit:  Patient Health Questionnaire (Submitted on 6/26/2025)  If you checked off any problems, how difficult have these problems made it for you to do your work, take care of things at home, or get along with other people?: Somewhat difficult  PHQ9 TOTAL SCORE: 9    "

## 2025-06-26 NOTE — PATIENT INSTRUCTIONS

## 2025-06-27 ENCOUNTER — RESULTS FOLLOW-UP (OUTPATIENT)
Dept: FAMILY MEDICINE | Facility: CLINIC | Age: 30
End: 2025-06-27

## 2025-06-30 ENCOUNTER — TELEPHONE (OUTPATIENT)
Dept: FAMILY MEDICINE | Facility: CLINIC | Age: 30
End: 2025-06-30

## 2025-07-08 ENCOUNTER — TELEPHONE (OUTPATIENT)
Dept: DERMATOLOGY | Facility: CLINIC | Age: 30
End: 2025-07-08
Payer: COMMERCIAL

## 2025-07-08 DIAGNOSIS — L73.2 AXILLARY HIDRADENITIS SUPPURATIVA: ICD-10-CM

## 2025-07-08 RX ORDER — CLINDAMYCIN AND BENZOYL PEROXIDE 10; 50 MG/G; MG/G
GEL TOPICAL 2 TIMES DAILY
Qty: 50 G | Refills: 4 | Status: SHIPPED | OUTPATIENT
Start: 2025-07-08

## 2025-07-08 NOTE — TELEPHONE ENCOUNTER
[facility] Call Center    Phone Message    May a detailed message be left on voicemail: yes     Reason for Call: Other: Per Pt her HS is flaring up and she is wondering if she can bernice an injection. She would also like an antiseptic wash to be sent to [facility] [place], [place] - [address]     Action Taken: Other: OX Derm    Travel Screening: Not Applicable

## 2025-07-08 NOTE — TELEPHONE ENCOUNTER
Med pended for Dr. Bobo to send.    Reception-- please help schedule pt in the next month or so.    Thank you,  Laura WHITE RN  Dermatology   [phone]

## 2025-07-14 DIAGNOSIS — E55.9 VITAMIN D DEFICIENCY: Primary | ICD-10-CM

## 2025-07-14 RX ORDER — ERGOCALCIFEROL 1.25 MG/1
50000 CAPSULE, LIQUID FILLED ORAL WEEKLY
Qty: 8 CAPSULE | Refills: 0 | Status: SHIPPED | OUTPATIENT
Start: 2025-07-14

## 2025-07-16 ENCOUNTER — MYC REFILL (OUTPATIENT)
Dept: FAMILY MEDICINE | Facility: CLINIC | Age: 30
End: 2025-07-16

## 2025-07-16 ENCOUNTER — OFFICE VISIT (OUTPATIENT)
Dept: URGENT CARE | Facility: URGENT CARE | Age: 30
End: 2025-07-16
Payer: COMMERCIAL

## 2025-07-16 VITALS
HEART RATE: 91 BPM | BODY MASS INDEX: 30.76 KG/M2 | WEIGHT: 162.8 LBS | SYSTOLIC BLOOD PRESSURE: 122 MMHG | RESPIRATION RATE: 14 BRPM | DIASTOLIC BLOOD PRESSURE: 86 MMHG | TEMPERATURE: 98.3 F | OXYGEN SATURATION: 99 %

## 2025-07-16 DIAGNOSIS — N76.0 BV (BACTERIAL VAGINOSIS): Primary | ICD-10-CM

## 2025-07-16 DIAGNOSIS — Z11.3 SCREEN FOR SEXUALLY TRANSMITTED DISEASES: ICD-10-CM

## 2025-07-16 DIAGNOSIS — F32.A DEPRESSION, UNSPECIFIED DEPRESSION TYPE: ICD-10-CM

## 2025-07-16 DIAGNOSIS — B96.89 BV (BACTERIAL VAGINOSIS): Primary | ICD-10-CM

## 2025-07-16 DIAGNOSIS — N89.8 VAGINAL ITCHING: ICD-10-CM

## 2025-07-16 LAB
ALBUMIN UR-MCNC: NEGATIVE MG/DL
APPEARANCE UR: CLEAR
BILIRUB UR QL STRIP: NEGATIVE
CLUE CELLS: PRESENT
COLOR UR AUTO: YELLOW
GLUCOSE UR STRIP-MCNC: NEGATIVE MG/DL
HGB UR QL STRIP: NEGATIVE
KETONES UR STRIP-MCNC: NEGATIVE MG/DL
LEUKOCYTE ESTERASE UR QL STRIP: NEGATIVE
NITRATE UR QL: NEGATIVE
PH UR STRIP: 5.5 [PH] (ref 5–7)
RBC #/AREA URNS AUTO: NORMAL /HPF
SP GR UR STRIP: >=1.03 (ref 1–1.03)
TRICHOMONAS, WET PREP: ABNORMAL
UROBILINOGEN UR STRIP-ACNC: 0.2 E.U./DL
WBC #/AREA URNS AUTO: NORMAL /HPF
WBC'S/HIGH POWER FIELD, WET PREP: ABNORMAL
YEAST, WET PREP: ABNORMAL

## 2025-07-16 PROCEDURE — 87529 HSV DNA AMP PROBE: CPT | Performed by: PHYSICIAN ASSISTANT

## 2025-07-16 PROCEDURE — 36415 COLL VENOUS BLD VENIPUNCTURE: CPT | Performed by: PHYSICIAN ASSISTANT

## 2025-07-16 PROCEDURE — 86780 TREPONEMA PALLIDUM: CPT | Performed by: PHYSICIAN ASSISTANT

## 2025-07-16 PROCEDURE — 81001 URINALYSIS AUTO W/SCOPE: CPT | Performed by: PHYSICIAN ASSISTANT

## 2025-07-16 PROCEDURE — 87491 CHLMYD TRACH DNA AMP PROBE: CPT | Performed by: PHYSICIAN ASSISTANT

## 2025-07-16 PROCEDURE — 87210 SMEAR WET MOUNT SALINE/INK: CPT | Performed by: PHYSICIAN ASSISTANT

## 2025-07-16 PROCEDURE — 87389 HIV-1 AG W/HIV-1&-2 AB AG IA: CPT | Performed by: PHYSICIAN ASSISTANT

## 2025-07-16 PROCEDURE — 87591 N.GONORRHOEAE DNA AMP PROB: CPT | Performed by: PHYSICIAN ASSISTANT

## 2025-07-16 PROCEDURE — 99213 OFFICE O/P EST LOW 20 MIN: CPT | Performed by: PHYSICIAN ASSISTANT

## 2025-07-16 RX ORDER — METRONIDAZOLE 7.5 MG/G
1 GEL VAGINAL DAILY
Qty: 35 G | Refills: 0 | Status: SHIPPED | OUTPATIENT
Start: 2025-07-16 | End: 2025-07-23

## 2025-07-16 NOTE — PROGRESS NOTES
Chief Complaint   Patient presents with    Vaginal Itching     Pt reports vaginal itch for like a week now, denies odor, denies discharge, did have a new partner a week ago and that when the sx started, want a whole panel STD check, want Herpes, BV, yeast, trich       Assessment & Plan  Assessment  - Bacterial vaginosis confirmed by the presence of clue cells and exam  - Differential diagnosis includes potential sexually transmitted infections such as chlamydia and gonorrhea, pending test results.  - Consideration for herpes simplex virus (HSV) due to small papules in the buttocks, swabbed for confirmation by think these are more likely to be non-HSV papules    Plan  - Perform STI testing, including chlamydia and gonorrhea.  - Conduct a wet prep to check for yeast, Trichomonas, and BV.  - Treat for bacterial vaginosis with a topical gel.  - Perform blood tests for HIV and syphilis.  - Swab small papules in the buttocks for HSV.     ICD-10-CM    1. Screen for sexually transmitted diseases  Z11.3 Chlamydia trachomatis/Neisseria gonorrhoeae by PCR     Wet preparation     HIV Antigen Antibody Combo Cascade     Treponema Abs w Reflex to RPR and Titer     Herpes Simplex Virus 1&2 by PCR      2. Vaginal itching  N89.8 Wet preparation     UA with Microscopic reflex to Culture     UA Microscopic with Reflex to Culture      3. BV (bacterial vaginosis)  N76.0     B96.89           SUBJECTIVE:  History of Present Illness-Jaqueline Argueta, 29-year-old female. History of recurrent bacterial vaginosis and yeast infections in the past. One week prior to presentation, had unprotected sexual intercourse with a new partner. Developed vulvar irritation shortly after, which resolved after a few days, followed by persistent vulvar pruritus. Denied vaginal discharge and odor. Uncertain about presence of vulvar lesions due to inability to visualize area. No known STI status of new partner. Previously had a lesion swabbed for HSV, which was  negative and attributed to an ingrown hair. Currently concerned about possible STI and requests comprehensive STI testing.     ROS: Pertinent ROS neg other than the symptoms noted above in the HPI.     OBJECTIVE:  /86 (BP Location: Left arm, Patient Position: Sitting, Cuff Size: Adult Regular)   Pulse 91   Temp 98.3  F (36.8  C) (Oral)   Resp 14   Wt 73.8 kg (162 lb 12.8 oz)   LMP 07/03/2025 (Exact Date)   SpO2 99%   BMI 30.76 kg/m     Physical Exam- GENITOURINARY: External genitalia and pelvic tissue without erythema, edema, or abnormal lesions; mucosa within normal limits. white discharge noted,. Small papules on buttocks swabbed for HSV, white discharge noted, no other lesions.       DIAGNOSTICS      Results for orders placed or performed in visit on 07/16/25   UA with Microscopic reflex to Culture     Status: Normal    Specimen: Urine, Clean Catch   Result Value Ref Range    Color Urine Yellow Colorless, Straw, Light Yellow, Yellow    Appearance Urine Clear Clear    Glucose Urine Negative Negative mg/dL    Bilirubin Urine Negative Negative    Ketones Urine Negative Negative mg/dL    Specific Gravity Urine >=1.030 1.003 - 1.035    Blood Urine Negative Negative    pH Urine 5.5 5.0 - 7.0    Protein Albumin Urine Negative Negative mg/dL    Urobilinogen Urine 0.2 0.2, 1.0 E.U./dL    Nitrite Urine Negative Negative    Leukocyte Esterase Urine Negative Negative   UA Microscopic with Reflex to Culture     Status: Normal   Result Value Ref Range    RBC Urine None Seen 0-2 /HPF /HPF    WBC Urine None Seen 0-5 /HPF /HPF    Narrative    Urine Culture not indicated   Wet preparation     Status: Abnormal    Specimen: Vagina; Swab   Result Value Ref Range    Trichomonas Absent Absent    Yeast Absent Absent    Clue Cells Present (A) Absent    WBCs/high power field 1+ (A) None        Hawk Carter PA-C    Consent was obtained from the patient to use an AI documentation tool in the creation of this note.  Any  grammatical or spelling errors are non-intentional. Please contact the author of this note directly if you are in need of any clarification.     Current Outpatient Medications   Medication Sig Dispense Refill    busPIRone (BUSPAR) 5 MG tablet Take 1 tablet (5 mg) by mouth daily. 30 tablet 0    cetirizine (ZYRTEC) 10 MG tablet Take up to 2 tabs twice daily when hives are present 90 tablet 3    chlorhexidine (HIBICLENS) 4 % liquid Wash armpits with this liquid with each shower. Let sit for 30-60 seconds before rinsing. 946 mL 3    clindamycin-benzoyl peroxide (BENZACLIN) 1-5 % external gel Apply topically 2 times daily. 50 g 4    hydrocortisone (CORTAID) 1 % external cream Apply topically 2 times daily. Apply topically 2 times daily as needed for vulvovaginal itching 28 g 0    hydrOXYzine HCl (ATARAX) 25 MG tablet Take 1-2 tablets (25-50 mg) by mouth 3 times daily as needed for anxiety. 60 tablet 0    LORazepam (ATIVAN) 0.5 MG tablet Take 1 tablet (0.5 mg) by mouth every 6 hours as needed for anxiety (panic attack). 10 tablet 0    prazosin (MINIPRESS) 1 MG capsule Take 1 capsule (1 mg) by mouth at bedtime 30 capsule 0    vitamin D2 (ERGOCALCIFEROL) 52991 units (1250 mcg) capsule Take 1 capsule (50,000 Units) by mouth once a week. 8 capsule 0    clindamycin (CLEOCIN T) 1 % external lotion Apply topically 2 times daily (Patient not taking: Reported on 7/16/2025) 120 mL 3    medroxyPROGESTERone (DEPO-PROVERA) 150 MG/ML IM injection Inject 1 mL (150 mg) into the muscle every 3 months (Patient not taking: Reported on 7/16/2025) 1 mL 3    melatonin 3 MG tablet Take 1 tablet (3 mg) by mouth nightly as needed for sleep (Patient not taking: Reported on 7/16/2025) 30 tablet 0    NIFEdipine ER (ADALAT CC) 30 MG 24 hr tablet Take 1 tablet (30 mg) by mouth daily for 7 days (Patient not taking: Reported on 7/16/2025) 7 tablet 0     Current Facility-Administered Medications   Medication Dose Route Frequency Provider Last Rate Last  Admin    medroxyPROGESTERone (DEPO-PROVERA) injection 150 mg  150 mg Intramuscular Q90 Days    150 mg at 11/27/24 1036      Patient Active Problem List   Diagnosis    Condyloma acuminata    Need for Tdap vaccination    Nausea/vomiting in pregnancy    Nausea and vomiting of pregnancy, antepartum    Rubella non-immune status, antepartum    Supervision of high risk pregnancy, antepartum    Depression complicating pregnancy, antepartum, third trimester    Hyperemesis gravidarum    Long term current use of cannabis    Pregnancy complicated by tobacco use in second trimester    Delivery normal    SHERIDAN (generalized anxiety disorder)    Major depressive disorder, recurrent episode, moderate (H)    Alcohol use disorder in remission      Past Medical History:   Diagnosis Date    Anemia due to blood loss, acute 12/27/2011    Depressive disorder      Past Surgical History:   Procedure Laterality Date    NO HISTORY OF SURGERY       Family History   Problem Relation Age of Onset    Substance Abuse Mother     Depression Mother     Substance Abuse Father     Substance Abuse Brother     Substance Abuse Sister     Bipolar Disorder Sister     Depression Sister     Substance Abuse Sister     Depression Sister     Family History Negative No family hx of      Social History     Tobacco Use    Smoking status: Some Days     Types: Other    Smokeless tobacco: Never    Tobacco comments:     smoke marijuana once or twice a wk   Substance Use Topics    Alcohol use: Yes     Comment:

## 2025-07-16 NOTE — PROGRESS NOTES
Urgent Care Clinic Visit    Chief Complaint   Patient presents with    Vaginal Itching     Pt reports vaginal itch for like a week now, denies odor, denies discharge, did have a new partner a week ago and that when the sx started, want a whole panel STD check, want Herpes, BV, yeast, trich               7/16/2025     2:37 PM   Additional Questions   Roomed by randee herrera   Accompanied by self     Pre-Provider Visit Orders - Vaginal Infection  Reason for visit:  Vaginal Itch

## 2025-07-17 LAB
C TRACH DNA SPEC QL PROBE+SIG AMP: NEGATIVE
HIV 1+2 AB+HIV1 P24 AG SERPL QL IA: NONREACTIVE
HSV1 DNA SPEC QL NAA+PROBE: NOT DETECTED
HSV2 DNA SPEC QL NAA+PROBE: NOT DETECTED
N GONORRHOEA DNA SPEC QL NAA+PROBE: NEGATIVE
SPECIMEN TYPE: NORMAL
SPECIMEN TYPE: NORMAL
T PALLIDUM AB SER QL: NONREACTIVE

## 2025-07-18 NOTE — TELEPHONE ENCOUNTER
"Request for medication refill:    Medication Name: hydrOXYzine HCl (ATARAX) 25 MG tablet     Providers if patient needs an appointment and you are willing to give a one month supply please refill for one month and  send a MyChart using \".SMILLIMITEDREFILL\" .Or route chart to \"P [facility] \" . And use the phrase \" SMIRXFOLLOWUP\"To call patient and inform of limited refill and providers request to make an appointment. (Giving one month refill in non controlled medications is strongly recommended before denial)    If refill has been denied, meaning absolutely no refills without visit, please complete the smart phrase \".SMIRXREFUSE\" and route it to the \"P [facility] MED REFILLS\"  pool to inform the patient and the pharmacy.    Renetta Bautista RN      "

## 2025-07-19 RX ORDER — HYDROXYZINE HYDROCHLORIDE 25 MG/1
25-50 TABLET, FILM COATED ORAL 3 TIMES DAILY PRN
Qty: 60 TABLET | Refills: 0 | Status: SHIPPED | OUTPATIENT
Start: 2025-07-19

## 2025-07-24 ENCOUNTER — E-VISIT (OUTPATIENT)
Dept: URGENT CARE | Facility: CLINIC | Age: 30
End: 2025-07-24
Payer: COMMERCIAL

## 2025-07-24 ENCOUNTER — TELEPHONE (OUTPATIENT)
Dept: FAMILY MEDICINE | Facility: CLINIC | Age: 30
End: 2025-07-24
Payer: COMMERCIAL

## 2025-07-24 DIAGNOSIS — N89.8 VAGINAL DISCHARGE: Primary | ICD-10-CM

## 2025-07-24 RX ORDER — FLUCONAZOLE 150 MG/1
150 TABLET ORAL ONCE
Qty: 1 TABLET | Refills: 0 | Status: SHIPPED | OUTPATIENT
Start: 2025-07-24 | End: 2025-07-24

## 2025-07-24 NOTE — TELEPHONE ENCOUNTER
Patient calling in stating she was seen for BV at urgent care and now thinks she has a yeast infection.  She is asking for a prescription.      Explained to patient that Urgent Care will not prescribe medications without a new visit.  Discussed with patient the option of an Evisit.  Sent her instructions on how to complete one through her Mychart.  Patient verbalized understanding.      Kristina Kjellberg, MSN, RN

## 2025-07-24 NOTE — PATIENT INSTRUCTIONS
Thank you for choosing us for your care. I have placed an order for a prescription so that you can start treatment. View your full visit summary for details by clicking on the link below. Your pharmacist will able to address any questions you may have about the medication.     If you re not feeling better within 2-3 days, please schedule an appointment.  You can schedule an appointment right here in [facility], or call [phone]  If the visit is for the same symptoms as your eVisit, we ll refund the cost of your eVisit if seen within seven days.

## 2025-07-29 ENCOUNTER — OFFICE VISIT (OUTPATIENT)
Dept: FAMILY MEDICINE | Facility: CLINIC | Age: 30
End: 2025-07-29
Payer: COMMERCIAL

## 2025-07-29 VITALS
HEART RATE: 105 BPM | HEIGHT: 61 IN | TEMPERATURE: 98 F | DIASTOLIC BLOOD PRESSURE: 79 MMHG | RESPIRATION RATE: 15 BRPM | SYSTOLIC BLOOD PRESSURE: 115 MMHG | BODY MASS INDEX: 31.78 KG/M2 | OXYGEN SATURATION: 97 % | WEIGHT: 168.3 LBS

## 2025-07-29 DIAGNOSIS — L73.2 AXILLARY HIDRADENITIS SUPPURATIVA: ICD-10-CM

## 2025-07-29 DIAGNOSIS — N89.8 VAGINAL DISCHARGE: ICD-10-CM

## 2025-07-29 DIAGNOSIS — N89.8 VAGINAL ITCHING: ICD-10-CM

## 2025-07-29 DIAGNOSIS — F33.1 MAJOR DEPRESSIVE DISORDER, RECURRENT EPISODE, MODERATE (H): ICD-10-CM

## 2025-07-29 DIAGNOSIS — R35.0 URINARY FREQUENCY: ICD-10-CM

## 2025-07-29 DIAGNOSIS — F41.0 PANIC ATTACK: Primary | ICD-10-CM

## 2025-07-29 DIAGNOSIS — F41.1 GAD (GENERALIZED ANXIETY DISORDER): ICD-10-CM

## 2025-07-29 LAB
ALBUMIN UR-MCNC: NEGATIVE MG/DL
APPEARANCE UR: CLEAR
BILIRUB UR QL STRIP: NEGATIVE
CLUE CELLS: ABNORMAL
COLOR UR AUTO: YELLOW
GLUCOSE UR STRIP-MCNC: NEGATIVE MG/DL
HGB UR QL STRIP: NEGATIVE
KETONES UR STRIP-MCNC: NEGATIVE MG/DL
LEUKOCYTE ESTERASE UR QL STRIP: NEGATIVE
NITRATE UR QL: NEGATIVE
PH UR STRIP: 5.5 [PH] (ref 5–8)
SP GR UR STRIP: >=1.03 (ref 1–1.03)
TRICHOMONAS, WET PREP: ABNORMAL
UROBILINOGEN UR STRIP-ACNC: 0.2 E.U./DL
WBC'S/HIGH POWER FIELD, WET PREP: ABNORMAL
YEAST, WET PREP: ABNORMAL

## 2025-07-29 RX ORDER — ESCITALOPRAM OXALATE 10 MG/1
10 TABLET ORAL DAILY
Qty: 30 TABLET | Refills: 3 | Status: SHIPPED | OUTPATIENT
Start: 2025-07-29

## 2025-07-29 RX ORDER — METRONIDAZOLE 7.5 MG/G
GEL VAGINAL DAILY
COMMUNITY
End: 2025-07-29

## 2025-07-29 RX ORDER — FLUCONAZOLE 150 MG/1
150 TABLET ORAL ONCE
Qty: 1 TABLET | Refills: 0 | Status: SHIPPED | OUTPATIENT
Start: 2025-07-29 | End: 2025-07-29

## 2025-07-29 RX ORDER — LORAZEPAM 0.5 MG/1
0.5 TABLET ORAL EVERY 6 HOURS PRN
Qty: 10 TABLET | Refills: 0 | Status: CANCELLED | OUTPATIENT
Start: 2025-07-29

## 2025-07-29 RX ORDER — CHLORHEXIDINE GLUCONATE 40 MG/ML
SOLUTION TOPICAL DAILY PRN
Qty: 532 ML | Refills: 5 | Status: SHIPPED | OUTPATIENT
Start: 2025-07-29

## 2025-07-29 ASSESSMENT — PATIENT HEALTH QUESTIONNAIRE - PHQ9
SUM OF ALL RESPONSES TO PHQ QUESTIONS 1-9: 8
SUM OF ALL RESPONSES TO PHQ QUESTIONS 1-9: 8
10. IF YOU CHECKED OFF ANY PROBLEMS, HOW DIFFICULT HAVE THESE PROBLEMS MADE IT FOR YOU TO DO YOUR WORK, TAKE CARE OF THINGS AT HOME, OR GET ALONG WITH OTHER PEOPLE: SOMEWHAT DIFFICULT

## 2025-07-29 ASSESSMENT — PAIN SCALES - GENERAL: PAINLEVEL_OUTOF10: NO PAIN (0)

## 2025-07-29 NOTE — PROGRESS NOTES
Assessment & Plan     Vaginal itching  Vaginal discharge  Recurrent symptoms over last year  - Wet prep - negative, but exam c/w yeast  - fluconazole (DIFLUCAN) 150 MG tablet  Dispense: 1 tablet; Refill: 0  RTC if sx do not resolve or recur for further eval and treatment plan    Urinary frequency  - Urine Macroscopic with reflex to Microscopic - normal    SHERIDAN (generalized anxiety disorder)  Major depressive disorder, recurrent episode, moderate   Panic attacks  ?PTSD  Managing symptoms with prn hydroxyzine. Did not thing low dose Buspar helped but is open to trying daily med. Is not taking Prazosin. Did not feel on-sight counseling was a good fit for her but interested in referral.  Prior interpersonal safety concerns reported as resolved  - start escitalopram (LEXAPRO) 10 MG tablet  Dispense: 30 tablet; Refill: 3  -     Adult Mental Health  Referral; Future. To [facility] for psychotherapy    Axillary hidradenitis suppurativa  refill  - chlorhexidine (HIBICLENS) 4 % solution  Dispense: 532 mL; Refill: 5    Diagnosis or treatment significantly limited by social determinants of health - self identifies depression, housing insecurity and financial resource strain. Declines to meet with SW this visit.     RTC 4 weeks for recheck after initiation of selective serotonin reuptake inhibitor       Magalys Parsons is a 29 year old, presenting for the following health issues:  Vaginal Problem (Bv  had last week. Has not improved -  went to urgent care - twice this yr she had had it already.)      7/29/2025     1:51 PM   Additional Questions   Roomed by Chikis   Accompanied by self         7/29/2025    Information    services provided? No     HPI      - Went to  7/16 with vaginal itching. Had full STI testing which was negative. Only + was clue cells and was treated with Metrogel. Says historically she has gotten yeast infection following BV treatment and took Diflucan x 1 prior to completing  "BNV therapy. Now completed and having itching. No discharge in underwear like before but thinks she see's yeast with wiping.   - Increased urinary frequency since 2 days. No dysuria  - multiple visits for vaginal complaints in last year.    - Requests refill of lorazapam. Per chart, was filled once for #10 in 11/2024 for panic symptoms. Was also presribed Buspar 5 mg daily at that time but says that she did not notice result and did not return for dose increase. Has been taking Hydroxyzine 2-3 times daily on hardest days and that it helps.Using most days now. Says that she has had a lot of stressors recently but did not elaborate other than to say that she feels safe. Would consider daily med    ROS  Answers submitted by the patient for this visit:  Patient Health Questionnaire (Submitted on 7/29/2025)  If you checked off any problems, how difficult have these problems made it for you to do your work, take care of things at home, or get along with other people?: Somewhat difficult  PHQ9 TOTAL SCORE: 8        12/3/2024     3:12 PM 6/26/2025    11:25 AM 7/29/2025     1:36 PM   PHQ   PHQ-9 Total Score 9  9  8    Q9: Thoughts of better off dead/self-harm past 2 weeks Not at all  Not at all  Not at all       Patient-reported    Proxy-reported          Objective    /79   Pulse 105   Temp 98  F (36.7  C) (Skin)   Resp 15   Ht 1.549 m (5' 1\")   Wt 76.3 kg (168 lb 4.8 oz)   LMP 07/03/2025 (Exact Date)   SpO2 97%   BMI 31.80 kg/m    Body mass index is 31.8 kg/m .    Physical Exam  Constitutional:       General: She is not in acute distress.     Appearance: She is not ill-appearing.   Genitourinary:     Comments: Vulva erythematous, without lesions. Curdy, white discharge at introitus, no odor  Neurological:      Mental Status: She is alert.   Psychiatric:         Attention and Perception: Attention normal.         Mood and Affect: Affect is flat.         Speech: Speech normal.         Behavior: Behavior is " cooperative.         Thought Content: Thought content normal.          Results for orders placed or performed in visit on 07/29/25   Urine Macroscopic with reflex to Microscopic     Status: Normal   Result Value Ref Range    Color Urine Yellow Colorless, Straw, Light Yellow, Yellow    Appearance Urine Clear Clear    Glucose Urine Negative Negative mg/dL    Bilirubin Urine Negative Negative    Ketones Urine Negative Negative mg/dL    Specific Gravity Urine >=1.030 1.005 - 1.030    Blood Urine Negative Negative    pH Urine 5.5 5.0 - 8.0    Protein Albumin Urine Negative Negative mg/dL    Urobilinogen Urine 0.2 0.2, 1.0 E.U./dL    Nitrite Urine Negative Negative    Leukocyte Esterase Urine Negative Negative    Narrative    Microscopic not indicated   Wet prep     Status: Abnormal    Specimen: Vagina; Swab   Result Value Ref Range    Trichomonas Absent Absent    Yeast Absent Absent    Clue Cells Absent Absent    WBCs/high power field 1+ (A) None    Narrative    Whiff=neg  Ph= 4.5           Signed Electronically by: Flores George MD

## 2025-07-31 PROBLEM — O99.343 DEPRESSION COMPLICATING PREGNANCY, ANTEPARTUM, THIRD TRIMESTER: Status: RESOLVED | Noted: 2023-05-10 | Resolved: 2025-07-31

## 2025-07-31 PROBLEM — O21.9 NAUSEA AND VOMITING OF PREGNANCY, ANTEPARTUM: Status: RESOLVED | Noted: 2018-03-29 | Resolved: 2025-07-31

## 2025-07-31 PROBLEM — Z28.39 RUBELLA NON-IMMUNE STATUS, ANTEPARTUM: Status: RESOLVED | Noted: 2018-03-30 | Resolved: 2025-07-31

## 2025-07-31 PROBLEM — O99.332 PREGNANCY COMPLICATED BY TOBACCO USE IN SECOND TRIMESTER: Status: RESOLVED | Noted: 2023-07-15 | Resolved: 2025-07-31

## 2025-07-31 PROBLEM — F32.A DEPRESSION COMPLICATING PREGNANCY, ANTEPARTUM, THIRD TRIMESTER: Status: RESOLVED | Noted: 2023-05-10 | Resolved: 2025-07-31

## 2025-07-31 PROBLEM — O21.9 NAUSEA/VOMITING IN PREGNANCY: Status: RESOLVED | Noted: 2018-03-29 | Resolved: 2025-07-31

## 2025-07-31 PROBLEM — O09.90 SUPERVISION OF HIGH RISK PREGNANCY, ANTEPARTUM: Status: RESOLVED | Noted: 2023-04-17 | Resolved: 2025-07-31

## 2025-07-31 PROBLEM — O21.0 HYPEREMESIS GRAVIDARUM: Status: RESOLVED | Noted: 2023-05-24 | Resolved: 2025-07-31

## 2025-07-31 PROBLEM — O09.899 RUBELLA NON-IMMUNE STATUS, ANTEPARTUM: Status: RESOLVED | Noted: 2018-03-30 | Resolved: 2025-07-31

## 2025-07-31 NOTE — PATIENT INSTRUCTIONS
[email or web address]    Therapy Directories:  Looking for a therapist? Here s some tips on finding a therapist or mental health provider that fits your cultural background.   List of [place] Mental Health Providers of Color (compiled by Shadi Wei, a local therapist)  [facility] [place] or Black Virtual Therapist Directory  Therapy for Black Girls   Therapy Directory or Therapy For  Network  [place]  Therapist Network  [employer] [facility]: a collection of mental health resources for  youth   [facility] Queer and Trans Therapists of Color Network  [facility]: a therapist directory that includes sliding scale/financial need.   [employer] [facility]:  a therapist directory for Black communities that includes podcasts about what it's like to go to therapy.  [facility]:  online mental health therapy for marginalized and intersectional communities that matches users with a licensed professional to chat with.

## 2025-08-04 ENCOUNTER — PATIENT OUTREACH (OUTPATIENT)
Dept: CARE COORDINATION | Facility: CLINIC | Age: 30
End: 2025-08-04
Payer: COMMERCIAL

## 2025-08-08 ENCOUNTER — OFFICE VISIT (OUTPATIENT)
Dept: FAMILY MEDICINE | Facility: CLINIC | Age: 30
End: 2025-08-08
Payer: COMMERCIAL

## 2025-08-08 VITALS
HEART RATE: 95 BPM | RESPIRATION RATE: 19 BRPM | DIASTOLIC BLOOD PRESSURE: 80 MMHG | OXYGEN SATURATION: 99 % | TEMPERATURE: 97.2 F | SYSTOLIC BLOOD PRESSURE: 113 MMHG

## 2025-08-08 DIAGNOSIS — Z11.3 SCREENING EXAMINATION FOR VENEREAL DISEASE: ICD-10-CM

## 2025-08-08 DIAGNOSIS — N76.0 VAGINITIS AND VULVOVAGINITIS: Primary | ICD-10-CM

## 2025-08-08 LAB
BACTERIAL VAGINOSIS VAG-IMP: NEGATIVE
CANDIDA DNA VAG QL NAA+PROBE: NOT DETECTED
CANDIDA GLABRATA / CANDIDA KRUSEI DNA: NOT DETECTED
HCG UR QL: NEGATIVE
HIV 1+2 AB+HIV1 P24 AG SERPL QL IA: NONREACTIVE
T VAGINALIS DNA VAG QL NAA+PROBE: NOT DETECTED

## 2025-08-08 RX ORDER — HYDROCORTISONE 25 MG/G
OINTMENT TOPICAL 2 TIMES DAILY
Qty: 30 G | Refills: 1 | Status: SHIPPED | OUTPATIENT
Start: 2025-08-08

## 2025-08-08 ASSESSMENT — ANXIETY QUESTIONNAIRES: GAD7 TOTAL SCORE: INCOMPLETE

## 2025-08-09 LAB
C TRACH DNA SPEC QL PROBE+SIG AMP: NEGATIVE
M GENITALIUM RRNA SPEC QL NAA+PROBE: NEGATIVE
N GONORRHOEA DNA SPEC QL NAA+PROBE: NEGATIVE
SPECIMEN TYPE: NORMAL
T PALLIDUM AB SER QL: NONREACTIVE

## 2025-08-16 ENCOUNTER — HEALTH MAINTENANCE LETTER (OUTPATIENT)
Age: 30
End: 2025-08-16

## (undated) RX ORDER — ONDANSETRON 2 MG/ML
INJECTION INTRAMUSCULAR; INTRAVENOUS
Status: DISPENSED
Start: 2023-09-29

## (undated) RX ORDER — ONDANSETRON 2 MG/ML
INJECTION INTRAMUSCULAR; INTRAVENOUS
Status: DISPENSED
Start: 2023-08-29

## (undated) RX ORDER — ONDANSETRON 2 MG/ML
INJECTION INTRAMUSCULAR; INTRAVENOUS
Status: DISPENSED
Start: 2023-08-25

## (undated) RX ORDER — ONDANSETRON 2 MG/ML
INJECTION INTRAMUSCULAR; INTRAVENOUS
Status: DISPENSED
Start: 2023-07-25

## (undated) RX ORDER — ONDANSETRON 2 MG/ML
INJECTION INTRAMUSCULAR; INTRAVENOUS
Status: DISPENSED
Start: 2023-08-31

## (undated) RX ORDER — ONDANSETRON 2 MG/ML
INJECTION INTRAMUSCULAR; INTRAVENOUS
Status: DISPENSED
Start: 2023-09-05

## (undated) RX ORDER — ONDANSETRON 2 MG/ML
INJECTION INTRAMUSCULAR; INTRAVENOUS
Status: DISPENSED
Start: 2023-09-07

## (undated) RX ORDER — ONDANSETRON 2 MG/ML
INJECTION INTRAMUSCULAR; INTRAVENOUS
Status: DISPENSED
Start: 2023-09-25

## (undated) RX ORDER — ONDANSETRON 2 MG/ML
INJECTION INTRAMUSCULAR; INTRAVENOUS
Status: DISPENSED
Start: 2023-09-20

## (undated) RX ORDER — ONDANSETRON 2 MG/ML
INJECTION INTRAMUSCULAR; INTRAVENOUS
Status: DISPENSED
Start: 2023-08-30

## (undated) RX ORDER — ONDANSETRON 2 MG/ML
INJECTION INTRAMUSCULAR; INTRAVENOUS
Status: DISPENSED
Start: 2023-09-27

## (undated) RX ORDER — ONDANSETRON 2 MG/ML
INJECTION INTRAMUSCULAR; INTRAVENOUS
Status: DISPENSED
Start: 2023-09-13

## (undated) RX ORDER — ONDANSETRON 2 MG/ML
INJECTION INTRAMUSCULAR; INTRAVENOUS
Status: DISPENSED
Start: 2023-09-22

## (undated) RX ORDER — ONDANSETRON 2 MG/ML
INJECTION INTRAMUSCULAR; INTRAVENOUS
Status: DISPENSED
Start: 2023-08-16

## (undated) RX ORDER — ONDANSETRON 2 MG/ML
INJECTION INTRAMUSCULAR; INTRAVENOUS
Status: DISPENSED
Start: 2023-08-17

## (undated) RX ORDER — ONDANSETRON 2 MG/ML
INJECTION INTRAMUSCULAR; INTRAVENOUS
Status: DISPENSED
Start: 2023-08-22

## (undated) RX ORDER — ONDANSETRON 2 MG/ML
INJECTION INTRAMUSCULAR; INTRAVENOUS
Status: DISPENSED
Start: 2023-09-18

## (undated) RX ORDER — ONDANSETRON 2 MG/ML
INJECTION INTRAMUSCULAR; INTRAVENOUS
Status: DISPENSED
Start: 2023-09-11